# Patient Record
Sex: FEMALE | Race: WHITE | Employment: FULL TIME | URBAN - METROPOLITAN AREA
[De-identification: names, ages, dates, MRNs, and addresses within clinical notes are randomized per-mention and may not be internally consistent; named-entity substitution may affect disease eponyms.]

---

## 2017-08-28 ENCOUNTER — OFFICE VISIT (OUTPATIENT)
Dept: FAMILY MEDICINE CLINIC | Age: 52
End: 2017-08-28

## 2017-08-28 VITALS
WEIGHT: 191 LBS | HEIGHT: 65 IN | HEART RATE: 76 BPM | OXYGEN SATURATION: 100 % | DIASTOLIC BLOOD PRESSURE: 52 MMHG | RESPIRATION RATE: 16 BRPM | BODY MASS INDEX: 31.82 KG/M2 | SYSTOLIC BLOOD PRESSURE: 107 MMHG | TEMPERATURE: 97 F

## 2017-08-28 DIAGNOSIS — Z13.31 SCREENING FOR DEPRESSION: ICD-10-CM

## 2017-08-28 DIAGNOSIS — L65.9 HAIR LOSS: Primary | ICD-10-CM

## 2017-08-28 DIAGNOSIS — F31.81 BIPOLAR 2 DISORDER, MAJOR DEPRESSIVE EPISODE (HCC): ICD-10-CM

## 2017-08-28 RX ORDER — BUSPIRONE HYDROCHLORIDE 7.5 MG/1
7.5 TABLET ORAL 2 TIMES DAILY
Qty: 60 TAB | Refills: 0 | Status: SHIPPED | OUTPATIENT
Start: 2017-08-28 | End: 2017-09-12 | Stop reason: SDUPTHER

## 2017-08-28 NOTE — PROGRESS NOTES
Carmelo Williamson is an 50 y.o. female . Chief complaint: hair loss and anxiety     Pt presents as a new pt to me. She has a hx of bipolar disorder, depression and anxiety and states she currently is experiencing \"cycling. \" Pt reports hair loss, hot flashes, weight loss, loss of appetite and anxiety. States that she has not been sleeping well - chronically sleeps 2-3 hrs a night. \"My brain is not shutting off. \" Her  has reported that she is very distracted with activities at home. Reports she is still having regular menstrual cycles-finished her cycle 4 days ago. 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 She was previously following Dr. Neal Nuñez and taking Lugene Jeff and Buspar which she stopped on her own. She also has not seen Dr. Neal Nuñez since 2015. Reports latuda and buspar were both helping and she denies any adverse effects from the medications but she stopped taking them because she is just a \"bad patient. \" Pt states medication was effective and she stopped it because she is just \"not that great at taking medication. \" Of note, pt reports being hospitalized for suicidal ideation and severe depression in 2003 at [facility]. Reports to have previously tried Paxil, remeron, prozac, wellbutrin, lexapro, celexa, abilify, lamictal, klonopin, trazadone. Denies current suicidal and homicidal ideation. Pt is extremely concerned about her hair loss and that is the primary reason she is here today. Denies using any new products. Allergies - reviewed:   No Known Allergies      Medications - reviewed:   Current Outpatient Prescriptions   Medication Sig    lurasidone (LATUDA) 20 mg tab tablet Take 1 Tab by mouth daily (with breakfast) for 30 days.  busPIRone (BUSPAR) 7.5 mg tablet Take 1 Tab by mouth two (2) times a day. No current facility-administered medications for this visit.         Past Medical History - reviewed:  Past Medical History:   Diagnosis Date    Anxiety     Asthma     Bipolar 2 disorder ([address]     Depression     Pyelonephritis 6/7/14       Immunizations - reviewed: There is no immunization history on file for this patient. ROS  Constitutional: +weight change, +loss of appeite  HEENT: denies blurry vision   Cardiac: denies palpitations  Pulm: denies sob   GI: denies nausea/vomiting  Extremities: denies CAT   Endocrine: +hot flashes, +hair loss   Psych: +anxiety, + bipolar, + depression. PHQ 9 positive. Skin: denies skin changes  Neuro: denies headache    Physical Exam  Visit Vitals    /52    Pulse 76    Temp 97 °F (36.1 °C) (Oral)    Resp 16    Ht 5' 5\" (1.651 m)    Wt 191 lb (86.6 kg)    LMP 08/21/2017    SpO2 100%    BMI 31.78 kg/m2       General appearance - Alert, NAD. Appears anxious   Head: Atraumatic. Normocephalic. No lymphadenopathy  Eyes: EOMI. Sclera white. Respiratory - LCTAB. No wheeze/rale/rhonchi  Heart - Normal rate, regular rhythm. No m/r/r  Neurological/Psych - No focal deficits. Speech normal. Appears slightly anxious  Extremities - No LE edema. Distal pulses intact  Skin - normal coloration and normal turgor. No cyanosis, no rash. Assessment/Plan  Hair loss: Unclear etiology.   -Will check TSH, CBC to rule out thyroid dysfunction and iron deficiency anemia as a possible source     Bipolar Disorder, Depression, Anxiety: Poorly controlled. Pt appears to be hypomanic and depressed warranting treatment and referral to psychiatry.   -Prescribed Latuda and Buspar and pt tolerated these well previously. Return to clinic in 2 weeks for follow-up  -Referral provided for psychiatry. Urged pt to set up appt ASAP. -Pt denies suicidal/homicidal ideation. Suicide precautions provided. Follow-up Disposition:  Return in about 2 weeks (around 9/11/2017) for follow-up of bipolar and depression .     I have discussed the diagnosis with the patient and the intended plan as seen in the above orders.  she has expressed understanding.  The patient has received an after-visit summary and questions were answered concerning future plans.  I have discussed medication side effects and warnings with the patient as well. Instructed patient to contact office or on-call physician promptly should condition worsen or any new symptoms appear and provided on-call telephone numbers. IF THE PATIENT HAS ANY SUICIDAL OR HOMICIDAL IDEATION, CALL THE OFFICE, DISCUSS WITH A SUPPORT MEMBER OR GO TO THE ER IMMEDIATELY- pt said they would.     Pt discussed with Dr. Gerardo Velasco MD  Family Medicine Resident

## 2017-08-28 NOTE — PATIENT INSTRUCTIONS
Dontrell Dixon [address] (Dr. Leidy Sheth): [email or web address]  [address] [facility]. [address], [place], [address] [place] [address] Street  Phone: [phone]  Fax: [phone]  Office Hours:  Mon: 10:00 am to 4:00 pm  Tue: 8:00 am to 6:00 pm  Wed-Thur: 8:00 am to 7:00 pm    Instructed patient to contact office or on-call physician promptly should condition worsen or any new symptoms appear and provided on-call telephone numbers. IF THE PATIENT HAS ANY SUICIDAL OR HOMICIDAL IDEATION, CALL THE OFFICE, DISCUSS WITH A SUPPORT MEMBER OR GO TO THE ER IMMEDIATELY- pt said they would. Bipolar Disorder: Care Instructions  Your Care Instructions  Bipolar disorder is an illness that causes extreme mood changes, from times of very high energy (manic episodes) to times of depression. But many people with bipolar disorder show only the symptoms of depression. These moods may cause problems with your work, school, family life, friendships, and how well you function. This disease is also called manic-depression. There is no cure for bipolar disorder, but it can be helped with medicines. Counseling may also help. It is important to take your medicines exactly as prescribed, even when you feel well. You may need lifelong treatment. Follow-up care is a key part of your treatment and safety. Be sure to make and go to all appointments, and call your doctor if you are having problems. It's also a good idea to know your test results and keep a list of the medicines you take. How can you care for yourself at home? · Be safe with medicines. Take your medicines exactly as prescribed. Do not stop or change a medicine without talking to your doctor first. Baldemar Mcmullen and your doctor may need to try different combinations of medicines to find what works for you. · Take your medicines on schedule to keep your moods even. When you feel good, you may think that you do not need your medicines.  But it is important to keep taking them.  · Go to your counseling sessions. Call and talk with your counselor if you can't go to a session or if you don't think the sessions are helping. Do not just stop going. · Get at least 30 minutes of activity on most days of the week. Walking is a good choice. You also may want to do other things, such as running, swimming, or cycling. · Get enough sleep. Keep your room dark and quiet. Try to go to bed at the same time every night. · Eat a healthy diet. This includes whole grains, dairy, fruits, vegetables, and protein. Eat foods from each of these groups. · Try to lower your stress. Manage your time, build a strong support system, and lead a healthy lifestyle. To lower your stress, try physical activity, slow deep breathing, or getting a massage. · Do not use alcohol or illegal drugs. · Learn the early signs of your mood changes. You can then take steps to help yourself feel better. · Ask for help from friends and family when you need it. You may need help with daily chores when you are depressed. When you are manic, you may need support to control your high energy levels. What should you do if someone in your family has bipolar disorder? · Learn about the disease and the signs that it is getting worse. · Remind your family member that you love him or her. · Make a plan with all family members about how to take care of your loved one when his or her symptoms are bad. · Talk about your fears and concerns and those of other family members. Seek counseling if needed. · Do not focus attention only on the person who is in treatment. · Remind yourself that it will take time for changes to occur. · Do not blame yourself for the disease. · Know your legal rights and the legal rights of your family member. Support groups or counselors can help you with this information. · Take care of yourself. Keep up with your own interests, such as your career, hobbies, and friends.  Use exercise, positive self-talk, deep breathing, and other relaxing exercises to help lower your stress. · Give yourself time to grieve. You may need to deal with emotions such as anger, fear, and frustration. After you work through your feelings, you will be better able to care for yourself and your family. · If you are having a hard time with your feelings or with your relationship with your family member, talk with a counselor. When should you call for help? Call 911 anytime you think you may need emergency care. For example, call if:  · You feel like hurting yourself or someone else. · Someone who has bipolar disorder displays dangerous behavior, and you think the person might hurt himself or herself or someone else. Call your doctor now or seek immediate medical care if:  · You hear voices. · Someone you know has bipolar disorder and talks about suicide. Keep the numbers for these national suicide hotlines: 0-188-024-TALK (6-331.882.6820) and 1-358-HGSZSAW (0-429.402.7615). If a suicide threat seems real, with a specific plan and a way to carry it out, stay with the person, or ask someone you trust to stay with the person, until you can get help. · Someone you know has bipolar disorder and:  ¨ Starts to give away possessions. ¨ Is using illegal drugs or drinking alcohol heavily. ¨ Talks or writes about death, including writing suicide notes or talking about guns, knives, or pills. ¨ Talks or writes about hurting someone else. ¨ Starts to spend a lot of time alone. ¨ Acts very aggressively or suddenly appears calm. ¨ Talks about beliefs that are not based in reality (delusions). Watch closely for changes in your health, and be sure to contact your doctor if:  · You cannot go to your counseling sessions. Where can you learn more? Go to http://lilia-rohit.info/. Enter K052 in the search box to learn more about \"Bipolar Disorder: Care Instructions. \"  Current as of: July 26, 2016  Content Version: 11.3  © 4577-8445 [employer]. Care instructions adapted under license by [employer] (which disclaims liability or warranty for this information). If you have questions about a medical condition or this instruction, always ask your healthcare professional. [address] any warranty or liability for your use of this information. Depression and Chronic Disease: Care Instructions  Your Care Instructions  A chronic disease is one that you have for a long time. Some chronic diseases can be controlled, but they usually cannot be cured. Depression is common in people with chronic diseases, but it often goes unnoticed. Many people have concerns about seeking treatment for a mental health problem. You may think it's a sign of weakness, or you don't want people to know about it. It's important to overcome these reasons for not seeking treatment. Treating depression or anxiety is good for your health. Follow-up care is a key part of your treatment and safety. Be sure to make and go to all appointments, and call your doctor if you are having problems. It's also a good idea to know your test results and keep a list of the medicines you take. How can you care for yourself at home? Watch for symptoms of depression  The symptoms of depression are often subtle at first. You may think they are caused by your disease rather than depression. Or you may think it is normal to be depressed when you have a chronic disease. If you are depressed you may:  · Feel sad or hopeless. · Feel guilty or worthless. · Not enjoy the things you used to enjoy. · Feel hopeless, as though life is not worth living. · Have trouble thinking or remembering. · Have low energy, and you may not eat or sleep well. · Pull away from others. · Think often about death or killing yourself.  (Keep the numbers for these national suicide hotlines: 0-344-539-TALK [1-434.463.4419] and 6-107-LAIHHRR [1-831.562.4522]. )  Get treatment  By treating your depression, you can feel more hopeful and have more energy. If you feel better, you may take better care of yourself, so your health may improve. · Talk to your doctor if you have any changes in mood during treatment for your disease. · Ask your doctor for help. Counseling, antidepressant medicine, or a combination of the two can help most people with depression. Often a combination works best. Counseling can also help you cope with having a chronic disease. When should you call for help? Call 911 anytime you think you may need emergency care. For example, call if:  · You feel like hurting yourself or someone else. · Someone you know has depression and is about to attempt or is attempting suicide. Call your doctor now or seek immediate medical care if:  · You hear voices. · Someone you know has depression and:  ¨ Starts to give away his or her possessions. ¨ Uses illegal drugs or drinks alcohol heavily. ¨ Talks or writes about death, including writing suicide notes or talking about guns, knives, or pills. ¨ Starts to spend a lot of time alone. ¨ Acts very aggressively or suddenly appears calm. Watch closely for changes in your health, and be sure to contact your doctor if:  · You do not get better as expected. Where can you learn more? Go to http://lilia-rohit.info/. Enter T007 in the search box to learn more about \"Depression and Chronic Disease: Care Instructions. \"  Current as of: July 26, 2016  Content Version: 11.3  © 7982-3972 [employer]. Care instructions adapted under license by [employer] (which disclaims liability or warranty for this information). If you have questions about a medical condition or this instruction, always ask your healthcare professional. [address] any warranty or liability for your use of this information.

## 2017-08-28 NOTE — MR AVS SNAPSHOT
Visit Information Date & Time Provider Department Dept. Phone Encounter #  
 8/28/2017  4:10 PM Ernie Grimes MD [facility] [phone] [id number] Follow-up Instructions Return in about 2 weeks (around 9/11/2017) for follow-up of bipolar and depression . Upcoming Health Maintenance Date Due DTaP/Tdap/Td series (1 - Tdap) 3/31/1988 PAP AKA CERVICAL CYTOLOGY 7/12/2015 BREAST CANCER SCRN MAMMOGRAM 3/31/2017 FOBT Q 1 YEAR AGE 50-75 3/31/2017 INFLUENZA AGE 9 TO ADULT 8/1/2017 Allergies as of 8/28/2017  Review Complete On: 6/19/2015 By: Gilmer Perdomo RN No Known Allergies Current Immunizations  Never Reviewed No immunizations on file. Not reviewed this visit You Were Diagnosed With   
  
 Codes Comments Hair loss    -  Primary ICD-10-CM: L65.9 ICD-9-CM: 704.00 Screening for depression     ICD-10-CM: Z13.89 ICD-9-CM: V79.0 Bipolar 2 disorder, major depressive episode (HCC)     ICD-10-CM: F31.81 
ICD-9-CM: 296.89 Vitals BP Pulse Temp Resp Height(growth percentile) Weight(growth percentile) 107/52 76 97 °F (36.1 °C) (Oral) 16 5' 5\" (1.651 m) 191 lb (86.6 kg) LMP SpO2 BMI OB Status Smoking Status 08/21/2017 100% 31.78 kg/m2 Having regular periods Former Smoker BMI and BSA Data Body Mass Index Body Surface Area 31.78 kg/m 2 1.99 m 2 Preferred Pharmacy Pharmacy Name Phone [id number], [address] AT [facility] [phone] Your Updated Medication List  
  
   
This list is accurate as of: 8/28/17  4:46 PM.  Always use your most recent med list.  
  
  
  
  
 busPIRone 7.5 mg tablet Commonly known as:  BUSPAR Take 1 Tab by mouth two (2) times a day. lurasidone 20 mg Tab tablet Commonly known as:  Lars Car Take 1 Tab by mouth daily (with breakfast) for 30 days. Prescriptions Sent to Pharmacy Refills  
 lurasidone (LATUDA) 20 mg tab tablet 0 Sig: Take 1 Tab by mouth daily (with breakfast) for 30 days. Class: Normal  
 Pharmacy: [address] [place] [address] Ph #: [phone] Route: Oral  
 busPIRone (BUSPAR) 7.5 mg tablet 0 Sig: Take 1 Tab by mouth two (2) times a day. Class: Normal  
 Pharmacy: [address] [place] [address] Ph #: [phone] Route: Oral  
  
We Performed the Following CBC WITH AUTOMATED DIFF [39435 CPT(R)] METABOLIC PANEL, COMPREHENSIVE [34108 CPT(R)] [address] [facility] [email or web address] [ \[facility]\""] REFERRAL TO PSYCHIATRY [REF91 Custom] Comments:  
 Please evaluate patient for bipolar disorder and depression TSH RFX ON ABNORMAL TO FREE T4 [[id number] Custom] Follow-up Instructions Return in about 2 weeks (around 9/11/2017) for follow-up of bipolar and depression . Referral Information Referral ID Referred By Referred To  
  
 [id number] CECILIA RAMOS Not Available Visits Status Start Date End Date 1 New Request 8/28/17 8/28/18 If your referral has a status of pending review or denied, additional information will be sent to support the outcome of this decision. Patient Instructions Dontrell Dixon 149 (Dr. Sandra Hobbs): [email or web address] 
[address] [facility] [address] Phone: [phone] Fax: [phone] Office Hours: 
Mon: 10:00 am to 4:00 pm 
Tue: 8:00 am to 6:00 pm 
Wed-Thur: 8:00 am to 7:00 pm 
 
Instructed patient to contact office or on-call physician promptly should condition worsen or any new symptoms appear and provided on-call telephone numbers. IF THE PATIENT HAS ANY SUICIDAL OR HOMICIDAL IDEATION, CALL THE OFFICE, DISCUSS WITH A SUPPORT MEMBER OR GO TO THE ER IMMEDIATELY- pt said they would. Bipolar Disorder: Care Instructions Your Care Instructions Bipolar disorder is an illness that causes extreme mood changes, from times of very high energy (manic episodes) to times of depression. But many people with bipolar disorder show only the symptoms of depression. These moods may cause problems with your work, school, family life, friendships, and how well you function. This disease is also called manic-depression. There is no cure for bipolar disorder, but it can be helped with medicines. Counseling may also help. It is important to take your medicines exactly as prescribed, even when you feel well. You may need lifelong treatment. Follow-up care is a key part of your treatment and safety. Be sure to make and go to all appointments, and call your doctor if you are having problems. It's also a good idea to know your test results and keep a list of the medicines you take. How can you care for yourself at home? · Be safe with medicines. Take your medicines exactly as prescribed. Do not stop or change a medicine without talking to your doctor first. James Pratt and your doctor may need to try different combinations of medicines to find what works for you. · Take your medicines on schedule to keep your moods even. When you feel good, you may think that you do not need your medicines. But it is important to keep taking them. · Go to your counseling sessions. Call and talk with your counselor if you can't go to a session or if you don't think the sessions are helping. Do not just stop going. · Get at least 30 minutes of activity on most days of the week. Walking is a good choice. You also may want to do other things, such as running, swimming, or cycling. · Get enough sleep. Keep your room dark and quiet. Try to go to bed at the same time every night. · Eat a healthy diet. This includes whole grains, dairy, fruits, vegetables, and protein. Eat foods from each of these groups. · Try to lower your stress.  Manage your time, build a strong support system, and lead a healthy lifestyle. To lower your stress, try physical activity, slow deep breathing, or getting a massage. · Do not use alcohol or illegal drugs. · Learn the early signs of your mood changes. You can then take steps to help yourself feel better. · Ask for help from friends and family when you need it. You may need help with daily chores when you are depressed. When you are manic, you may need support to control your high energy levels. What should you do if someone in your family has bipolar disorder? · Learn about the disease and the signs that it is getting worse. · Remind your family member that you love him or her. · Make a plan with all family members about how to take care of your loved one when his or her symptoms are bad. · Talk about your fears and concerns and those of other family members. Seek counseling if needed. · Do not focus attention only on the person who is in treatment. · Remind yourself that it will take time for changes to occur. · Do not blame yourself for the disease. · Know your legal rights and the legal rights of your family member. Support groups or counselors can help you with this information. · Take care of yourself. Keep up with your own interests, such as your career, hobbies, and friends. Use exercise, positive self-talk, deep breathing, and other relaxing exercises to help lower your stress. · Give yourself time to grieve. You may need to deal with emotions such as anger, fear, and frustration. After you work through your feelings, you will be better able to care for yourself and your family. · If you are having a hard time with your feelings or with your relationship with your family member, talk with a counselor. When should you call for help? Call 911 anytime you think you may need emergency care. For example, call if: 
· You feel like hurting yourself or someone else.  
· Someone who has bipolar disorder displays dangerous behavior, and you think the person might hurt himself or herself or someone else. Call your doctor now or seek immediate medical care if: 
· You hear voices. · Someone you know has bipolar disorder and talks about suicide. Keep the numbers for these national suicide hotlines: 8-320-530-TALK (9-984.209.4011) and 4-652-BFJCNIL (0-311.902.1671). If a suicide threat seems real, with a specific plan and a way to carry it out, stay with the person, or ask someone you trust to stay with the person, until you can get help. · Someone you know has bipolar disorder and: 
¨ Starts to give away possessions. ¨ Is using illegal drugs or drinking alcohol heavily. ¨ Talks or writes about death, including writing suicide notes or talking about guns, knives, or pills. ¨ Talks or writes about hurting someone else. ¨ Starts to spend a lot of time alone. ¨ Acts very aggressively or suddenly appears calm. ¨ Talks about beliefs that are not based in reality (delusions). Watch closely for changes in your health, and be sure to contact your doctor if: 
· You cannot go to your counseling sessions. Where can you learn more? Go to http://lilia-rohit.info/. Enter K052 in the search box to learn more about \"Bipolar Disorder: Care Instructions. \" Current as of: July 26, 2016 Content Version: 11.3 © 7231-6603 Rainbow. Care instructions adapted under license by [employer] (which disclaims liability or warranty for this information). If you have questions about a medical condition or this instruction, always ask your healthcare professional. [address] any warranty or liability for your use of this information. Depression and Chronic Disease: Care Instructions Your Care Instructions A chronic disease is one that you have for a long time. Some chronic diseases can be controlled, but they usually cannot be cured.  Depression is common in people with chronic diseases, but it often goes unnoticed. Many people have concerns about seeking treatment for a mental health problem. You may think it's a sign of weakness, or you don't want people to know about it. It's important to overcome these reasons for not seeking treatment. Treating depression or anxiety is good for your health. Follow-up care is a key part of your treatment and safety. Be sure to make and go to all appointments, and call your doctor if you are having problems. It's also a good idea to know your test results and keep a list of the medicines you take. How can you care for yourself at home? Watch for symptoms of depression The symptoms of depression are often subtle at first. You may think they are caused by your disease rather than depression. Or you may think it is normal to be depressed when you have a chronic disease. If you are depressed you may: · Feel sad or hopeless. · Feel guilty or worthless. · Not enjoy the things you used to enjoy. · Feel hopeless, as though life is not worth living. · Have trouble thinking or remembering. · Have low energy, and you may not eat or sleep well. · Pull away from others. · Think often about death or killing yourself. (Keep the numbers for these national suicide hotlines: 2-511-562-TALK [1-157.971.4622] and 6-685-HTFRVBQ [1-166.238.8683]. ) Get treatment By treating your depression, you can feel more hopeful and have more energy. If you feel better, you may take better care of yourself, so your health may improve. · Talk to your doctor if you have any changes in mood during treatment for your disease. · Ask your doctor for help. Counseling, antidepressant medicine, or a combination of the two can help most people with depression. Often a combination works best. Counseling can also help you cope with having a chronic disease. When should you call for help? Call 911 anytime you think you may need emergency care. For example, call if: 
· You feel like hurting yourself or someone else. · Someone you know has depression and is about to attempt or is attempting suicide. Call your doctor now or seek immediate medical care if: 
· You hear voices. · Someone you know has depression and: 
¨ Starts to give away his or her possessions. ¨ Uses illegal drugs or drinks alcohol heavily. ¨ Talks or writes about death, including writing suicide notes or talking about guns, knives, or pills. ¨ Starts to spend a lot of time alone. ¨ Acts very aggressively or suddenly appears calm. Watch closely for changes in your health, and be sure to contact your doctor if: 
· You do not get better as expected. Where can you learn more? Go to http://lilia-rohit.info/. Enter V422 in the search box to learn more about \"Depression and Chronic Disease: Care Instructions. \" Current as of: July 26, 2016 Content Version: 11.3 © 9296-6519 Healthwise, Incorporated. Care instructions adapted under license by [employer] (which disclaims liability or warranty for this information). If you have questions about a medical condition or this instruction, always ask your healthcare professional. [address] any warranty or liability for your use of this information. Introducing [facility] & HEALTH SERVICES! Brian Marin introduces [employer] patient portal. Now you can access parts of your medical record, email your doctor's office, and request medication refills online. 1. In your internet browser, go to [email or web address] 2. Click on the First Time User? Click Here link in the Sign In box. You will see the New Member Sign Up page. 3. Enter your [employer] Access Code exactly as it appears below. You will not need to use this code after youve completed the sign-up process.  If you do not sign up before the expiration date, you must request a new code. · [employer] Access Code: [email or web address] Expires: 11/26/2017  4:46 PM 
 
4. Enter the last four digits of your Social Security Number (xxxx) and Date of Birth (mm/dd/yyyy) as indicated and click Submit. You will be taken to the next sign-up page. 5. Create a [employer] ID. This will be your [employer] login ID and cannot be changed, so think of one that is secure and easy to remember. 6. Create a [employer] password. You can change your password at any time. 7. Enter your Password Reset Question and Answer. This can be used at a later time if you forget your password. 8. Enter your e-mail address. You will receive e-mail notification when new information is available in [address] 9. Click Sign Up. You can now view and download portions of your medical record. 10. Click the Download Summary menu link to download a portable copy of your medical information. If you have questions, please visit the Frequently Asked Questions section of the [employer] website. Remember, [employer] is NOT to be used for urgent needs. For medical emergencies, dial 911. Now available from your iPhone and Android! Please provide this summary of care documentation to your next provider. Your primary care clinician is listed as Rachel Campuzano. If you have any questions after today's visit, please call [phone].

## 2017-08-29 LAB
ALBUMIN SERPL-MCNC: 4.3 G/DL (ref 3.5–5.5)
ALBUMIN/GLOB SERPL: 1.5 {RATIO} (ref 1.2–2.2)
ALP SERPL-CCNC: 73 IU/L (ref 39–117)
ALT SERPL-CCNC: 21 IU/L (ref 0–32)
AST SERPL-CCNC: 23 IU/L (ref 0–40)
BASOPHILS # BLD AUTO: 0.1 X10E3/UL (ref 0–0.2)
BASOPHILS NFR BLD AUTO: 1 %
BILIRUB SERPL-MCNC: 0.4 MG/DL (ref 0–1.2)
BUN SERPL-MCNC: 15 MG/DL (ref 6–24)
BUN/CREAT SERPL: 19 (ref 9–23)
CALCIUM SERPL-MCNC: 9.7 MG/DL (ref 8.7–10.2)
CHLORIDE SERPL-SCNC: 101 MMOL/L (ref 96–106)
CO2 SERPL-SCNC: 21 MMOL/L (ref 18–29)
CREAT SERPL-MCNC: 0.79 MG/DL (ref 0.57–1)
EOSINOPHIL # BLD AUTO: 0.3 X10E3/UL (ref 0–0.4)
EOSINOPHIL NFR BLD AUTO: 3 %
ERYTHROCYTE [DISTWIDTH] IN BLOOD BY AUTOMATED COUNT: 14.1 % (ref 12.3–15.4)
GLOBULIN SER CALC-MCNC: 2.8 G/DL (ref 1.5–4.5)
GLUCOSE SERPL-MCNC: 88 MG/DL (ref 65–99)
HCT VFR BLD AUTO: 39.5 % (ref 34–46.6)
HGB BLD-MCNC: 12.8 G/DL (ref 11.1–15.9)
IMM GRANULOCYTES # BLD: 0 X10E3/UL (ref 0–0.1)
IMM GRANULOCYTES NFR BLD: 0 %
LYMPHOCYTES # BLD AUTO: 3.2 X10E3/UL (ref 0.7–3.1)
LYMPHOCYTES NFR BLD AUTO: 30 %
MCH RBC QN AUTO: 29.9 PG (ref 26.6–33)
MCHC RBC AUTO-ENTMCNC: 32.4 G/DL (ref 31.5–35.7)
MCV RBC AUTO: 92 FL (ref 79–97)
MONOCYTES # BLD AUTO: 1 X10E3/UL (ref 0.1–0.9)
MONOCYTES NFR BLD AUTO: 9 %
NEUTROPHILS # BLD AUTO: 6.1 X10E3/UL (ref 1.4–7)
NEUTROPHILS NFR BLD AUTO: 57 %
PLATELET # BLD AUTO: 363 X10E3/UL (ref 150–379)
POTASSIUM SERPL-SCNC: 4.1 MMOL/L (ref 3.5–5.2)
PROT SERPL-MCNC: 7.1 G/DL (ref 6–8.5)
RBC # BLD AUTO: 4.28 X10E6/UL (ref 3.77–5.28)
SODIUM SERPL-SCNC: 139 MMOL/L (ref 134–144)
TSH SERPL DL<=0.005 MIU/L-ACNC: 3.07 UIU/ML (ref 0.45–4.5)
WBC # BLD AUTO: 10.6 X10E3/UL (ref 3.4–10.8)

## 2017-09-12 ENCOUNTER — OFFICE VISIT (OUTPATIENT)
Dept: FAMILY MEDICINE CLINIC | Age: 52
End: 2017-09-12

## 2017-09-12 VITALS
WEIGHT: 194 LBS | TEMPERATURE: 97.9 F | OXYGEN SATURATION: 99 % | HEART RATE: 54 BPM | SYSTOLIC BLOOD PRESSURE: 138 MMHG | BODY MASS INDEX: 32.32 KG/M2 | HEIGHT: 65 IN | DIASTOLIC BLOOD PRESSURE: 62 MMHG | RESPIRATION RATE: 16 BRPM

## 2017-09-12 DIAGNOSIS — F31.81 BIPOLAR 2 DISORDER, MAJOR DEPRESSIVE EPISODE (HCC): Primary | ICD-10-CM

## 2017-09-12 DIAGNOSIS — F41.9 ANXIETY: Chronic | ICD-10-CM

## 2017-09-12 RX ORDER — BUSPIRONE HYDROCHLORIDE 7.5 MG/1
15 TABLET ORAL 2 TIMES DAILY
Qty: 60 TAB | Refills: 0 | Status: SHIPPED | OUTPATIENT
Start: 2017-09-12 | End: 2017-12-12 | Stop reason: SDUPTHER

## 2017-09-12 NOTE — MR AVS SNAPSHOT
Visit Information Date & Time Provider Department Dept. Phone Encounter #  
 9/12/2017  4:10 PM Nicole Harkins MD [address] [place] [address] [phone] [id number] Follow-up Instructions Return in about 3 weeks (around 10/3/2017). Upcoming Health Maintenance Date Due DTaP/Tdap/Td series (1 - Tdap) 3/31/1988 PAP AKA CERVICAL CYTOLOGY 7/12/2015 BREAST CANCER SCRN MAMMOGRAM 3/31/2017 FOBT Q 1 YEAR AGE 50-75 3/31/2017 INFLUENZA AGE 9 TO ADULT 8/1/2017 Allergies as of 9/12/2017  Review Complete On: 9/12/2017 By: Hawk Andrade LPN No Known Allergies Current Immunizations  Never Reviewed No immunizations on file. Not reviewed this visit You Were Diagnosed With   
  
 Codes Comments Bipolar 2 disorder, major depressive episode ([facility]    -  Primary ICD-10-CM: F31.81 
ICD-9-CM: 296.89 Anxiety     ICD-10-CM: F41.9 ICD-9-CM: 300.00 Vitals BP Pulse Temp Resp Height(growth percentile) Weight(growth percentile)  
 138/62 (!) 54 97.9 °F (36.6 °C) (Oral) 16 5' 5\" (1.651 m) 194 lb (88 kg) LMP SpO2 BMI OB Status Smoking Status 08/21/2017 99% 32.28 kg/m2 Having regular periods Former Smoker BMI and BSA Data Body Mass Index Body Surface Area  
 32.28 kg/m 2 2.01 m 2 [facility]erred [facility] Name Phone [id number] [address] [facility] [phone] Your Updated Medication List  
  
   
This list is accurate as of: 9/12/17  4:52 PM.  Always use your most recent med list.  
  
  
  
  
 busPIRone 7.5 mg tablet Commonly known as:  BUSPAR Take 2 Tabs by mouth two (2) times a day. lurasidone 40 mg Tab tablet Commonly known as:  Euell Stew Take 1 Tab by mouth daily (with breakfast) for 30 days. Prescriptions Sent to Pharmacy  Refills  
 lurasidone (LATUDA) 40 mg tab tablet 0  
 Sig: Take 1 Tab by mouth daily (with breakfast) for 30 days. Class: Normal  
 Pharmacy: [address] [place] [address] Ph #: [phone] Route: Oral  
 busPIRone (BUSPAR) 7.5 mg tablet 0 Sig: Take 2 Tabs by mouth two (2) times a day. Class: Normal  
 Pharmacy: [address] [place] [address] Ph #: [phone] Route: Oral  
  
Follow-up Instructions Return in about 3 weeks (around 10/3/2017). Patient Instructions Bipolar Disorder: Care Instructions Your Care Instructions Bipolar disorder is an illness that causes extreme mood changes, from times of very high energy (manic episodes) to times of depression. But many people with bipolar disorder show only the symptoms of depression. These moods may cause problems with your work, school, family life, friendships, and how well you function. This disease is also called manic-depression. There is no cure for bipolar disorder, but it can be helped with medicines. Counseling may also help. It is important to take your medicines exactly as prescribed, even when you feel well. You may need lifelong treatment. Follow-up care is a key part of your treatment and safety. Be sure to make and go to all appointments, and call your doctor if you are having problems. It's also a good idea to know your test results and keep a list of the medicines you take. How can you care for yourself at home? · Be safe with medicines. Take your medicines exactly as prescribed. Do not stop or change a medicine without talking to your doctor first. Samira Xie and your doctor may need to try different combinations of medicines to find what works for you. · Take your medicines on schedule to keep your moods even. When you feel good, you may think that you do not need your medicines. But it is important to keep taking them. · Go to your counseling sessions. Call and talk with your counselor if you can't go to a session or if you don't think the sessions are helping. Do not just stop going. · Get at least 30 minutes of activity on most days of the week. Walking is a good choice. You also may want to do other things, such as running, swimming, or cycling. · Get enough sleep. Keep your room dark and quiet. Try to go to bed at the same time every night. · Eat a healthy diet. This includes whole grains, dairy, fruits, vegetables, and protein. Eat foods from each of these groups. · Try to lower your stress. Manage your time, build a strong support system, and lead a healthy lifestyle. To lower your stress, try physical activity, slow deep breathing, or getting a massage. · Do not use alcohol or illegal drugs. · Learn the early signs of your mood changes. You can then take steps to help yourself feel better. · Ask for help from friends and family when you need it. You may need help with daily chores when you are depressed. When you are manic, you may need support to control your high energy levels. What should you do if someone in your family has bipolar disorder? · Learn about the disease and the signs that it is getting worse. · Remind your family member that you love him or her. · Make a plan with all family members about how to take care of your loved one when his or her symptoms are bad. · Talk about your fears and concerns and those of other family members. Seek counseling if needed. · Do not focus attention only on the person who is in treatment. · Remind yourself that it will take time for changes to occur. · Do not blame yourself for the disease. · Know your legal rights and the legal rights of your family member. Support groups or counselors can help you with this information. · Take care of yourself. Keep up with your own interests, such as your career, hobbies, and friends.  Use exercise, positive self-talk, deep breathing, and other relaxing exercises to help lower your stress. · Give yourself time to grieve. You may need to deal with emotions such as anger, fear, and frustration. After you work through your feelings, you will be better able to care for yourself and your family. · If you are having a hard time with your feelings or with your relationship with your family member, talk with a counselor. When should you call for help? Call 911 anytime you think you may need emergency care. For example, call if: 
· You feel like hurting yourself or someone else. · Someone who has bipolar disorder displays dangerous behavior, and you think the person might hurt himself or herself or someone else. Call your doctor now or seek immediate medical care if: 
· You hear voices. · Someone you know has bipolar disorder and talks about suicide. Keep the numbers for these national suicide hotlines: 3-134-784-TALK (8-769.752.6357) and 1-169-DIOPUAP (3-917.357.5128). If a suicide threat seems real, with a specific plan and a way to carry it out, stay with the person, or ask someone you trust to stay with the person, until you can get help. · Someone you know has bipolar disorder and: 
¨ Starts to give away possessions. ¨ Is using illegal drugs or drinking alcohol heavily. ¨ Talks or writes about death, including writing suicide notes or talking about guns, knives, or pills. ¨ Talks or writes about hurting someone else. ¨ Starts to spend a lot of time alone. ¨ Acts very aggressively or suddenly appears calm. ¨ Talks about beliefs that are not based in reality (delusions). Watch closely for changes in your health, and be sure to contact your doctor if: 
· You cannot go to your counseling sessions. Where can you learn more? Go to http://lilia-rohit.info/. Enter K052 in the search box to learn more about \"Bipolar Disorder: Care Instructions. \" Current as of: July 26, 2016 Content Version: 11.3 © 5290-3858 Healthwise, Incorporated. Care instructions adapted under license by [employer] (which disclaims liability or warranty for this information). If you have questions about a medical condition or this instruction, always ask your healthcare professional. [address] any warranty or liability for your use of this information. Introducing [facility] & HEALTH SERVICES! Dear Liliya Simpson: 
Thank you for requesting a Pelikon account. Our records indicate that you already have an active Pelikon account. You can access your account anytime at [email or web address] Online Did you know that you can access your hospital and ER discharge instructions at any time in Pelikon? You can also review all of your test results from your hospital stay or ER visit. Additional Information If you have questions, please visit the Frequently Asked Questions section of the Pelikon website at [email or web address] Remember, Pelikon is NOT to be used for urgent needs. For medical emergencies, dial 911. Now available from your iPhone and Android! Please provide this summary of care documentation to your next provider. Your primary care clinician is listed as Rachel Campuzano. If you have any questions after today's visit, please call [phone].

## 2017-09-12 NOTE — PROGRESS NOTES
Chief Complaint   Patient presents with    Depression     f/u    Anxiety     1. Have you been to the ER, urgent care clinic since your last visit? Hospitalized since your last visit? No    2. Have you seen or consulted any other health care providers outside of the [address] [place] [address] since your last visit? Include any pap smears or colon screening.  No

## 2017-09-12 NOTE — PATIENT INSTRUCTIONS
Bipolar Disorder: Care Instructions  Your Care Instructions  Bipolar disorder is an illness that causes extreme mood changes, from times of very high energy (manic episodes) to times of depression. But many people with bipolar disorder show only the symptoms of depression. These moods may cause problems with your work, school, family life, friendships, and how well you function. This disease is also called manic-depression. There is no cure for bipolar disorder, but it can be helped with medicines. Counseling may also help. It is important to take your medicines exactly as prescribed, even when you feel well. You may need lifelong treatment. Follow-up care is a key part of your treatment and safety. Be sure to make and go to all appointments, and call your doctor if you are having problems. It's also a good idea to know your test results and keep a list of the medicines you take. How can you care for yourself at home? · Be safe with medicines. Take your medicines exactly as prescribed. Do not stop or change a medicine without talking to your doctor first. Riesa Body and your doctor may need to try different combinations of medicines to find what works for you. · Take your medicines on schedule to keep your moods even. When you feel good, you may think that you do not need your medicines. But it is important to keep taking them. · Go to your counseling sessions. Call and talk with your counselor if you can't go to a session or if you don't think the sessions are helping. Do not just stop going. · Get at least 30 minutes of activity on most days of the week. Walking is a good choice. You also may want to do other things, such as running, swimming, or cycling. · Get enough sleep. Keep your room dark and quiet. Try to go to bed at the same time every night. · Eat a healthy diet. This includes whole grains, dairy, fruits, vegetables, and protein. Eat foods from each of these groups. · Try to lower your stress. Manage your time, build a strong support system, and lead a healthy lifestyle. To lower your stress, try physical activity, slow deep breathing, or getting a massage. · Do not use alcohol or illegal drugs. · Learn the early signs of your mood changes. You can then take steps to help yourself feel better. · Ask for help from friends and family when you need it. You may need help with daily chores when you are depressed. When you are manic, you may need support to control your high energy levels. What should you do if someone in your family has bipolar disorder? · Learn about the disease and the signs that it is getting worse. · Remind your family member that you love him or her. · Make a plan with all family members about how to take care of your loved one when his or her symptoms are bad. · Talk about your fears and concerns and those of other family members. Seek counseling if needed. · Do not focus attention only on the person who is in treatment. · Remind yourself that it will take time for changes to occur. · Do not blame yourself for the disease. · Know your legal rights and the legal rights of your family member. Support groups or counselors can help you with this information. · Take care of yourself. Keep up with your own interests, such as your career, hobbies, and friends. Use exercise, positive self-talk, deep breathing, and other relaxing exercises to help lower your stress. · Give yourself time to grieve. You may need to deal with emotions such as anger, fear, and frustration. After you work through your feelings, you will be better able to care for yourself and your family. · If you are having a hard time with your feelings or with your relationship with your family member, talk with a counselor. When should you call for help? Call 911 anytime you think you may need emergency care. For example, call if:  · You feel like hurting yourself or someone else.   · Someone who has bipolar disorder displays dangerous behavior, and you think the person might hurt himself or herself or someone else. Call your doctor now or seek immediate medical care if:  · You hear voices. · Someone you know has bipolar disorder and talks about suicide. Keep the numbers for these national suicide hotlines: 9-073-434-TALK (4-403.713.8400) and 7-474-YZAREOA (4-178.484.6062). If a suicide threat seems real, with a specific plan and a way to carry it out, stay with the person, or ask someone you trust to stay with the person, until you can get help. · Someone you know has bipolar disorder and:  ¨ Starts to give away possessions. ¨ Is using illegal drugs or drinking alcohol heavily. ¨ Talks or writes about death, including writing suicide notes or talking about guns, knives, or pills. ¨ Talks or writes about hurting someone else. ¨ Starts to spend a lot of time alone. ¨ Acts very aggressively or suddenly appears calm. ¨ Talks about beliefs that are not based in reality (delusions). Watch closely for changes in your health, and be sure to contact your doctor if:  · You cannot go to your counseling sessions. Where can you learn more? Go to http://lilia-rohit.info/. Enter K052 in the search box to learn more about \"Bipolar Disorder: Care Instructions. \"  Current as of: July 26, 2016  Content Version: 11.3  © 1128-9854 [employer]. Care instructions adapted under license by [employer] (which disclaims liability or warranty for this information). If you have questions about a medical condition or this instruction, always ask your healthcare professional. [address] any warranty or liability for your use of this information.

## 2017-09-12 NOTE — PROGRESS NOTES
Abby Cool is an 50 y.o. female . Chief complaint: follow-up of bipolar, depression, anxiety     50year old presenting for follow-up of bipolar, depression, anxiety. She was seen on 8/28 and started on Latuda and Buspar at the lowest doses. She states that she has had some improvement of symptoms-has had fewer panic attacks and mood is overall slightly better. She is still sleeping approximately 3 hours a night and continues to feel as though her brain never shuts off. She has attempted to arrange an appt with a psychiatrist however has had some insurance coverage issues; is currently working with her insurance company to establish care with an in-network psychiatrist. She continues to be able to go to work daily without any issues and has a good support system at home. Has not had any side effects from her medication. Denies homicidal/suicidal ideation. Allergies - reviewed:   No Known Allergies      Medications - reviewed:   Current Outpatient Prescriptions   Medication Sig    lurasidone (LATUDA) 40 mg tab tablet Take 1 Tab by mouth daily (with breakfast) for 30 days.  busPIRone (BUSPAR) 7.5 mg tablet Take 2 Tabs by mouth two (2) times a day. No current facility-administered medications for this visit. Past Medical History - reviewed:  Past Medical History:   Diagnosis Date    Anxiety     Asthma     Bipolar 2 disorder ([facility] [address]     Depression     Pyelonephritis 6/7/14       Immunizations - reviewed: There is no immunization history on file for this patient.     ROS  Constitutional: Denies fatigue  HEENT: denies vision change   Cardiac: denies chest pain  Pulm: denies sob  GI: denies abdominal pain  Neuro: denies headache, dizziness  Psych: +insomnia, racing thoughts    Physical Exam  Visit Vitals    /62    Pulse (!) 54    Temp 97.9 °F (36.6 °C) (Oral)    Resp 16    Ht 5' 5\" (1.651 m)    Wt 194 lb (88 kg)    LMP 08/21/2017    SpO2 99%    BMI 32.28 kg/m2 General appearance - Alert, NAD. Head: Atraumatic. Normocephalic. No lymphadenopathy  Eyes: EOMI. Sclera white. Respiratory - LCTAB. No wheeze/rale/rhonchi  Heart - Normal rate, regular rhythm. No m/r/r  Neurological - No focal deficits. Speech normal.   Psych: Normal mood and affect     Assessment/Plan  Bipolar Disorder, Depression, Anxiety: Symptoms improving from previous visit after starting Latuda and Buspar but pt still experiencing decreased sleep and panic symptoms. Overall appears less anxious, more composed and in a better affect than last time.   -Will increase dose of Latuda from 20mg to 40mg daily and of Buspar from 7.5mg BID to 15mg BID to optimize therapy Return to clinic in 3 weeks for follow-up  -Pt to follow-up with insurance company regarding the psychiatrists that are in-network for her   -Pt denies suicidal/homicidal ideation. Suicide precautions provided. Follow-up Disposition:  Return in about 3 weeks (around 10/3/2017). I have discussed the diagnosis with the patient and the intended plan as seen in the above orders.  she has expressed understanding.  The patient has received an after-visit summary and questions were answered concerning future plans.  I have discussed medication side effects and warnings with the patient as well. Instructed patient to contact office or on-call physician promptly should condition worsen or any new symptoms appear and provided on-call telephone numbers. IF THE PATIENT HAS ANY SUICIDAL OR HOMICIDAL IDEATION, CALL THE OFFICE, DISCUSS WITH A SUPPORT MEMBER OR GO TO THE ER IMMEDIATELY- pt said they would.     Pt discussed with Dr. Sukhwinder Nunez MD  Family Medicine Resident

## 2017-12-04 ENCOUNTER — TELEPHONE (OUTPATIENT)
Dept: FAMILY MEDICINE CLINIC | Age: 52
End: 2017-12-04

## 2017-12-04 ENCOUNTER — OFFICE VISIT (OUTPATIENT)
Dept: FAMILY MEDICINE CLINIC | Age: 52
End: 2017-12-04

## 2017-12-04 VITALS
BODY MASS INDEX: 32.99 KG/M2 | RESPIRATION RATE: 16 BRPM | HEART RATE: 76 BPM | DIASTOLIC BLOOD PRESSURE: 84 MMHG | SYSTOLIC BLOOD PRESSURE: 135 MMHG | WEIGHT: 198 LBS | OXYGEN SATURATION: 100 % | HEIGHT: 65 IN | TEMPERATURE: 97.5 F

## 2017-12-04 DIAGNOSIS — R05.9 COUGH: ICD-10-CM

## 2017-12-04 DIAGNOSIS — J45.41 MODERATE PERSISTENT ASTHMA WITH ACUTE EXACERBATION: Primary | ICD-10-CM

## 2017-12-04 RX ORDER — ALBUTEROL SULFATE 0.83 MG/ML
2.5 SOLUTION RESPIRATORY (INHALATION)
Qty: 24 EACH | Refills: 2 | Status: SHIPPED | OUTPATIENT
Start: 2017-12-04 | End: 2022-03-25

## 2017-12-04 RX ORDER — ALBUTEROL SULFATE 0.83 MG/ML
2.5 SOLUTION RESPIRATORY (INHALATION) ONCE
Qty: 1 EACH | Refills: 0 | Status: SHIPPED | COMMUNITY
Start: 2017-12-04 | End: 2017-12-04

## 2017-12-04 RX ORDER — ALBUTEROL SULFATE 0.83 MG/ML
2.5 SOLUTION RESPIRATORY (INHALATION) ONCE
Qty: 24 EACH | Refills: 0 | Status: SHIPPED | OUTPATIENT
Start: 2017-12-04 | End: 2017-12-04

## 2017-12-04 RX ORDER — NEBULIZER AND COMPRESSOR
1 EACH MISCELLANEOUS
Qty: 1 EACH | Refills: 0 | Status: SHIPPED | OUTPATIENT
Start: 2017-12-04 | End: 2022-03-25

## 2017-12-04 RX ORDER — PREDNISONE 20 MG/1
60 TABLET ORAL
Qty: 15 TAB | Refills: 0 | Status: SHIPPED | OUTPATIENT
Start: 2017-12-04 | End: 2017-12-12 | Stop reason: ALTCHOICE

## 2017-12-04 RX ORDER — ALBUTEROL SULFATE 90 UG/1
2 AEROSOL, METERED RESPIRATORY (INHALATION)
COMMUNITY
End: 2022-03-25

## 2017-12-04 RX ORDER — NEBULIZER AND COMPRESSOR
1 EACH MISCELLANEOUS
Qty: 1 EACH | Refills: 0 | Status: SHIPPED | OUTPATIENT
Start: 2017-12-04 | End: 2017-12-04 | Stop reason: SDUPTHER

## 2017-12-04 RX ORDER — LURASIDONE HYDROCHLORIDE 20 MG/1
TABLET, FILM COATED ORAL
Refills: 0 | Status: ON HOLD | COMMUNITY
Start: 2017-08-28 | End: 2018-06-13

## 2017-12-04 NOTE — LETTER
NOTIFICATION RETURN TO WORK  
 
12/4/2017 11:29 AM 
 
Ms. Tolu Cali [address] [facility] [address] [id number] To Whom It May Concern: 
 
Tolu Cali is currently under the care of [facility] [employer]. She will return to work on: Thursday, December 7, 2017 If there are questions or concerns please have the patient contact our office. Sincerely, Darien Coffman MD

## 2017-12-04 NOTE — PATIENT INSTRUCTIONS
Asthma Attack: Care Instructions  Your Care Instructions    During an asthma attack, the airways swell and narrow. This makes it hard to breathe. Severe asthma attacks can be life-threatening, but you can help prevent them by keeping your asthma under control and treating symptoms before they get bad. Symptoms include being short of breath, having chest tightness, coughing, and wheezing. Noting and treating these symptoms can also help you avoid future trips to the emergency room. The doctor has checked you carefully, but problems can develop later. If you notice any problems or new symptoms, get medical treatment right away. Follow-up care is a key part of your treatment and safety. Be sure to make and go to all appointments, and call your doctor if you are having problems. It's also a good idea to know your test results and keep a list of the medicines you take. How can you care for yourself at home? · Follow your asthma action plan to prevent and treat attacks. If you don't have an asthma action plan, work with your doctor to create one. · Take your asthma medicines exactly as prescribed. Talk to your doctor right away if you have any questions about how to take them. ¨ Use your quick-relief medicine when you have symptoms of an attack. Quick-relief medicine is usually an albuterol inhaler. Some people need to use quick-relief medicine before they exercise. ¨ Take your controller medicine every day, not just when you have symptoms. Controller medicine is usually an inhaled corticosteroid. The goal is to prevent problems before they occur. Don't use your controller medicine to treat an attack that has already started. It doesn't work fast enough to help. ¨ If your doctor prescribed corticosteroid pills to use during an attack, take them exactly as prescribed. It may take hours for the pills to work, but they may make the episode shorter and help you breathe better.   ¨ Keep your quick-relief medicine with you at all times. · Talk to your doctor before using other medicines. Some medicines, such as aspirin, can cause asthma attacks in some people. · If you have a peak flow meter, use it to check how well you are breathing. This can help you predict when an asthma attack is going to occur. Then you can take medicine to prevent the asthma attack or make it less severe. · Do not smoke or allow others to smoke around you. Avoid smoky places. Smoking makes asthma worse. If you need help quitting, talk to your doctor about stop-smoking programs and medicines. These can increase your chances of quitting for good. · Learn what triggers an asthma attack for you, and avoid the triggers when you can. Common triggers include colds, smoke, air pollution, dust, pollen, mold, pets, cockroaches, stress, and cold air. · Avoid colds and the flu. Get a pneumococcal vaccine shot. If you have had one before, ask your doctor if you need a second dose. Get a flu vaccine every fall. If you must be around people with colds or the flu, wash your hands often. When should you call for help? Call 911 anytime you think you may need emergency care. For example, call if:  ? · You have severe trouble breathing. ?Call your doctor now or seek immediate medical care if:  ? · Your symptoms do not get better after you have followed your asthma action plan. ? · You have new or worse trouble breathing. ? · Your coughing and wheezing get worse. ? · You cough up dark brown or bloody mucus (sputum). ? · You have a new or higher fever. ? Watch closely for changes in your health, and be sure to contact your doctor if:  ? · You need to use quick-relief medicine on more than 2 days a week (unless it is just for exercise). ? · You cough more deeply or more often, especially if you notice more mucus or a change in the color of your mucus. ? · You are not getting better as expected. Where can you learn more?   Go to http://lilia-rohit.info/. Enter G167 in the search box to learn more about \"Asthma Attack: Care Instructions. \"  Current as of: May 12, 2017  Content Version: 11.4  © 0779-1990 [employer], [employer]. Care instructions adapted under license by [employer] (which disclaims liability or warranty for this information). If you have questions about a medical condition or this instruction, always ask your healthcare professional. [place] [address] any warranty or liability for your use of this information.

## 2017-12-04 NOTE — TELEPHONE ENCOUNTER
Pharmacy advising insurance will not cover the Located within [facility] [address] teofilo ANDRADE is required     please call pharmacy at [phone]

## 2017-12-04 NOTE — PROGRESS NOTES
Chief Complaint   Patient presents with    Asthma     c/o cough shortness of breath wheezing persistent since September 1. Have you been to the ER, urgent care clinic since your last visit? Hospitalized since your last visit? No    2. Have you seen or consulted any other health care providers outside of the [address] [place] [address] since your last visit? Include any pap smears or colon screening. No    Verbal Order given for Albuterol Sulfate Inhalation Solution 0.083% per nebulization route per physician. Verbal order repeated and confirmation provided. Treatment administered per physician's orders, physician notified at completion of treatment. No complaints or concerned voiced.      Albuterol Sulfate Inhalation Solution 0.083% (2.5mg/3mL)  Lot Number: [id number]  Expiration Date: July 31, 2018  Route: Inhalation  . [id number]

## 2017-12-04 NOTE — PROGRESS NOTES
Subjective:   Melody Sheffield  is an 50 y.o. female with known mild persistent asthma, anxiety and depression who presents for persistent cough and wheezing. HPI  She reports having persistent dry cough and dyspnea on exertion ( when she is making her bed) sine the beginning of September. Symptoms associated with wheezing. She was using her Albuterol inhaler couple times a day every day with minimal  improvement. For the last 3 days worsening of the wheezing and no improvement with Albuterol inhaler. No sputum production. No fever. No sick contacts around. Work in the environment where she is a \"second smoker user\". She has never been evaluated by the pulmonologist, never had PFTs done. No history of hospitalizations for asthma exacerbation, no hx on intubation. Family hx: Strong family hx of asthma        Allergies - reviewed:   No Known Allergies      Medications - reviewed:  Current Outpatient Prescriptions   Medication Sig    LATUDA 20 mg tab tablet TAKE 1 TABLET BY MOUTH DAILY WITH BREAKFAST    albuterol (PROAIR HFA) 90 mcg/actuation inhaler Take 2 Puffs by inhalation every four (4) hours as needed for Wheezing.  budesonide (PULMICORT) 180 mcg/actuation aepb inhaler Take 1 Puff by inhalation two (2) times a day.  predniSONE (DELTASONE) 20 mg tablet Take 3 Tabs by mouth daily (with breakfast).  Nebulizer & Compressor machine 1 Each by Does Not Apply route daily as needed.  albuterol (PROVENTIL VENTOLIN) 2.5 mg /3 mL (0.083 %) nebulizer solution 3 mL by Nebulization route every four (4) hours as needed for Wheezing.  busPIRone (BUSPAR) 7.5 mg tablet Take 2 Tabs by mouth two (2) times a day. No current facility-administered medications for this visit.           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 Denies chills. EYES: denies double vision. Has blurry vision relieved with contacts. ENT: denies sinus congestion. Denies sinus drainage  CARDIOVASCULAR: denies chest pain. Denies palpitations  RESPIRATORY: Dry cough, wheezing, dyspnea on exartion  SKIN: denies rash. Denies easy bruising          Objective:     Visit Vitals    /84 (BP 1 Location: Left arm, BP Patient Position: Sitting)    Pulse 76    Temp 97.5 °F (36.4 °C) (Oral)    Resp 16    Ht 5' 5\" (1.651 m)    Wt 198 lb (89.8 kg)    LMP 11/28/2017    SpO2 100%    BMI 32.95 kg/m2       General appearance - alert, well appearing, and in no distress, talking in full sentences  Nose - normal and patent, no erythema, discharge or polyps  Mouth - mucous membranes moist, pharynx normal without lesions  Neck - supple, no significant adenopathy  Chest - no tachypnea, retractions or cyanosis, good air movement bilaterally, bilateral wheezing throughout the lung files, no crackles, no rales  Heart - normal rate, regular rhythm, normal S1, S2, no murmurs, rubs, clicks or gallops  Skin - normal coloration and turgor, no rashes, no suspicious skin lesions noted    CXR was personally reviewed, aggree with the reading, no acute process.     Assessment:   49 yo male with known moderate persistent asthma who is here for asthma exacerbation. ICD-10-CM ICD-9-CM    1. Moderate persistent asthma with acute exacerbation J45.41 493.92 LATUDA 20 mg tab tablet      albuterol (PROAIR HFA) 90 mcg/actuation inhaler      XR CHEST PA LAT      budesonide (PULMICORT) 180 mcg/actuation aepb inhaler      predniSONE (DELTASONE) 20 mg tablet      Nebulizer & Compressor machine      albuterol (PROVENTIL VENTOLIN) 2.5 mg /3 mL (0.083 %) nebulizer solution   2. Cough R05 786.2 LATUDA 20 mg tab tablet      albuterol (PROAIR HFA) 90 mcg/actuation inhaler      XR CHEST PA LAT      budesonide (PULMICORT) 180 mcg/actuation aepb inhaler      predniSONE (DELTASONE) 20 mg tablet      Nebulizer & Compressor machine           Plan:    Mild asthma exacerbation. Will treat with short course of oral steroids. Script for nebuliezer machine and Albuterol nebulizer was given to pt. The pt was advised to use it every 4 hours when awake for the next 2 days. After the completion of oral steroid course will start maintenance steroid inhaler as she is having daily symptoms of wheezing and dyspnea and using Albuterol inhaler on the daily basis. VSS. CXR w/o acute process. Dyspnea improved with the treatment with Albuterol in the office.  -Will send to pulmonologist for PFTs when recovers from current exacerbation  -ER precautions were given, if more SOB instructed to go to ER  -She will need Influenza and pneumococcal vaccine next visit  -Appointment is scheduled on 12/12/17 for follow up visit    Follow-up Disposition:  Return in about 1 week (around 12/11/2017), or if symptoms worsen or fail to improve, for follow up visit. I have discussed the diagnosis with the patient and the intended plan as seen in the above orders. The patient has received an after-visit summary and questions were answered concerning future plans. I have discussed medication side effects and warnings with the patient as well. Informed pt to return to the office if new symptoms arise.     The pt was discussed with Dr. Demetria Eisenmenger ( the attending physician)    Shannon Duncan MD  Family Medicine Resident, PGY -2

## 2017-12-04 NOTE — MR AVS SNAPSHOT
Visit Information Date & Time Provider Department Dept. Phone Encounter #  
 12/4/2017  9:45 AM Ronda García MD [facility] [phone] [id number] Follow-up Instructions Return in about 1 week (around 12/11/2017), or if symptoms worsen or fail to improve, for follow up visit. Upcoming Health Maintenance Date Due DTaP/Tdap/Td series (1 - Tdap) 3/31/1988 PAP AKA CERVICAL CYTOLOGY 7/12/2015 BREAST CANCER SCRN MAMMOGRAM 3/31/2017 FOBT Q 1 YEAR AGE 50-75 3/31/2017 Influenza Age 5 to Adult 8/1/2017 Allergies as of 12/4/2017  Review Complete On: 12/4/2017 By: Ronda García MD  
 No Known Allergies Current Immunizations  Never Reviewed No immunizations on file. Not reviewed this visit You Were Diagnosed With   
  
 Codes Comments Moderate persistent asthma with acute exacerbation    -  Primary ICD-10-CM: J45.41 
ICD-9-CM: 493.92 Cough     ICD-10-CM: R05 ICD-9-CM: 068. 2 Vitals BP Pulse Temp Resp Height(growth percentile) Weight(growth percentile) 135/84 (BP 1 Location: Left arm, BP Patient Position: Sitting) 76 97.5 °F (36.4 °C) (Oral) 16 5' 5\" (1.651 m) 198 lb (89.8 kg) LMP SpO2 BMI OB Status Smoking Status 11/28/2017 100% 32.95 kg/m2 Having regular periods Former Smoker Vitals History BMI and BSA Data Body Mass Index Body Surface Area 32.95 kg/m 2 2.03 m 2 Preferred Pharmacy Pharm[facility] Name Phone [id number] [address] [facility] [phone] Your Updated Medication List  
  
   
This list is accurate as of: 12/4/17 11:05 AM.  Always use your most recent med list.  
  
  
  
  
 budesonide 180 mcg/actuation Aepb inhaler Commonly known as:  PULMICORT Take 1 Puff by inhalation two (2) times a day. busPIRone 7.5 mg tablet Commonly known as:  BUSPAR Take 2 Tabs by mouth two (2) times a day. LATUDA 20 mg Tab tablet Generic drug:  lurasidone TAKE 1 TABLET BY MOUTH DAILY WITH BREAKFAST Nebulizer & Compressor machine 1 Each by Does Not Apply route daily as needed. predniSONE 20 mg tablet Commonly known as:  Marcelene Im Take 3 Tabs by mouth daily (with breakfast). * PROAIR HFA 90 mcg/actuation inhaler Generic drug:  albuterol Take 2 Puffs by inhalation every four (4) hours as needed for Wheezing. * albuterol 2.5 mg /3 mL (0.083 %) nebulizer solution Commonly known as:  PROVENTIL VENTOLIN  
3 mL by Nebulization route once for 1 dose. * Notice: This list has 2 medication(s) that are the same as other medications prescribed for you. Read the directions carefully, and ask your doctor or other care provider to review them with you. Prescriptions Sent to Pharmacy Refills  
 budesonide (PULMICORT) 180 mcg/actuation aepb inhaler 3 Sig: Take 1 Puff by inhalation two (2) times a day. Class: Normal  
 Pharmacy: [address] [place] [address] Ph #: [phone] Route: Inhalation  
 predniSONE (DELTASONE) 20 mg tablet 0 Sig: Take 3 Tabs by mouth daily (with breakfast). Class: Normal  
 Pharmacy: [address] [place] [address] Ph #: [phone] Route: Oral  
 albuterol (PROVENTIL VENTOLIN) 2.5 mg /3 mL (0.083 %) nebulizer solution 0 Sig: 3 mL by Nebulization route once for 1 dose. Class: Normal  
 Pharmacy: [address] [place] [address] Ph #: [phone] Route: Nebulization Nebulizer & Compressor machine 0 Si Each by Does Not Apply route daily as needed. Class: Normal  
 Pharmacy: [address] [place] [address] Ph #: [phone] Route: Does Not Apply Follow-up Instructions Return in about 1 week (around 12/11/2017), or if symptoms worsen or fail to improve, for follow up visit. To-Do List   
 12/04/2017 Imaging:  XR CHEST PA LAT Patient Instructions Asthma Attack: Care Instructions Your Care Instructions During an asthma attack, the airways swell and narrow. This makes it hard to breathe. Severe asthma attacks can be life-threatening, but you can help prevent them by keeping your asthma under control and treating symptoms before they get bad. Symptoms include being short of breath, having chest tightness, coughing, and wheezing. Noting and treating these symptoms can also help you avoid future trips to the emergency room. The doctor has checked you carefully, but problems can develop later. If you notice any problems or new symptoms, get medical treatment right away. Follow-up care is a key part of your treatment and safety. Be sure to make and go to all appointments, and call your doctor if you are having problems. It's also a good idea to know your test results and keep a list of the medicines you take. How can you care for yourself at home? · Follow your asthma action plan to prevent and treat attacks. If you don't have an asthma action plan, work with your doctor to create one. · Take your asthma medicines exactly as prescribed. Talk to your doctor right away if you have any questions about how to take them. ¨ Use your quick-relief medicine when you have symptoms of an attack. Quick-relief medicine is usually an albuterol inhaler. Some people need to use quick-relief medicine before they exercise. ¨ Take your controller medicine every day, not just when you have symptoms. Controller medicine is usually an inhaled corticosteroid. The goal is to prevent problems before they occur. Don't use your controller medicine to treat an attack that has already started. It doesn't work fast enough to help. ¨ If your doctor prescribed corticosteroid pills to use during an attack, take them exactly as prescribed. It may take hours for the pills to work, but they may make the episode shorter and help you breathe better. ¨ Keep your quick-relief medicine with you at all times. · Talk to your doctor before using other medicines. Some medicines, such as aspirin, can cause asthma attacks in some people. · If you have a peak flow meter, use it to check how well you are breathing. This can help you predict when an asthma attack is going to occur. Then you can take medicine to prevent the asthma attack or make it less severe. · Do not smoke or allow others to smoke around you. Avoid smoky places. Smoking makes asthma worse. If you need help quitting, talk to your doctor about stop-smoking programs and medicines. These can increase your chances of quitting for good. · Learn what triggers an asthma attack for you, and avoid the triggers when you can. Common triggers include colds, smoke, air pollution, dust, pollen, mold, pets, cockroaches, stress, and cold air. · Avoid colds and the flu. Get a pneumococcal vaccine shot. If you have had one before, ask your doctor if you need a second dose. Get a flu vaccine every fall. If you must be around people with colds or the flu, wash your hands often. When should you call for help? Call 911 anytime you think you may need emergency care. For example, call if: 
? · You have severe trouble breathing. ?Call your doctor now or seek immediate medical care if: 
? · Your symptoms do not get better after you have followed your asthma action plan. ? · You have new or worse trouble breathing. ? · Your coughing and wheezing get worse. ? · You cough up dark brown or bloody mucus (sputum). ? · You have a new or higher fever. ? Watch closely for changes in your health, and be sure to contact your doctor if: 
? · You need to use quick-relief medicine on more than 2 days a week (unless it is just for exercise). ? · You cough more deeply or more often, especially if you notice more mucus or a change in the color of your mucus. ? · You are not getting better as expected. Where can you learn more? Go to http://lilia-rohit.info/. Enter Y799 in the search box to learn more about \"Asthma Attack: Care Instructions. \" Current as of: May 12, 2017 Content Version: 11.4 © 1335-5379 [employer]. Care instructions adapted under license by [employer] (which disclaims liability or warranty for this information). If you have questions about a medical condition or this instruction, always ask your healthcare professional. [address] any warranty or liability for your use of this information. Introducing [facility]! Dear Sonido Liu: 
Thank you for requesting a Caipiaobao account. Our records indicate that you already have an active Caipiaobao account. You can access your account anytime at [email or web address] Did you know that you can access your hospital and ER discharge instructions at any time in Caipiaobao? You can also review all of your test results from your hospital stay or ER visit. Additional Information If you have questions, please visit the Frequently Asked Questions section of the Caipiaobao website at [email or web address] Remember, Caipiaobao is NOT to be used for urgent needs. For medical emergencies, dial 911. Now available from your iPhone and Android! Please provide this summary of care documentation to your next provider. Your primary care clinician is listed as Rachel Campuzano. If you have any questions after today's visit, please call [phone].

## 2017-12-06 NOTE — TELEPHONE ENCOUNTER
Call [facility] pharmacy, the tech explained that the auth is needed for steroid inhaler. I called the patient at [phone]. The patient was identified by 2 identifiers. Explained that the Pulmicort is not covered by the insurance. She will call the insurance and ask what medication is covered.   11:47 AM  12/6/2017  Helen Cordova MD

## 2017-12-12 ENCOUNTER — OFFICE VISIT (OUTPATIENT)
Dept: FAMILY MEDICINE CLINIC | Age: 52
End: 2017-12-12

## 2017-12-12 VITALS
HEIGHT: 65 IN | SYSTOLIC BLOOD PRESSURE: 142 MMHG | RESPIRATION RATE: 20 BRPM | TEMPERATURE: 97.8 F | DIASTOLIC BLOOD PRESSURE: 87 MMHG | HEART RATE: 90 BPM | OXYGEN SATURATION: 97 % | WEIGHT: 196.6 LBS | BODY MASS INDEX: 32.76 KG/M2

## 2017-12-12 DIAGNOSIS — Z23 ENCOUNTER FOR IMMUNIZATION: ICD-10-CM

## 2017-12-12 DIAGNOSIS — J45.40 MODERATE PERSISTENT ASTHMA WITHOUT COMPLICATION: Primary | ICD-10-CM

## 2017-12-12 DIAGNOSIS — F41.9 ANXIETY: Chronic | ICD-10-CM

## 2017-12-12 RX ORDER — BUSPIRONE HYDROCHLORIDE 10 MG/1
20 TABLET ORAL 2 TIMES DAILY
Qty: 60 TAB | Refills: 1 | Status: SHIPPED | OUTPATIENT
Start: 2017-12-12 | End: 2018-02-05 | Stop reason: SDUPTHER

## 2017-12-12 NOTE — PROGRESS NOTES
Chief Complaint   Patient presents with    Follow-up     1. Have you been to the ER, urgent care clinic since your last visit? Hospitalized since your last visit? No    2. Have you seen or consulted any other health care providers outside of the [address] [place] [address] since your last visit? Include any pap smears or colon screening.  No

## 2017-12-12 NOTE — PATIENT INSTRUCTIONS
[address] [facility] [employer] [facility] [address], [address] [facility], [address]  Phone: [phone]  Fax: [phone]    [address]  [address], [address] [facility], [address]  Phone: [phone]  Fax: [phone]    Haresh Fan M.D. [place], [address] [facility], [address]    Skyla Yousif M.D. [facility]  [phone]    [place] [address]  [phone]  [phone]  [phone]    [address] [facility]  [address] [place] [address], [address] [place] [address], [address]  [phone]       Anxiety Disorder: Care Instructions  Your Care Instructions    Anxiety is a normal reaction to stress. Difficult situations can cause you to have symptoms such as sweaty palms and a nervous feeling. In an anxiety disorder, the symptoms are far more severe. Constant worry, muscle tension, trouble sleeping, nausea and diarrhea, and other symptoms can make normal daily activities difficult or impossible. These symptoms may occur for no reason, and they can affect your work, school, or social life. Medicines, counseling, and self-care can all help. Follow-up care is a key part of your treatment and safety. Be sure to make and go to all appointments, and call your doctor if you are having problems. It's also a good idea to know your test results and keep a list of the medicines you take. How can you care for yourself at home? · Take medicines exactly as directed. Call your doctor if you think you are having a problem with your medicine. · Go to your counseling sessions and follow-up appointments. · Recognize and accept your anxiety. Then, when you are in a situation that makes you anxious, say to yourself, \"This is not an emergency. I feel uncomfortable, but I am not in danger. I can keep going even if I feel anxious. \"  · Be kind to your body:  ¨ Relieve tension with exercise or a massage. ¨ Get enough rest.  ¨ Avoid alcohol, caffeine, nicotine, and illegal drugs. They can increase your anxiety level and cause sleep problems. ¨ Learn and do relaxation techniques. See below for more about these techniques. · Engage your mind. Get out and do something you enjoy. Go to a funny movie, or take a walk or hike. Plan your day. Having too much or too little to do can make you anxious. · Keep a record of your symptoms. Discuss your fears with a good friend or family member, or join a support group for people with similar problems. Talking to others sometimes relieves stress. · Get involved in social groups, or volunteer to help others. Being alone sometimes makes things seem worse than they are. · Get at least 30 minutes of exercise on most days of the week to relieve stress. Walking is a good choice. You also may want to do other activities, such as running, swimming, cycling, or playing tennis or team sports. Relaxation techniques  Do relaxation exercises 10 to 20 minutes a day. You can play soothing, relaxing music while you do them, if you wish. · Tell others in your house that you are going to do your relaxation exercises. Ask them not to disturb you. · Find a comfortable place, away from all distractions and noise. · Lie down on your back, or sit with your back straight. · Focus on your breathing. Make it slow and steady. · Breathe in through your nose. Breathe out through either your nose or mouth. · Breathe deeply, filling up the area between your navel and your rib cage. Breathe so that your belly goes up and down. · Do not hold your breath. · Breathe like this for 5 to 10 minutes. Notice the feeling of calmness throughout your whole body. As you continue to breathe slowly and deeply, relax by doing the following for another 5 to 10 minutes:  · Tighten and relax each muscle group in your body.  You can begin at your toes and work your way up to your head. · Imagine your muscle groups relaxing and becoming heavy. · Empty your mind of all thoughts. · Let yourself relax more and more deeply. · Become aware of the state of calmness that surrounds you. · When your relaxation time is over, you can bring yourself back to alertness by moving your fingers and toes and then your hands and feet and then stretching and moving your entire body. Sometimes people fall asleep during relaxation, but they usually wake up shortly afterward. · Always give yourself time to return to full alertness before you drive a car or do anything that might cause an accident if you are not fully alert. Never play a relaxation tape while you drive a car. When should you call for help? Call 911 anytime you think you may need emergency care. For example, call if:  ? · You feel you cannot stop from hurting yourself or someone else. ? Keep the numbers for these national suicide hotlines: 7-789-598-TALK (8-368.114.5362) and 5-174-DOQCABL (1-388.598.6160). If you or someone you know talks about suicide or feeling hopeless, get help right away. ? Watch closely for changes in your health, and be sure to contact your doctor if:  ? · You have anxiety or fear that affects your life. ? · You have symptoms of anxiety that are new or different from those you had before. Where can you learn more? Go to http://lilia-rohit.info/. Enter P754 in the search box to learn more about \"Anxiety Disorder: Care Instructions. \"  Current as of: May 12, 2017  Content Version: 11.4  © 4021-6990 [employer]. Care instructions adapted under license by [employer] (which disclaims liability or warranty for this information). If you have questions about a medical condition or this instruction, always ask your healthcare professional. [address] any warranty or liability for your use of this information.

## 2017-12-12 NOTE — PROGRESS NOTES
Subjective:   Crystal Nails is an 50 y.o. female with known moderate persistent asthma, anxiety and bipolar disorder who presents for up visit. 1.Asthma, moderate persistent with recent exacerbation. She was seen in the clinic on 12/4 for acute exacerbation, was put on oral steroid course. She completed the course and feels much better. He wheezing and SOB improved. For the last couple of months she having daily wheezing and exertional dyspnea, used Albuterol almost every day. She works in the environment where she is a \"second smoker user'. 2. Anxiety   She was taking Buspar inconsistently because she thought that \"nobody could give her refills because she does not have a psychiatrist\". Previously Buspar was working well for her, no side effects reported. Recently more worried about her performance at works and thinks '\"that other people talking about her not doing the job well\". Frequent night awakenings which affect her total number of hours of sleep, sleeps for about 4-5 hour at night as always having \" thoughts in her head\". She was seen by  ( psychiatrist) in the past ( last visit in 2015). She was trying to find the psychiatrist but her insurance recently changed so its difficult for her to find in-network psychiatrist who accepts new patients. She works as a  at [employer]. Reports good support at home. No SI/HI. Allergies - reviewed:   No Known Allergies      Medications - reviewed:  Current Outpatient Prescriptions   Medication Sig    busPIRone (BUSPAR) 10 mg tablet Take 2 Tabs by mouth two (2) times a day.  LATUDA 20 mg tab tablet TAKE 1 TABLET BY MOUTH DAILY WITH BREAKFAST    albuterol (PROAIR HFA) 90 mcg/actuation inhaler Take 2 Puffs by inhalation every four (4) hours as needed for Wheezing.  budesonide (PULMICORT) 180 mcg/actuation aepb inhaler Take 1 Puff by inhalation two (2) times a day.     Nebulizer & Compressor machine 1 Each by Does Not Apply route daily as needed.  albuterol (PROVENTIL VENTOLIN) 2.5 mg /3 mL (0.083 %) nebulizer solution 3 mL by Nebulization route every four (4) hours as needed for Wheezing. No current facility-administered medications for this visit. Past Medical History - reviewed:  Past Medical History:   Diagnosis Date    Anxiety     Asthma     Bipolar 2 disorder ([facility] [address]     Depression     Pyelonephritis 6/7/14         Past Surgical History - reviewed:  Past Surgical History:   Procedure Laterality Date    HX GYN           Family History - reviewed:  History reviewed. No pertinent family history. 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 Denies chills. CARDIOVASCULAR: denies chest pain. Denies palpitations  RESPIRATORY: denies cough, shortness of breath  PSYCH: +Anxiety. Denies depression        Objective:     Visit Vitals    /87 (BP 1 Location: Right arm, BP Patient Position: Sitting)    Pulse 90    Temp 97.8 °F (36.6 °C) (Oral)    Resp 20    Ht 5' 5\" (1.651 m)    Wt 196 lb 9.6 oz (89.2 kg)    LMP 11/28/2017    SpO2 97%    BMI 32.72 kg/m2       General appearance - alert, well appearing, and in no distress, crying spells during the conversation.   Chest - clear to auscultation, no wheezes, rales or rhonchi, symmetric air entry  Heart - normal rate, regular rhythm, normal S1, S2, no murmurs, rubs, clicks or gallops  Abdomen - soft, nontender, nondistended, no masses or organomegaly  Extremities - peripheral pulses normal, no pedal edema, no clubbing or cyanosis    Assessment:   51 yo female with knonw anxiety and asthma who is here for follow up visit    ICD-10-CM ICD-9-CM    1. Encounter for immunization Z23 V03.89 INFLUENZA VIRUS VAC QUAD,SPLIT,PRESV FREE SYRINGE IM   2. Anxiety F41.9 300.00 busPIRone (BUSPAR) 10 mg tablet   3. Moderate persistent asthma without complication K74.10 025.46 REFERRAL TO PULMONARY DISEASE           Plan:   1. Asthma. SOB improved after the treatment with steroid course. No wheezing on exam today. She needs daily steroid inhaler as symptoms currently >3 times a week and almost daily use of rescue inhaler  · Will work on [employer] for steroid inhaler as not covered by the insurance currently  · Will refer to pulmonologist for PFTs    2. Anxiety. Worsening of the symptoms due to medication non compliance. No SI/No HI. Advised the pt to take medication two times a day as prescribed to see the effect. I think that underlying bipolar disorder can contribute to non controlled symptoms as well and the pt would benefit from the psychiatrist involvement. · Will titrate up to the max dose and if no improvement of symptoms may consider switching to different class of medications ( SSRI). · She is working to find the psychiatrist as not all of the providers are covered by the insurance, new list provided    3. The pt received influenza vaccine. Follow-up Disposition:  Return in about 1 month (around 1/12/2018) for CPE with PAP. I have discussed the diagnosis with the patient and the intended plan as seen in the above orders. The patient has received an after-visit summary and questions were answered concerning future plans. I have discussed medication side effects and warnings with the patient as well. Informed pt to return to the office if new symptoms arise.       Maureen Andrade MD  Family Medicine Resident, PGY-2

## 2017-12-12 NOTE — MR AVS SNAPSHOT
Visit Information Date & Time Provider Department Dept. Phone Encounter #  
 12/12/2017  9:30 AM Deb Weber MD [address] [place] [address] [phone] [id number] Follow-up Instructions Return in about 1 month (around 1/12/2018) for CPE with PAP. Upcoming Health Maintenance Date Due Pneumococcal 19-64 Medium Risk (1 of 1 - PPSV23) 3/31/1986 DTaP/Tdap/Td series (1 - Tdap) 3/31/1988 PAP AKA CERVICAL CYTOLOGY 7/12/2015 BREAST CANCER SCRN MAMMOGRAM 3/31/2017 FOBT Q 1 YEAR AGE 50-75 3/31/2017 Influenza Age 5 to Adult 8/1/2017 Allergies as of 12/12/2017  Review Complete On: 12/12/2017 By: Axel Cheung No Known Allergies Current Immunizations  Never Reviewed Name Date Influenza Vaccine (Quad) PF  Incomplete Not reviewed this visit You Were Diagnosed With   
  
 Codes Comments Encounter for immunization    -  Primary ICD-10-CM: D57 ICD-9-CM: V03.89 Anxiety     ICD-10-CM: F41.9 ICD-9-CM: 300.00 Moderate persistent asthma without complication     CTV-35-AW: J45.40 ICD-9-CM: 493.90 Vitals BP Pulse Temp Resp Height(growth percentile) Weight(growth percentile) 142/87 (BP 1 Location: Right arm, BP Patient Position: Sitting) 90 97.8 °F (36.6 °C) (Oral) 20 5' 5\" (1.651 m) 196 lb 9.6 oz (89.2 kg) LMP SpO2 BMI OB Status Smoking Status 11/28/2017 97% 32.72 kg/m2 Having regular periods Former Smoker BMI and BSA Data Body Mass Index Body Surface Area 32.72 kg/m 2 2.02 m 2 Preferred Pharmacy Pharmacy Name Phone [id number] [place], [address] AT [address] [phone] Your Updated Medication List  
  
   
This list is accurate as of: 12/12/17  9:54 AM.  Always use your most recent med list.  
  
  
  
  
 budesonide 180 mcg/actuation Aepb inhaler Commonly known as:  PULMICORT Take 1 Puff by inhalation two (2) times a day. busPIRone 10 mg tablet Commonly known as:  BUSPAR Take 2 Tabs by mouth two (2) times a day. LATUDA 20 mg Tab tablet Generic drug:  lurasidone TAKE 1 TABLET BY MOUTH DAILY WITH BREAKFAST Nebulizer & Compressor machine 1 Each by Does Not Apply route daily as needed. * PROAIR HFA 90 mcg/actuation inhaler Generic drug:  albuterol Take 2 Puffs by inhalation every four (4) hours as needed for Wheezing. * albuterol 2.5 mg /3 mL (0.083 %) nebulizer solution Commonly known as:  PROVENTIL VENTOLIN  
3 mL by Nebulization route every four (4) hours as needed for Wheezing. * Notice: This list has 2 medication(s) that are the same as other medications prescribed for you. Read the directions carefully, and ask your doctor or other care provider to review them with you. Prescriptions Sent to Pharmacy Refills  
 busPIRone (BUSPAR) 10 mg tablet 1 Sig: Take 2 Tabs by mouth two (2) times a day. Class: Normal  
 Pharmacy: [address] [place] [address] Ph #: [phone] Route: Oral  
  
We Performed the Following INFLUENZA VIRUS VAC QUAD,SPLIT,PRESV FREE SYRINGE IM [id number] CPT(R)] REFERRAL TO PULMONARY DISEASE [RYW36 Custom] Follow-up Instructions Return in about 1 month (around 1/12/2018) for CPE with PAP. Referral Information Referral ID Referred By Referred To  
  
 [id number] [facility] [address] [place] [address] [address] Visits Status Start Date End Date 1 New Request 12/12/17 12/12/18 If your referral has a status of pending review or denied, additional information will be sent to support the outcome of this decision. Patient Instructions [address] [address], [address] Phone: [phone] Fax: [phone] [address] [facility] [address], [address] [facility] [address] Phone: [phone] Fax: [phone] Lester Robles M.D. [place], [address] Katrin Almanzar M.D. [facility] [phone] [address] [address] [phone] [address] [place] [address], [address] 
[phone] Anxiety Disorder: Care Instructions Your Care Instructions Anxiety is a normal reaction to stress. Difficult situations can cause you to have symptoms such as sweaty palms and a nervous feeling. In an anxiety disorder, the symptoms are far more severe. Constant worry, muscle tension, trouble sleeping, nausea and diarrhea, and other symptoms can make normal daily activities difficult or impossible. These symptoms may occur for no reason, and they can affect your work, school, or social life. Medicines, counseling, and self-care can all help. Follow-up care is a key part of your treatment and safety. Be sure to make and go to all appointments, and call your doctor if you are having problems. It's also a good idea to know your test results and keep a list of the medicines you take. How can you care for yourself at home? · Take medicines exactly as directed. Call your doctor if you think you are having a problem with your medicine. · Go to your counseling sessions and follow-up appointments. · Recognize and accept your anxiety. Then, when you are in a situation that makes you anxious, say to yourself, \"This is not an emergency. I feel uncomfortable, but I am not in danger. I can keep going even if I feel anxious. \" · Be kind to your body: ¨ Relieve tension with exercise or a massage.  
¨ Get enough rest. 
 ¨ Avoid alcohol, caffeine, nicotine, and illegal drugs. They can increase your anxiety level and cause sleep problems. ¨ Learn and do relaxation techniques. See below for more about these techniques. · Engage your mind. Get out and do something you enjoy. Go to a funny movie, or take a walk or hike. Plan your day. Having too much or too little to do can make you anxious. · Keep a record of your symptoms. Discuss your fears with a good friend or family member, or join a support group for people with similar problems. Talking to others sometimes relieves stress. · Get involved in social groups, or volunteer to help others. Being alone sometimes makes things seem worse than they are. · Get at least 30 minutes of exercise on most days of the week to relieve stress. Walking is a good choice. You also may want to do other activities, such as running, swimming, cycling, or playing tennis or team sports. Relaxation techniques Do relaxation exercises 10 to 20 minutes a day. You can play soothing, relaxing music while you do them, if you wish. · Tell others in your house that you are going to do your relaxation exercises. Ask them not to disturb you. · Find a comfortable place, away from all distractions and noise. · Lie down on your back, or sit with your back straight. · Focus on your breathing. Make it slow and steady. · Breathe in through your nose. Breathe out through either your nose or mouth. · Breathe deeply, filling up the area between your navel and your rib cage. Breathe so that your belly goes up and down. · Do not hold your breath. · Breathe like this for 5 to 10 minutes. Notice the feeling of calmness throughout your whole body. As you continue to breathe slowly and deeply, relax by doing the following for another 5 to 10 minutes: · Tighten and relax each muscle group in your body. You can begin at your toes and work your way up to your head. · Imagine your muscle groups relaxing and becoming heavy. · Empty your mind of all thoughts. · Let yourself relax more and more deeply. · Become aware of the state of calmness that surrounds you. · When your relaxation time is over, you can bring yourself back to alertness by moving your fingers and toes and then your hands and feet and then stretching and moving your entire body. Sometimes people fall asleep during relaxation, but they usually wake up shortly afterward. · Always give yourself time to return to full alertness before you drive a car or do anything that might cause an accident if you are not fully alert. Never play a relaxation tape while you drive a car. When should you call for help? Call 911 anytime you think you may need emergency care. For example, call if: 
? · You feel you cannot stop from hurting yourself or someone else. ? Keep the numbers for these national suicide hotlines: 0-456-355-TALK (7-457.986.8902) and 2-923-QNSAEVL (7-988-420-464.886.3409). If you or someone you know talks about suicide or feeling hopeless, get help right away. ? Watch closely for changes in your health, and be sure to contact your doctor if: 
? · You have anxiety or fear that affects your life. ? · You have symptoms of anxiety that are new or different from those you had before. Where can you learn more? Go to http://lilia-rohit.info/. Enter P754 in the search box to learn more about \"Anxiety Disorder: Care Instructions. \" Current as of: May 12, 2017 Content Version: 11.4 © 8198-2217 Healthwise, Incorporated. Care instructions adapted under license by [employer] (which disclaims liability or warranty for this information). If you have questions about a medical condition or this instruction, always ask your healthcare professional. [address] any warranty or liability for your use of this information. Introducing [facility] & HEALTH SERVICES! Dear Jori Cogan: Thank you for requesting a coresystems account. Our records indicate that you already have an active coresystems account. You can access your account anytime at https://[employer]. Orthomimetics/[employer] Did you know that you can access your hospital and ER discharge instructions at any time in coresystems? You can also review all of your test results from your hospital stay or ER visit. Additional Information If you have questions, please visit the Frequently Asked Questions section of the coresystems website at [email or web address]. Orthomimetics/[employer]/. Remember, coresystems is NOT to be used for urgent needs. For medical emergencies, dial 911. Now available from your iPhone and Android! Please provide this summary of care documentation to your next provider. Your primary care clinician is listed as Rachel Campuzano. If you have any questions after today's visit, please call [phone].

## 2018-02-05 DIAGNOSIS — F41.9 ANXIETY: Chronic | ICD-10-CM

## 2018-02-05 RX ORDER — BUSPIRONE HYDROCHLORIDE 10 MG/1
TABLET ORAL
Qty: 60 TAB | Refills: 1 | Status: SHIPPED | OUTPATIENT
Start: 2018-02-05 | End: 2018-03-03 | Stop reason: SDUPTHER

## 2018-03-03 DIAGNOSIS — F41.9 ANXIETY: Chronic | ICD-10-CM

## 2018-03-03 RX ORDER — BUSPIRONE HYDROCHLORIDE 10 MG/1
TABLET ORAL
Qty: 60 TAB | Refills: 1 | Status: SHIPPED | OUTPATIENT
Start: 2018-03-03 | End: 2018-04-02 | Stop reason: SDUPTHER

## 2018-03-23 ENCOUNTER — TELEPHONE (OUTPATIENT)
Dept: FAMILY MEDICINE CLINIC | Age: 53
End: 2018-03-23

## 2018-04-02 DIAGNOSIS — F41.9 ANXIETY: Chronic | ICD-10-CM

## 2018-04-13 RX ORDER — BUSPIRONE HYDROCHLORIDE 10 MG/1
TABLET ORAL
Qty: 60 TAB | Refills: 1 | Status: SHIPPED | OUTPATIENT
Start: 2018-04-13 | End: 2022-05-24

## 2018-05-25 ENCOUNTER — OFFICE VISIT (OUTPATIENT)
Dept: FAMILY MEDICINE CLINIC | Age: 53
End: 2018-05-25

## 2018-05-25 ENCOUNTER — HOSPITAL ENCOUNTER (OUTPATIENT)
Dept: LAB | Age: 53
Discharge: HOME | End: 2018-05-25
Payer: MEDICARE

## 2018-05-25 VITALS
HEART RATE: 65 BPM | OXYGEN SATURATION: 97 % | TEMPERATURE: 97.8 F | BODY MASS INDEX: 32.49 KG/M2 | SYSTOLIC BLOOD PRESSURE: 111 MMHG | DIASTOLIC BLOOD PRESSURE: 71 MMHG | WEIGHT: 195 LBS | HEIGHT: 65 IN | RESPIRATION RATE: 17 BRPM

## 2018-05-25 DIAGNOSIS — F10.29 ALCOHOL DEPENDENCE WITH UNSPECIFIED ALCOHOL-INDUCED DISORDER (HCC): ICD-10-CM

## 2018-05-25 DIAGNOSIS — Z12.39 SCREENING FOR BREAST CANCER: ICD-10-CM

## 2018-05-25 DIAGNOSIS — Z12.11 SCREENING FOR COLON CANCER: ICD-10-CM

## 2018-05-25 DIAGNOSIS — Z23 ENCOUNTER FOR IMMUNIZATION: ICD-10-CM

## 2018-05-25 DIAGNOSIS — L65.9 HAIR LOSS: ICD-10-CM

## 2018-05-25 DIAGNOSIS — Z01.419 WELL WOMAN EXAM WITH ROUTINE GYNECOLOGICAL EXAM: Primary | ICD-10-CM

## 2018-05-25 DIAGNOSIS — N95.1 PERIMENOPAUSAL: ICD-10-CM

## 2018-05-25 PROCEDURE — 87624 HPV HI-RISK TYP POOLED RSLT: CPT | Performed by: FAMILY MEDICINE

## 2018-05-25 PROCEDURE — 88175 CYTOPATH C/V AUTO FLUID REDO: CPT | Performed by: FAMILY MEDICINE

## 2018-05-25 RX ORDER — BUPROPION HYDROCHLORIDE 100 MG/1
TABLET, EXTENDED RELEASE ORAL
COMMUNITY
End: 2022-03-25

## 2018-05-25 RX ORDER — GABAPENTIN 300 MG/1
300 CAPSULE ORAL 3 TIMES DAILY
COMMUNITY
End: 2022-05-24

## 2018-05-25 RX ORDER — TRAZODONE HYDROCHLORIDE 150 MG/1
150 TABLET ORAL
COMMUNITY
End: 2020-02-11 | Stop reason: ALTCHOICE

## 2018-05-25 RX ORDER — MONTELUKAST SODIUM 10 MG/1
10 TABLET ORAL DAILY
COMMUNITY
End: 2022-03-25

## 2018-05-25 NOTE — MR AVS SNAPSHOT
[address] 
[phone] Patient: Cary Spear MRN: [id number] :1965 Visit Information Date & Time Provider Department Dept. Phone Encounter #  
 2018  1:00 PM Irais Hadley MD [address] [place] [address] [phone] [id number] Follow-up Instructions Return if symptoms worsen or fail to improve. Upcoming Health Maintenance Date Due Pneumococcal 19-64 Medium Risk (1 of 1 - PPSV23) 3/31/1986 DTaP/Tdap/Td series (1 - Tdap) 3/31/1988 PAP AKA CERVICAL CYTOLOGY 2015 BREAST CANCER SCRN MAMMOGRAM 3/31/2017 FOBT Q 1 YEAR AGE 50-75 3/31/2017 Influenza Age 5 to Adult 2018 Allergies as of 2018  Review Complete On: 2018 By: Irais Hadley MD  
 No Known Allergies Current Immunizations  Never Reviewed Name Date Influenza Vaccine (Quad) PF 2017 Not reviewed this visit You Were Diagnosed With   
  
 Codes Comments Well woman exam with routine gynecological exam    -  Primary ICD-10-CM: K51.300 ICD-9-CM: V72.31 Screening for breast cancer     ICD-10-CM: Z12.31 
ICD-9-CM: V76.10 Encounter for immunization     ICD-10-CM: E75 ICD-9-CM: V03.89 Screening for colon cancer     ICD-10-CM: Z12.11 ICD-9-CM: V76.51 Vitals BP Pulse Temp Resp Height(growth percentile) Weight(growth percentile) 111/71 65 97.8 °F (36.6 °C) (Oral) 17 5' 5\" (1.651 m) 195 lb (88.5 kg) SpO2 BMI OB Status Smoking Status 97% 32.45 kg/m2 Perimenopausal Former Smoker Vitals History BMI and BSA Data Body Mass Index Body Surface Area  
 32.45 kg/m 2 2.01 m 2 Preferred Pharmacy Pharmacy Name Phone [id number] [address] AT [facility] [phone] Your Updated Medication List  
  
   
 This list is accurate as of 18  1:39 PM.  Always use your most recent med list.  
  
  
  
  
 budesonide 180 mcg/actuation Aepb inhaler Commonly known as:  PULMICORT Take 1 Puff by inhalation two (2) times a day. busPIRone 10 mg tablet Commonly known as:  BUSPAR  
TAKE 2 TABLETS BY MOUTH TWO (2) TIMES A DAY. DULERA 200-5 mcg/actuation HFA inhaler Generic drug:  mometasone-formoterol Take 2 Puffs by inhalation two (2) times a day.  
  
 gabapentin 300 mg capsule Commonly known as:  NEURONTIN Take 300 mg by mouth three (3) times daily. LATUDA 20 mg Tab tablet Generic drug:  lurasidone TAKE 1 TABLET BY MOUTH DAILY WITH BREAKFAST Nebulizer & Compressor machine 1 Each by Does Not Apply route daily as needed. pneumococcal 23-valent 25 mcg/0.5 mL injection Commonly known as:  PNEUMOVAX 23  
0.5 mL by IntraMUSCular route once for 1 dose. * PROAIR HFA 90 mcg/actuation inhaler Generic drug:  albuterol Take 2 Puffs by inhalation every four (4) hours as needed for Wheezing. * albuterol 2.5 mg /3 mL (0.083 %) nebulizer solution Commonly known as:  PROVENTIL VENTOLIN  
3 mL by Nebulization route every four (4) hours as needed for Wheezing. SINGULAIR 10 mg tablet Generic drug:  montelukast  
Take 10 mg by mouth daily. traZODone 150 mg tablet Commonly known as:  Eward Sheets Take 150 mg by mouth nightly. WELLBUTRIN  mg SR tablet Generic drug:  buPROPion SR Take  by mouth. * Notice: This list has 2 medication(s) that are the same as other medications prescribed for you. Read the directions carefully, and ask your doctor or other care provider to review them with you. Prescriptions Printed Refills  
 pneumococcal 23-valent (PNEUMOVAX 23) 25 mcg/0.5 mL injection 0 Si.5 mL by IntraMUSCular route once for 1 dose. Class: Print Route: IntraMUSCular We Performed the Following CBC W/O DIFF [84728 CPT(R)] LIPID PANEL [63978 CPT(R)] METABOLIC PANEL, COMPREHENSIVE [69484 CPT(R)] PAP IG, APTIMA HPV AND RFX 16/18,45 (687138) [[id number] Custom] REFERRAL FOR COLONOSCOPY [BXT600 Custom] Comments:  
 Please evaluate patient for screening for colon cancer TSH 3RD GENERATION [92460 CPT(R)] VITAMIN B12 & FOLATE [75055 CPT(R)] Follow-up Instructions Return if symptoms worsen or fail to improve. To-Do List   
 06/07/2018 Imaging:  [facility] MAMMO BI SCREENING INCL CAD Referral Information Referral ID Referred By Referred To  
  
 [id number] [facility] [facility], [address]   
   [address] [address] [phone] [place] [address] Visits Status Start Date End Date 1 New Request 5/25/18 5/25/19 If your referral has a status of pending review or denied, additional information will be sent to support the outcome of this decision. Patient Instructions If you do not hear from a team member, please call [phone] to speak with this team directly. 
--------------------------------------------------------------------------------------------------------------------- Please call Kimball Brittle at our office ([phone]) after you have made the appointment with the specialist.  Your referral for your insurance will not be done until she has the date and time of your specialty appointment!!  
 
 
Libby Kenny MD 
[facility], [address] [address] Office: [phone] Mic Blankenship MD 
[facility] Jeff Sharif [address] [address] Phone: [phone] Fax: [phone] Well Visit, Women 48 to 72: Care Instructions Your Care Instructions Physical exams can help you stay healthy. Your doctor has checked your overall health and may have suggested ways to take good care of yourself. He or she also may have recommended tests. At home, you can help prevent illness with healthy eating, regular exercise, and other steps. Follow-up care is a key part of your treatment and safety. Be sure to make and go to all appointments, and call your doctor if you are having problems. It's also a good idea to know your test results and keep a list of the medicines you take. How can you care for yourself at home? · Reach and stay at a healthy weight. This will lower your risk for many problems, such as obesity, diabetes, heart disease, and high blood pressure. · Get at least 30 minutes of exercise on most days of the week. Walking is a good choice. You also may want to do other activities, such as running, swimming, cycling, or playing tennis or team sports. · Do not smoke. Smoking can make health problems worse. If you need help quitting, talk to your doctor about stop-smoking programs and medicines. These can increase your chances of quitting for good. · Protect your skin from too much sun. When you're outdoors from 10 a.m. to 4 p.m., stay in the shade or cover up with clothing and a hat with a wide brim. Wear sunglasses that block UV rays. Even when it's cloudy, put broad-spectrum sunscreen (SPF 30 or higher) on any exposed skin. · See a dentist one or two times a year for checkups and to have your teeth cleaned. · Wear a seat belt in the car. · Limit alcohol to 1 drink a day. Too much alcohol can cause health problems. Follow your doctor's advice about when to have certain tests. These tests can spot problems early. · Cholesterol. Your doctor will tell you how often to have this done based on your age, family history, or other things that can increase your risk for heart attack and stroke. · Blood pressure. Have your blood pressure checked during a routine doctor visit.  Your doctor will tell you how often to check your blood pressure based on your age, your blood pressure results, and other factors. · Mammogram. Ask your doctor how often you should have a mammogram, which is an X-ray of your breasts. A mammogram can spot breast cancer before it can be felt and when it is easiest to treat. · Pap test and pelvic exam. Ask your doctor how often you should have a Pap test. You may not need to have a Pap test as often as you used to. · Vision. Have your eyes checked every year or two or as often as your doctor suggests. Some experts recommend that you have yearly exams for glaucoma and other age-related eye problems starting at age 48. · Hearing. Tell your doctor if you notice any change in your hearing. You can have tests to find out how well you hear. · Diabetes. Ask your doctor whether you should have tests for diabetes. · Colon cancer. You should begin tests for colon cancer at age 48. You may have one of several tests. Your doctor will tell you how often to have tests based on your age and risk. Risks include whether you already had a precancerous polyp removed from your colon or whether your parents, sisters and brothers, or children have had colon cancer. · Thyroid disease. Talk to your doctor about whether to have your thyroid checked as part of a regular physical exam. Women have an increased chance of a thyroid problem. · Osteoporosis. You should begin tests for bone density at age 72. If you are younger than 72, ask your doctor whether you have factors that may increase your risk for this disease. You may want to have this test before age 72. · Heart attack and stroke risk. At least every 4 to 6 years, you should have your risk for heart attack and stroke assessed. Your doctor uses factors such as your age, blood pressure, cholesterol, and whether you smoke or have diabetes to show what your risk for a heart attack or stroke is over the next 10 years. When should you call for help? Watch closely for changes in your health, and be sure to contact your doctor if you have any problems or symptoms that concern you. Where can you learn more? Go to http://lilia-rohit.info/. Enter C343 in the search box to learn more about \"Well Visit, Women 50 to 72: Care Instructions. \" Current as of: May 12, 2017 Content Version: 11.4 © 8498-7783 [employer]. Care instructions adapted under license by [employer] (which disclaims liability or warranty for this information). If you have questions about a medical condition or this instruction, always ask your healthcare professional. [address] [address] any warranty or liability for your use of this information. Your physician has referred you for a procedure/imaging as discussed. A member of the [facility] Team will contact you within 24 hours to schedule. Introducing [facility] & HEALTH SERVICES! Cristi Anaya introduces [employer] patient portal. Now you can access parts of your medical record, email your doctor's office, and request medication refills online. 1. In your internet browser, go to [email or web address] 2. Click on the First Time User? Click Here link in the Sign In box. You will see the New Member Sign Up page. 3. Enter your [employer] Access Code exactly as it appears below. You will not need to use this code after youve completed the sign-up process. If you do not sign up before the expiration date, you must request a new code. · [employer] Access Code: [email or web address] Expires: 8/23/2018  1:39 PM 
 
4. Enter the last four digits of your Social Security Number (xxxx) and Date of Birth (mm/dd/yyyy) as indicated and click Submit. You will be taken to the next sign-up page. 5. Create a [employer] ID. This will be your [employer] login ID and cannot be changed, so think of one that is secure and easy to remember. 6. Create a [email or web address] password. You can change your password at any time. 7. Enter your Password Reset Question and Answer. This can be used at a later time if you forget your password. 8. Enter your e-mail address. You will receive e-mail notification when new information is available in [address] 9. Click Sign Up. You can now view and download portions of your medical record. 10. Click the Download Summary menu link to download a portable copy of your medical information. If you have questions, please visit the Frequently Asked Questions section of the [email or web address] website. Remember, [email or web address] is NOT to be used for urgent needs. For medical emergencies, dial 911. Now available from your iPhone and Android! Please provide this summary of care documentation to your next provider. Your primary care clinician is listed as Rachel Campuzano. If you have any questions after today's visit, please call [phone].

## 2018-05-25 NOTE — PROGRESS NOTES
48year old female here for complete physical    Has depression and anxiety  Works as   On FMLA due to anxiety and depression    Has hair loss  No thyroid issues    Has irregular menses    Had hx alcohol abuse -- actively trying to cut down on her alcohol intake    Needs mammogram and colonoscopy    I reviewed with the resident the medical history and the resident's findings on the physical examination. I discussed with the resident the patient's diagnosis and concur with the plan.

## 2018-05-25 NOTE — PROGRESS NOTES
Chief Complaint   Patient presents with    Complete Physical     wants to talk about hair loss     1. Have you been to the ER, urgent care clinic since your last visit? Hospitalized since your last visit? No    2. Have you seen or consulted any other health care providers outside of the [address] [place] [address] since your last visit? Include any pap smears or colon screening.  No        Pap-- needs pap    colonscopy--wants a FOBT    Mammogram-- needs referral

## 2018-05-25 NOTE — PROGRESS NOTES
HPI:  Cary Spear is a 48 y.o. female presenting for well woman exam.     Concerns today:   1. Increased hair loss for the last couple of months. No itching. No new shampoos, no new lotions. 2. Depression and anxiety. She suffers from severe anxiety and depression which is related to her job. She worked as a  at [employer] but currently on [employer] due to depression and anxiety. She reports that the job was very stressful for her. Since she has been on FMLA she feels better. No SI. She is followed by Dr. Judi Cooper at [facility], [facility]. - [facility] every 2 weeks, she is followed by the counser as well. She lives with her  and reports good relationship with him, reports he is very supportive. Chronic medical problems:  1. Mild intermittent asthma. She is followed by pulmonologist ( Dr. Denise Moody). She recently had PFTs done and was told it was normal.  She is currently on Dulera, Singular. 2. Alcohol dependence. She reports that previously she drank 1 bottle of vodka daily, but cut to 1-2 glasses of wine or beer daily. She is actively trying ti cut down by herself and is not interested in taking any medications. GYN hx:  Age at which menses began: Does not remember  Last menstrual period was April, 2018, periods are starting being irregular, every 2-4 months lasting x 4-5 days. No hot flashes, no vaginal dryness. [id number]    Sexually active?: Yes  Number of sexual partners: 1( )  Type of sexual partners: Male  Method of family planning: None    Diet: Various, but reports that she is eating \"unhealthy\", eats plenty of junk food, not a lot of salads and veggies. Exercise: Walking almost every day, does not want to go to the GYM. Allergies- reviewed:   No Known Allergies      Medications- reviewed:   Current Outpatient Prescriptions   Medication Sig    mometasone-formoterol (DULERA) 200-5 mcg/actuation HFA inhaler Take 2 Puffs by inhalation two (2) times a day.     montelukast (SINGULAIR) 10 mg tablet Take 10 mg by mouth daily.  gabapentin (NEURONTIN) 300 mg capsule Take 300 mg by mouth three (3) times daily.  traZODone (DESYREL) 150 mg tablet Take 150 mg by mouth nightly.  buPROPion SR (WELLBUTRIN SR) 100 mg SR tablet Take  by mouth.  pneumococcal 23-valent (PNEUMOVAX 23) 25 mcg/0.5 mL injection 0.5 mL by IntraMUSCular route once for 1 dose.  busPIRone (BUSPAR) 10 mg tablet TAKE 2 TABLETS BY MOUTH TWO (2) TIMES A DAY.  albuterol (PROAIR HFA) 90 mcg/actuation inhaler Take 2 Puffs by inhalation every four (4) hours as needed for Wheezing.  Nebulizer & Compressor machine 1 Each by Does Not Apply route daily as needed.  albuterol (PROVENTIL VENTOLIN) 2.5 mg /3 mL (0.083 %) nebulizer solution 3 mL by Nebulization route every four (4) hours as needed for Wheezing.  LATUDA 20 mg tab tablet TAKE 1 TABLET BY MOUTH DAILY WITH BREAKFAST    budesonide (PULMICORT) 180 mcg/actuation aepb inhaler Take 1 Puff by inhalation two (2) times a day. No current facility-administered medications for this visit. Past Medical History- reviewed:  Past Medical History:   Diagnosis Date    Anxiety     Asthma     Bipolar 2 disorder ([facility] [address]     Depression     Pyelonephritis 6/7/14         Past Surgical History- reviewed:   Past Surgical History:   Procedure Laterality Date    HX GYN           Family History - reviewed:  No family history on file.       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   Mammogram Last one in 7/2012 -WNL, due for another one  Colonoscopy Never done  DEXA scan Not indicated  HIV testing Refused today   Hepatitis C testing Refused today  Lung cancer screening N0t indicated      Review of Systems   CONSTITUTIONAL: Denies: fever, chills, weakness, fatigue, weight loss  EYES: Denies: blurry vision, decreased vision, loss of vision  ENT: Denies: nasal discharge, hearing loss  CARDIOVASCULAR: Denies: chest pain, dyspnea on exertion, orthopnea, edema  RESPIRATORY: Denies: hemoptysis, shortness of breath, wheezing  ENDOCRINE: Denies: polydipsia/polyuria, skin changes  GI: Denies: abdominal pain, black stool, blood in stool  : Denies: dysuria, frequency/urgency, genital discharge, vaginal bleeding  NEURO: Denies: dizzy/vertigo, headache, numbness/tingling  MUSCULOSKELETAL: Denies: back pain, joint pain  SKIN: Denies: rash, itching  PSYCH:+anxiety, depression  BREASTS: No masses or nipple discharge      Physical Exam  Visit Vitals    /71    Pulse 65    Temp 97.8 °F (36.6 °C) (Oral)    Resp 17    Ht 5' 5\" (1.651 m)    Wt 195 lb (88.5 kg)    SpO2 97%    BMI 32.45 kg/m2       General appearance - alert, well appearing, and in no distress  Eyes - pupils equal and reactive, extraocular eye movements intact  Ears - bilateral TM's and external ear canals normal  Nose - normal and patent, no erythema, discharge or polyps  Mouth - mucous membranes moist, pharynx normal without lesions  Neck - supple, no significant adenopathy  Chest - clear to auscultation, no wheezes, rales or rhonchi, symmetric air entry  Heart - normal rate, regular rhythm, normal S1, S2, no murmurs, rubs, clicks or gallops  Abdomen - soft, nontender, nondistended, no masses or organomegaly  Neurological - alert, oriented, normal speech, no focal findings or movement disorder noted  Musculoskeletal - no joint tenderness, deformity or swelling  Extremities - peripheral pulses normal, no pedal edema, no clubbing or cyanosis  Skin - normal coloration and turgor, no rashes, no suspicious skin lesions noted  Pelvic - Exam chaperoned by Alisson Bajwa LPN. External genitalia normal without rashes or lesions. Pink and moist vaginal mucosa. Scant white discharge. Cervix without lesions or abnormal discharge. Uterus non tender and normal size. No adnexal masses or tenderness. Breast - Exam chaperoned by Alisson Bajwa LPN. Non tender. No masses. No nipple discharge. Assessment/Plan:  49 yo female who is here for:      ICD-10-CM ICD-9-CM    1. Well woman exam with routine gynecological exam Z01.419 V72.31 PAP IG, APTIMA HPV AND RFX 16/18,45 ([id number])      REFERRAL FOR COLONOSCOPY      CBC W/O DIFF      METABOLIC PANEL, COMPREHENSIVE      LIPID PANEL      TSH 3RD GENERATION      VITAMIN B12 & FOLATE      HEMOGLOBIN A1C WITH EAG   2. Screening for breast cancer Z12.31 V76.10 [facility] MAMMO BI SCREENING INCL CAD   3. Encounter for immunization Z23 V03.89 pneumococcal 23-valent (PNEUMOVAX 23) 25 mcg/0.5 mL injection      TETANUS, DIPHTHERIA TOXOIDS AND ACELLULAR PERTUSSIS VACCINE (TDAP), IN INDIVIDS. >=7, IM   4. Screening for colon cancer Z12.11 V76.51 REFERRAL FOR COLONOSCOPY   5. Perimenopausal N95.1 627.2    6. Alcohol dependence with unspecified alcohol-induced disorder (HCC) F10.29 303.90 [place] BRIEF ALCOHOL MISUSE      291.9    7. Hair loss L65.9 704.00        · Counseled re: diet, exercise, healthy lifestyle    · Appropriate labs, vaccines, imaging studies, and referrals ordered as listed above  · Hair loss:  Checking basic labs and will screen for hypothyroid as well. Suspect it is related to the underlying stress and anxiety. Follow-up Disposition:  Return if symptoms worsen or fail to improve.       I have discussed the diagnosis with the patient and the intended plan as seen in the above orders. Patient verbalized understanding of the plan and agrees with the plan. The patient has received an after-visit summary and questions were answered concerning future plans. I have discussed medication side effects and warnings with the patient as well. Informed patient to return to the office if new symptoms arise.         Christ Kanner, MD  Family Medicine Resident, PGY-2

## 2018-05-26 LAB
ALBUMIN SERPL-MCNC: 4.3 G/DL (ref 3.5–5.5)
ALBUMIN/GLOB SERPL: 1.6 {RATIO} (ref 1.2–2.2)
ALP SERPL-CCNC: 65 IU/L (ref 39–117)
ALT SERPL-CCNC: 18 IU/L (ref 0–32)
AST SERPL-CCNC: 14 IU/L (ref 0–40)
BILIRUB SERPL-MCNC: 0.6 MG/DL (ref 0–1.2)
BUN SERPL-MCNC: 15 MG/DL (ref 6–24)
BUN/CREAT SERPL: 19 (ref 9–23)
CALCIUM SERPL-MCNC: 9.4 MG/DL (ref 8.7–10.2)
CHLORIDE SERPL-SCNC: 103 MMOL/L (ref 96–106)
CHOLEST SERPL-MCNC: 173 MG/DL (ref 100–199)
CO2 SERPL-SCNC: 22 MMOL/L (ref 18–29)
CREAT SERPL-MCNC: 0.81 MG/DL (ref 0.57–1)
ERYTHROCYTE [DISTWIDTH] IN BLOOD BY AUTOMATED COUNT: 12.9 % (ref 12.3–15.4)
EST. AVERAGE GLUCOSE BLD GHB EST-MCNC: 105 MG/DL
FOLATE SERPL-MCNC: >20 NG/ML
GFR SERPLBLD CREATININE-BSD FMLA CKD-EPI: 84 ML/MIN/1.73
GFR SERPLBLD CREATININE-BSD FMLA CKD-EPI: 97 ML/MIN/1.73
GLOBULIN SER CALC-MCNC: 2.7 G/DL (ref 1.5–4.5)
GLUCOSE SERPL-MCNC: 80 MG/DL (ref 65–99)
HBA1C MFR BLD: 5.3 % (ref 4.8–5.6)
HCT VFR BLD AUTO: 38.1 % (ref 34–46.6)
HDLC SERPL-MCNC: 58 MG/DL
HGB BLD-MCNC: 12.4 G/DL (ref 11.1–15.9)
INTERPRETATION, 910389: NORMAL
LDLC SERPL CALC-MCNC: 94 MG/DL (ref 0–99)
MCH RBC QN AUTO: 30.9 PG (ref 26.6–33)
MCHC RBC AUTO-ENTMCNC: 32.5 G/DL (ref 31.5–35.7)
MCV RBC AUTO: 95 FL (ref 79–97)
PLATELET # BLD AUTO: 297 X10E3/UL (ref 150–379)
POTASSIUM SERPL-SCNC: 4.3 MMOL/L (ref 3.5–5.2)
PROT SERPL-MCNC: 7 G/DL (ref 6–8.5)
RBC # BLD AUTO: 4.01 X10E6/UL (ref 3.77–5.28)
SODIUM SERPL-SCNC: 139 MMOL/L (ref 134–144)
TRIGL SERPL-MCNC: 105 MG/DL (ref 0–149)
TSH SERPL DL<=0.005 MIU/L-ACNC: 1.7 UIU/ML (ref 0.45–4.5)
VIT B12 SERPL-MCNC: 1266 PG/ML (ref 232–1245)
VLDLC SERPL CALC-MCNC: 21 MG/DL (ref 5–40)
WBC # BLD AUTO: 8.9 X10E3/UL (ref 3.4–10.8)

## 2018-05-28 NOTE — PROGRESS NOTES
CBC and CMP WNL. Lipid panel with , , HDL 58, LDL 94, 10 year ASCVD risk is 0.8%, no stain is indicated. TSH 1.7, HgA1C is 5.3. Will call and discuss with the patient.

## 2018-06-01 NOTE — PROGRESS NOTES
PAP is negative for IEL and negative HPV. Will need PAP in 5 years.  Called and discussed with the patient on 6/1/2018

## 2018-06-04 ENCOUNTER — TELEPHONE (OUTPATIENT)
Dept: FAMILY MEDICINE CLINIC | Age: 53
End: 2018-06-04

## 2018-06-04 NOTE — TELEPHONE ENCOUNTER
----- Message from Varun Cassidy sent at 6/4/2018  9:46 AM EDT -----  Regarding: Dr. Pauline Hinton  Pt would need a referral to Dr. Roxanna Hooker [facility]  inc for colonoscopy.  Monday June 11,2018 at 8:35 am  Pt's Callback : [phone]

## 2018-06-06 ENCOUNTER — HOSPITAL ENCOUNTER (OUTPATIENT)
Dept: MAMMOGRAPHY | Age: 53
Discharge: HOME | End: 2018-06-06
Attending: FAMILY MEDICINE
Payer: MEDICARE

## 2018-06-06 DIAGNOSIS — Z12.39 SCREENING FOR BREAST CANCER: ICD-10-CM

## 2018-06-06 PROCEDURE — 77067 SCR MAMMO BI INCL CAD: CPT

## 2018-06-06 NOTE — PROGRESS NOTES
Mammo is with BI-RADS 1: Negative. No mammographic evidence of malignancy. Will need next mammo in 1 year. Will send a letter.

## 2018-06-12 NOTE — H&P
Luz Maria Grande M.D.  [phone]            History and Physical       NAME:  Noa Adams   :   1965   MRN:   [id number]       Referring Physician:     Consult Date: 2018 2:42 PM    Chief Complaint:  Colon cancer screening    History of Present Illness:  Patient is a 48 y.o. who is seen for colon cancer screening. Denies any ongoing GI complaints. PMH:  Past Medical History:   Diagnosis Date    Anxiety     Asthma     Bipolar 2 disorder ([address]     Depression     Pyelonephritis 14       PSH:  Past Surgical History:   Procedure Laterality Date    HX GYN         Allergies:  No Known Allergies    Home Medications:  Cannot display prior to admission medications because the patient has not been admitted in this contact. Hospital Medications:  No current facility-administered medications for this encounter. Current Outpatient Prescriptions   Medication Sig    mometasone-formoterol (DULERA) 200-5 mcg/actuation HFA inhaler Take 2 Puffs by inhalation two (2) times a day.  montelukast (SINGULAIR) 10 mg tablet Take 10 mg by mouth daily.  gabapentin (NEURONTIN) 300 mg capsule Take 300 mg by mouth three (3) times daily.  traZODone (DESYREL) 150 mg tablet Take 150 mg by mouth nightly.  buPROPion SR (WELLBUTRIN SR) 100 mg SR tablet Take  by mouth.  busPIRone (BUSPAR) 10 mg tablet TAKE 2 TABLETS BY MOUTH TWO (2) TIMES A DAY.  LATUDA 20 mg tab tablet TAKE 1 TABLET BY MOUTH DAILY WITH BREAKFAST    albuterol (PROAIR HFA) 90 mcg/actuation inhaler Take 2 Puffs by inhalation every four (4) hours as needed for Wheezing.  budesonide (PULMICORT) 180 mcg/actuation aepb inhaler Take 1 Puff by inhalation two (2) times a day.  Nebulizer & Compressor machine 1 Each by Does Not Apply route daily as needed.     albuterol (PROVENTIL VENTOLIN) 2.5 mg /3 mL (0.083 %) nebulizer solution 3 mL by Nebulization route every four (4) hours as needed for Wheezing. Social History:  Social History   Substance Use Topics    Smoking status: Former Smoker     Quit date: 6/21/1992    Smokeless tobacco: Never Used      Comment: 32 Years     Alcohol use Yes       Family History:  No family history on file. Review of Systems:      Constitutional: negative fever, negative chills, negative weight loss  Eyes:   negative visual changes  ENT:   negative sore throat, tongue or lip swelling  Respiratory:  negative cough, negative dyspnea  Cards:  negative for chest pain, palpitations, lower extremity edema  GI:   See HPI  :  negative for frequency, dysuria  Integument:  negative for rash and pruritus  Heme:  negative for easy bruising and gum/nose bleeding  Musculoskel: negative for myalgias,  back pain and muscle weakness  Neuro: negative for headaches, dizziness, vertigo  Psych:  negative for feelings of anxiety, depression       Objective:   No data found. EXAM:     NEURO-a&o   HEENT-wnl   LUNGS-clear    COR-regular rate and rhythym     ABD-soft , no tenderness, no rebound, good bs     EXT-no edema     Data Review     No results for input(s): WBC, HGB, HCT, PLT, HGBEXT, HCTEXT, PLTEXT in the last 72 hours. No results for input(s): NA, K, CL, CO2, BUN, CREA, GLU, PHOS, CA in the last 72 hours. No results for input(s): SGOT, GPT, AP, TBIL, TP, ALB, GLOB, GGT, AML, LPSE in the last 72 hours. No lab exists for component: AMYP, HLPSE  No results for input(s): INR, PTP, APTT in the last 72 hours. No lab exists for component: INREXT    Patient Active Problem List   Diagnosis Code    Anxiety F41.9    [facility] 2 [facility], major [facility] F31.81    Moderate persistent asthma with acute exacerbation J45.41      Assessment:   · Colon cancer screening   Plan:   · Colonoscopy today.      Signed By: Rafael Montez MD     6/12/2018  2:42 PM

## 2018-06-13 ENCOUNTER — ANESTHESIA EVENT (OUTPATIENT)
Dept: ENDOSCOPY | Age: 53
End: 2018-06-13
Payer: MEDICARE

## 2018-06-13 ENCOUNTER — ANESTHESIA (OUTPATIENT)
Dept: ENDOSCOPY | Age: 53
End: 2018-06-13
Payer: MEDICARE

## 2018-06-13 ENCOUNTER — HOSPITAL ENCOUNTER (OUTPATIENT)
Age: 53
Setting detail: OUTPATIENT SURGERY
Discharge: HOME | End: 2018-06-13
Attending: INTERNAL MEDICINE | Admitting: INTERNAL MEDICINE
Payer: MEDICARE

## 2018-06-13 VITALS
DIASTOLIC BLOOD PRESSURE: 57 MMHG | SYSTOLIC BLOOD PRESSURE: 120 MMHG | TEMPERATURE: 97.6 F | RESPIRATION RATE: 23 BRPM | WEIGHT: 195 LBS | BODY MASS INDEX: 32.49 KG/M2 | OXYGEN SATURATION: 92 % | HEIGHT: 65 IN | HEART RATE: 73 BPM

## 2018-06-13 LAB — HCG UR QL: NEGATIVE

## 2018-06-13 PROCEDURE — 77030009426 HC FCPS BIOP ENDOSC BSC -B: Performed by: INTERNAL MEDICINE

## 2018-06-13 PROCEDURE — 74011000250 HC RX REV CODE- 250

## 2018-06-13 PROCEDURE — 77030011640 HC PAD GRND REM COVD -A: Performed by: INTERNAL MEDICINE

## 2018-06-13 PROCEDURE — 74011250636 HC RX REV CODE- 250/636

## 2018-06-13 PROCEDURE — 76040000019: Performed by: INTERNAL MEDICINE

## 2018-06-13 PROCEDURE — 81025 URINE PREGNANCY TEST: CPT

## 2018-06-13 PROCEDURE — 88305 TISSUE EXAM BY PATHOLOGIST: CPT | Performed by: INTERNAL MEDICINE

## 2018-06-13 PROCEDURE — 74011250636 HC RX REV CODE- 250/636: Performed by: INTERNAL MEDICINE

## 2018-06-13 PROCEDURE — 77030013992 HC SNR POLYP ENDOSC BSC -B: Performed by: INTERNAL MEDICINE

## 2018-06-13 PROCEDURE — 76060000031 HC ANESTHESIA FIRST 0.5 HR: Performed by: INTERNAL MEDICINE

## 2018-06-13 RX ORDER — PROPOFOL 10 MG/ML
INJECTION, EMULSION INTRAVENOUS
Status: DISCONTINUED | OUTPATIENT
Start: 2018-06-13 | End: 2018-06-13 | Stop reason: HOSPADM

## 2018-06-13 RX ORDER — EPINEPHRINE 0.1 MG/ML
1 INJECTION INTRACARDIAC; INTRAVENOUS
Status: DISCONTINUED | OUTPATIENT
Start: 2018-06-13 | End: 2018-06-13 | Stop reason: HOSPADM

## 2018-06-13 RX ORDER — PROPOFOL 10 MG/ML
INJECTION, EMULSION INTRAVENOUS AS NEEDED
Status: DISCONTINUED | OUTPATIENT
Start: 2018-06-13 | End: 2018-06-13 | Stop reason: HOSPADM

## 2018-06-13 RX ORDER — NALOXONE HYDROCHLORIDE 0.4 MG/ML
0.4 INJECTION, SOLUTION INTRAMUSCULAR; INTRAVENOUS; SUBCUTANEOUS
Status: DISCONTINUED | OUTPATIENT
Start: 2018-06-13 | End: 2018-06-13 | Stop reason: HOSPADM

## 2018-06-13 RX ORDER — LIDOCAINE HYDROCHLORIDE 20 MG/ML
INJECTION, SOLUTION EPIDURAL; INFILTRATION; INTRACAUDAL; PERINEURAL AS NEEDED
Status: DISCONTINUED | OUTPATIENT
Start: 2018-06-13 | End: 2018-06-13 | Stop reason: HOSPADM

## 2018-06-13 RX ORDER — ATROPINE SULFATE 0.1 MG/ML
0.4 INJECTION INTRAVENOUS
Status: DISCONTINUED | OUTPATIENT
Start: 2018-06-13 | End: 2018-06-13 | Stop reason: HOSPADM

## 2018-06-13 RX ORDER — MIDAZOLAM HYDROCHLORIDE 1 MG/ML
.25-5 INJECTION, SOLUTION INTRAMUSCULAR; INTRAVENOUS
Status: DISCONTINUED | OUTPATIENT
Start: 2018-06-13 | End: 2018-06-13 | Stop reason: HOSPADM

## 2018-06-13 RX ORDER — FLUMAZENIL 0.1 MG/ML
0.2 INJECTION INTRAVENOUS
Status: DISCONTINUED | OUTPATIENT
Start: 2018-06-13 | End: 2018-06-13 | Stop reason: HOSPADM

## 2018-06-13 RX ORDER — DEXTROMETHORPHAN/PSEUDOEPHED 2.5-7.5/.8
1.2 DROPS ORAL
Status: DISCONTINUED | OUTPATIENT
Start: 2018-06-13 | End: 2018-06-13 | Stop reason: HOSPADM

## 2018-06-13 RX ORDER — SODIUM CHLORIDE 9 MG/ML
INJECTION, SOLUTION INTRAVENOUS
Status: DISCONTINUED | OUTPATIENT
Start: 2018-06-13 | End: 2018-06-13 | Stop reason: HOSPADM

## 2018-06-13 RX ORDER — SODIUM CHLORIDE 9 MG/ML
50 INJECTION, SOLUTION INTRAVENOUS CONTINUOUS
Status: DISCONTINUED | OUTPATIENT
Start: 2018-06-13 | End: 2018-06-13 | Stop reason: HOSPADM

## 2018-06-13 RX ADMIN — PROPOFOL 50 MG: 10 INJECTION, EMULSION INTRAVENOUS at 07:44

## 2018-06-13 RX ADMIN — PROPOFOL 20 MG: 10 INJECTION, EMULSION INTRAVENOUS at 07:55

## 2018-06-13 RX ADMIN — PROPOFOL 50 MG: 10 INJECTION, EMULSION INTRAVENOUS at 07:51

## 2018-06-13 RX ADMIN — LIDOCAINE HYDROCHLORIDE 20 MG: 20 INJECTION, SOLUTION EPIDURAL; INFILTRATION; INTRACAUDAL; PERINEURAL at 07:43

## 2018-06-13 RX ADMIN — PROPOFOL 30 MG: 10 INJECTION, EMULSION INTRAVENOUS at 07:45

## 2018-06-13 RX ADMIN — SODIUM CHLORIDE: 9 INJECTION, SOLUTION INTRAVENOUS at 08:06

## 2018-06-13 RX ADMIN — SODIUM CHLORIDE 50 ML/HR: 9 INJECTION, SOLUTION INTRAVENOUS at 06:53

## 2018-06-13 RX ADMIN — PROPOFOL 50 MG: 10 INJECTION, EMULSION INTRAVENOUS at 07:43

## 2018-06-13 RX ADMIN — SODIUM CHLORIDE: 9 INJECTION, SOLUTION INTRAVENOUS at 07:29

## 2018-06-13 RX ADMIN — SODIUM CHLORIDE 50 ML/HR: 9 INJECTION, SOLUTION INTRAVENOUS at 07:22

## 2018-06-13 RX ADMIN — PROPOFOL 100 MCG/KG/MIN: 10 INJECTION, EMULSION INTRAVENOUS at 07:43

## 2018-06-13 NOTE — ANESTHESIA PREPROCEDURE EVALUATION
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
<<----- Click to add NO significant Past Surgical History

## 2018-06-13 NOTE — PERIOP NOTES
Care of patient assumed from the anesthesia provider. Patient tolerated procedure well. Abdomen remains soft and non tender post procedure, no complaints or indication of discomfort noted at this time. See anesthesia note. Endoscope was pre-cleaned at bedside immediately following procedure by Paras Rose.

## 2018-06-13 NOTE — PROCEDURES
Abhijit Whiting M.D.  [phone]            2018          Colonoscopy Operative Report  Sachin Cleary  :  1965  [facility] Medical Record Number:  [id number]      Indications:    Screening colonoscopy     :  Lisa Young MD    Referring Provider: Leticia Dennis MD    Sedation:  MAC anesthesia Propofol    Pre-Procedural Exam:      Airway: clear,  No airway problems anticipated  Heart: RRR, without gallops or rubs  Lungs: clear bilaterally without wheezes, crackles, or rhonchi  Abdomen: soft, nontender, nondistended, bowel sounds present  Mental Status: awake, alert and oriented to person, place and time     Procedure Details:  After informed consent was obtained with all risks and benefits of procedure explained and preoperative exam completed, the patient was taken to the endoscopy suite and placed in the left lateral decubitus position. Upon sequential sedation as per above, a digital rectal exam was performed. The Olympus videocolonoscope  was inserted in the rectum and carefully advanced to the terminal ileum. The quality of preparation was excellent. The colonoscope was slowly withdrawn with careful inspection and evaluation between folds. Retroflexion in the rectum was performed. Abdominal pressure was needed to help with passage of the scope in the transverse area    Findings:   Terminal Ileum: normal  Cecum: 1  Sessile polyp(s), the largest 6 mm in size;  Ascending Colon:     - Diverticulosis  Transverse Colon:     - Diverticulosis  Descending Colon:     - Diverticulosis  Sigmoid: 1  Sessile polyp(s), the largest 7 mm in size; Rectum: normal    Interventions:  2 complete polypectomy were performed using cold snare  and the polyps were  retrieved    Specimen Removed:  specimen #1, 6-8 mm in size, located in the cecum and the sigmoid removed by cold snare and retrieved for pathology    Complications: None. EBL:  None.     Impression:  Two polyps in the cecum and sigmoid colon. Removed using a cold snare              Diverticulosis of mild severity noted throughout the colon    Recommendations:  -Repeat colonoscopy in 5 years.   -High fiber diet.    -Resume normal medication(s). Discharge Disposition:  Home in the company of a  when able to ambulate.     Annette Stinson MD  6/13/2018  8:03 AM

## 2018-06-13 NOTE — IP AVS SNAPSHOT
Ki Albrecht 
 
 
 [address] [place] [address] 
[phone] Patient: Noa Adams MRN: [id number] :1965 About your hospitalization You were admitted on:  2018 You last received care in the:  [facility] ENDOSCOPY You were discharged on:  2018 Why you were hospitalized Your primary diagnosis was:  Not on File Follow-up Information None Discharge Orders None A check sage indicates which time of day the medication should be taken. My Medications ASK your doctor about these medications Instructions Each Dose to Equal  
 Morning Noon Evening Bedtime  
 busPIRone 10 mg tablet Commonly known as:  BUSPAR Your last dose was: Your next dose is: TAKE 2 TABLETS BY MOUTH TWO (2) TIMES A DAY. DULERA 200-5 mcg/actuation HFA inhaler Generic drug:  mometasone-formoterol Your last dose was: Your next dose is: Take 2 Puffs by inhalation two (2) times a day. 2 Puff  
    
   
   
   
  
 gabapentin 300 mg capsule Commonly known as:  NEURONTIN Your last dose was: Your next dose is: Take 300 mg by mouth three (3) times daily. 300 mg Nebulizer & Compressor machine Your last dose was: Your next dose is:    
   
   
 1 Each by Does Not Apply route daily as needed. 1 Each  
    
   
   
   
  
 * PROAIR HFA 90 mcg/actuation inhaler Generic drug:  albuterol Your last dose was: Your next dose is: Take 2 Puffs by inhalation every four (4) hours as needed for Wheezing. 2 Puff * albuterol 2.5 mg /3 mL (0.083 %) nebulizer solution Commonly known as:  PROVENTIL VENTOLIN Your last dose was: Your next dose is: 3 mL by Nebulization route every four (4) hours as needed for Wheezing. 2.5 mg  
    
   
   
   
  
 SINGULAIR 10 mg tablet Generic drug:  montelukast  
   
Your last dose was: Your next dose is: Take 10 mg by mouth daily. 10 mg  
    
   
   
   
  
 traZODone 150 mg tablet Commonly known as:  Angelito Gonzalez Your last dose was: Your next dose is: Take 150 mg by mouth nightly. 150 mg WELLBUTRIN  mg SR tablet Generic drug:  buPROPion SR Your last dose was: Your next dose is: Take  by mouth. * Notice: This list has 2 medication(s) that are the same as other medications prescribed for you. Read the directions carefully, and ask your doctor or other care provider to review them with you. Discharge Instructions [facility]. [facility] Belinda Morrell M.D. 
[phone] COLON DISCHARGE INSTRUCTIONS 
    
2018 [address] :  1965 [facility] Number:  [id number] COLONOSCOPY FINDINGS: 
Your colonoscopy showed: two polyps and diverticulosis. DISCOMFORT: 
Redness at IV site- apply warm compress to area; if redness or soreness persist- contact your physician There may be a slight amount of blood passed from the rectum Gaseous discomfort- walking, belching will help relieve any discomfort You may not operate a vehicle for 12 hours You may not engage in an occupation involving machinery or appliances for rest of today You may not drink alcoholic beverages for at least 12 hours Avoid making any critical decisions for at least 24 hour DIET: 
 High fiber diet.  however -  remember your colon is empty and a heavy meal will produce gas. Avoid these foods:  vegetables, fried / greasy foods, carbonated drinks for today ACTIVITY: 
You may resume your normal daily activities it is recommended that you spend the remainder of the day resting -  avoid any strenuous activity. CALL M.D. ANY SIGN OF: Increasing pain, nausea, vomiting Abdominal distension (swelling) New increased bleeding (oral or rectal) Fever (chills) Pain in chest area Bloody discharge from nose or mouth Shortness of breath Follow-up Instructions: 
 Call Dr. Rene Banuelos if any questions or problems. Telephone # [phone] Biopsy results will be available in  5 to 7 days Should have a repeat colonoscopy in 5 years. Introducing [facility] [place] [facility] SERVICES! Magdiel Mccall introduces Bio[employer] patient portal. Now you can access parts of your medical record, email your doctor's office, and request medication refills online. 1. In your internet browser, go to [email or web address] 2. Click on the First Time User? Click Here link in the Sign In box. You will see the New Member Sign Up page. 3. Enter your BioPoly Access Code exactly as it appears below. You will not need to use this code after youve completed the sign-up process. If you do not sign up before the expiration date, you must request a new code. · Bio[employer] Access Code: [email or web address] Expires: 8/23/2018  1:39 PM 
 
4. Enter the last four digits of your Social Security Number (xxxx) and Date of Birth (mm/dd/yyyy) as indicated and click Submit. You will be taken to the next sign-up page. 5. Create a BioPoly ID. This will be your BioPoly login ID and cannot be changed, so think of one that is secure and easy to remember. 6. Create a BioPoly password. You can change your password at any time. 7. Enter your Password Reset Question and Answer. This can be used at a later time if you forget your password. 8. Enter your e-mail address. You will receive e-mail notification when new information is available in [address] 9. Click Sign Up. You can now view and download portions of your medical record. 10. Click the Download Summary menu link to download a portable copy of your medical information. If you have questions, please visit the Frequently Asked Questions section of the ShinyB[employer]et website. Remember, EndoDex is NOT to be used for urgent needs. For medical emergencies, dial 911. Now available from your iPhone and Android! Introducing Vamsi Parekh As a [facility] patient, I wanted to make you aware of our electronic visit tool called Vamsi Parekh. [employer] allows you to connect within minutes with a medical provider 24 hours a day, seven days a week via a mobile device or tablet or logging into a secure website from your computer. You can access Vamsi Parekh from anywhere in the [place]. A virtual visit might be right for you when you have a simple condition and feel like you just dont want to get out of bed, or cant get away from work for an appointment, when your regular [facility] provider is not available (evenings, weekends or holidays), or when youre out of town and need minor care. Electronic visits cost only $49 and if the [employer] provider determines a prescription is needed to treat your condition, one can be electronically transmitted to a nearby pharmacy*. Please take a moment to enroll today if you have not already done so. The enrollment process is free and takes just a few minutes. To enroll, please download the [facility] erika to your tablet or phone, or visit [email or web address] to enroll on your computer. And, as an [address] [place] [address] patient with a Nonpareil account, the results of your visits will be scanned into your electronic medical record and your primary care provider will be able to view the scanned results. We urge you to continue to see your regular [facility] provider for your ongoing medical care.   And while your primary care provider may not be the one available when you seek a Vamsi Parekh virtual visit, the peace of mind you get from getting a real diagnosis real time can be priceless. For more information on Vamsi Parekh, view our Frequently Asked Questions (FAQs) at [email or web address]. Sincerely, 
 
Deisy Cuenca MD 
Chief Medical Officer David Wilks *:  certain medications cannot be prescribed via Vamsi Parekh Providers Seen During Your Hospitalization Provider Specialty Primary office phone Soni Blackwell MD Gastroenterology [phone] Your Primary Care Physician (PCP) Primary Care Physician Office Phone Office Fax Jett Fernández [phone] You are allergic to the following No active allergies Recent Documentation Height Weight Breastfeeding? BMI OB Status Smoking Status 1.651 m 88.5 kg No 32.45 kg/m2 Perimenopausal Former Smoker Emergency Contacts Name Discharge Info Relation Home Work Mobile [employer] DISCHARGE CAREGIVER [3] Spouse [3] [phone] Patient Belongings The following personal items are in your possession at time of discharge: 
  Dental Appliances: None  Visual Aid: Glasses Please provide this summary of care documentation to your next provider. Signatures-by signing, you are acknowledging that this After Visit Summary has been reviewed with you and you have received a copy. Patient Signature:  ____________________________________________________________ Date:  ____________________________________________________________  
  
Yecenia Salazar Provider Signature:  ____________________________________________________________ Date:  ____________________________________________________________

## 2018-06-13 NOTE — PROGRESS NOTES
Electronic signiture pad not working, discharge instructions given beside with patient and daughter, Beverley Slater in to speak to patient and daughter also

## 2018-06-13 NOTE — ANESTHESIA POSTPROCEDURE EVALUATION
Post-Anesthesia Evaluation and Assessment    Patient: Annetta Henderson MRN: [id number]  SSN: [id number]    YOB: 1965  Age: 48 y.o. Sex: female       Cardiovascular Function/Vital Signs  Visit Vitals    /57    Pulse 73    Temp 36.4 °C (97.6 °F)    Resp 23    Ht 5' 5\" (1.651 m)    Wt 88.5 kg (195 lb)    SpO2 92%    Breastfeeding No    BMI 32.45 kg/m2       Patient is status post MAC anesthesia for Procedure(s):  COLONOSCOPY  ENDOSCOPIC POLYPECTOMY. Nausea/Vomiting: None    Postoperative hydration reviewed and adequate. Pain:  Pain Scale 1: Numeric (0 - 10) (06/13/18 0831)  Pain Intensity 1: 0 (06/13/18 0831)   Managed    Neurological Status: At baseline    Mental Status and Level of Consciousness: Arousable    Pulmonary Status:   O2 Device: Room air (06/13/18 0831)   Adequate oxygenation and airway patent    Complications related to anesthesia: None    Post-anesthesia assessment completed.  No concerns    Signed By: Millicent Austin MD     June 13, 2018

## 2018-06-13 NOTE — ROUTINE PROCESS
Hermann Villa  1965  [id number]    Situation:  Verbal report received from: Arvind Pitt RN  Procedure: Procedure(s):  COLONOSCOPY  ENDOSCOPIC POLYPECTOMY    Background:    Preoperative diagnosis: FAMILY HISTORY OF COLON POLYPS  Postoperative diagnosis: colon polyps  diverticulosis    :  Dr. Conteh Standard  Assistant(s): Endoscopy Technician-1: Domingo Mathews  Endoscopy RN-1: Merlene Duarte RN    Specimens:   ID Type Source Tests Collected by Time Destination   1 : cecum/sigmoid polyp Saline Cecum  Behzad Olsen MD 6/13/2018 0800 Pathology     H. Pylori  no    Assessment:  Intra-procedure medications   Anesthesia gave intra-procedure sedation and medications, see anesthesia flow sheet yes    Intravenous fluids: NS@ KVO     Vital signs stable     Abdominal assessment: round and soft     Recommendation:  Discharge patient per MD order.   Family or Friend   Permission to share finding with family or friend yes

## 2018-06-13 NOTE — DISCHARGE INSTRUCTIONS
[facility]. Norman Soliman M.D.  [phone]          COLON DISCHARGE INSTRUCTIONS       2018    Avis Franklin  :  1965  [facility] Medical Record Number:  [id number]      COLONOSCOPY FINDINGS:  Your colonoscopy showed: two polyps and diverticulosis. DISCOMFORT:  Redness at IV site- apply warm compress to area; if redness or soreness persist- contact your physician  There may be a slight amount of blood passed from the rectum  Gaseous discomfort- walking, belching will help relieve any discomfort  You may not operate a vehicle for 12 hours  You may not engage in an occupation involving machinery or appliances for rest of today  You may not drink alcoholic beverages for at least 12 hours  Avoid making any critical decisions for at least 24 hour  DIET:   High fiber diet. - however -  remember your colon is empty and a heavy meal will produce gas. Avoid these foods:  vegetables, fried / greasy foods, carbonated drinks for today     ACTIVITY:  You may resume your normal daily activities it is recommended that you spend the remainder of the day resting -  avoid any strenuous activity. CALL M.DBrittani ANY SIGN OF:   Increasing pain, nausea, vomiting  Abdominal distension (swelling)  New increased bleeding (oral or rectal)  Fever (chills)  Pain in chest area  Bloody discharge from nose or mouth   Shortness of breath    Follow-up Instructions:   Call Dr. Itzel Frey if any questions or problems. Telephone # [phone]  Biopsy results will be available in  5 to 7 days  Should have a repeat colonoscopy in 5 years.

## 2019-05-15 ENCOUNTER — OFFICE VISIT (OUTPATIENT)
Dept: FAMILY MEDICINE CLINIC | Age: 54
End: 2019-05-15

## 2019-05-15 VITALS
SYSTOLIC BLOOD PRESSURE: 125 MMHG | WEIGHT: 200 LBS | HEART RATE: 91 BPM | HEIGHT: 65 IN | BODY MASS INDEX: 33.32 KG/M2 | OXYGEN SATURATION: 98 % | TEMPERATURE: 98.7 F | RESPIRATION RATE: 16 BRPM | DIASTOLIC BLOOD PRESSURE: 84 MMHG

## 2019-05-15 DIAGNOSIS — L03.211 CELLULITIS OF FACE: Primary | ICD-10-CM

## 2019-05-15 DIAGNOSIS — R52 BODY ACHES: ICD-10-CM

## 2019-05-15 DIAGNOSIS — J02.9 SORE THROAT: ICD-10-CM

## 2019-05-15 LAB
FLUAV+FLUBV AG NOSE QL IA.RAPID: NEGATIVE POS/NEG
FLUAV+FLUBV AG NOSE QL IA.RAPID: NEGATIVE POS/NEG
S PYO AG THROAT QL: NEGATIVE
VALID INTERNAL CONTROL?: YES
VALID INTERNAL CONTROL?: YES

## 2019-05-15 RX ORDER — DOXYCYCLINE 100 MG/1
100 CAPSULE ORAL 2 TIMES DAILY
Qty: 20 CAP | Refills: 0 | Status: SHIPPED | OUTPATIENT
Start: 2019-05-15 | End: 2019-05-25

## 2019-05-15 NOTE — PROGRESS NOTES
Chief Complaint   Patient presents with    Facial Swelling     x 3 days     Sore Throat     patient states symptoms started last night     Generalized Body Aches     patient states having body aches that last night      Blood pressure 125/84, pulse 91, temperature 98.7 °F (37.1 °C), temperature source Oral, resp. rate 16, height 5' 5\" (1.651 m), weight 200 lb (90.7 kg), SpO2 98 %. 1. Have you been to the ER, urgent care clinic since your last visit? Hospitalized since your last visit? No    2. Have you seen or consulted any other health care providers outside of the [address] [place] [address] since your last visit? Include any pap smears or colon screening.  No

## 2019-05-15 NOTE — PATIENT INSTRUCTIONS

## 2019-05-15 NOTE — PROGRESS NOTES
Leidy Valdez is a 48 y.o. female here today to address the following issues:  Chief Complaint   Patient presents with    Facial Swelling     x 3 days     Sore Throat     patient states symptoms started last night     Generalized Body Aches     patient states having body aches that last night      Patient with concerns for face swelling for the past 3 days. Also with a sore throat and some generalized body aches that started yesterday. This is painful. In the right maxillary area. No drainage. No fever. 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 Denies any issues with her vision, ear pain or sinus issues. No known sick contacts. No history of MRSA. Past Medical History:   Diagnosis Date    Anxiety     Asthma     Bipolar 2 disorder ([facility] [address]     Depression     and anxiety    Pyelonephritis 14     Past Surgical History:   Procedure Laterality Date    COLONOSCOPY N/A 2018    COLONOSCOPY performed by Raciel Larson MD at [facility] HX GYN  1995    tubal ligation    HX OTHER SURGICAL      vein ligation    HX TONSILLECTOMY  1985     Social History     Socioeconomic History    Marital status:      Spouse name: Not on file    Number of children: Not on file    Years of education: Not on file    Highest education level: Not on file   Occupational History    Occupation: Home Health Nurse   Social Needs    Financial resource strain: Not on file    Food insecurity:     Worry: Not on file     Inability: Not on file    Transportation needs:     Medical: Not on file     Non-medical: Not on file   Tobacco Use    Smoking status: Former Smoker     Last attempt to quit:      Years since quittin.3    Smokeless tobacco: Never Used    Tobacco comment: 26 Years    Substance and Sexual Activity    Alcohol use:  Yes     Alcohol/week: 12.0 oz     Types: 20 Cans of beer per week    Drug use: No    Sexual activity: Not on file   Lifestyle    Physical activity:     Days per week: Not on file     Minutes per session: Not on file    Stress: Not on file   Relationships    Social connections:     Talks on phone: Not on file     Gets together: Not on file     Attends Yazidism service: Not on file     Active member of club or organization: Not on file     Attends meetings of clubs or organizations: Not on file     Relationship status: Not on file    Intimate partner violence:     Fear of current or ex partner: Not on file     Emotionally abused: Not on file     Physically abused: Not on file     Forced sexual activity: Not on file   Other Topics Concern    Not on file   Social History Narrative    Not on file       No Known Allergies    Current Outpatient Medications   Medication Sig    doxycycline (VIBRAMYCIN) 100 mg capsule Take 1 Cap by mouth two (2) times a day for 10 days.  mometasone-formoterol (DULERA) 200-5 mcg/actuation HFA inhaler Take 2 Puffs by inhalation two (2) times a day.  montelukast (SINGULAIR) 10 mg tablet Take 10 mg by mouth daily.  gabapentin (NEURONTIN) 300 mg capsule Take 300 mg by mouth three (3) times daily.  traZODone (DESYREL) 150 mg tablet Take 150 mg by mouth nightly.  buPROPion SR (WELLBUTRIN SR) 100 mg SR tablet Take  by mouth.  busPIRone (BUSPAR) 10 mg tablet TAKE 2 TABLETS BY MOUTH TWO (2) TIMES A DAY.  albuterol (PROAIR HFA) 90 mcg/actuation inhaler Take 2 Puffs by inhalation every four (4) hours as needed for Wheezing.  Nebulizer & Compressor machine 1 Each by Does Not Apply route daily as needed.  albuterol (PROVENTIL VENTOLIN) 2.5 mg /3 mL (0.083 %) nebulizer solution 3 mL by Nebulization route every four (4) hours as needed for Wheezing. No current facility-administered medications for this visit. Review of Systems   Constitutional: Negative for fever. HENT: Negative for congestion, hearing loss, sinus pain and tinnitus. Eyes: Negative for blurred vision, double vision, photophobia, pain, discharge and redness. Musculoskeletal: Positive for myalgias. Skin: Negative for itching. A comprehensive review of systems was negative except for that written in the HPI and listed above. Visit Vitals  /84   Pulse 91   Temp 98.7 °F (37.1 °C) (Oral)   Resp 16   Ht 5' 5\" (1.651 m)   Wt 200 lb (90.7 kg)   SpO2 98%   BMI 33.28 kg/m²       Physical Exam   Constitutional: She is well-developed, well-nourished, and in no distress. No distress. HENT:   Head: Normocephalic and atraumatic. Right Ear: External ear normal.   Left Ear: External ear normal.   Nose: Nose normal.   Mouth/Throat: Oropharynx is clear and moist. No oropharyngeal exudate. 0.7cm x 0.5cm nodular lesion noted, tender to palpation, mild erythema that does not extend past the nodule   Eyes: Conjunctivae are normal. Right eye exhibits no discharge. Left eye exhibits no discharge. Cardiovascular: Normal rate, regular rhythm and normal heart sounds. Exam reveals no gallop and no friction rub. No murmur heard. Pulmonary/Chest: Effort normal and breath sounds normal. No respiratory distress. She has no wheezes. She has no rales. Abdominal: Soft. Lymphadenopathy:     She has cervical adenopathy. Neurological: She is alert. Skin: Skin is warm and dry. She is not diaphoretic. Psychiatric: Affect normal.   Nursing note and vitals reviewed. INDICATION: Possible cellulitis versus abscess      Using a [employer] e ultrasound system, a linear probe was used in the the area of erythema over the left upper thigh was evaluated. Subcutaneous edema appreciated on ultrasound. No clear hypoechoic collection of fluid or abscess noted.     IMPRESSION: Cellulitis without definable abscess    The credentialed provider supervising this exam was Dr. Sydney Pacheco MD, Annita Palacio, TARIQ        Recent Results (from the past 12 hour(s))   AMB POC RAPID STREP A    Collection Time: 05/15/19 12:35 PM   Result Value Ref Range    VALID INTERNAL CONTROL POC Yes     Group A Strep Ag Negative Negative   AMB POC JONE INFLUENZA A/B TEST    Collection Time: 05/15/19 12:35 PM   Result Value Ref Range    VALID INTERNAL CONTROL POC Yes     Influenza A Ag POC Negative Negative Pos/Neg    Influenza B Ag POC Negative Negative Pos/Neg       1. Sore throat  - AMB POC RAPID STREP A    2. Body aches  - AMB POC JONE INFLUENZA A/B TEST    3. Cellulitis of face  We will start antibiotics. Apply warm compresses as well. If any worsening, redness, concerns or fever return referral to the emergency room or follow-up here indicated. - doxycycline (VIBRAMYCIN) 100 mg capsule; Take 1 Cap by mouth two (2) times a day for 10 days. Dispense: 20 Cap; Refill: 0      Follow-up and Dispositions    · Return in about 6 months (around 11/15/2019), or if symptoms worsen or fail to improve, for Annual Wellness.            Arnaldo Ferrera MD, CAQSM, RMSK

## 2020-01-17 ENCOUNTER — HOSPITAL ENCOUNTER (OUTPATIENT)
Dept: ULTRASOUND IMAGING | Age: 55
Discharge: HOME | End: 2020-01-17
Attending: ORTHOPAEDIC SURGERY
Payer: COMMERCIAL

## 2020-01-17 DIAGNOSIS — M25.571 CHRONIC PAIN OF RIGHT ANKLE: ICD-10-CM

## 2020-01-17 DIAGNOSIS — G89.29 CHRONIC PAIN OF RIGHT ANKLE: ICD-10-CM

## 2020-01-17 PROCEDURE — 76882 US LMTD JT/FCL EVL NVASC XTR: CPT

## 2020-02-11 ENCOUNTER — OFFICE VISIT (OUTPATIENT)
Dept: FAMILY MEDICINE CLINIC | Age: 55
End: 2020-02-11

## 2020-02-11 ENCOUNTER — HOSPITAL ENCOUNTER (OUTPATIENT)
Dept: LAB | Age: 55
Discharge: HOME | End: 2020-02-11

## 2020-02-11 VITALS
BODY MASS INDEX: 33.32 KG/M2 | DIASTOLIC BLOOD PRESSURE: 81 MMHG | RESPIRATION RATE: 19 BRPM | HEIGHT: 65 IN | OXYGEN SATURATION: 100 % | HEART RATE: 66 BPM | WEIGHT: 200 LBS | TEMPERATURE: 98.7 F | SYSTOLIC BLOOD PRESSURE: 128 MMHG

## 2020-02-11 DIAGNOSIS — Z00.00 PREVENTATIVE HEALTH CARE: ICD-10-CM

## 2020-02-11 DIAGNOSIS — M76.71 PERONEAL TENDINITIS OF RIGHT LOWER EXTREMITY: Primary | ICD-10-CM

## 2020-02-11 DIAGNOSIS — Z01.818 PRE-OP EVALUATION: ICD-10-CM

## 2020-02-11 PROBLEM — J45.20 MILD INTERMITTENT ASTHMA WITHOUT COMPLICATION: Status: ACTIVE | Noted: 2017-12-04

## 2020-02-11 LAB
ALBUMIN SERPL-MCNC: 3.9 G/DL (ref 3.5–5)
ALBUMIN/GLOB SERPL: 1.1 {RATIO} (ref 1.1–2.2)
ALP SERPL-CCNC: 71 U/L (ref 45–117)
ALT SERPL-CCNC: 23 U/L (ref 12–78)
ANION GAP SERPL CALC-SCNC: 0 MMOL/L (ref 5–15)
AST SERPL-CCNC: 12 U/L (ref 15–37)
BILIRUB SERPL-MCNC: 0.5 MG/DL (ref 0.2–1)
BUN SERPL-MCNC: 16 MG/DL (ref 6–20)
BUN/CREAT SERPL: 16 (ref 12–20)
CALCIUM SERPL-MCNC: 9.7 MG/DL (ref 8.5–10.1)
CHLORIDE SERPL-SCNC: 108 MMOL/L (ref 97–108)
CHOLEST SERPL-MCNC: 199 MG/DL
CO2 SERPL-SCNC: 29 MMOL/L (ref 21–32)
CREAT SERPL-MCNC: 0.98 MG/DL (ref 0.55–1.02)
ERYTHROCYTE [DISTWIDTH] IN BLOOD BY AUTOMATED COUNT: 13.2 % (ref 11.5–14.5)
EST. AVERAGE GLUCOSE BLD GHB EST-MCNC: 120 MG/DL
GLOBULIN SER CALC-MCNC: 3.4 G/DL (ref 2–4)
GLUCOSE SERPL-MCNC: 104 MG/DL (ref 65–100)
HBA1C MFR BLD: 5.8 % (ref 4–5.6)
HCT VFR BLD AUTO: 38.4 % (ref 35–47)
HDLC SERPL-MCNC: 60 MG/DL
HDLC SERPL: 3.3 {RATIO} (ref 0–5)
HGB BLD-MCNC: 12.1 G/DL (ref 11.5–16)
LDLC SERPL CALC-MCNC: 113.4 MG/DL (ref 0–100)
LIPID PROFILE,FLP: ABNORMAL
MCH RBC QN AUTO: 30.8 PG (ref 26–34)
MCHC RBC AUTO-ENTMCNC: 31.5 G/DL (ref 30–36.5)
MCV RBC AUTO: 97.7 FL (ref 80–99)
NRBC # BLD: 0 K/UL (ref 0–0.01)
NRBC BLD-RTO: 0 PER 100 WBC
PLATELET # BLD AUTO: 319 K/UL (ref 150–400)
PMV BLD AUTO: 11.4 FL (ref 8.9–12.9)
POTASSIUM SERPL-SCNC: 4.3 MMOL/L (ref 3.5–5.1)
PROT SERPL-MCNC: 7.3 G/DL (ref 6.4–8.2)
RBC # BLD AUTO: 3.93 M/UL (ref 3.8–5.2)
SODIUM SERPL-SCNC: 137 MMOL/L (ref 136–145)
TRIGL SERPL-MCNC: 128 MG/DL (ref ?–150)
VLDLC SERPL CALC-MCNC: 25.6 MG/DL
WBC # BLD AUTO: 7.6 K/UL (ref 3.6–11)

## 2020-02-11 RX ORDER — GUANFACINE HYDROCHLORIDE 2 MG/1
TABLET ORAL
COMMUNITY
Start: 2019-12-09 | End: 2020-02-11 | Stop reason: ALTCHOICE

## 2020-02-11 RX ORDER — QUETIAPINE FUMARATE 50 MG/1
TABLET, EXTENDED RELEASE ORAL
COMMUNITY
Start: 2019-12-09 | End: 2022-03-25

## 2020-02-11 RX ORDER — DOXEPIN HYDROCHLORIDE 10 MG/1
CAPSULE ORAL
COMMUNITY
Start: 2019-12-09 | End: 2022-03-25

## 2020-02-11 NOTE — PROGRESS NOTES
[address] Medicine Residency Attending Addendum:  Patient encounter was discussed on the day of the encounter with Lilo Wen MD, performing the key elements of the service. I discussed the findings, assessment and plan with the resident and agree with the resident's findings and plan as documented in the resident's note.       Castro Chaudhari MD, CAQSM, RMSK

## 2020-02-11 NOTE — PROGRESS NOTES
Preoperative Evaluation for Zohra Brock     2/11/2020  No chief complaint on file. HPI:   Zohra Brock is a 48 y.o. female referred for evaluation by: Dr. Chad Toure for Pre- Op Evaluation. Please see encounter details and orders for consultative summary. Type of surgery and indication: Peroneal tendon reconstruction of right lower extremity    Surgery site : RLE  Anesthesia type: General  Date of procedure:  2/17/20    Review of Systems     Review of Systems   Constitutional: Negative for chills and fever. HENT: Negative for congestion and sore throat. Respiratory: Negative for cough, shortness of breath and wheezing. Cardiovascular: Negative for chest pain and palpitations. Gastrointestinal: Negative for abdominal pain, constipation, diarrhea, nausea and vomiting. Neurological: Negative for dizziness and headaches. Inherent Risk of Surgery   Surgical risk:  Intermediate  Intermediate:  Orthopedic, abdominal, thoracic, carotid endarterctomy, prostate, head and neck    Patient Cardiac Risk Assessment   Revised Cardiac Risk Index (RCRI)  High Risk Surgery: no  Hx of Heart Failure: no  Hx of ischemic heart disease: no  Hx of CVD: no  DM requiring insulin therapy: no  Preoperative serum Cr >2.0 mg/dl: no  None of the above pertain to this patient.      Rate if cardiac death, nonfatal MI, nonfatal cardiac arrest by number of risk factor  None - 0.4%    LIN/AHA 2007 Guidelines:    1) Surgery Emergency, Non-cardiac -> to surgery  2) If not, look at clinical predictors  Major Intermediate Minor   Unstable CAD (recent MI, severe angina) Mild angina Advanced Age   Decompensated CHF Prior MI Abnormal EKG   Severe Valvular Disease Compensated/Prior CHF Rhythm other than sinus   Arrhythmias (AV block, uncontrolled vent rate, sxs) Diabetes Low METS (<4)    Renal Insuficiency Hx of CVA     Uncontrolled HTN       METS     <4 METS >4 METS   Care for self Climb a flight of stairs or a hill Walk indoors around housse Walk on level ground at 4 mph   Walk 2-3 blocks on level ground (2-3 mph) Run a short distance   Light work around house (dust, dishes) Heavy work around house (scrub floors, move furniture)     Prior cardiac evaluation: none    Other Risk Factors:   Screening for ETOH use:  Done and low risk  Smoking status:  No smoking    Personal or FH of bleeding problems: no  Personal or FH of blood clots: no  Personal or FH of anesthesia problems: No    Pulmonary Risk:  Asthma: Well controlled since Pt got rid of her cat. Not on any controller inhalers. Uses albuterol inhaler less than once every 6 months. Obesity:  Body mass index is 33.28 kg/m². Known GUILLERMO: No  Albumin: Normal    Past Medical, Surgical, Social History   Allergies: No Known Allergies  Latex allergy: no  Prior reactions to anesthesia:  None      Current Outpatient Medications   Medication Sig    doxepin (SINEQUAN) 10 mg capsule     QUEtiapine SR (SEROQUEL XR) 50 mg sr tablet     QUEtiapine SR (SEROQUEL XR) 50 mg sr tablet     montelukast (SINGULAIR) 10 mg tablet Take 10 mg by mouth daily.  gabapentin (NEURONTIN) 300 mg capsule Take 300 mg by mouth three (3) times daily.  buPROPion SR (WELLBUTRIN SR) 100 mg SR tablet Take  by mouth.  busPIRone (BUSPAR) 10 mg tablet TAKE 2 TABLETS BY MOUTH TWO (2) TIMES A DAY.  albuterol (PROAIR HFA) 90 mcg/actuation inhaler Take 2 Puffs by inhalation every four (4) hours as needed for Wheezing.  Nebulizer & Compressor machine 1 Each by Does Not Apply route daily as needed.  albuterol (PROVENTIL VENTOLIN) 2.5 mg /3 mL (0.083 %) nebulizer solution 3 mL by Nebulization route every four (4) hours as needed for Wheezing. No current facility-administered medications for this visit.       Past Medical History:   Diagnosis Date    Anxiety     Asthma     Bipolar 2 disorder ([facility] [address]     Depression     and anxiety    Pyelonephritis 6/7/14     Past Surgical History:   Procedure Laterality Date    COLONOSCOPY N/A 2018    COLONOSCOPY performed by Brodie [facility]iu MD at [facility]    tubal ligation    HX OTHER SURGICAL      vein ligation    HX TONSILLECTOMY       Social History     Tobacco Use    Smoking status: Former Smoker     Last attempt to quit:      Years since quittin.1    Smokeless tobacco: Never Used    Tobacco comment: 26 Years    Substance Use Topics    Alcohol use: Yes     Alcohol/week: 20.0 standard drinks     Types: 20 Cans of beer per week    Drug use: No       Objective     Vitals:    20 1000   BP: 128/81   Pulse: 66   Resp: 19   Temp: 98.7 °F (37.1 °C)   SpO2: 100%   Weight: 200 lb (90.7 kg)   Height: 5' 5\" (1.651 m)     Constitutional: Appears well,  No Acute Distress, Vitals noted  Psychiatric:  Affect normal, Alert and Oriented to person/place/time  Eyes:  Pupils equally round and reactive, EOMI, conjunctiva clear, eyelids normal  ENT: External ears and nose normal/lips, TMs and Orophyarynx normal  Neck: general inspection and Thyroid normal.  No abnormal cervical or supraclavicular nodes  Lungs: clear to auscultation, good respiratory effort  Heart: Ausculation normal.  Regular rhythm. No cardiac murmurs. No carotid bruits or palpable thrills  Extremities: RLE in walking boot. Skin:  Warm to palpation, without rashes, bruising, or suspicious lesions     No results found for this or any previous visit (from the past 24 hour(s)). EKG Results     None        Assessment an PLan     Assessment/Plan:     ICD-10-CM ICD-9-CM    1. Peroneal tendinitis of right lower extremity M76.71 726.79    2. Pre-op evaluation Z01.818 V72.84    3.  Preventative health care Z00.00 V70.0 CBC W/O DIFF      METABOLIC PANEL, COMPREHENSIVE      LIPID PANEL      HEMOGLOBIN A1C WITH EAG     1) Preoperative Clearance:  Peroneal tendon reconstruction of right lower extremity on 20  0.4% estimated risk for MI, pulmonary edema, ventricular fibrillation, cardiac arrest or complete heart block per John Matte criteria. Patient is very low risk for adverse outcomes with non-cardiac surgery. No further testing recommended at this time    3. Preventative health care: Pt due for labs which were ordered. - f/u after surgery for yearly physical.    Follow-up and Dispositions    · Return in about 3 months (around 5/11/2020).        Pt discussed with Dr. Mariano De Leon.

## 2020-03-09 ENCOUNTER — OFFICE VISIT (OUTPATIENT)
Dept: FAMILY MEDICINE CLINIC | Age: 55
End: 2020-03-09

## 2020-03-09 VITALS
TEMPERATURE: 98 F | OXYGEN SATURATION: 98 % | HEART RATE: 76 BPM | DIASTOLIC BLOOD PRESSURE: 72 MMHG | SYSTOLIC BLOOD PRESSURE: 115 MMHG | RESPIRATION RATE: 20 BRPM

## 2020-03-09 DIAGNOSIS — I82.4Z1 ACUTE DEEP VEIN THROMBOSIS (DVT) OF DISTAL VEIN OF RIGHT LOWER EXTREMITY (HCC): Primary | ICD-10-CM

## 2020-03-09 RX ORDER — BUPROPION HYDROCHLORIDE 300 MG/1
300 TABLET ORAL
COMMUNITY
End: 2022-05-24

## 2020-03-09 NOTE — PROGRESS NOTES
Danial Oconnor is a 48 y.o. female who presents today for follow up DVT in RLE. Pt had tendon surgery 2/17/20 with Dr. Vannesa Vera and was seen by him 3/5/20 for follow up and casting. Concern for DVT and sent to [facility] and diagnosed with DVT in the ER. Started on Xarelto. Currently on 15mg twice a day since 3/6/20. Given 21 day supply. Currently with no pain in RLE. No previous hx of DVT or clotting disorder      Data reviewed or ordered today:       Other problems include:  Patient Active Problem List   Diagnosis Code    Anxiety F41.9    Bipolar 2 disorder, major depressive episode ([facility] [address] F31.81    Mild intermittent asthma without complication J50.62       Medications:  Current Outpatient Medications   Medication Sig Dispense Refill    buPROPion XL (WELLBUTRIN XL) 300 mg XL tablet Take 300 mg by mouth every morning.  rivaroxaban (XARELTO) 15 mg tab tablet Take 15 mg by mouth two (2) times a day.  rivaroxaban (XARELTO) 20 mg tab tablet Take 1 Tab by mouth daily (with breakfast). 30 Tab 2    doxepin (SINEQUAN) 10 mg capsule       QUEtiapine SR (SEROQUEL XR) 50 mg sr tablet       montelukast (SINGULAIR) 10 mg tablet Take 10 mg by mouth daily.  gabapentin (NEURONTIN) 300 mg capsule Take 300 mg by mouth three (3) times daily.  busPIRone (BUSPAR) 10 mg tablet TAKE 2 TABLETS BY MOUTH TWO (2) TIMES A DAY. 60 Tab 1    albuterol (PROAIR HFA) 90 mcg/actuation inhaler Take 2 Puffs by inhalation every four (4) hours as needed for Wheezing.  Nebulizer & Compressor machine 1 Each by Does Not Apply route daily as needed. 1 Each 0    albuterol (PROVENTIL VENTOLIN) 2.5 mg /3 mL (0.083 %) nebulizer solution 3 mL by Nebulization route every four (4) hours as needed for Wheezing. 24 Each 2    QUEtiapine SR (SEROQUEL XR) 50 mg sr tablet       buPROPion SR (WELLBUTRIN SR) 100 mg SR tablet Take  by mouth.          Allergies:  No Known Allergies    LMP:  Patient's last menstrual period was 2020. Social History     Socioeconomic History    Marital status:      Spouse name: Not on file    Number of children: Not on file    Years of education: Not on file    Highest education level: Not on file   Occupational History    Occupation: Home Health Nurse   Social Needs    Financial resource strain: Not on file    Food insecurity:     Worry: Not on file     Inability: Not on file    Transportation needs:     Medical: Not on file     Non-medical: Not on file   Tobacco Use    Smoking status: Former Smoker     Last attempt to quit:      Years since quittin.2    Smokeless tobacco: Never Used    Tobacco comment: 26 Years    Substance and Sexual Activity    Alcohol use: Yes     Alcohol/week: 20.0 standard drinks     Types: 20 Cans of beer per week    Drug use: No    Sexual activity: Not on file   Lifestyle    Physical activity:     Days per week: Not on file     Minutes per session: Not on file    Stress: Not on file   Relationships    Social connections:     Talks on phone: Not on file     Gets together: Not on file     Attends Episcopal service: Not on file     Active member of club or organization: Not on file     Attends meetings of clubs or organizations: Not on file     Relationship status: Not on file    Intimate partner violence:     Fear of current or ex partner: Not on file     Emotionally abused: Not on file     Physically abused: Not on file     Forced sexual activity: Not on file   Other Topics Concern    Not on file   Social History Narrative    Not on file       No family history on file.         ROS:  Fever: no  Headaches:  no  Chest Pain:  no  SOB:  no  Cough:  no  Other significant ROS:        Physical Exam  Visit Vitals  /72 (BP 1 Location: Left arm, BP Patient Position: Sitting)   Pulse 76   Temp 98 °F (36.7 °C) (Oral)   Resp 20   LMP 2020   SpO2 98%     Constitutional:  Appears well,  No acute distress, Vitals noted  Neck:   General inspection and Thyroid normal.  No abnormal cervical or supraclavicular nodes. Lungs:   Clear to auscultation, good respiratory effort, no wheezes, rales or rhonchi  Heart:   Normal HR, Normal S1 and S2,  Regular rhythm. No cardiac murmurs. No carotid bruits. Chest wall normal  Extremities:   RLE with hard cast in place up to knee. No discoloration of toes. Sensation intact   Skin:  Warm to palpation, without rashes, bruising, or suspicious lesions       BP Readings from Last 3 Encounters:   03/09/20 115/72   02/11/20 128/81   05/15/19 125/84       Assessment/Plan:      ICD-10-CM ICD-9-CM    1. Acute deep vein thrombosis (DVT) of distal vein of right lower extremity (HCC) I82.4Z1 453.42 rivaroxaban (XARELTO) 20 mg tab tablet     1. Acute deep vein thrombosis (DVT) of distal vein of right lower extremity [facility]): provoked with recent surgery. Will treat for 3 months. - rivaroxaban (XARELTO) 20 mg tab tablet; Take 1 Tab by mouth daily (with breakfast). Dispense: 30 Tab; Refill: 2, start after completion of 21 day treatment with 15mg BID. - follow up in 3 months here   - pt to bring medical records   - close follow up with Ortho       Orders Placed This Encounter    buPROPion XL (WELLBUTRIN XL) 300 mg XL tablet     Sig: Take 300 mg by mouth every morning.  rivaroxaban (XARELTO) 15 mg tab tablet     Sig: Take 15 mg by mouth two (2) times a day.  rivaroxaban (XARELTO) 20 mg tab tablet     Sig: Take 1 Tab by mouth daily (with breakfast).      Dispense:  30 Tab     Refill:  [address], [place]  [address] [facility]  Medicine Residency

## 2021-07-06 ENCOUNTER — TELEPHONE (OUTPATIENT)
Dept: FAMILY MEDICINE CLINIC | Age: 56
End: 2021-07-06

## 2021-07-06 NOTE — TELEPHONE ENCOUNTER
----- Message from [employer] sent at 7/6/2021 11:02 AM EDT -----  Regarding: Dr. Leandra Capellan 1/Escalated Issue      Caller's first and last name and relationship (if not the patient):  N/A      Best contact number(s):  [phone]      What are the symptoms:  Irregular heartbeat, fast beating, feels like pounding out of her chest, skipping beats, has to cough to get it to stop. This pattern is becoming more and more persistent. No other symptoms reported.       Transfer successful - yes/no (include outcome):  N      Transfer declined - yes/no (include reason):  N      Was caller advised to seek appropriate level of care - yes/no:  Y      Details to clarify the request:  N/A        Nara

## 2022-01-18 ENCOUNTER — OFFICE VISIT (OUTPATIENT)
Dept: FAMILY MEDICINE CLINIC | Age: 57
End: 2022-01-18
Payer: COMMERCIAL

## 2022-01-18 VITALS
WEIGHT: 217.6 LBS | SYSTOLIC BLOOD PRESSURE: 133 MMHG | HEIGHT: 65 IN | HEART RATE: 73 BPM | OXYGEN SATURATION: 98 % | TEMPERATURE: 98 F | DIASTOLIC BLOOD PRESSURE: 83 MMHG | BODY MASS INDEX: 36.25 KG/M2

## 2022-01-18 DIAGNOSIS — M70.22 OLECRANON BURSITIS OF LEFT ELBOW: Primary | ICD-10-CM

## 2022-01-18 PROCEDURE — 99213 OFFICE O/P EST LOW 20 MIN: CPT | Performed by: STUDENT IN AN ORGANIZED HEALTH CARE EDUCATION/TRAINING PROGRAM

## 2022-01-18 NOTE — PROGRESS NOTES
[address] [facility] [address] [place] [address]   Office [phone], Fax [phone]    Subjective:     Chief Complaint   Patient presents with    Elbow Swelling     Left elbow, painful for a month      History provided by patient     HPI:  Tor Harding is a 49 y.o. DECLINED female with medical history of Anxiety, Bipolar, and Asthma presents for   Chief Complaint   Patient presents with    Elbow Swelling     Left elbow, painful for a month        Ms. Jones presents to clinic for evaluation of Elbow Swelling. She says that she had rotator cuff surgery on 2021. She has been working hard with physical therapy since then. She saw Ortho on  and was told that she is recovering normally. Her elbow swelling started about 2-3 weeks ago. She does not have any pain in the elbow. No difficulty with movement of her elbow. No fevers. She has been using Voltaren 75mg BID but it has not been helping much. 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   Tobacco Use    Smoking status: Former Smoker     Quit date:      Years since quittin.1    Smokeless tobacco: Never Used    Tobacco comment: 26 Years    Substance and Sexual Activity    Alcohol use: Yes     Alcohol/week: 20.0 standard drinks     Types: 20 Cans of beer per week    Drug use: No    Sexual activity: Not on file   Other Topics Concern    Not on file   Social History Narrative    Not on file     Social Determinants of Health     Financial Resource Strain:     Difficulty of Paying Living Expenses: Not on file   Food Insecurity:     Worried About Running Out of Food in the Last Year: Not on file    Jesús of Food in the Last Year: Not on file   Transportation Needs:     Lack of Transportation (Medical):  Not on file    Lack of Transportation (Non-Medical): Not on file   Physical Activity:     Days of Exercise per Week: Not on file    Minutes of Exercise per Session: Not on file   Stress:     Feeling of Stress : Not on file   Social Connections:     Frequency of Communication with Friends and Family: Not on file    Frequency of Social Gatherings with Friends and Family: Not on file    Attends [employer]ish Services: Not on file    Active Member of [address] [place] [address] or Organizations: Not on file    Attends Club or Organization Meetings: Not on file    Marital Status: Not on file   Intimate Partner Violence:     Fear of Current or Ex-Partner: Not on file    Emotionally Abused: Not on file    Physically Abused: Not on file    Sexually Abused: Not on file   Housing Stability:     Unable to Pay for Housing in the Last Year: Not on file    Number of [place] in the Last Year: Not on file    Unstable Housing in the Last Year: Not on file     Review of Systems   Constitutional: Negative for chills and fever. Respiratory: Negative for cough and shortness of breath. Cardiovascular: Negative for chest pain and leg swelling. Gastrointestinal: Negative for abdominal pain, constipation, diarrhea, nausea and vomiting. Genitourinary: Negative for dysuria, frequency and urgency. Musculoskeletal:        Elbow swelling, only painful when puts pressure on it. Otherwise not overly painful. No redness. No discharge. No warmth. Skin: Negative for itching and rash. Neurological: Negative for dizziness and headaches. Objective:     Visit Vitals  /83 (BP 1 Location: Left upper arm, BP Patient Position: Sitting)   Pulse 73   Temp 98 °F (36.7 °C) (Oral)   Ht 5' 5\" (1.651 m)   Wt 217 lb 9.6 oz (98.7 kg)   SpO2 98%   BMI 36.21 kg/m²          Physical Exam  Constitutional:       Appearance: Normal appearance. HENT:      Head: Normocephalic and atraumatic.    Cardiovascular:      Rate and Rhythm: Normal rate and regular rhythm. Pulses: Normal pulses. Heart sounds: Normal heart sounds. Pulmonary:      Effort: Pulmonary effort is normal.      Breath sounds: Normal breath sounds. Abdominal:      General: Abdomen is flat. Bowel sounds are normal.      Palpations: Abdomen is soft. Musculoskeletal:         General: Swelling present. No tenderness or signs of injury. Normal range of motion. Comments: No warmth or erythema. No pain with passive or active ROM. Swelling present over olecranon in distribution of olecranon bursae. Skin:     General: Skin is warm and dry. Neurological:      General: No focal deficit present. Mental Status: She is alert and oriented to person, place, and time. Assessment and orders:       ICD-10-CM ICD-9-CM    1. Olecranon bursitis of left elbow  M70.22 726.33      1. Olecranon Bursitis of Left Elbow: no red flag symptoms to indicate joint infection, no fevers, no erythema, good range of motion passively and actively, likely due to physical therapy for shoulder  - Advised to limit physical therapy, shoulder has been progressing well since surgery  - Reassurance provided, but gave strict return precautions including development of erythema or fevers or decreased ROM     Labs, imaging and immunization ordered as above    Follow Up: as needed    Pt was discussed with Dr. Analia Lopes (attending physician). I have reviewed patient medical and social history and medications. I have reviewed pertinent labs results and other data. I have discussed the diagnosis with the patient and the intended plan as seen in the above orders. The patient has received an after-visit summary and questions were answered concerning future plans. I have discussed medication side effects and warnings with the patient as well.     Erlene Schilder, MD  Resident [facility]  02/07/22

## 2022-01-18 NOTE — PROGRESS NOTES
Chief Complaint   Patient presents with    Elbow Swelling     Left elbow, painful for a month      Visit Vitals  /83 (BP 1 Location: Left upper arm, BP Patient Position: Sitting)   Pulse 73   Temp 98 °F (36.7 °C) (Oral)   Ht 5' 5\" (1.651 m)   Wt 217 lb 9.6 oz (98.7 kg)   SpO2 98%   BMI 36.21 kg/m²

## 2022-03-14 ENCOUNTER — NURSE TRIAGE (OUTPATIENT)
Dept: OTHER | Facility: CLINIC | Age: 57
End: 2022-03-14

## 2022-03-14 NOTE — TELEPHONE ENCOUNTER
Received call from [employer] at [facility] with Red Flag Complaint. Subjective: Caller states \"abd pain  \"     Current Symptoms: onset 10 days ago. Pain in abdomen radiating around to back. Pain originates in RLQ radiating across to left and into back. Is very gassy and cramping, freq stools that are very soft, but not diarrhea. Pain is like a vise when it comes, having 4+ bm's a day. No vomiting  Pain is in lower back. Denies any urinary sx, denies any abd distention. Has not eaten any sea food or raw fish  Onset: 10 days ago; sudden    Associated Symptoms: NA    Pain Severity: 10/10; sharp and stabbing; intermittent, excruciating    Temperature: denies     What has been tried: antacids, pepto,     LMP: 4 weeks Pregnant: No    Recommended disposition: See in Office Today or Tomorrow    Care advice provided, patient verbalizes understanding; denies any other questions or concerns; instructed to call back for any new or worsening symptoms. Patient/Caller agrees with recommended disposition; writer provided warm transfer to Tennille Perez at [facility] for appointment scheduling    Attention Provider: Thank you for allowing me to participate in the care of your patient. The patient was connected to triage in response to information provided to the [facility]. Please do not respond through this encounter as the response is not directed to a shared pool.       Reason for Disposition   MILD pain (e.g., does not interfere with normal activities) and pain comes and goes (cramps) lasts > 48 hours (Exception: this same abdominal pain is a chronic symptom recurrent or ongoing AND present > 4 weeks)    Protocols used: ABDOMINAL PAIN - [facility] ARNOLD GONZALES

## 2022-03-19 PROBLEM — J45.20 MILD INTERMITTENT ASTHMA WITHOUT COMPLICATION: Status: ACTIVE | Noted: 2017-12-04

## 2022-03-19 PROBLEM — F31.81 BIPOLAR 2 DISORDER, MAJOR DEPRESSIVE EPISODE (HCC): Status: ACTIVE | Noted: 2017-08-28

## 2022-03-25 ENCOUNTER — OFFICE VISIT (OUTPATIENT)
Dept: FAMILY MEDICINE CLINIC | Age: 57
End: 2022-03-25
Payer: COMMERCIAL

## 2022-03-25 VITALS
TEMPERATURE: 98.1 F | HEIGHT: 65 IN | RESPIRATION RATE: 16 BRPM | BODY MASS INDEX: 36.15 KG/M2 | WEIGHT: 217 LBS | OXYGEN SATURATION: 95 % | HEART RATE: 77 BPM | SYSTOLIC BLOOD PRESSURE: 115 MMHG | DIASTOLIC BLOOD PRESSURE: 75 MMHG

## 2022-03-25 DIAGNOSIS — N61.0 MASTITIS: Primary | ICD-10-CM

## 2022-03-25 PROCEDURE — 99214 OFFICE O/P EST MOD 30 MIN: CPT | Performed by: STUDENT IN AN ORGANIZED HEALTH CARE EDUCATION/TRAINING PROGRAM

## 2022-03-25 RX ORDER — AMOXICILLIN AND CLAVULANATE POTASSIUM 875; 125 MG/1; MG/1
1 TABLET, FILM COATED ORAL EVERY 12 HOURS
Qty: 14 TABLET | Refills: 0 | Status: SHIPPED | OUTPATIENT
Start: 2022-03-25 | End: 2022-04-01

## 2022-03-25 RX ORDER — OXYCODONE AND ACETAMINOPHEN 5; 325 MG/1; MG/1
TABLET ORAL
COMMUNITY
Start: 2022-03-21 | End: 2022-05-24

## 2022-03-25 RX ORDER — AMOXICILLIN AND CLAVULANATE POTASSIUM 875; 125 MG/1; MG/1
1 TABLET, FILM COATED ORAL EVERY 12 HOURS
Qty: 20 TABLET | Refills: 0 | Status: SHIPPED | OUTPATIENT
Start: 2022-03-25 | End: 2022-03-25

## 2022-03-25 NOTE — PROGRESS NOTES
Curtis Monae is a 49 y.o. female who presents for right breast pain that started last night abruptly. Accompanied by fevers as high as 101.4. Patient has also noted brownish drainage on her bra. No history of mastitis. No personal or family history of breast cancer. No pain or drainage from left breast.  Taking tylenol for pain and fevers which helps some. Most recent mammogram 5/2018 Birads 1 (benign). Patient did have a sigmoidectomy a few weeks ago at [facility] for diverticulitis. Surgical sites have been healing well and not draining. Denies worsening abdominal pain other than the tenderness present since surgery. Is having regular bowel movements that are hard in consistency/      Past Medical History  Past Medical History:   Diagnosis Date    Anxiety     Asthma     Bipolar 2 disorder ([address]     Depression     and anxiety    History of partial surgical removal of colon 03/18/2022    8 inches of colon take out at [address] Pyelonephritis 6/7/14       Current Medications  Current Outpatient Medications   Medication Sig Dispense Refill    oxyCODONE-acetaminophen (PERCOCET) 5-325 mg per tablet TAKE 1 TAB BY MOUTH EVERY 4 HRS AS NEEDED FOR POST-OP PAIN      amoxicillin-clavulanate (AUGMENTIN) 875-125 mg per tablet Take 1 Tablet by mouth every twelve (12) hours for 7 days. 14 Tablet 0    buPROPion XL (WELLBUTRIN XL) 300 mg XL tablet Take 300 mg by mouth every morning.  gabapentin (NEURONTIN) 300 mg capsule Take 300 mg by mouth three (3) times daily.  busPIRone (BUSPAR) 10 mg tablet TAKE 2 TABLETS BY MOUTH TWO (2) TIMES A DAY. 60 Tab 1    rivaroxaban (XARELTO) 15 mg tab tablet Take 15 mg by mouth two (2) times a day.  rivaroxaban (XARELTO) 20 mg tab tablet Take 1 Tab by mouth daily (with breakfast).  (Patient not taking: Reported on 3/25/2022) 30 Tab 2    doxepin (SINEQUAN) 10 mg capsule  (Patient not taking: Reported on 3/25/2022)      QUEtiapine SR (SEROQUEL XR) 50 mg sr tablet  (Patient not taking: Reported on 3/25/2022)      QUEtiapine SR (SEROQUEL XR) 50 mg sr tablet       montelukast (SINGULAIR) 10 mg tablet Take 10 mg by mouth daily.  buPROPion SR (WELLBUTRIN SR) 100 mg SR tablet Take  by mouth.  albuterol (PROAIR HFA) 90 mcg/actuation inhaler Take 2 Puffs by inhalation every four (4) hours as needed for Wheezing.  Nebulizer & Compressor machine 1 Each by Does Not Apply route daily as needed. 1 Each 0    albuterol (PROVENTIL VENTOLIN) 2.5 mg /3 mL (0.083 %) nebulizer solution 3 mL by Nebulization route every four (4) hours as needed for Wheezing. 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   Tobacco Use    Smoking status: Former Smoker     Quit date:      Years since quittin.2    Smokeless tobacco: Never Used    Tobacco comment: 26 Years    Substance and Sexual Activity    Alcohol use: Yes     Alcohol/week: 20.0 standard drinks     Types: 20 Cans of beer per week    Drug use: No    Sexual activity: Not on file   Other Topics Concern    Not on file   Social History Narrative    Not on file     Social Determinants of Health     Financial Resource Strain:     Difficulty of Paying Living Expenses: Not on file   Food Insecurity:     Worried About Running Out of Food in the Last Year: Not on file    Jesús of Food in the Last Year: Not on file   Transportation Needs:     Lack of Transportation (Medical): Not on file    Lack of Transportation (Non-Medical):  Not on file   Physical Activity:     Days of Exercise per Week: Not on file    Minutes of Exercise per Session: Not on file   Stress:     Feeling of Stress : Not on file   Social Connections:  Frequency of Communication with Friends and Family: Not on file    Frequency of Social Gatherings with Friends and Family: Not on file    Attends Church Services: Not on file    Active Member of Clubs or Organizations: Not on file    Attends Club or Organization Meetings: Not on file    Marital Status: Not on file   Intimate Partner Violence:     Fear of Current or Ex-Partner: Not on file    Emotionally Abused: Not on file    Physically Abused: Not on file    Sexually Abused: Not on file   Housing Stability:     Unable to Pay for Housing in the Last Year: Not on file    Number of [place] in the Last Year: Not on file    Unstable Housing in the Last Year: Not on file       Family History  History reviewed. No pertinent family history. Objective   Vital Signs  Visit Vitals  /75 (BP 1 Location: Right upper arm, BP Patient Position: Sitting)   Pulse 77   Temp 98.1 °F (36.7 °C) (Oral)   Resp 16   Ht 5' 5\" (1.651 m)   Wt 217 lb (98.4 kg)   SpO2 95%   BMI 36.11 kg/m²       Physical Examination  Physical Exam  Cardiovascular:      Rate and Rhythm: Normal rate and regular rhythm. Pulses: Normal pulses. Heart sounds: Normal heart sounds. Pulmonary:      Effort: Pulmonary effort is normal.      Breath sounds: Normal breath sounds. Chest:   Breasts:      Right: Nipple discharge (dried brown discharge evident, not actively draining) and tenderness (subareolar ) present. No swelling, bleeding, inverted nipple, mass or skin change (no erythema present). Left: Normal.       Abdominal:      General: Abdomen is flat. Bowel sounds are normal. There is no distension. Palpations: Abdomen is soft. Tenderness: There is abdominal tenderness (mild). There is no guarding. Comments: Surgical sites healing well, no erythema or drainage. Assessment   French Salvador is a 49 y. o. who is here for right periductal mastitis.   Etiology unknown, need to rule out underlying mass obstructing ducts given most recent mammogram in 2018. Plan   Diagnoses and all orders for this visit:    1. Mastitis:   -     PAOLO MAMMO BI SCREENING INCL CAD; Future  -     amoxicillin-clavulanate (AUGMENTIN) 875-125 mg per tablet; Take 1 Tablet by mouth every twelve (12) hours for 7 days.  - Follow up in 7 days - if no improvement change mammogram to diagnostic and have completed sooner. Consider ultrasound in office to look for abscess. Patient is counseled to return to the office if symptoms do not improve as expected. Urgent consultation with the nearest Emergency Department is strongly recommended if condition worsens. Patient is counseled to follow up as recommended and to inform the office if any changes in treatment are recommended.       I discussed this patient with Dr. Tyler Faith (Attending Physician)       Signed By:  Jacey Mohan DO    Family Medicine Resident

## 2022-03-25 NOTE — PATIENT INSTRUCTIONS
central scheduling [phone]     Mastitis: Care Instructions  Your Care Instructions  Mastitis is an inflammation of the breast. It occurs most often in women who are breastfeeding, but it can affect any woman. Mastitis can be caused by poor milk flow from the breast. When milk builds up in a breast, it leaks into the nearby breast tissue. Infection can also develop when the nipples become cracked or irritated. The tissue can then become infected with bacteria. Antibiotics can usually cure mastitis. For women who are nursing, continued breastfeeding (or pumping) can help. If mastitis is not treated, a pocket of pus may form in the breast and need to be drained. Follow-up care is a key part of your treatment and safety. Be sure to make and go to all appointments, and call your doctor if you are having problems. It's also a good idea to know your test results and keep a list of the medicines you take. How can you care for yourself at home? · If your doctor prescribed antibiotics, take them as directed. Do not stop taking them just because you feel better. You need to take the full course of antibiotics. · If you are breastfeeding, continue breastfeeding or pumping breast milk. It is important to empty your breasts regularly, every 2 to 3 hours while you are awake. These tips may help:  ? Before breastfeeding, place a warm, wet washcloth over your breast for about 15 minutes. Try this at least 3 times a day. This increases milk flow in the breast. Massaging the affected breast may also increase milk flow. ? Breastfeed on both sides. Try to start with your healthy breast first. Then, after your milk is flowing, breastfeed from the affected breast until it feels soft. You should empty this breast completely. Then switch back to the healthy breast and breastfeed until your baby has finished. ? Pump or hand-express a small amount of breast milk before breastfeeding if your breasts are too full with milk.  This will make your breasts less full and may make it easier for your baby to latch on to your breast.  ? Pump or express milk from the affected breast if it hurts too much to breastfeed. · Take an over-the-counter pain medicine, such as acetaminophen (Tylenol) or ibuprofen (Advil, Motrin) to relieve pain and fever. Read and follow all instructions on the label. · Do not take two or more pain medicines at the same time unless the doctor told you to. Many pain medicines have acetaminophen, which is Tylenol. Too much acetaminophen (Tylenol) can be harmful. · Rest as much as possible. · Drink extra fluids. · If pus is draining from your infected breast, wash the nipple gently and let it air-dry before you put your bra back on. A disposable breast pad placed in the bra cup may absorb the pus. · Sometimes, a blocked nipple pore, called a milk blister (or bleb) happens. This causes milk to back up in the breast. It can cause mastitis, so it's important to treat this if it happens. A milk blister is often a white dot on your nipple that can be painful. If a milk blister is causing you pain, it may help to place a warm, wet washcloth over the blister before breastfeeding or pumping. If this doesn't clear the blockage, your doctor can open the blister using a sterile needle. When should you call for help? Call your doctor now or seek immediate medical care if:    · You have worse symptoms of a breast infection, such as:  ? Increased pain, swelling, redness, or warmth around a breast.  ? Red streaks leading from a breast.  ? Pus draining from a breast.  ? A fever. Watch closely for changes in your health, and be sure to contact your doctor if:    · You do not get better as expected. Where can you learn more? Go to http://www.gray.com/  Enter Y207 in the search box to learn more about \"Mastitis: Care Instructions. \"  Current as of: June 16, 2021               Content Version: 13.2  © 2759-8062 [employer]. Care instructions adapted under license by [employer] (which disclaims liability or warranty for this information). If you have questions about a medical condition or this instruction, always ask your healthcare professional. [address] any warranty or liability for your use of this information.

## 2022-03-25 NOTE — PROGRESS NOTES
Purvi Kahn is a 49 y.o. female    Chief Complaint   Patient presents with    Breast pain     Patient is coming in for right breast pain and discharge. She is having a throbbing and sharp pain She thinks her nipple is swollen. She state sthat it was a brown discharge and this started yesterday afternoon. Her highest temperture was this morning at 101.4. she state states taht she has been running fevers since yesterday. 1. Have you been to the ER, urgent care clinic since your last visit? Hospitalized since your last visit? No  2. Have you seen or consulted any other health care providers outside of the [address] [place] [address] since your last visit? Include any pap smears or colon screening. No      Visit Vitals  /75 (BP 1 Location: Right upper arm, BP Patient Position: Sitting)   Pulse 77   Temp 98.1 °F (36.7 °C) (Oral)   Resp 16   Ht 5' 5\" (1.651 m)   Wt 217 lb (98.4 kg)   SpO2 95%   BMI 36.11 kg/m²           Health Maintenance Due   Topic Date Due    Hepatitis C Screening  Never done    COVID-19 Vaccine (1) Never done    Pneumococcal 0-64 years (1 of 2 - PPSV23) Never done    Depression Monitoring  Never done    Shingrix Vaccine Age 50> (1 of 2) Never done    Breast Cancer Screen Mammogram  06/06/2020    Flu Vaccine (1) 09/01/2021         Medication Reconciliation completed, changes noted.   Please  Update medication list.

## 2022-04-01 ENCOUNTER — OFFICE VISIT (OUTPATIENT)
Dept: FAMILY MEDICINE CLINIC | Age: 57
End: 2022-04-01
Payer: COMMERCIAL

## 2022-04-01 VITALS
RESPIRATION RATE: 16 BRPM | DIASTOLIC BLOOD PRESSURE: 60 MMHG | BODY MASS INDEX: 36.15 KG/M2 | HEIGHT: 65 IN | HEART RATE: 70 BPM | WEIGHT: 217 LBS | SYSTOLIC BLOOD PRESSURE: 126 MMHG | OXYGEN SATURATION: 99 %

## 2022-04-01 DIAGNOSIS — N64.4 BREAST PAIN, RIGHT: ICD-10-CM

## 2022-04-01 DIAGNOSIS — N64.59 INVERTED NIPPLE: Primary | ICD-10-CM

## 2022-04-01 DIAGNOSIS — N64.52 NIPPLE DISCHARGE: ICD-10-CM

## 2022-04-01 PROCEDURE — 99213 OFFICE O/P EST LOW 20 MIN: CPT | Performed by: STUDENT IN AN ORGANIZED HEALTH CARE EDUCATION/TRAINING PROGRAM

## 2022-04-01 NOTE — PROGRESS NOTES
Chief Complaint   Patient presents with    Follow-up     Patient is here today to follow up on R breast pain. States it still hurts but now it itches and her nipple is inverted.      Visit Vitals  /60 (BP 1 Location: Right arm, BP Patient Position: Sitting, BP Cuff Size: Adult long)   Pulse 70   Resp 16   Ht 5' 5\" (1.651 m)   Wt 217 lb (98.4 kg)   SpO2 99%   BMI 36.11 kg/m²

## 2022-04-01 NOTE — PROGRESS NOTES
[address] [facility] [address] [place] [address] [facility]    Office [phone], Fax [phone]      Chief Complaint:     Chief Complaint   Patient presents with    Follow-up     Patient is here today to follow up on R breast pain. States it still hurts but now it itches and her nipple is inverted. Rose Mary Kendrick is a 56 y.o. female that presents for: follow up       Assessment/Plan:       1. Inverted nipple  Comments:  concerning finding in conjunction with breast pain, and nipple discharge. appt with breast surgery scheduled for 4/4/22   needs diagnostic mammo and US   Orders:  -     REFERRAL TO BREAST SURGERY  2. Breast pain, right  -     REFERRAL TO BREAST SURGERY  3. Nipple discharge  -     REFERRAL TO BREAST SURGERY         Follow up: Follow-up and Dispositions    · Return in about 3 months (around 7/1/2022) for chronic conditions . Subjective:   HPI:  Rose Mary Kendrick is a 56 y.o. female that presents for:    Follow up right breast pain and discharge   Seen 3/25/ with right breast pain that started abruptly. According to provider note from this visit \" Accompanied by fevers as high as 101.4. Patient has also noted brownish drainage on her bra. No history of mastitis. No personal or family history of breast cancer. No pain or drainage from left breast.  Taking tylenol for pain and fevers which helps some. Most recent mammogram 5/2018 Birads 1 (benign). Patient did have a sigmoidectomy a few weeks ago at [facility] for diverticulitis. Surgical sites have been healing well and not draining. Denies worsening abdominal pain other than the tenderness present since surgery. Is having regular bowel movements that are hard in consistency. \"     At this time Pt was Started on augmentin BID x7 days and sent for mammogram, which could not be scheduled until 4/22. Advised to return if not improving. Pt states the pain is resolved and erythema is resolved.   However the discharge is still present (yellow brown in color) yet it is smaller amount. She now has severe breat itching and now her right nipple is inverted. Left breast is normal.       Health Maintenance:  Health Maintenance Due   Topic Date Due    Hepatitis C Screening  Never done    Depression Monitoring  Never done    Shingrix Vaccine Age 50> (1 of 2) Never done    Breast Cancer Screen Mammogram  06/06/2020    COVID-19 Vaccine (3 - Booster for Pfizer series) 06/18/2021        ROS:   Review of Systems   Constitutional: Negative for chills and fever. Eyes: Negative for double vision. Respiratory: Negative for cough and shortness of breath. Cardiovascular: Negative for chest pain. Gastrointestinal: Negative for abdominal pain, diarrhea, nausea and vomiting. Genitourinary: Negative for dysuria. Right breast pain, right nipple inverted, right nipple discharge    Musculoskeletal: Negative for myalgias. Skin: Negative for rash. Neurological: Negative for dizziness, tingling, sensory change and headaches. Past medical history, social history, and medications personally reviewed. Past Medical History:   Diagnosis Date    Anxiety     Asthma     Bipolar 2 disorder ([facility] [address]     Depression     and anxiety    History of partial surgical removal of colon 03/18/2022    8 inches of colon take out at [address] Pyelonephritis 6/7/14        Allergies personally reviewed. No Known Allergies       Objective:   Vitals reviewed. Visit Vitals  /60 (BP 1 Location: Right arm, BP Patient Position: Sitting, BP Cuff Size: Adult long)   Pulse 70   Resp 16   Ht 5' 5\" (1.651 m)   Wt 217 lb (98.4 kg)   SpO2 99%   BMI 36.11 kg/m²        Physical Exam  Physical Exam  Vitals and nursing note reviewed. Exam conducted with a chaperone present. HENT:      Head: Normocephalic and atraumatic. Eyes:      Conjunctiva/sclera: Conjunctivae normal.   Cardiovascular:      Rate and Rhythm: Normal rate and regular rhythm. Heart sounds: Normal heart sounds. No murmur heard. No friction rub. No gallop. Pulmonary:      Effort: Pulmonary effort is normal. No respiratory distress. Breath sounds: Normal breath sounds. No wheezing. Chest:   Breasts: Breasts are symmetrical.      Right: Inverted nipple and nipple discharge present. No swelling, bleeding, skin change or axillary adenopathy. Left: Normal. No axillary adenopathy. Musculoskeletal:         General: Normal range of motion. Cervical back: Normal range of motion and neck supple. Lymphadenopathy:      Upper Body:      Right upper body: No axillary or pectoral adenopathy. Left upper body: No axillary or pectoral adenopathy. Skin:     General: Skin is warm and dry. Neurological:      Mental Status: She is alert. Psychiatric:         Mood and Affect: Affect normal.              Pt was discussed with Dr Esther Forman  (attending physician). I have reviewed pertinent labs results and other data. I have discussed the diagnosis with the patient and the intended plan as seen in the above orders. The patient has received an after-visit summary and questions were answered concerning future plans. I have discussed medication side effects and warnings with the patient as well.     Thi Sofia DO  Resident [facility]  04/01/22

## 2022-04-04 ENCOUNTER — OFFICE VISIT (OUTPATIENT)
Dept: SURGERY | Age: 57
End: 2022-04-04
Payer: COMMERCIAL

## 2022-04-04 VITALS
WEIGHT: 217 LBS | SYSTOLIC BLOOD PRESSURE: 123 MMHG | DIASTOLIC BLOOD PRESSURE: 56 MMHG | BODY MASS INDEX: 36.15 KG/M2 | HEIGHT: 65 IN

## 2022-04-04 DIAGNOSIS — N61.0 BREAST INFECTION: Primary | ICD-10-CM

## 2022-04-04 DIAGNOSIS — N64.59 INVERTED NIPPLE: ICD-10-CM

## 2022-04-04 PROCEDURE — 99203 OFFICE O/P NEW LOW 30 MIN: CPT | Performed by: NURSE PRACTITIONER

## 2022-04-04 NOTE — PATIENT INSTRUCTIONS

## 2022-04-04 NOTE — PROGRESS NOTES
HISTORY OF PRESENT ILLNESS  Jesus Ferreira is a 56 y.o. female. HPI New patient presents for evaluation of RIGHT breast infection. Reports redness and pain to her breast two weeks ago. Denies drainage. Was seen by PCP and diagnosed with an infection. Has completed a course of Augmentin - finished yesterday. Reports pain a 4/10 and that this has improved while on antibiotics. No redness or drainage at this time. Reports that her RIGHT nipple became inverted with the infection and remains that way. Breast history -   Referring - Dr. Koko Cortés  No history of breast biopsies      Family history -   No family history of breast or ovarian cancer. Past Surgical History:   Procedure Laterality Date    COLONOSCOPY N/A 6/13/2018    COLONOSCOPY performed by Yanni Prince [facility] at [facility]    tubal ligation    HX OTHER SURGICAL      vein ligation    HX ROTATOR CUFF REPAIR  09/17/2021    HX SHOULDER REPLACEMENT  09/2021    Shoulder reconstruction    HX TONSILLECTOMY  1985         ROS    Physical Exam  Constitutional:       Appearance: Normal appearance. Chest:   Breasts:      Right: Inverted nipple and tenderness (mildly tender) present. No mass, nipple discharge, skin change (no redness, warmth, drainage or fluctuance noted), axillary adenopathy or supraclavicular adenopathy. Musculoskeletal:      Comments: FROM - UE x 2   Lymphadenopathy:      Upper Body:      Right upper body: No supraclavicular or axillary adenopathy. Neurological:      Mental Status: She is alert.    Psychiatric:         Attention and Perception: Attention normal.         Mood and Affect: Mood normal.         Speech: Speech normal.         Behavior: Behavior normal.         Visit Vitals  BP (!) 123/56 (BP 1 Location: Right arm, BP Patient Position: Sitting, BP Cuff Size: Adult)   Ht 5' 5\" (1.651 m)   Wt 217 lb (98.4 kg)   BMI 36.11 kg/m²         ASSESSMENT and PLAN    ICD-10-CM ICD-9-CM 1. Breast infection  N61.0 611.0    2. Inverted nipple  N64.59 611.79 PAOLO 3D MAINOR W MAMMO BI DX INCL CAD      US BREAST RT LIMITED=<3 QUAD         RIGHT breast infection - resolved with no skin changes noted on exam today. Discussed pathology of a breast infection and resolution vs reinfection after treatment. RIGHT breast nipple inverted - reports that this happened with the recent breast infection. Due for mammogram - will have BDmammogram 3D and RIGHT breast US for complete evaluation for s/sx of residual infection and additional concerning findings associated with the nipple inversion. If imaging is normal, no additional follow-up is needed at this time. If imaging is abnormal, follow-up PRN. RTC here PRN. She is comfortable with this plan. All questions answered and she stated understanding. Total time spent for this patient - 30 minutes.

## 2022-05-05 ENCOUNTER — OFFICE VISIT (OUTPATIENT)
Dept: SURGERY | Age: 57
End: 2022-05-05
Payer: COMMERCIAL

## 2022-05-05 ENCOUNTER — HOSPITAL ENCOUNTER (OUTPATIENT)
Dept: MAMMOGRAPHY | Age: 57
Discharge: HOME | End: 2022-05-05
Attending: NURSE PRACTITIONER
Payer: COMMERCIAL

## 2022-05-05 ENCOUNTER — DOCUMENTATION ONLY (OUTPATIENT)
Dept: SURGERY | Age: 57
End: 2022-05-05

## 2022-05-05 VITALS — WEIGHT: 210 LBS | HEIGHT: 65 IN | BODY MASS INDEX: 34.99 KG/M2

## 2022-05-05 DIAGNOSIS — R92.8 ABNORMAL MAMMOGRAM: Primary | ICD-10-CM

## 2022-05-05 DIAGNOSIS — C77.3 BREAST CANCER METASTASIZED TO AXILLARY LYMPH NODE, RIGHT (HCC): ICD-10-CM

## 2022-05-05 DIAGNOSIS — N64.59 INVERTED NIPPLE: ICD-10-CM

## 2022-05-05 DIAGNOSIS — C50.511 MALIGNANT NEOPLASM OF LOWER-OUTER QUADRANT OF RIGHT FEMALE BREAST, UNSPECIFIED ESTROGEN RECEPTOR STATUS (HCC): ICD-10-CM

## 2022-05-05 DIAGNOSIS — C50.911 BREAST CANCER METASTASIZED TO AXILLARY LYMPH NODE, RIGHT (HCC): ICD-10-CM

## 2022-05-05 DIAGNOSIS — R92.8 ABNORMAL ULTRASOUND OF BREAST: ICD-10-CM

## 2022-05-05 PROCEDURE — 77062 BREAST TOMOSYNTHESIS BI: CPT

## 2022-05-05 PROCEDURE — 19083 BX BREAST 1ST LESION US IMAG: CPT | Performed by: SURGERY

## 2022-05-05 PROCEDURE — 38505 NEEDLE BIOPSY LYMPH NODES: CPT | Performed by: SURGERY

## 2022-05-05 PROCEDURE — 76642 ULTRASOUND BREAST LIMITED: CPT

## 2022-05-05 PROCEDURE — 99214 OFFICE O/P EST MOD 30 MIN: CPT | Performed by: SURGERY

## 2022-05-05 NOTE — PROGRESS NOTES
HISTORY OF PRESENT ILLNESS  Fernando Cardozo is a 56 y.o. female. HPI ESTABLISHED patient here for RIGHT breast mass found on diagnostic mammogram.  Pt initially presented 3/2022 with brown nipple discharge, pain, and fever and chills. Her nipple then became inverted. She was seen by Bebeto Taylor NP and scheduled for a mammogram which was done today. Mammogram shows a small RIGHT breast mass with an adjacent  (2cm away) cluster of calcs, and a RIGHT axillary lymph node with a thick cortex. 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 [facility] Results (most recent):  Results from [facility] encounter on 05/05/22    [facility] 3D MAINOR W MAMMO BI DX INCL CAD    Narrative  STUDY:  Bilateral Diagnostic Digital Mammography with tomosynthesis and right  Ultrasound    INDICATION:  Right nipple retraction. Previous episodes of nipple discharge that  have not recurred over the past month. VIEWS OBTAINED: Bilateral CC, MLO, right ML with tomosynthesis, right  magnification CC and ML    COMPARISON:  2018, 2012    BREAST COMPOSITION: There are scattered fibroglandular densities. FINDINGS:    Mammography:  Bilateral digital diagnostic mammography was performed, and is  interpreted in conjunction with a computer assisted detection (CAD) system. In  the right slightly lateral breast at 9:00 posterior depth 7 cm from the nipple  there is an irregular spiculated equal density 1 cm mass. There are linear fine  pleomorphic calcifications which extend from the mass toward the nipple over 2  cm. No evidence of suspicious masses or microcalcifications in the left breast.    Ultrasound: The patient was scanned by the technologist and the physician.  In  the right breast at 9:00 1 cm from the nipple there is an irregular hypoechoic  spiculated mass with posterior shadowing measuring 0.8 x 0.8 x 0.7 cm. No  significant internal vascularity. There is one mildly thickened right axillary  lymph node with cortical thickness of 0.5 cm. Impression  1. BI-RADS Assessment Category 5: Highly suggestive of malignancy- Appropriate  action should be taken. Right breast mass with calcifications. 2. Mildly thickened right axillary lymph node. Recommendations:  Right breast and axillary ultrasound guided biopsies. The calcifications which extend anteriorly from the mass could be excised if the  patient requires lumpectomy in the future. Otherwise stereotactic biopsy could  be considered if clinically indicated. Stereotactic biopsy would probably be  challenging to perform on the same day as the ultrasound-guided biopsy as  calcifications with likely be obscured. The patient has been notified of these results and recommendations.         Past Medical History:   Diagnosis Date    Anxiety     Asthma     Bipolar 2 disorder ([facility] [address]     Depression     and anxiety    History of partial surgical removal of colon 2022    8 inches of colon take out at [address] Pyelonephritis 14     Past Surgical History:   Procedure Laterality Date    COLONOSCOPY N/A 2018    COL[facility] performed by [facility]ine Lindsey [facility] at [facility] 86    tubal ligation    HX OTHER SURGICAL      vein ligation    HX ROTATOR CUFF REPAIR  2021    HX SHOULDER REPLACEMENT  2021    Shoulder reconstruction    HX TONSILLECTOMY        OB History    No obstetric history on file. Obstetric Comments   Menarche 17, LMP 24, # of children 3, age of 4st delivery 26, Hysterectomy/oophorectomy no/no, Breast bx no, history of breast feeding no, BCP yes, Hormone therapy no           No family history on file.   Social History     Tobacco Use    Smoking status: Former Smoker     Quit date:      Years since quittin.3    Smokeless tobacco: Never Used    Tobacco comment: 26 Years    Substance Use Topics    Alcohol use: Yes     Alcohol/week: 20.0 standard drinks     Types: 20 Cans of beer per week      Prior to Admission medications    Medication Sig Start Date End Date Taking? Authorizing Provider   oxyCODONE-acetaminophen (PERCOCET) 5-325 mg per tablet TAKE 1 TAB BY MOUTH EVERY 4 HRS AS NEEDED FOR POST-OP PAIN 3/21/22  Yes Provider, Historical   buPROPion XL (WELLBUTRIN XL) 300 mg XL tablet Take 300 mg by mouth every morning. Yes Provider, Historical   gabapentin (NEURONTIN) 300 mg capsule Take 300 mg by mouth three (3) times daily. Yes Provider, Historical   busPIRone (BUSPAR) 10 mg tablet TAKE 2 TABLETS BY MOUTH TWO (2) TIMES A DAY. 4/13/18  Yes Mackenzie Lozano MD      No Known Allergies        Review of Systems   All other systems reviewed and are negative. Physical Exam  Chest:   Breasts: Breasts are symmetrical.      Right: Inverted nipple (does not tata. no discharge) and skin change (very subtle skin retraction 8:00 1/3) present. No mass, nipple discharge, tenderness, axillary adenopathy or supraclavicular adenopathy. Left: No inverted nipple, mass, nipple discharge, skin change, tenderness, axillary adenopathy or supraclavicular adenopathy. Lymphadenopathy:      Upper Body:      Right upper body: No supraclavicular or axillary adenopathy. Left upper body: No supraclavicular or axillary adenopathy. US - Guided Core Biopsy  Indication : Right Breast mass 9 o'clock/periareolar, and RIGHT axillary lymph node with thickened cortex. Neither finding is palpable. Ultrasound Findings: In the right breast at 9:00 1 cm from the nipple there is an irregular hypoechoic spiculated mass with posterior shadowing measuring 0.8 x 0.8 x 0.7 cm. No  significant internal vascularity. There is one mildly thickened right axillary  lymph node with cortical thickness of 0.5 cm. Prep : alcohol.    Anesthesia : 1% lidocaine with epinephrine, 4cc each site. Device : The hand-held 10 gauge BARD needle was inserted through the lesion and captured tissue with real-time Ultrasound Confirmation. .   Core Sampling :  2 cores were obtained from the RIGHT breast mass and 2 cores were obtained from the RIGHT axillary lymph node. Marker: clip placed at each site   Dressing : Steristrips, gauze and tape. Instructions : Remove gauze this evening. Remove steristrips in one week. Tolerance: Pt tolerated procedure with no discomfort  Pathology : invasive ductal carcinoma, grade 1, + metastatic carcinoma  Concordance: yes  ASSESSMENT and PLAN    ICD-10-CM ICD-9-CM    1. Abnormal mammogram  R92.8 793.80 SURGICAL PATHOLOGY      SURGICAL PATHOLOGY   2. Abnormal ultrasound of breast  R92.8 793.89 SURGICAL PATHOLOGY      SURGICAL PATHOLOGY     Total time spent with patient: 45 minutes     1. RIGHT breast mass and RIGHT axillary lymph node biopsied  2. Pt has a cluster of calcs 2cm from the breast mass. Could be needle localized at surgery  3. Pt had brown discharge, and nipple retraction. MRI will be ordered after biopsy result to see if disease extends to the nipple. Going on a [place] cruise [place] 1st    5/10/2022  Called patient  invasive ductal carcinoma, grade 1, + metastatic carcinoma  biomarkers pending  Breast MRI.   Pt will schedule  Med onc appt, hopefully next week

## 2022-05-05 NOTE — PROGRESS NOTES
[facility]  OFFICE PROCEDURE PROGRESS NOTE        Chart reviewed for the following:   IEcho, have reviewed the History, Physical and updated the Allergic reactions for [address] performed immediately prior to start of procedure:   Anil Quintanilla, have performed the following reviews on [address] [place] [address] prior to the start of the procedure:            * Patient was identified by name and date of birth   * Agreement on procedure being performed was verified  * Risks and Benefits explained to the patient  * Procedure site verified and marked as necessary  * Patient was positioned for comfort  * Consent was signed and verified     Time: 12:05pm      Date of procedure: 5/5/2022    Procedure performed by:  Echo West MD    Provider assisted by: Army Jose De Jesus SAEZ    Patient assisted by: self    How tolerated by patient: tolerated the procedure well with no complications    Post Procedural Pain Scale: 0 - No Hurt    Comments: none

## 2022-05-05 NOTE — PATIENT INSTRUCTIONS
Breast Lumps: Care Instructions  Your Care Instructions  Breast lumps are common, especially in women between ages 27 and 48. Many women's breasts feel lumpy and tender before their menstrual period. Women also may have lumps when they are breastfeeding. Breast lumps may go away after menopause. All new breast lumps in women after menopause should be checked by a doctor. Although lumps may be normal for you, it is important to have your doctor check any lump or thickness that is not like the rest of your breast to make sure it is not cancer. A lump may be larger, harder, or different from the rest of your breast tissue. Follow-up care is a key part of your treatment and safety. Be sure to make and go to all appointments, and call your doctor if you are having problems. It's also a good idea to know your test results and keep a list of the medicines you take. How can you care for yourself at home? · Make an appointment to have a mammogram and other follow-up visits as recommended by your doctor. When should you call for help? Watch closely for changes in your health, and be sure to contact your doctor if:    · You do not get better as expected.     · Your breast has changed.     · You have pain in your breast.     · You have a discharge from your nipple.     · A breast lump changes or does not go away. Where can you learn more? Go to http://www.gray.com/  Enter Q928 in the search box to learn more about \"Breast Lumps: Care Instructions. \"  Current as of: November 22, 2021               Content Version: 13.2  © 2006-2022 Healthwise[employer] Incorporated. Care instructions adapted under license by [employer] (which disclaims liability or warranty for this information). If you have questions about a medical condition or this instruction, always ask your healthcare professional. [address] any warranty or liability for your use of this information.

## 2022-05-10 ENCOUNTER — TELEPHONE (OUTPATIENT)
Dept: SURGERY | Age: 57
End: 2022-05-10

## 2022-05-12 PROBLEM — C50.911 BREAST CANCER, RIGHT (HCC): Status: ACTIVE | Noted: 2022-05-12

## 2022-05-13 ENCOUNTER — DOCUMENTATION ONLY (OUTPATIENT)
Dept: SURGERY | Age: 57
End: 2022-05-13

## 2022-05-13 ENCOUNTER — TELEPHONE (OUTPATIENT)
Dept: SURGERY | Age: 57
End: 2022-05-13

## 2022-05-13 ENCOUNTER — HOSPITAL ENCOUNTER (OUTPATIENT)
Dept: MRI IMAGING | Age: 57
Discharge: HOME | End: 2022-05-13
Attending: SURGERY
Payer: COMMERCIAL

## 2022-05-13 DIAGNOSIS — C50.511 MALIGNANT NEOPLASM OF LOWER-OUTER QUADRANT OF RIGHT FEMALE BREAST, UNSPECIFIED ESTROGEN RECEPTOR STATUS (HCC): ICD-10-CM

## 2022-05-13 PROCEDURE — A9585 GADOBUTROL INJECTION: HCPCS | Performed by: SURGERY

## 2022-05-13 PROCEDURE — 77049 MRI BREAST C-+ W/CAD BI: CPT

## 2022-05-13 PROCEDURE — 74011250636 HC RX REV CODE- 250/636: Performed by: SURGERY

## 2022-05-13 RX ADMIN — GADOBUTROL 9 ML: 604.72 INJECTION INTRAVENOUS at 10:48

## 2022-05-16 ENCOUNTER — NURSE NAVIGATOR (OUTPATIENT)
Dept: CASE MANAGEMENT | Age: 57
End: 2022-05-16

## 2022-05-16 NOTE — NURSE NAVIGATOR
[facility]   Breast Navigator Encounter    Name: Ranulfo Ordaz  Age: 56 y.o.  : 1965  Diagnosis: Right breast IDC; ER+/MO+/HER2- by Glenis Obrien    Encounter type:  [x]Initial Navigator Encounter  []Patient Initiated  []Navigator Follow-up []Pre-op  []Post-op []Check-in Prior to First Treatment []Treatment Modality Change   []Other:     Narrative:   Called pt to introduce self/role of Breast Navigator and to discuss upcoming consult with Dr. Jac Navarrete. We were able to move her appointment up and she is now scheduled to see Dr. Jac Navarrete on  at 4:15pm instead of . Pt was appreciative. Date/time/location of updated appt confirmed. Pt shares she is still processing the diagnosis and has her ups and downs. Validated her feelings and supportive listening provided. She states she has a strong support system consisting of her  and adult children. 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 She is hoping after her visit with Dr. Jac Navarrete she will have a better understanding of what to expect. I assured her that she would and that it is a very informative visit. Contact info given and pt encouraged to reach out as needed. Will follow-up.      Interdisciplinary Team:  Med-Onc: Dr. Marcos Kwan MD  Surg-Onc: Dr. Marcia Lee MD  Rad-Onc:  Plastics:  :   Nurse Navigator:  Deni Chapman, RN      JAKI LittleN, RN, [facility]  Breast Cancer Nurse [address] [place] [address] Nw  W: [phone]  F: [phone]  [email or web address]   Good Help to Those in [facility]

## 2022-05-17 ENCOUNTER — DOCUMENTATION ONLY (OUTPATIENT)
Dept: SURGERY | Age: 57
End: 2022-05-17

## 2022-05-17 ENCOUNTER — TELEPHONE (OUTPATIENT)
Dept: SURGERY | Age: 57
End: 2022-05-17

## 2022-05-17 DIAGNOSIS — R92.8 ABNORMAL MRI, BREAST: Primary | ICD-10-CM

## 2022-05-17 DIAGNOSIS — Z17.0 MALIGNANT NEOPLASM OF LOWER-OUTER QUADRANT OF RIGHT BREAST OF FEMALE, ESTROGEN RECEPTOR POSITIVE (HCC): Primary | ICD-10-CM

## 2022-05-17 DIAGNOSIS — M54.9 OTHER ACUTE BACK PAIN: ICD-10-CM

## 2022-05-17 DIAGNOSIS — C50.511 MALIGNANT NEOPLASM OF LOWER-OUTER QUADRANT OF RIGHT BREAST OF FEMALE, ESTROGEN RECEPTOR POSITIVE (HCC): Primary | ICD-10-CM

## 2022-05-17 NOTE — TELEPHONE ENCOUNTER
1. Breast MRI  RIGHT breast cancer  LEFT breast UOQ nonmass enhancement. MRI guided biopsy ordered  2.  Nipple pain, and pain extends to the scapula  Bone scan ordered  3. appt with Dr Shai Rich next week, 5/24

## 2022-05-24 ENCOUNTER — DOCUMENTATION ONLY (OUTPATIENT)
Dept: ONCOLOGY | Age: 57
End: 2022-05-24

## 2022-05-24 ENCOUNTER — HOSPITAL ENCOUNTER (OUTPATIENT)
Dept: MAMMOGRAPHY | Age: 57
Discharge: HOME | End: 2022-05-24
Attending: SURGERY
Payer: COMMERCIAL

## 2022-05-24 ENCOUNTER — NURSE NAVIGATOR (OUTPATIENT)
Dept: CASE MANAGEMENT | Age: 57
End: 2022-05-24

## 2022-05-24 ENCOUNTER — OFFICE VISIT (OUTPATIENT)
Dept: ONCOLOGY | Age: 57
End: 2022-05-24
Payer: COMMERCIAL

## 2022-05-24 VITALS
WEIGHT: 221 LBS | HEART RATE: 81 BPM | DIASTOLIC BLOOD PRESSURE: 78 MMHG | RESPIRATION RATE: 18 BRPM | TEMPERATURE: 98 F | OXYGEN SATURATION: 95 % | HEIGHT: 65 IN | BODY MASS INDEX: 36.82 KG/M2 | SYSTOLIC BLOOD PRESSURE: 141 MMHG

## 2022-05-24 DIAGNOSIS — Z17.0 MALIGNANT NEOPLASM OF CENTRAL PORTION OF RIGHT BREAST IN FEMALE, ESTROGEN RECEPTOR POSITIVE (HCC): Primary | ICD-10-CM

## 2022-05-24 DIAGNOSIS — C50.111 MALIGNANT NEOPLASM OF CENTRAL PORTION OF RIGHT BREAST IN FEMALE, ESTROGEN RECEPTOR POSITIVE (HCC): Primary | ICD-10-CM

## 2022-05-24 DIAGNOSIS — R92.8 ABNORMAL MRI, BREAST: ICD-10-CM

## 2022-05-24 PROCEDURE — 99205 OFFICE O/P NEW HI 60 MIN: CPT | Performed by: INTERNAL MEDICINE

## 2022-05-24 PROCEDURE — 76642 ULTRASOUND BREAST LIMITED: CPT

## 2022-05-24 NOTE — PROGRESS NOTES
[facility] [place] at [address], [address] [facility]  W: [phone]  F: [phone]      Reason for Visit:   Shantel Hyman is a 56 y.o. female who is seen in consultation at the request of Dr. Rubi Fernandez for evaluation of therapy for breast cancer    Treatment History:   · 22 right breast bx:  IDC, gr 1, 8 mm, ER + at > 90%, SD + at 90%, HER 2 negative (IHC 2+; FISH ratio 1.8; sig/cell 3.73); right ax LN + for breast cancer    History of Present Illness: An abnormal mammogram led to the path above    FH: No breast or ovarian cancer; no pancreas or prostate cancer    LMP 2022    Past Medical History:   Diagnosis Date    Anxiety     Asthma     [facility] ([facility] [address]     Cancer ([facility] [address]     Depression     and anxiety    History of partial surgical removal of colon 2022    8 inches of colon take out at [address] Pyelonephritis 14      Past Surgical History:   Procedure Laterality Date    COLONOSCOPY N/A 2018    COLONOSCOPY performed by Kati Garcia MD at [facility] 86    tubal ligation    HX OTHER SURGICAL      vein ligation    HX ROTATOR CUFF REPAIR  2021    HX SHOULDER REPLACEMENT  2021    Shoulder reconstruction    HX TONSILLECTOMY  1985      Social History     Tobacco Use    Smoking status: Former Smoker     Quit date:      Years since quittin.4    Smokeless tobacco: Never Used    Tobacco comment: 26 Years    Substance Use Topics    Alcohol use: Yes     Alcohol/week: 20.0 standard drinks     Types: 20 Cans of beer per week      History reviewed. No pertinent family history. Current Outpatient Medications   Medication Sig    oxyCODONE-acetaminophen (PERCOCET) 5-325 mg per tablet TAKE 1 TAB BY MOUTH EVERY 4 HRS AS NEEDED FOR POST-OP PAIN (Patient not taking: Reported on 2022)    buPROPion XL (WELLBUTRIN XL) 300 mg XL tablet Take 300 mg by mouth every morning.  (Patient not taking: Reported on 5/24/2022)    gabapentin (NEURONTIN) 300 mg capsule Take 300 mg by mouth three (3) times daily. (Patient not taking: Reported on 5/24/2022)    busPIRone (BUSPAR) 10 mg tablet TAKE 2 TABLETS BY MOUTH TWO (2) TIMES A DAY. (Patient not taking: Reported on 5/24/2022)     No current facility-administered medications for this visit. Allergies   Allergen Reactions    Other Medication Other (comments)     IV PHENERGAN--Makes Hallucinate \"Really\" bad. Review of Systems: A complete review of systems was obtained, negative except as described above. Physical Exam:     Visit Vitals  BP (!) 141/78   Pulse 81   Temp 98 °F (36.7 °C) (Temporal)   Resp 18   Ht 5' 5\" (1.651 m)   Wt 221 lb (100.2 kg)   SpO2 95%   BMI 36.78 kg/m²     ECOG PS: 0    Constitutional: Appears well-developed and well-nourished in no apparent distress      Mental status: Alert and awake, Oriented to person/place/time, Able to follow commands    Eyes: EOM normal, Sclera normal, No visible ocular discharge    HENT: Normocephalic, atraumatic    Mouth/Throat: Moist mucous membranes    External Ears normal    Neck: No visualized mass    Pulmonary/Chest: Respiratory effort normal, No visualized signs of difficulty breathing or respiratory distress    Musculoskeletal: Normal gait with no signs of ataxia, Normal range of motion of neck    Neurological: No facial asymmetry (Cranial nerve 7 motor function), No gaze palsy    Skin: No significant exanthematous lesions or discoloration noted on facial skin    Psychiatric: Normal affect, No hallucinations     Breasts:  Right breast, 9:00, 4cmfn, 2.1 cm, 1.5 cm LN; Left breast:  L ax LN, 1.2 cm        Results:     Lab Results   Component Value Date/Time    WBC 7.6 02/11/2020 10:57 AM    HGB 12.1 02/11/2020 10:57 AM    HCT 38.4 02/11/2020 10:57 AM    PLATELET 386 21/37/6114 10:57 AM    MCV 97.7 02/11/2020 10:57 AM    ABS.  NEUTROPHILS 6.1 08/28/2017 04:41 PM     Lab Results   Component Value Date/Time    Sodium 137 02/11/2020 10:57 AM    Potassium 4.3 02/11/2020 10:57 AM    Chloride 108 02/11/2020 10:57 AM    CO2 29 02/11/2020 10:57 AM    Glucose 104 (H) 02/11/2020 10:57 AM    BUN 16 02/11/2020 10:57 AM    Creatinine 0.98 02/11/2020 10:57 AM    GFR est AA >60 02/11/2020 10:57 AM    GFR est non-AA 60 (L) 02/11/2020 10:57 AM    Calcium 9.7 02/11/2020 10:57 AM     Lab Results   Component Value Date/Time    Bilirubin, total 0.5 02/11/2020 10:57 AM    ALT (SGPT) 23 02/11/2020 10:57 AM    Alk. phosphatase 71 02/11/2020 10:57 AM    Protein, total 7.3 02/11/2020 10:57 AM    Albumin 3.9 02/11/2020 10:57 AM    Globulin 3.4 02/11/2020 10:57 AM     MRI breast 5/13/22  Right breast: A dominant dynamically enhancing mass with washout kinetics lies  at approximately 9:00 in the right nipple line, mid depth, measuring 1.1 cm AP,  1.5 cm cc and 1.1 cm TRV. An additional small enhancing mass lies at the nipple,  immediately posterior real or, with nipple inversion and enhancement  inseparable. This nipple areolar complex and enhancement measures 1.4 cm TRV,  1.2 cm AP and 0.9 cm CC. Between the dominant mass at 9:00 and the nipple, there  is a combined distance of 8.2 cm, where there is linear delayed enhancement in  the nipple line suspicious for ductal extension. There are no other dominant  abnormalities of morphology or enhancement within the right breast to suggest  additional disease, although the gland overall in the outer breast is enhancing. Lymph nodes in the right axilla are normal to indeterminate, but none contains a  biopsy clip artifact.     Left breast: In the axillary breast, posterior depth at 1:30, nonmass  enhancement measures 3.7 cm AP, 1.1 cm TRV and approximately 2.4 cm CC. It shows  dynamic washout enhancement and is suspicious for contralateral malignancy.  No  other dominant abnormality of morphology or enhancement appears in the left  breast. Left axillary lymph nodes are not enlarged, but are morphologically  indeterminate.      IMPRESSION  . IMPRESSION:  1. Right BI-RADS 6, known malignancy. Left BI-RADS 4, suspicious. 2. RIGHT BREAST: Known invasive ductal carcinoma at 9:00, with additional  findings between this mass and the nipple for a distance of 8.2 cm, suspicious  for ductal extension to the nipple areolar complex as described above. Indeterminate lymph nodes, no biopsy clip artifact detected. 3. LEFT BREAST: Suspicious nonmass enhancement in the axillary breast 1:30, for  which ultrasonography could be attempted for biopsy localization to exclude  contralateral malignancy. However, this lesion may be sonographically subtle,  and MRI guided biopsy may be necessary to ensure concordance. Indeterminate left  axillary lymph nodes. 4. A summary portfolio has been created for reference and is available in PACS. 5/24/22 US breast L  IMPRESSION  No discrete mass is identified. BI-RADS Category 4 - Suspicious abnormality. .  RECOMMENDATION:  MRI guided biopsy which will be technically challenging due to the far posterior  position of the MRI finding. Records reviewed and summarized above. Pathology report(s) reviewed above. Radiology report(s) reviewed above. Assessment/plan:   1. Right breast IDC, gr 1, ER +, CA +, HER 2 negative, LN + by core:  cT2 cN1a cM0, stage IIB, prognostic stage IA  Pre/perimenopausal    We explained to the patient that the goal of systemic adjuvant therapy is to improve the chances for cure and decrease the risk of relapse. We explained why a patient can have microscopic cancer spread now even though physical examination, laboratory studies and imaging studies are negative for cancer. We explained that the same treatments used now as adjuvant or preventive treatments rarely if ever are curative in women who develop metastases.      Premenopausal, so neoadjuvant endocrine therapy is not ideal as tamoxifen should not be used in this setting and it would take 4 week for OS. Discussed that based on [place]-Rx, chemotherapy would be warranted (premenopausal with LN + disease). Discussed that the update to MINDACT validates mammaprint in this setting (> 48years old, LN + disease). We discussed the potential value of Mammaprint testing. The [place] 70-gene prognostic profile (Mammaprint®), one of the first gene expression array-based prognosticators, classifies tumors as low-risk or high-risk for breast cancer recurrence. The clinical validity of the 70-gene prognostic profile has been demonstrated in multiple studies. Results from an international randomized trial, the Microarray in Node-Negative Disease May Avoid Chemotherapy (MINDACT) trial suggest that this genetic profile may identify subsets of patients who have a low likelihood of distant recurrence despite high-risk clinical features. In this trial, [id number] women, approximately [de-identified] percent of whom had lymph node-negative disease, underwent risk assessment by clinical criteria (using Adjuvant! Online) and by the 70-genetic profile. Patients with discordant clinical and genomic predictions were randomly assigned to receive or not receive adjuvant chemotherapy. Among patients in the intention-to-treat population who had a high clinical risk of recurrence but a low risk by [place] genetic profile, the five-year distant metastases-free survival rate was 95.9 percent with chemotherapy and 94.4 percent without chemotherapy (HR for distant metastasis or death 0.78, 95% CI 0.50-1.21). However, it should be noted that the MINDACT study was not powered to exclude a benefit of chemotherapy.  In analysis of discordant groups in the intention-to-treat population, adjuvant chemotherapy was associated with improvement in the rate of distant metastasis-free survival of 2.8 and 2 percent, respectively, for the high clinical/low genomic and low clinical/high genomic risk groups, although these differences were also not statistically significant. Based on MINDACT, [employer] has suggested Vin[employer] Gouge is one of several assays that might be used to determine prognosis for those with high clinical risk, hormone receptor-positive, HER2-negative breast cancer and no or limited (one to three) involved lymph nodes to inform decisions regarding withholding chemotherapy. Will order mammaprint. Discussed that the likely plan would be to proceed with surgery when she returns from her vacation. Discussed that chemotherapy may follow surgery if the mammaprint returns high risk OR if her tumor is > 5 cm. Discussed that XRT and endocrine therapy would be part of her adjuvant therapy plan. 2. Emotional well being/recurrent depression:  She has excellent support and is coping well with her disease    3. L breast lesion:  MRI bx on 6/1/22. If this is + for breast cancer, would then recommend genetic testing, discussed    4. Back pain/right shoulder pain: bone scan scheduled for 6/1/22; will order CT c/a/p to complete staging    Will be on cruise 6/2-6/17/22    > 60 min were spent in this encounter with time spent in face to face discussion, record review, examination, and documentation    I appreciate the opportunity to participate in Ms. [address] [place]'s care. Signed By: Arely Malin MD      No orders of the defined types were placed in this encounter.

## 2022-05-25 NOTE — NURSE NAVIGATOR
[address]  Breast Navigator Encounter    Name: Yany Scott  Age: 56 y.o.  : 1965  Diagnosis: Right breast IDC; ER+/AR+/HER2- by [facility]    Encounter type:  [x]Initial Navigator Encounter  []Patient Initiated  []Navigator Follow-up []Pre-op  []Post-op []Check-in Prior to First Treatment []Treatment Modality Change   []Other:     Narrative:   Met with pt and her , Quan Dillard, while in clinic for her med/onc consult. Re-introduced self/role of Breast Navigator. Pt shares she went for ultrasound biopsy today, however it wasn't able to be completed. MRI biopsy is recommended and scheduled for next Wednesday, . Pt states bone scan was ordered d/t shoulder/back pain. This is scheduled for  as well. Answered all questions, as able. Contact info given and pt encouraged to reach out as needed. Will follow-up.      Interdisciplinary Team:  Med-Onc: Dr. Ephraim Ross MD  Surg-Onc: Dr. Laura Schaeffer MD  Rad-Onc:  Plastics:  : Usama Price LCSW  Nurse Navigator:  Alfonso Santillan, JAKI RicciN, RN, [facility]  Breast Cancer Nurse [address] [place] [address]  W: [phone]  F: [phone]  [email or web address]   Good Help to Those in [facility]

## 2022-05-25 NOTE — PROGRESS NOTES
Oncology Social Work  Psychosocial Assessment    Reason for Assessment:      [] Social Work Referral [x] Initial Assessment [x] New Diagnosis [] Other    [facility] [address] 2017   Confirm Advance Directive None   Patient Would Like to Complete Advance Directive No   Does the patient have other document types (No Data)       Sources of Information:    [x]Patient  [x]Family  [x]Staff  [x]Medical Record    Mental Status:    [x]Alert  []Lethargic  []Unresponsive   [] Unable to assess   Oriented to:  [x]Person  [x]Place  [x]Time  [x]Situation      Relationship Status:  []Single  []  [x]Significant Other/Life Partner  []  []  []    Living Circumstances:  []Lives Alone  [x]Family/Significant Other in Household  []Roommates  []Children in the Home  []Paid Caregivers  []Assisted Living Facility/Group Home  []Skilled [address]  []Homeless  []Incarcerated  []Environmental/Care Concerns  []Other:    Employment Status:  [x]Employed Full-time []Employed Part-time []Homemaker  [] Retired [] Short-Term Disability [] Long-Term Disability  [] Unemployed   [] [place]   [] SSDI  [] Self-Employment    Barriers to Learning:    []Language  []Developmental  []Cognitive  []Altered Mental Status  []Visual/Hearing Impairment  []Unable to Read/Write  []Motivational  [] Challenges Understanding Medical Jargon [x]No Barriers Identified      Financial/Legal Concerns:    []Uninsured  []Limited Income/Resources  []Non-Citizen  []Food Insecurity [x]No Concerns Identified   []Other:    Tenriism/Spiritual/Existential:  Does patient have any spiritual or Methodist beliefs? [] Yes [] No  Is the patient involved in a spiritual, serenity or Methodist community?  [] Yes [] No  Patient expressing spiritual/existential angst? [] Yes [x] No  Notes:    Support System:    Identified Support Person/Group: Suhas ()  []Strong  [x]Fair  []Limited    Coping with Illness:   []  Coping Well  [] Challenges Coping with Serious Illness [x] Situational Depression [x] Situational Anxiety [] Anticipatory Grief  [] Recent Loss [] Caregiver [place]            Narrative: Patient here for consultation with Dr. Dash Glynn for the management of breast cancer. Met with patient and her  Nerissa Sheldon) to introduce social work role and supports. Patient lives with her  in their private residence. They have three adult children, two local. 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 Her job requires frequent travel throughout the [place]. She has health insurance. Patient's primary support is her , children, stepmother in-law and father in-law. Patient endorsed feelings of concern and worry related to recent breast cancer diagnosis. She has a history of depression and anxiety. She has an established relationship with a psychiatrist (Dr. Kartik Damon) and therapist. Recently, her symptoms of depression and anxiety have decreased enough that she has been able to manage without medication and therapy. She also changed jobs which has reduced her stress. Although fear of the unknown related to her cancer diagnosis has been difficult, she feels in-tune with herself to know when additional supports are needed. Reviewed barriers to care and none identified at this time. Provided her with new patient resources folder. Patient will consider meditation; will hold off on referral at this time. Plan: Encouraged patient to contact me for ongoing psychosocial needs.      Referral/Handouts:  Complementary therapies referral  Support Groups referral    Thank you,  Emmanuelle Love LCSW

## 2022-06-01 ENCOUNTER — HOSPITAL ENCOUNTER (OUTPATIENT)
Dept: CT IMAGING | Age: 57
Discharge: HOME | End: 2022-06-01
Attending: INTERNAL MEDICINE
Payer: COMMERCIAL

## 2022-06-01 ENCOUNTER — TELEPHONE (OUTPATIENT)
Dept: ONCOLOGY | Age: 57
End: 2022-06-01

## 2022-06-01 ENCOUNTER — HOSPITAL ENCOUNTER (OUTPATIENT)
Dept: MRI IMAGING | Age: 57
Discharge: HOME | End: 2022-06-01
Attending: SURGERY
Payer: COMMERCIAL

## 2022-06-01 ENCOUNTER — HOSPITAL ENCOUNTER (OUTPATIENT)
Dept: MAMMOGRAPHY | Age: 57
Discharge: HOME | End: 2022-06-01
Attending: SURGERY
Payer: COMMERCIAL

## 2022-06-01 ENCOUNTER — HOSPITAL ENCOUNTER (OUTPATIENT)
Dept: NUCLEAR MEDICINE | Age: 57
Discharge: HOME | End: 2022-06-01
Attending: SURGERY
Payer: COMMERCIAL

## 2022-06-01 DIAGNOSIS — Z17.0 MALIGNANT NEOPLASM OF LOWER-OUTER QUADRANT OF RIGHT BREAST OF FEMALE, ESTROGEN RECEPTOR POSITIVE (HCC): ICD-10-CM

## 2022-06-01 DIAGNOSIS — M54.9 OTHER ACUTE BACK PAIN: ICD-10-CM

## 2022-06-01 DIAGNOSIS — R92.8 ABNORMAL MRI, BREAST: ICD-10-CM

## 2022-06-01 DIAGNOSIS — C50.111 MALIGNANT NEOPLASM OF CENTRAL PORTION OF RIGHT BREAST IN FEMALE, ESTROGEN RECEPTOR POSITIVE (HCC): ICD-10-CM

## 2022-06-01 DIAGNOSIS — Z17.0 MALIGNANT NEOPLASM OF CENTRAL PORTION OF RIGHT BREAST IN FEMALE, ESTROGEN RECEPTOR POSITIVE (HCC): ICD-10-CM

## 2022-06-01 DIAGNOSIS — C50.511 MALIGNANT NEOPLASM OF LOWER-OUTER QUADRANT OF RIGHT BREAST OF FEMALE, ESTROGEN RECEPTOR POSITIVE (HCC): ICD-10-CM

## 2022-06-01 PROCEDURE — 88305 TISSUE EXAM BY PATHOLOGIST: CPT

## 2022-06-01 PROCEDURE — 74011250636 HC RX REV CODE- 250/636

## 2022-06-01 PROCEDURE — 88360 TUMOR IMMUNOHISTOCHEM/MANUAL: CPT

## 2022-06-01 PROCEDURE — 74011000258 HC RX REV CODE- 258: Performed by: SURGERY

## 2022-06-01 PROCEDURE — A9576 INJ PROHANCE MULTIPACK: HCPCS

## 2022-06-01 PROCEDURE — 74011000250 HC RX REV CODE- 250: Performed by: SURGERY

## 2022-06-01 PROCEDURE — 78306 BONE IMAGING WHOLE BODY: CPT

## 2022-06-01 PROCEDURE — 74011000636 HC RX REV CODE- 636: Performed by: RADIOLOGY

## 2022-06-01 PROCEDURE — 19085 BX BREAST 1ST LESION MR IMAG: CPT

## 2022-06-01 PROCEDURE — 77065 DX MAMMO INCL CAD UNI: CPT

## 2022-06-01 PROCEDURE — 74177 CT ABD & PELVIS W/CONTRAST: CPT

## 2022-06-01 RX ORDER — LIDOCAINE HYDROCHLORIDE AND EPINEPHRINE 10; 10 MG/ML; UG/ML
INJECTION, SOLUTION INFILTRATION; PERINEURAL
Status: DISPENSED
Start: 2022-06-01 | End: 2022-06-01

## 2022-06-01 RX ORDER — LIDOCAINE HYDROCHLORIDE 10 MG/ML
INJECTION, SOLUTION EPIDURAL; INFILTRATION; INTRACAUDAL; PERINEURAL
Status: DISPENSED
Start: 2022-06-01 | End: 2022-06-01

## 2022-06-01 RX ORDER — LIDOCAINE HYDROCHLORIDE AND EPINEPHRINE 10; 10 MG/ML; UG/ML
24 INJECTION, SOLUTION INFILTRATION; PERINEURAL ONCE
Status: COMPLETED | OUTPATIENT
Start: 2022-06-01 | End: 2022-06-01

## 2022-06-01 RX ORDER — SODIUM BICARBONATE 84 MG/ML
10 INJECTION, SOLUTION INTRAVENOUS ONCE
Status: COMPLETED | OUTPATIENT
Start: 2022-06-01 | End: 2022-06-01

## 2022-06-01 RX ORDER — LIDOCAINE HYDROCHLORIDE 10 MG/ML
16 INJECTION INFILTRATION; PERINEURAL ONCE
Status: COMPLETED | OUTPATIENT
Start: 2022-06-01 | End: 2022-06-01

## 2022-06-01 RX ORDER — SODIUM BICARBONATE 84 MG/ML
INJECTION, SOLUTION INTRAVENOUS
Status: DISPENSED
Start: 2022-06-01 | End: 2022-06-01

## 2022-06-01 RX ORDER — SODIUM CHLORIDE 0.9 % (FLUSH) 0.9 %
10 SYRINGE (ML) INJECTION
Status: COMPLETED | OUTPATIENT
Start: 2022-06-01 | End: 2022-06-01

## 2022-06-01 RX ADMIN — SODIUM CHLORIDE 100 ML: 9 INJECTION, SOLUTION INTRAVENOUS at 08:01

## 2022-06-01 RX ADMIN — LIDOCAINE HYDROCHLORIDE,EPINEPHRINE BITARTRATE 240 MG: 10; .01 INJECTION, SOLUTION INFILTRATION; PERINEURAL at 08:00

## 2022-06-01 RX ADMIN — SODIUM CHLORIDE, PRESERVATIVE FREE 10 ML: 5 INJECTION INTRAVENOUS at 08:01

## 2022-06-01 RX ADMIN — IOHEXOL 50 ML: 240 INJECTION, SOLUTION INTRATHECAL; INTRAVASCULAR; INTRAVENOUS; ORAL at 10:24

## 2022-06-01 RX ADMIN — SODIUM BICARBONATE 10 MEQ: 84 INJECTION, SOLUTION INTRAVENOUS at 08:00

## 2022-06-01 RX ADMIN — LIDOCAINE HYDROCHLORIDE 16 ML: 10 INJECTION, SOLUTION EPIDURAL; INFILTRATION; INTRACAUDAL; PERINEURAL at 08:00

## 2022-06-01 RX ADMIN — GADOTERIDOL 20 ML: 279.3 INJECTION, SOLUTION INTRAVENOUS at 08:00

## 2022-06-01 NOTE — TELEPHONE ENCOUNTER
Called patient to inform her that her CT scan came back all clear, no cancer anywhere else. Patient understood and was pleased to hear.  Patient had no further questions or concerns at this time

## 2022-06-02 ENCOUNTER — TELEPHONE (OUTPATIENT)
Dept: SURGERY | Age: 57
End: 2022-06-02

## 2022-06-02 NOTE — TELEPHONE ENCOUNTER
Called patient  1. Bone scan and CT scan both negative. 2. MRI guided biopsy yesterday. RIGHT DCIS. Pt is going to have bilateral mastectomies  3. appt with Dr Vel Lyons 6/21/2022  4.  She is on a [place] cruise

## 2022-06-14 ENCOUNTER — DOCUMENTATION ONLY (OUTPATIENT)
Dept: SURGERY | Age: 57
End: 2022-06-14

## 2022-06-14 NOTE — PROGRESS NOTES
I called Dr Victor's office, left a message for Dee Dee Bethea to call me back to look at dates for this patient .

## 2022-06-17 ENCOUNTER — DOCUMENTATION ONLY (OUTPATIENT)
Dept: SURGERY | Age: 57
End: 2022-06-17

## 2022-06-17 NOTE — PROGRESS NOTES
I called the patient to let her know we are holding July 27th of June for this patient combo surgery . Josy/ Natali Brannon  She appreciated the phone call. Osiel Rangel with plastics will call after patient has seen the doctor.

## 2022-06-20 ENCOUNTER — DOCUMENTATION ONLY (OUTPATIENT)
Dept: SURGERY | Age: 57
End: 2022-06-20

## 2022-06-20 NOTE — PROGRESS NOTES
Verbally informed patient of surgery   [facility]   Will call when complete. Date: 06/27/2022 @ 7:30 AM   Arrive:6:00 AM  report to 1st floor outpatient registration day of surgery.     PAT:Nurse will call with pre admission testing information   Arrive: nurse gives info:  report to 2nd floor volunteer desk, take a right off the elevator      Gave following instructions:  NPO after Midnight the night before  Shower in AM, no lotion, deodorant, powder,perfume or makeup  Will need  morning of the surgery    Post op per Dr Indra Huang

## 2022-06-21 DIAGNOSIS — C50.911 MALIGNANT NEOPLASM OF RIGHT FEMALE BREAST, UNSPECIFIED ESTROGEN RECEPTOR STATUS, UNSPECIFIED SITE OF BREAST (HCC): Primary | ICD-10-CM

## 2022-06-22 ENCOUNTER — HOSPITAL ENCOUNTER (OUTPATIENT)
Dept: PREADMISSION TESTING | Age: 57
Discharge: HOME | End: 2022-06-22
Payer: COMMERCIAL

## 2022-06-22 VITALS
OXYGEN SATURATION: 97 % | RESPIRATION RATE: 16 BRPM | DIASTOLIC BLOOD PRESSURE: 69 MMHG | HEIGHT: 65 IN | WEIGHT: 225.09 LBS | BODY MASS INDEX: 37.5 KG/M2 | SYSTOLIC BLOOD PRESSURE: 135 MMHG | TEMPERATURE: 98.6 F | HEART RATE: 68 BPM

## 2022-06-22 LAB
ABO + RH BLD: NORMAL
ALBUMIN SERPL-MCNC: 3.5 G/DL (ref 3.5–5)
ALBUMIN/GLOB SERPL: 0.9 {RATIO} (ref 1.1–2.2)
ALP SERPL-CCNC: 83 U/L (ref 45–117)
ALT SERPL-CCNC: 36 U/L (ref 12–78)
ANION GAP SERPL CALC-SCNC: 5 MMOL/L (ref 5–15)
APTT PPP: 24.7 SEC (ref 22.1–31)
AST SERPL-CCNC: 26 U/L (ref 15–37)
BASOPHILS # BLD: 0.1 K/UL (ref 0–0.1)
BASOPHILS NFR BLD: 1 % (ref 0–1)
BILIRUB SERPL-MCNC: 0.4 MG/DL (ref 0.2–1)
BLOOD GROUP ANTIBODIES SERPL: NORMAL
BUN SERPL-MCNC: 14 MG/DL (ref 6–20)
BUN/CREAT SERPL: 20 (ref 12–20)
CALCIUM SERPL-MCNC: 9.5 MG/DL (ref 8.5–10.1)
CHLORIDE SERPL-SCNC: 106 MMOL/L (ref 97–108)
CO2 SERPL-SCNC: 28 MMOL/L (ref 21–32)
CREAT SERPL-MCNC: 0.7 MG/DL (ref 0.55–1.02)
DIFFERENTIAL METHOD BLD: ABNORMAL
EOSINOPHIL # BLD: 0.1 K/UL (ref 0–0.4)
EOSINOPHIL NFR BLD: 2 % (ref 0–7)
ERYTHROCYTE [DISTWIDTH] IN BLOOD BY AUTOMATED COUNT: 14.4 % (ref 11.5–14.5)
GLOBULIN SER CALC-MCNC: 3.9 G/DL (ref 2–4)
GLUCOSE SERPL-MCNC: 98 MG/DL (ref 65–100)
HCT VFR BLD AUTO: 39.2 % (ref 35–47)
HGB BLD-MCNC: 12.3 G/DL (ref 11.5–16)
IMM GRANULOCYTES # BLD AUTO: 0 K/UL (ref 0–0.04)
IMM GRANULOCYTES NFR BLD AUTO: 1 % (ref 0–0.5)
INR PPP: 1 (ref 0.9–1.1)
LYMPHOCYTES # BLD: 2.8 K/UL (ref 0.8–3.5)
LYMPHOCYTES NFR BLD: 42 % (ref 12–49)
MCH RBC QN AUTO: 30.6 PG (ref 26–34)
MCHC RBC AUTO-ENTMCNC: 31.4 G/DL (ref 30–36.5)
MCV RBC AUTO: 97.5 FL (ref 80–99)
MONOCYTES # BLD: 0.6 K/UL (ref 0–1)
MONOCYTES NFR BLD: 9 % (ref 5–13)
NEUTS SEG # BLD: 3.1 K/UL (ref 1.8–8)
NEUTS SEG NFR BLD: 45 % (ref 32–75)
NRBC # BLD: 0 K/UL (ref 0–0.01)
NRBC BLD-RTO: 0 PER 100 WBC
PLATELET # BLD AUTO: 309 K/UL (ref 150–400)
PMV BLD AUTO: 10.2 FL (ref 8.9–12.9)
POTASSIUM SERPL-SCNC: 4.4 MMOL/L (ref 3.5–5.1)
PROT SERPL-MCNC: 7.4 G/DL (ref 6.4–8.2)
PROTHROMBIN TIME: 10.3 SEC (ref 9–11.1)
RBC # BLD AUTO: 4.02 M/UL (ref 3.8–5.2)
SODIUM SERPL-SCNC: 139 MMOL/L (ref 136–145)
SPECIMEN EXP DATE BLD: NORMAL
THERAPEUTIC RANGE,PTTT: NORMAL SECS (ref 58–77)
WBC # BLD AUTO: 6.7 K/UL (ref 3.6–11)

## 2022-06-22 PROCEDURE — 85730 THROMBOPLASTIN TIME PARTIAL: CPT

## 2022-06-22 PROCEDURE — 93005 ELECTROCARDIOGRAM TRACING: CPT

## 2022-06-22 PROCEDURE — 80053 COMPREHEN METABOLIC PANEL: CPT

## 2022-06-22 PROCEDURE — 36415 COLL VENOUS BLD VENIPUNCTURE: CPT

## 2022-06-22 PROCEDURE — 86850 RBC ANTIBODY SCREEN: CPT

## 2022-06-22 PROCEDURE — 85025 COMPLETE CBC W/AUTO DIFF WBC: CPT

## 2022-06-22 PROCEDURE — 85610 PROTHROMBIN TIME: CPT

## 2022-06-22 RX ORDER — ACETAMINOPHEN 500 MG
1000 TABLET ORAL
COMMUNITY

## 2022-06-22 RX ORDER — CHOLECALCIFEROL (VITAMIN D3) 125 MCG
10 CAPSULE ORAL
COMMUNITY
End: 2022-09-12

## 2022-06-22 NOTE — PERIOP NOTES
[address], [address] [facility]   MAIN OR                                  [phone]   MAIN PRE OP                          [phone]                                                                                AMBULATORY PRE OP          [phone]  PRE-ADMISSION TESTING    [phone]   Surgery Date:  Monday 6/27/22       Is surgery arrival time given by surgeon? NO  If NO, [facility] staff will call you between 3 and 7pm the day before your surgery with your arrival time. (If your surgery is on a Monday, we will call you the Friday before.)    Call [phone] after 7pm Monday-Friday if you did not receive this call. INSTRUCTIONS BEFORE YOUR SURGERY   When You  Arrive Arrive at the 2nd [address] [facility] on the day of your surgery  Have your insurance card, photo ID, and any copayment (if needed)   Food   and   Drink NO food or drink after midnight the night before surgery    This means NO water, gum, mints, coffee, juice, etc.  No alcohol (beer, wine, liquor) 24 hours before and after surgery   Medications to   TAKE   Morning of Surgery MEDICATIONS TO TAKE THE MORNING OF SURGERY WITH A SIP OF WATER:    n/a   Medications  To  STOP      7 days before surgery  Non-Steroidal anti-inflammatory Drugs (NSAID's): for example, Ibuprofen (Advil, Motrin), Naproxen (Aleve)   Aspirin, if taking for pain    Herbal supplements, vitamins, and fish oil   Other:  (Pain medications not listed above, including Tylenol may be taken)   Blood  Thinners  n/a   Bathing Clothing  Jewelry  Valuables      If you shower the morning of surgery, please do not apply anything to your skin (lotions, powders, deodorant, or makeup, especially mascara)   Follow [employer]lor[employer] body wash instructions provided to you during PAT appointment. Begin 3 days prior to surgery.    Do not shave or trim anywhere 24 hours before surgery   Wear your hair loose or down; no pony-tails, buns, or metal hair clips   Wear loose, comfortable, clean clothes   Wear glasses instead of contacts  [address], valuables, and jewelry, including body piercings, at home   If you were given an [employer], bring it on day of surgery. Going Home - or Spending the Night  SAME-DAY SURGERY: You must have a responsible adult drive you home and stay with you 24 hours after surgery   ADMITS: If your doctor is keeping you in the hospital after surgery, leave personal belongings/luggage in your car until you have a hospital room number. Hospital discharge time is 12 noon  Drivers must be here before 12 noon unless you are told differently   Special Instructions   Free  parking available M-F 7-5     Follow all instructions so your surgery wont be cancelled. Please, be on time. If a situation occurs and you are delayed the day of surgery, call  [phone]. If your physical condition changes (like a fever, cold, flu, etc.) call your surgeon. Home medication(s) reviewed and verified via    VERBAL   during PAT appointment. The patient was contacted by     IN-PERSON  The patient verbalizes understanding of all instructions and      DOES NOT   need reinforcement.

## 2022-06-23 LAB
ATRIAL RATE: 61 BPM
CALCULATED P AXIS, ECG09: 36 DEGREES
CALCULATED R AXIS, ECG10: 17 DEGREES
CALCULATED T AXIS, ECG11: 38 DEGREES
DIAGNOSIS, 93000: NORMAL
P-R INTERVAL, ECG05: 134 MS
Q-T INTERVAL, ECG07: 404 MS
QRS DURATION, ECG06: 96 MS
QTC CALCULATION (BEZET), ECG08: 406 MS
VENTRICULAR RATE, ECG03: 61 BPM

## 2022-06-24 ENCOUNTER — ANESTHESIA EVENT (OUTPATIENT)
Dept: SURGERY | Age: 57
End: 2022-06-24
Payer: COMMERCIAL

## 2022-06-24 RX ORDER — OXYCODONE HYDROCHLORIDE 5 MG/1
10 TABLET ORAL
Status: CANCELLED | OUTPATIENT
Start: 2022-06-24 | End: 2022-06-25

## 2022-06-24 RX ORDER — OXYCODONE HCL 10 MG/1
10 TABLET, FILM COATED, EXTENDED RELEASE ORAL ONCE
Status: CANCELLED | OUTPATIENT
Start: 2022-06-24 | End: 2022-06-24

## 2022-06-24 RX ORDER — KETOROLAC TROMETHAMINE 30 MG/ML
15 INJECTION, SOLUTION INTRAMUSCULAR; INTRAVENOUS
Status: CANCELLED | OUTPATIENT
Start: 2022-06-24 | End: 2022-06-25

## 2022-06-24 RX ORDER — CELECOXIB 100 MG/1
100 CAPSULE ORAL ONCE
Status: CANCELLED | OUTPATIENT
Start: 2022-06-24 | End: 2022-06-24

## 2022-06-24 RX ORDER — ACETAMINOPHEN 325 MG/1
975 TABLET ORAL ONCE
Status: CANCELLED | OUTPATIENT
Start: 2022-06-24 | End: 2022-06-24

## 2022-06-27 ENCOUNTER — APPOINTMENT (OUTPATIENT)
Dept: GENERAL RADIOLOGY | Age: 57
End: 2022-06-27
Attending: STUDENT IN AN ORGANIZED HEALTH CARE EDUCATION/TRAINING PROGRAM
Payer: COMMERCIAL

## 2022-06-27 ENCOUNTER — HOSPITAL ENCOUNTER (OUTPATIENT)
Age: 57
Setting detail: OBSERVATION
Discharge: HOME | End: 2022-06-28
Attending: SURGERY | Admitting: STUDENT IN AN ORGANIZED HEALTH CARE EDUCATION/TRAINING PROGRAM
Payer: COMMERCIAL

## 2022-06-27 ENCOUNTER — ANESTHESIA (OUTPATIENT)
Dept: SURGERY | Age: 57
End: 2022-06-27
Payer: COMMERCIAL

## 2022-06-27 ENCOUNTER — APPOINTMENT (OUTPATIENT)
Dept: GENERAL RADIOLOGY | Age: 57
End: 2022-06-27
Attending: SURGERY
Payer: COMMERCIAL

## 2022-06-27 DIAGNOSIS — C50.111 MALIGNANT NEOPLASM OF CENTRAL PORTION OF RIGHT BREAST IN FEMALE, ESTROGEN RECEPTOR POSITIVE (HCC): ICD-10-CM

## 2022-06-27 DIAGNOSIS — Z17.0 MALIGNANT NEOPLASM OF CENTRAL PORTION OF RIGHT BREAST IN FEMALE, ESTROGEN RECEPTOR POSITIVE (HCC): ICD-10-CM

## 2022-06-27 DIAGNOSIS — C50.911 MALIGNANT NEOPLASM OF RIGHT FEMALE BREAST, UNSPECIFIED ESTROGEN RECEPTOR STATUS, UNSPECIFIED SITE OF BREAST (HCC): ICD-10-CM

## 2022-06-27 PROBLEM — C50.919 BREAST CANCER (HCC): Status: ACTIVE | Noted: 2022-06-27

## 2022-06-27 PROCEDURE — 88307 TISSUE EXAM BY PATHOLOGIST: CPT

## 2022-06-27 PROCEDURE — 88360 TUMOR IMMUNOHISTOCHEM/MANUAL: CPT

## 2022-06-27 PROCEDURE — 77030020061 HC IV BLD WRMR ADMIN SET 3M -B: Performed by: ANESTHESIOLOGY

## 2022-06-27 PROCEDURE — 77030018673: Performed by: SURGERY

## 2022-06-27 PROCEDURE — 77030040361 HC SLV COMPR DVT MDII -B

## 2022-06-27 PROCEDURE — P9045 ALBUMIN (HUMAN), 5%, 250 ML: HCPCS | Performed by: NURSE ANESTHETIST, CERTIFIED REGISTERED

## 2022-06-27 PROCEDURE — 77030019940 HC BLNKT HYPOTHRM STRY -B: Performed by: SURGERY

## 2022-06-27 PROCEDURE — 76210000037 HC AMBSU PH I REC 2 TO 2.5 HR: Performed by: SURGERY

## 2022-06-27 PROCEDURE — 77030031139 HC SUT VCRL2 J&J -A: Performed by: SURGERY

## 2022-06-27 PROCEDURE — 74011000250 HC RX REV CODE- 250: Performed by: NURSE ANESTHETIST, CERTIFIED REGISTERED

## 2022-06-27 PROCEDURE — 74011000258 HC RX REV CODE- 258: Performed by: SURGERY

## 2022-06-27 PROCEDURE — 77030005513 HC CATH URETH FOL11 MDII -B: Performed by: SURGERY

## 2022-06-27 PROCEDURE — 2709999900 HC NON-CHARGEABLE SUPPLY: Performed by: SURGERY

## 2022-06-27 PROCEDURE — 77030002986 HC SUT PROL J&J -A: Performed by: SURGERY

## 2022-06-27 PROCEDURE — 38525 BIOPSY/REMOVAL LYMPH NODES: CPT | Performed by: SURGERY

## 2022-06-27 PROCEDURE — 77030040506 HC DRN WND MDII -A: Performed by: SURGERY

## 2022-06-27 PROCEDURE — 77030008684 HC TU ET CUF COVD -B: Performed by: ANESTHESIOLOGY

## 2022-06-27 PROCEDURE — 77030011264 HC ELECTRD BLD EXT COVD -A: Performed by: SURGERY

## 2022-06-27 PROCEDURE — 77030011283 HC ELECTRD NDL COVD -A: Performed by: SURGERY

## 2022-06-27 PROCEDURE — 38900 IO MAP OF SENT LYMPH NODE: CPT | Performed by: SURGERY

## 2022-06-27 PROCEDURE — 74011000250 HC RX REV CODE- 250: Performed by: SURGERY

## 2022-06-27 PROCEDURE — 77030022517 HC TRNSFR ST F/ IMPL MENT -B: Performed by: SURGERY

## 2022-06-27 PROCEDURE — 74011250636 HC RX REV CODE- 250/636: Performed by: ANESTHESIOLOGY

## 2022-06-27 PROCEDURE — 76000 FLUOROSCOPY <1 HR PHYS/QHP: CPT

## 2022-06-27 PROCEDURE — 74011000250 HC RX REV CODE- 250: Performed by: STUDENT IN AN ORGANIZED HEALTH CARE EDUCATION/TRAINING PROGRAM

## 2022-06-27 PROCEDURE — G0378 HOSPITAL OBSERVATION PER HR: HCPCS

## 2022-06-27 PROCEDURE — 74011000258 HC RX REV CODE- 258: Performed by: NURSE ANESTHETIST, CERTIFIED REGISTERED

## 2022-06-27 PROCEDURE — 74011000272 HC RX REV CODE- 272: Performed by: SURGERY

## 2022-06-27 PROCEDURE — C1789 PROSTHESIS, BREAST, IMP: HCPCS | Performed by: SURGERY

## 2022-06-27 PROCEDURE — 77030002996 HC SUT SLK J&J -A: Performed by: SURGERY

## 2022-06-27 PROCEDURE — 77030002916 HC SUT ETHLN J&J -A: Performed by: SURGERY

## 2022-06-27 PROCEDURE — 74011250636 HC RX REV CODE- 250/636: Performed by: SURGERY

## 2022-06-27 PROCEDURE — C9290 INJ, BUPIVACAINE LIPOSOME: HCPCS | Performed by: SURGERY

## 2022-06-27 PROCEDURE — 19303 MAST SIMPLE COMPLETE: CPT | Performed by: SURGERY

## 2022-06-27 PROCEDURE — 74011250637 HC RX REV CODE- 250/637: Performed by: STUDENT IN AN ORGANIZED HEALTH CARE EDUCATION/TRAINING PROGRAM

## 2022-06-27 PROCEDURE — 76060000070 HC AMB SURG ANES 5 TO 5.5 HR: Performed by: SURGERY

## 2022-06-27 PROCEDURE — 77030002966 HC SUT PDS J&J -A: Performed by: SURGERY

## 2022-06-27 PROCEDURE — 77030026438 HC STYL ET INTUB CARD -A: Performed by: ANESTHESIOLOGY

## 2022-06-27 PROCEDURE — 77030040504 HC DRN WND MDII -B: Performed by: SURGERY

## 2022-06-27 PROCEDURE — 74011250636 HC RX REV CODE- 250/636: Performed by: NURSE ANESTHETIST, CERTIFIED REGISTERED

## 2022-06-27 PROCEDURE — 74011250636 HC RX REV CODE- 250/636: Performed by: STUDENT IN AN ORGANIZED HEALTH CARE EDUCATION/TRAINING PROGRAM

## 2022-06-27 PROCEDURE — 77030040922 HC BLNKT HYPOTHRM STRY -A

## 2022-06-27 PROCEDURE — 77030002933 HC SUT MCRYL J&J -A: Performed by: SURGERY

## 2022-06-27 PROCEDURE — 76030000010 HC AMB SURG OR TIME 5 TO 5.5: Performed by: SURGERY

## 2022-06-27 DEVICE — GRAFT DERMAL PLIABLE PRE THN LG 24X21 CMX0.7-1.4 MM FLEXHD: Type: IMPLANTABLE DEVICE | Site: BREAST | Status: FUNCTIONAL

## 2022-06-27 DEVICE — TISSUE EXPANDER, SMOOTH SURFACE, INTEGRATED PORT, FULL HEIGHT, 600-720 CC
Type: IMPLANTABLE DEVICE | Site: BREAST | Status: NON-FUNCTIONAL
Brand: DERMASPAN TISSUE EXPANDER
Removed: 2022-07-14

## 2022-06-27 RX ORDER — HYDROMORPHONE HYDROCHLORIDE 1 MG/ML
.25-1 INJECTION, SOLUTION INTRAMUSCULAR; INTRAVENOUS; SUBCUTANEOUS
Status: DISCONTINUED | OUTPATIENT
Start: 2022-06-27 | End: 2022-06-27 | Stop reason: HOSPADM

## 2022-06-27 RX ORDER — ACETAMINOPHEN 500 MG
1000 TABLET ORAL EVERY 6 HOURS
Status: DISCONTINUED | OUTPATIENT
Start: 2022-06-27 | End: 2022-06-28 | Stop reason: HOSPADM

## 2022-06-27 RX ORDER — FENTANYL CITRATE 50 UG/ML
25 INJECTION, SOLUTION INTRAMUSCULAR; INTRAVENOUS
Status: DISCONTINUED | OUTPATIENT
Start: 2022-06-27 | End: 2022-06-27 | Stop reason: HOSPADM

## 2022-06-27 RX ORDER — SODIUM CHLORIDE 0.9 % (FLUSH) 0.9 %
5-40 SYRINGE (ML) INJECTION EVERY 8 HOURS
Status: DISCONTINUED | OUTPATIENT
Start: 2022-06-27 | End: 2022-06-27 | Stop reason: HOSPADM

## 2022-06-27 RX ORDER — DIPHENHYDRAMINE HYDROCHLORIDE 50 MG/ML
12.5 INJECTION, SOLUTION INTRAMUSCULAR; INTRAVENOUS
Status: DISCONTINUED | OUTPATIENT
Start: 2022-06-27 | End: 2022-06-28 | Stop reason: HOSPADM

## 2022-06-27 RX ORDER — ONDANSETRON 2 MG/ML
4 INJECTION INTRAMUSCULAR; INTRAVENOUS AS NEEDED
Status: DISCONTINUED | OUTPATIENT
Start: 2022-06-27 | End: 2022-06-27 | Stop reason: HOSPADM

## 2022-06-27 RX ORDER — SUCCINYLCHOLINE CHLORIDE 20 MG/ML
INJECTION INTRAMUSCULAR; INTRAVENOUS AS NEEDED
Status: DISCONTINUED | OUTPATIENT
Start: 2022-06-27 | End: 2022-06-27 | Stop reason: HOSPADM

## 2022-06-27 RX ORDER — PROPOFOL 10 MG/ML
INJECTION, EMULSION INTRAVENOUS
Status: DISCONTINUED | OUTPATIENT
Start: 2022-06-27 | End: 2022-06-27 | Stop reason: HOSPADM

## 2022-06-27 RX ORDER — SODIUM CHLORIDE, SODIUM LACTATE, POTASSIUM CHLORIDE, CALCIUM CHLORIDE 600; 310; 30; 20 MG/100ML; MG/100ML; MG/100ML; MG/100ML
125 INJECTION, SOLUTION INTRAVENOUS CONTINUOUS
Status: DISCONTINUED | OUTPATIENT
Start: 2022-06-27 | End: 2022-06-28 | Stop reason: HOSPADM

## 2022-06-27 RX ORDER — ALBUTEROL SULFATE 0.83 MG/ML
2.5 SOLUTION RESPIRATORY (INHALATION) AS NEEDED
Status: DISCONTINUED | OUTPATIENT
Start: 2022-06-27 | End: 2022-06-27 | Stop reason: HOSPADM

## 2022-06-27 RX ORDER — ONDANSETRON 2 MG/ML
INJECTION INTRAMUSCULAR; INTRAVENOUS AS NEEDED
Status: DISCONTINUED | OUTPATIENT
Start: 2022-06-27 | End: 2022-06-27 | Stop reason: HOSPADM

## 2022-06-27 RX ORDER — LIDOCAINE HYDROCHLORIDE 20 MG/ML
INJECTION, SOLUTION EPIDURAL; INFILTRATION; INTRACAUDAL; PERINEURAL AS NEEDED
Status: DISCONTINUED | OUTPATIENT
Start: 2022-06-27 | End: 2022-06-27 | Stop reason: HOSPADM

## 2022-06-27 RX ORDER — PROPOFOL 10 MG/ML
INJECTION, EMULSION INTRAVENOUS AS NEEDED
Status: DISCONTINUED | OUTPATIENT
Start: 2022-06-27 | End: 2022-06-27 | Stop reason: HOSPADM

## 2022-06-27 RX ORDER — ALBUMIN HUMAN 50 G/1000ML
SOLUTION INTRAVENOUS AS NEEDED
Status: DISCONTINUED | OUTPATIENT
Start: 2022-06-27 | End: 2022-06-27 | Stop reason: HOSPADM

## 2022-06-27 RX ORDER — OXYCODONE HYDROCHLORIDE 5 MG/1
5 TABLET ORAL
Status: DISCONTINUED | OUTPATIENT
Start: 2022-06-27 | End: 2022-06-28 | Stop reason: HOSPADM

## 2022-06-27 RX ORDER — SODIUM CHLORIDE 0.9 % (FLUSH) 0.9 %
5-40 SYRINGE (ML) INJECTION AS NEEDED
Status: DISCONTINUED | OUTPATIENT
Start: 2022-06-27 | End: 2022-06-27 | Stop reason: HOSPADM

## 2022-06-27 RX ORDER — ROCURONIUM BROMIDE 10 MG/ML
INJECTION, SOLUTION INTRAVENOUS AS NEEDED
Status: DISCONTINUED | OUTPATIENT
Start: 2022-06-27 | End: 2022-06-27 | Stop reason: HOSPADM

## 2022-06-27 RX ORDER — NALOXONE HYDROCHLORIDE 0.4 MG/ML
0.4 INJECTION, SOLUTION INTRAMUSCULAR; INTRAVENOUS; SUBCUTANEOUS AS NEEDED
Status: DISCONTINUED | OUTPATIENT
Start: 2022-06-27 | End: 2022-06-28 | Stop reason: HOSPADM

## 2022-06-27 RX ORDER — DIPHENHYDRAMINE HYDROCHLORIDE 50 MG/ML
12.5 INJECTION, SOLUTION INTRAMUSCULAR; INTRAVENOUS ONCE
Status: COMPLETED | OUTPATIENT
Start: 2022-06-27 | End: 2022-06-27

## 2022-06-27 RX ORDER — HYDROMORPHONE HYDROCHLORIDE 1 MG/ML
0.5 INJECTION, SOLUTION INTRAMUSCULAR; INTRAVENOUS; SUBCUTANEOUS
Status: DISCONTINUED | OUTPATIENT
Start: 2022-06-27 | End: 2022-06-28 | Stop reason: HOSPADM

## 2022-06-27 RX ORDER — EPHEDRINE SULFATE/0.9% NACL/PF 50 MG/5 ML
SYRINGE (ML) INTRAVENOUS AS NEEDED
Status: DISCONTINUED | OUTPATIENT
Start: 2022-06-27 | End: 2022-06-27 | Stop reason: HOSPADM

## 2022-06-27 RX ORDER — DIPHENHYDRAMINE HYDROCHLORIDE 50 MG/ML
12.5 INJECTION, SOLUTION INTRAMUSCULAR; INTRAVENOUS AS NEEDED
Status: DISCONTINUED | OUTPATIENT
Start: 2022-06-27 | End: 2022-06-27 | Stop reason: HOSPADM

## 2022-06-27 RX ORDER — SODIUM CHLORIDE, SODIUM LACTATE, POTASSIUM CHLORIDE, CALCIUM CHLORIDE 600; 310; 30; 20 MG/100ML; MG/100ML; MG/100ML; MG/100ML
125 INJECTION, SOLUTION INTRAVENOUS CONTINUOUS
Status: DISCONTINUED | OUTPATIENT
Start: 2022-06-27 | End: 2022-06-27 | Stop reason: HOSPADM

## 2022-06-27 RX ORDER — ENOXAPARIN SODIUM 100 MG/ML
40 INJECTION SUBCUTANEOUS EVERY 24 HOURS
Status: DISCONTINUED | OUTPATIENT
Start: 2022-06-28 | End: 2022-06-28 | Stop reason: HOSPADM

## 2022-06-27 RX ORDER — GLYCOPYRROLATE 0.2 MG/ML
INJECTION INTRAMUSCULAR; INTRAVENOUS AS NEEDED
Status: DISCONTINUED | OUTPATIENT
Start: 2022-06-27 | End: 2022-06-27 | Stop reason: HOSPADM

## 2022-06-27 RX ORDER — SODIUM CHLORIDE 0.9 % (FLUSH) 0.9 %
5-40 SYRINGE (ML) INJECTION AS NEEDED
Status: DISCONTINUED | OUTPATIENT
Start: 2022-06-27 | End: 2022-06-28 | Stop reason: HOSPADM

## 2022-06-27 RX ORDER — FENTANYL CITRATE 50 UG/ML
25 INJECTION, SOLUTION INTRAMUSCULAR; INTRAVENOUS
Status: COMPLETED | OUTPATIENT
Start: 2022-06-27 | End: 2022-06-27

## 2022-06-27 RX ORDER — ENOXAPARIN SODIUM 100 MG/ML
40 INJECTION SUBCUTANEOUS EVERY 24 HOURS
Status: DISCONTINUED | OUTPATIENT
Start: 2022-06-27 | End: 2022-06-27 | Stop reason: SDUPTHER

## 2022-06-27 RX ORDER — DEXAMETHASONE SODIUM PHOSPHATE 4 MG/ML
INJECTION, SOLUTION INTRA-ARTICULAR; INTRALESIONAL; INTRAMUSCULAR; INTRAVENOUS; SOFT TISSUE AS NEEDED
Status: DISCONTINUED | OUTPATIENT
Start: 2022-06-27 | End: 2022-06-27 | Stop reason: HOSPADM

## 2022-06-27 RX ORDER — MIDAZOLAM HYDROCHLORIDE 1 MG/ML
INJECTION, SOLUTION INTRAMUSCULAR; INTRAVENOUS AS NEEDED
Status: DISCONTINUED | OUTPATIENT
Start: 2022-06-27 | End: 2022-06-27 | Stop reason: HOSPADM

## 2022-06-27 RX ORDER — ONDANSETRON 2 MG/ML
4 INJECTION INTRAMUSCULAR; INTRAVENOUS
Status: DISCONTINUED | OUTPATIENT
Start: 2022-06-27 | End: 2022-06-28 | Stop reason: HOSPADM

## 2022-06-27 RX ORDER — LIDOCAINE HYDROCHLORIDE 10 MG/ML
0.1 INJECTION, SOLUTION EPIDURAL; INFILTRATION; INTRACAUDAL; PERINEURAL AS NEEDED
Status: DISCONTINUED | OUTPATIENT
Start: 2022-06-27 | End: 2022-06-27 | Stop reason: HOSPADM

## 2022-06-27 RX ORDER — HYDROMORPHONE HYDROCHLORIDE 2 MG/ML
INJECTION, SOLUTION INTRAMUSCULAR; INTRAVENOUS; SUBCUTANEOUS AS NEEDED
Status: DISCONTINUED | OUTPATIENT
Start: 2022-06-27 | End: 2022-06-27 | Stop reason: HOSPADM

## 2022-06-27 RX ORDER — FENTANYL CITRATE 50 UG/ML
INJECTION, SOLUTION INTRAMUSCULAR; INTRAVENOUS AS NEEDED
Status: DISCONTINUED | OUTPATIENT
Start: 2022-06-27 | End: 2022-06-27 | Stop reason: HOSPADM

## 2022-06-27 RX ORDER — NALOXONE HYDROCHLORIDE 0.4 MG/ML
0.04 INJECTION, SOLUTION INTRAMUSCULAR; INTRAVENOUS; SUBCUTANEOUS
Status: DISCONTINUED | OUTPATIENT
Start: 2022-06-27 | End: 2022-06-27 | Stop reason: HOSPADM

## 2022-06-27 RX ORDER — OXYCODONE HYDROCHLORIDE 5 MG/1
10 TABLET ORAL
Status: DISCONTINUED | OUTPATIENT
Start: 2022-06-27 | End: 2022-06-28 | Stop reason: HOSPADM

## 2022-06-27 RX ORDER — FLUMAZENIL 0.1 MG/ML
0.2 INJECTION INTRAVENOUS
Status: DISCONTINUED | OUTPATIENT
Start: 2022-06-27 | End: 2022-06-27 | Stop reason: HOSPADM

## 2022-06-27 RX ORDER — NEOSTIGMINE METHYLSULFATE 1 MG/ML
INJECTION, SOLUTION INTRAVENOUS AS NEEDED
Status: DISCONTINUED | OUTPATIENT
Start: 2022-06-27 | End: 2022-06-27 | Stop reason: HOSPADM

## 2022-06-27 RX ORDER — SODIUM CHLORIDE 0.9 % (FLUSH) 0.9 %
5-40 SYRINGE (ML) INJECTION EVERY 8 HOURS
Status: DISCONTINUED | OUTPATIENT
Start: 2022-06-27 | End: 2022-06-28 | Stop reason: HOSPADM

## 2022-06-27 RX ADMIN — HYDROMORPHONE HYDROCHLORIDE 0.5 MG: 1 INJECTION, SOLUTION INTRAMUSCULAR; INTRAVENOUS; SUBCUTANEOUS at 15:08

## 2022-06-27 RX ADMIN — FENTANYL CITRATE 25 MCG: 50 INJECTION, SOLUTION INTRAMUSCULAR; INTRAVENOUS at 13:26

## 2022-06-27 RX ADMIN — ROCURONIUM BROMIDE 20 MG: 10 INJECTION INTRAVENOUS at 10:18

## 2022-06-27 RX ADMIN — FENTANYL CITRATE 100 MCG: 50 INJECTION, SOLUTION INTRAMUSCULAR; INTRAVENOUS at 09:21

## 2022-06-27 RX ADMIN — ROCURONIUM BROMIDE 10 MG: 10 INJECTION INTRAVENOUS at 07:50

## 2022-06-27 RX ADMIN — FENTANYL CITRATE 25 MCG: 50 INJECTION, SOLUTION INTRAMUSCULAR; INTRAVENOUS at 13:21

## 2022-06-27 RX ADMIN — SUCCINYLCHOLINE CHLORIDE 160 MG: 20 INJECTION, SOLUTION INTRAMUSCULAR; INTRAVENOUS; PARENTERAL at 07:50

## 2022-06-27 RX ADMIN — PROPOFOL 200 MG: 10 INJECTION, EMULSION INTRAVENOUS at 07:50

## 2022-06-27 RX ADMIN — FENTANYL CITRATE 25 MCG: 50 INJECTION, SOLUTION INTRAMUSCULAR; INTRAVENOUS at 13:16

## 2022-06-27 RX ADMIN — OXYCODONE 10 MG: 5 TABLET ORAL at 22:11

## 2022-06-27 RX ADMIN — FENTANYL CITRATE 100 MCG: 50 INJECTION, SOLUTION INTRAMUSCULAR; INTRAVENOUS at 07:47

## 2022-06-27 RX ADMIN — Medication 3 MG: at 12:27

## 2022-06-27 RX ADMIN — ALBUMIN (HUMAN) 12.5 G: 12.5 SOLUTION INTRAVENOUS at 10:52

## 2022-06-27 RX ADMIN — FENTANYL CITRATE 100 MCG: 50 INJECTION, SOLUTION INTRAMUSCULAR; INTRAVENOUS at 08:29

## 2022-06-27 RX ADMIN — DEXAMETHASONE SODIUM PHOSPHATE 8 MG: 4 INJECTION, SOLUTION INTRAMUSCULAR; INTRAVENOUS at 08:16

## 2022-06-27 RX ADMIN — LIDOCAINE HYDROCHLORIDE 60 MG: 20 INJECTION, SOLUTION INTRAVENOUS at 07:50

## 2022-06-27 RX ADMIN — MIDAZOLAM HYDROCHLORIDE 4 MG: 1 INJECTION, SOLUTION INTRAMUSCULAR; INTRAVENOUS at 07:38

## 2022-06-27 RX ADMIN — DIPHENHYDRAMINE HYDROCHLORIDE 12.5 MG: 50 INJECTION, SOLUTION INTRAMUSCULAR; INTRAVENOUS at 16:01

## 2022-06-27 RX ADMIN — CEFAZOLIN SODIUM 2 G: 1 POWDER, FOR SOLUTION INTRAMUSCULAR; INTRAVENOUS at 12:12

## 2022-06-27 RX ADMIN — HYDROMORPHONE HYDROCHLORIDE 1 MG: 1 INJECTION, SOLUTION INTRAMUSCULAR; INTRAVENOUS; SUBCUTANEOUS at 13:31

## 2022-06-27 RX ADMIN — GLYCOPYRROLATE 0.4 MG: 0.2 INJECTION INTRAMUSCULAR; INTRAVENOUS at 12:27

## 2022-06-27 RX ADMIN — SODIUM CHLORIDE 1 G: 9 INJECTION, SOLUTION INTRAVENOUS at 09:52

## 2022-06-27 RX ADMIN — DIPHENHYDRAMINE HYDROCHLORIDE 12.5 MG: 50 INJECTION, SOLUTION INTRAMUSCULAR; INTRAVENOUS at 07:20

## 2022-06-27 RX ADMIN — CEFAZOLIN 2 G: 1 INJECTION, POWDER, FOR SOLUTION INTRAMUSCULAR; INTRAVENOUS at 20:14

## 2022-06-27 RX ADMIN — HYDROMORPHONE HYDROCHLORIDE 1 MG: 2 INJECTION, SOLUTION INTRAMUSCULAR; INTRAVENOUS; SUBCUTANEOUS at 10:23

## 2022-06-27 RX ADMIN — ONDANSETRON HYDROCHLORIDE 4 MG: 2 SOLUTION INTRAMUSCULAR; INTRAVENOUS at 12:12

## 2022-06-27 RX ADMIN — Medication 10 MG: at 11:11

## 2022-06-27 RX ADMIN — ROCURONIUM BROMIDE 40 MG: 10 INJECTION INTRAVENOUS at 08:02

## 2022-06-27 RX ADMIN — SODIUM CHLORIDE, POTASSIUM CHLORIDE, SODIUM LACTATE AND CALCIUM CHLORIDE 125 ML/HR: 600; 310; 30; 20 INJECTION, SOLUTION INTRAVENOUS at 22:11

## 2022-06-27 RX ADMIN — FENTANYL CITRATE 25 MCG: 50 INJECTION, SOLUTION INTRAMUSCULAR; INTRAVENOUS at 13:06

## 2022-06-27 RX ADMIN — HYDROMORPHONE HYDROCHLORIDE 0.5 MG: 1 INJECTION, SOLUTION INTRAMUSCULAR; INTRAVENOUS; SUBCUTANEOUS at 20:10

## 2022-06-27 RX ADMIN — OXYCODONE 10 MG: 5 TABLET ORAL at 18:00

## 2022-06-27 RX ADMIN — CEFAZOLIN SODIUM 2 G: 1 POWDER, FOR SOLUTION INTRAMUSCULAR; INTRAVENOUS at 08:03

## 2022-06-27 RX ADMIN — ENOXAPARIN SODIUM 40 MG: 100 INJECTION SUBCUTANEOUS at 07:35

## 2022-06-27 RX ADMIN — PROPOFOL 50 MCG/KG/MIN: 10 INJECTION, EMULSION INTRAVENOUS at 08:00

## 2022-06-27 RX ADMIN — SODIUM CHLORIDE, POTASSIUM CHLORIDE, SODIUM LACTATE AND CALCIUM CHLORIDE 125 ML/HR: 600; 310; 30; 20 INJECTION, SOLUTION INTRAVENOUS at 06:22

## 2022-06-27 RX ADMIN — SODIUM CHLORIDE, PRESERVATIVE FREE 10 ML: 5 INJECTION INTRAVENOUS at 22:12

## 2022-06-27 RX ADMIN — ACETAMINOPHEN 1000 MG: 500 TABLET ORAL at 20:24

## 2022-06-27 RX ADMIN — OXYCODONE 10 MG: 5 TABLET ORAL at 13:54

## 2022-06-27 RX ADMIN — SODIUM CHLORIDE, POTASSIUM CHLORIDE, SODIUM LACTATE AND CALCIUM CHLORIDE 125 ML/HR: 600; 310; 30; 20 INJECTION, SOLUTION INTRAVENOUS at 19:06

## 2022-06-27 RX ADMIN — FENTANYL CITRATE 100 MCG: 50 INJECTION, SOLUTION INTRAMUSCULAR; INTRAVENOUS at 08:38

## 2022-06-27 RX ADMIN — FENTANYL CITRATE 100 MCG: 50 INJECTION, SOLUTION INTRAMUSCULAR; INTRAVENOUS at 08:07

## 2022-06-27 NOTE — ANESTHESIA POSTPROCEDURE EVALUATION
Procedure(s):  BILATERAL BREAST MASTECTOMIES AND RIGHT AXILLARY LYMPH NODE DISSECTION/BILATERAL BREAST RECONSTRUCTION WITH POSSIBLE TISSUE EXPANDER/POSSIBLE DIRECT TO IMPLANT/ ACELLULAR DERMAL MATRIX  BREAST RECONSTRUCTION. general    Anesthesia Post Evaluation      Multimodal analgesia: multimodal analgesia used between 6 hours prior to anesthesia start to PACU discharge  Patient location during evaluation: bedside  Patient participation: complete - patient participated  Level of consciousness: awake  Pain management: adequate  Airway patency: patent  Anesthetic complications: no  Cardiovascular status: acceptable  Respiratory status: acceptable  Hydration status: acceptable        INITIAL Post-op Vital signs:   Vitals Value Taken Time   /74 06/27/22 1431   Temp     Pulse 73 06/27/22 1436   Resp 15 06/27/22 1436   SpO2 99 % 06/27/22 1436   Vitals shown include unvalidated device data.

## 2022-06-27 NOTE — ANESTHESIA PREPROCEDURE EVALUATION
Relevant Problems   RESPIRATORY SYSTEM   (+) Mild intermittent asthma without complication      NEUROLOGY   (+) Bipolar 2 disorder, major depressive episode (HCC)      PERSONAL HX & FAMILY HX OF CANCER   (+) Breast cancer, right (HCC)       Anesthetic History   No history of anesthetic complications            Review of Systems / Medical History  Patient summary reviewed and pertinent labs reviewed    Pulmonary            Asthma : well controlled       Neuro/Psych         Psychiatric history     Cardiovascular  Within defined limits                Exercise tolerance: >4 METS     GI/Hepatic/Renal     GERD: well controlled           Endo/Other        Morbid obesity and cancer (right and left breast cancer)     Other Findings   Comments: H/o DVT x3 in 2020           Physical Exam    Airway  Mallampati: III  TM Distance: 4 - 6 cm  Neck ROM: normal range of motion   Mouth opening: Normal     Cardiovascular    Rhythm: regular  Rate: normal         Dental    Dentition: Upper dentition intact and Lower dentition intact     Pulmonary  Breath sounds clear to auscultation               Abdominal         Other Findings            Anesthetic Plan    ASA: 2  Anesthesia type: general          Induction: Intravenous  Anesthetic plan and risks discussed with: Patient

## 2022-06-27 NOTE — H&P
History and Physical    HISTORY OF PRESENT ILLNESS  Pino Woody is a 56 y.o. female. HPI ESTABLISHED patient here for RIGHT breast mass found on diagnostic mammogram.  Pt initially presented 3/2022 with brown nipple discharge, pain, and fever and chills. Her nipple then became inverted. She was seen by Ursula Barthel, NP and scheduled for a mammogram which was done today. Mammogram shows a small RIGHT breast mass with an adjacent  (2cm away) cluster of calcs, and a RIGHT axillary lymph node with a thick cortex. 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       [facility] Results (most recent):  Results from Hospital Encounter encounter on 05/05/22     [facility] 3D MAINOR W MAMMO BI DX INCL CAD     Narrative  STUDY:  Bilateral Diagnostic Digital Mammography with tomosynthesis and right  Ultrasound     INDICATION:  Right nipple retraction. Previous episodes of nipple discharge that  have not recurred over the past month.     VIEWS OBTAINED: Bilateral CC, MLO, right ML with tomosynthesis, right  magnification CC and ML     COMPARISON:  2018, 2012     BREAST COMPOSITION: There are scattered fibroglandular densities.        FINDINGS:     Mammography:  Bilateral digital diagnostic mammography was performed, and is  interpreted in conjunction with a computer assisted detection (CAD) system. In  the right slightly lateral breast at 9:00 posterior depth 7 cm from the nipple  there is an irregular spiculated equal density 1 cm mass. There are linear fine  pleomorphic calcifications which extend from the mass toward the nipple over 2  cm. No evidence of suspicious masses or microcalcifications in the left breast.     Ultrasound: The patient was scanned by the technologist and the physician.  In  the right breast at 9:00 1 cm from the nipple there is an irregular hypoechoic  spiculated mass with posterior shadowing measuring 0.8 x 0.8 x 0.7 cm. No  significant internal vascularity. There is one mildly thickened right axillary  lymph node with cortical thickness of 0.5 cm.     Impression  1. BI-RADS Assessment Category 5: Highly suggestive of malignancy- Appropriate  action should be taken. Right breast mass with calcifications. 2. Mildly thickened right axillary lymph node.     Recommendations:  Right breast and axillary ultrasound guided biopsies. The calcifications which extend anteriorly from the mass could be excised if the  patient requires lumpectomy in the future. Otherwise stereotactic biopsy could  be considered if clinically indicated. Stereotactic biopsy would probably be  challenging to perform on the same day as the ultrasound-guided biopsy as  calcifications with likely be obscured.     The patient has been notified of these results and recommendations.                Past Medical History:   Diagnosis Date    Anxiety      Asthma      Bipolar 2 disorder ([facility] [address]      Depression       and anxiety    History of partial surgical removal of colon 03/18/2022     8 inches of colon take out at [address] Pyelonephritis 6/7/14            Past Surgical History:   Procedure Laterality Date    COLONOSCOPY N/A 6/13/2018     COLONOSCOPY performed by Boyd Evans MD at [facility] HX GYN   1995     tubal ligation    HX OTHER SURGICAL         vein ligation    HX ROTATOR CUFF REPAIR   09/17/2021    HX SHOULDER REPLACEMENT   09/2021     Shoulder reconstruction    HX TONSILLECTOMY   1985      OB History    No obstetric history on file.       Obstetric Comments   Menarche 17, LMP 2/1/22, # of children 3, age of 4st delivery 26, Hysterectomy/oophorectomy no/no, Breast bx no, history of breast feeding no, BCP yes, Hormone therapy no             No family history on file.   Social History            Tobacco Use    Smoking status: Former Smoker       Quit date: 1991       Years since quittin.3    Smokeless tobacco: Never Used    Tobacco comment: 26 Years    Substance Use Topics    Alcohol use: Yes       Alcohol/week: 20.0 standard drinks       Types: 20 Cans of beer per week              Prior to Admission medications    Medication Sig Start Date End Date Taking? Authorizing Provider   oxyCODONE-acetaminophen (PERCOCET) 5-325 mg per tablet TAKE 1 TAB BY MOUTH EVERY 4 HRS AS NEEDED FOR POST-OP PAIN 3/21/22   Yes Provider, Historical   buPROPion XL (WELLBUTRIN XL) 300 mg XL tablet Take 300 mg by mouth every morning.     Yes Provider, Historical   gabapentin (NEURONTIN) 300 mg capsule Take 300 mg by mouth three (3) times daily.     Yes Provider, Historical   busPIRone (BUSPAR) 10 mg tablet TAKE 2 TABLETS BY MOUTH TWO (2) TIMES A DAY. 18   Yes Amalia Adams MD      No Known Allergies           Review of Systems   All other systems reviewed and are negative.        Physical Exam  Chest:   Breasts: Breasts are symmetrical.      Right: Inverted nipple (does not tata. no discharge) and skin change (very subtle skin retraction 8:00 1/3) present. No mass, nipple discharge, tenderness, axillary adenopathy or supraclavicular adenopathy. Left: No inverted nipple, mass, nipple discharge, skin change, tenderness, axillary adenopathy or supraclavicular adenopathy.          Lymphadenopathy:      Upper Body:      Right upper body: No supraclavicular or axillary adenopathy. Left upper body: No supraclavicular or axillary adenopathy.       US - Guided Core Biopsy  Indication : Right Breast mass 9 o'clock/periareolar, and RIGHT axillary lymph node with thickened cortex. Neither finding is palpable. Ultrasound Findings: In the right breast at 9:00 1 cm from the nipple there is an irregular hypoechoic spiculated mass with posterior shadowing measuring 0.8 x 0.8 x 0.7 cm. No  significant internal vascularity.  There is one mildly thickened right axillary  lymph node with cortical thickness of 0.5 cm. Prep : alcohol. Anesthesia : 1% lidocaine with epinephrine, 4cc each site. Device : The hand-held 10 gauge BARD needle was inserted through the lesion and captured tissue with real-time Ultrasound Confirmation. .   Core Sampling :  2 cores were obtained from the RIGHT breast mass and 2 cores were obtained from the RIGHT axillary lymph node. Marker: clip placed at each site   Dressing : Steristrips, gauze and tape. Instructions : Remove gauze this evening. Remove steristrips in one week. Tolerance: Pt tolerated procedure with no discomfort  Pathology : invasive ductal carcinoma, grade 1, + metastatic carcinoma  Concordance: yes  ASSESSMENT and PLAN      ICD-10-CM ICD-9-CM     1. Abnormal mammogram  R92.8 793.80 SURGICAL PATHOLOGY         SURGICAL PATHOLOGY   2. Abnormal ultrasound of breast  R92.8 793.89 SURGICAL PATHOLOGY         SURGICAL PATHOLOGY      5/2022 RIGHT LOQ invasive ductal carcinoma, grade 1, + axillary node, ER >90%. 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 Breast MRI  . IMPRESSION:  1. Right BI-RADS 6, known malignancy. Left BI-RADS 4, suspicious. 2. RIGHT BREAST: Known invasive ductal carcinoma at 9:00, with additional  findings between this mass and the nipple for a distance of 8.2 cm, suspicious  for ductal extension to the nipple areolar complex as described above. Indeterminate lymph nodes, no biopsy clip artifact detected. 3. LEFT BREAST: Suspicious nonmass enhancement in the axillary breast 1:30, for  which ultrasonography could be attempted for biopsy localization to exclude  contralateral malignancy. However, this lesion may be sonographically subtle,  and MRI guided biopsy may be necessary to ensure concordance. Indeterminate left  axillary lymph nodes.   LEFT breast MRI guided biopsy: papilloma with DCIS      Plan:   Bilateral mastectomy, RIGHT ALND  Reconstruction with  Rei Seals

## 2022-06-27 NOTE — OP NOTES
[address]  [address] [address] [place] [address] Way [address]    Name: Nathaniel Roca  : 1965    Bilateral Mastectomy followed by Reconstruction   Operative Report    Date of Surgery: 2022  Preoperative Diagnosis: RIGHT Breast Cancer and LEFT DCIS  Postoperative Diagnosis: same  Surgeon: Dr Herbert Dupree   Anesthesia: General  Procedure:  Procedure(s):  BILATERAL BREAST MASTECTOMIES AND RIGHT AXILLARY LYMPH NODE BIOPSY  Indication: RIGHT breast invasive ductal carcinoma with + axillary lymph node metastasis, and LEFT breast DCIS    Procedure Note:  The patient was brought to the operating room and placed on the table in the supine position. She was induced with general anesthesia. The chest was prepped and draped in the usual sterile fashion. 7cc of 0.5% methylene blue was injected in the RIGHT subareolar breast and UOQ. The right breast was massaged for 5 minutes. The LEFT mastectomy was done first.  This was performed through a triangular periareolar incision, each limb measuring 6 cm in length. The skin flaps were elevated medially to the sternum, inferiorly to the inframammary fold, superiorly to the infraclavicular region and laterally to the mid axillary line. The breast tissue was removed from the Pectoralis Major muscle using electrocautery. This was done in a medial to lateral fashion. The breast tissue was removed, marked with sutures for orientation purposes and x-rayed to confirm the UOQ biopsy clip. The biopsy clip was marked with a short/long suture to assist the pathologist.  The RIGHT breast mastectomy was then performed in a similar fashion with attention to the tumor in the subareolar space.  The breast was removed, marked with sutures and sent to pathology  The right deep axillary cavity was accessed through the clavipectoral fascia and 4 blue lymph nodes were removed, x-rayed to confirm the + node with clip was included in the specimen, and sent to Pathology. Hemostasis was controlled throughout the procedure using electrocautery. Dr Landen Mccormack then placed bilateral tissue expanders. Sponge, needle and instrument counts were reported to be correct.     Findings:  Biopsy clip confirmed in LEFT breast and RIGHT axillary node  Estimated Blood Loss:  100 cc        Specimens:   ID Type Source Tests Collected by Time Destination   1 : LEFT BREAST  Preservative Breast  Radhames Mayfield MD 6/27/2022 8411 Pathology   2 : RIGHT BREAST MASTECTOMY   Preservative Breast  Radhames Mayfield MD 6/27/2022 1539 Pathology   3 : RIGHT AXILLARY SENTINEL LYMPH NODES Preservative Odell Uriarte MD 6/27/2022 6811 Pathology      Complications: none  Implants: none  Assistant: Joann Hdez SA  Signed:  Neela Mcnamara MD

## 2022-06-27 NOTE — PERIOP NOTES
Bedside and Verbal shift change report given to JORDAN Ortiz (oncoming nurse) by Desi Cummings RN (offgoing nurse). Report included the following information SBAR, Kardex, OR Summary, Intake/Output and Cardiac Rhythm SR.       1930: Bedside and Verbal shift change report given to Pamela Martinez RN  (oncoming nurse) by Kaylynn Fall RN (offgoing nurse).  Report included the following information SBAR, Kardex, Recent Results and Cardiac Rhythm SR.

## 2022-06-27 NOTE — H&P
Plastic Surgery PRE-OP Admission History and Physical    Patient: Soren Nicholson MRN: [id number]  SSN: [id number]    YOB: 1965  Age: 56 y.o. Sex: female            Subjective:   Patient is a 56 y.o.  female who presents with right breast cancer. The patient was evaluated and determined the most appropriate plan of care is to proceed with surgical intervention. Patient denies chest pain, tightness, pain radiating down left arm, palpitations, dizziness, lightheadedness, fevers, chills. Patient denies shortness of breath, wheezing, diarrhea, constipation, abdominal pain. Patient denies urinary problems, dysuria, hesitancy, urgency. Denies skin breakdown, rashes, insect bites or open area. Pt. Is a  Not a smoker. Patient Active Problem List    Diagnosis Date Noted    Breast cancer, right ([facility] 05/12/2022    Mild intermittent asthma without complication 07/65/9000    Bipolar 2 disorder, major depressive episode ([facility] 08/28/2017    Anxiety 01/07/2014     Past Medical History:   Diagnosis Date    Allergy-induced asthma     Anxiety and depression     Arthritis     hands    Bipolar 2 disorder ([address]     Breast cancer ([facility] [address] 2022    Right and left breast    COVID-19 2022    Diverticulosis     Pyelonephritis 6/7/14    Thromboembolus ([address] 2020    post surgery-1 in R calf and 2 in R upper thigh      Past Surgical History:   Procedure Laterality Date    COLONOSCOPY N/A 6/13/2018    COLONOSCOPY performed by Nayely North MD at [address] [facility] HX ORTHOPAEDIC Right 2020    tendon and ligament repair to right foot    HX OTHER SURGICAL  1988    vein ligation    HX PARTIAL COLECTOMY  2022    partial sigmoid colectomy    HX ROTATOR CUFF REPAIR Left 09/17/2021    HX SHOULDER REPLACEMENT  09/2021    HX TONSILLECTOMY  1985    HX TUBAL LIGATION  1994      Prior to Admission medications    Medication Sig Start Date End Date Taking?  Authorizing Provider   acetaminophen (Tylenol Extra Strength) 500 mg tablet Take 1,000 mg by mouth daily as needed for Pain. Provider, Historical   melatonin 5 mg tablet Take 10 mg by mouth nightly as needed. Provider, Historical     Current Facility-Administered Medications   Medication Dose Route Frequency    lidocaine (PF) (XYLOCAINE) 10 mg/mL (1 %) injection 0.1 mL  0.1 mL SubCUTAneous PRN    lactated Ringers infusion  125 mL/hr IntraVENous CONTINUOUS    sodium chloride (NS) flush 5-40 mL  5-40 mL IntraVENous Q8H    sodium chloride (NS) flush 5-40 mL  5-40 mL IntraVENous PRN    naloxone (NARCAN) injection 0.04 mg  0.04 mg IntraVENous Multiple    flumazeniL (ROMAZICON) 0.1 mg/mL injection 0.2 mg  0.2 mg IntraVENous Multiple    ceFAZolin (ANCEF) 2 g in sterile water (preservative free) 20 mL IV syringe  2 g IntraVENous ONCE    enoxaparin (LOVENOX) injection 40 mg  40 mg SubCUTAneous Q24H      Allergies   Allergen Reactions    Phenergan [Promethazine] Other (comments)     IV only- Hallucinate      Social History     Tobacco Use    Smoking status: Former Smoker     Quit date:      Years since quittin.5    Smokeless tobacco: Never Used    Tobacco comment: 26 Years    Substance Use Topics    Alcohol use: Yes     Alcohol/week: 3.0 standard drinks     Types: 1 Glasses of wine, 1 Cans of beer, 1 Shots of liquor per week      History reviewed. No pertinent family history. Review of Systems  A comprehensive review of systems was negative except for that written in the HPI.         Objective:     Patient Vitals for the past 8 hrs:   BP Temp Pulse Resp SpO2 Height Weight   22 0621 (!) 121/93 98.1 °F (36.7 °C) 76 20 97 % 5' 5\" (1.651 m) 100.6 kg (221 lb 12.5 oz)     Temp (24hrs), Av.1 °F (36.7 °C), Min:98.1 °F (36.7 °C), Max:98.1 °F (36.7 °C)      Gen: Well-developed,  in no acute distress   HEENT:  hearing intact to voice, moist mucous membranes   Neck: Supple, without masses,   Resp: No accessory muscle use,  breathing even/ nonlabored. Card: Regular rate   Abd: Soft, non-tender, non-distended,   Breast: bilateral breasts are soft and nontender to palpation. Right nipple is inverted. Skin: No skin breakdown noted. Skin warm, pink, dry. No rashes. Neuro:  follows commands appropriately   Psych: Good insight, oriented to person, place and time, alert    Labs: No results found for this or any previous visit (from the past 24 hour(s)). Assessment:     Patient Active Problem List    Diagnosis Date Noted    Breast cancer[facility] right ([facility] 05/12/2022    Mild intermittent asthma without complication 39/39/3419    Bipolar 2 disorder, major depressive episode ([facility] 08/28/2017    Anxiety 01/07/2014    Will proceed with bilateral breast reconstruction. Of note patient has had a provoked DVT after lower extremity surgery. She additionally has an aunt who passed after a provoked PE after knee surgery. Dr. Fran Lowe with heme-onc reviewed her medical history and recommended we administered Lovenox on the day of surgery as well as in the postoperative period until patient is ambulatory. I have ordered lovenox for this morning     Plan:   Proceed with surgical intervention without contraindications. I met with pt. this morning and discussed increased ambulation to improve pulmonary status. We also discussed preop and postop expectations to include pain management. All questions were answered.         Jerica Silva MD

## 2022-06-27 NOTE — INTERVAL H&P NOTE
Update History & Physical    The Patient's History and Physical of June 27,   The surgical plan  was reviewed with the patient and I examined the patient. There was no change. The surgical site was confirmed by the patient and me. Plan:  The risk, benefits, expected outcome, and alternative to the recommended procedure have been discussed with the patient. Patient understands and wants to proceed with the procedure.     Electronically signed by Hollis Weathers MD on 6/27/2022 at 7:30 AM

## 2022-06-28 VITALS
RESPIRATION RATE: 14 BRPM | HEART RATE: 63 BPM | DIASTOLIC BLOOD PRESSURE: 83 MMHG | TEMPERATURE: 97.5 F | WEIGHT: 221.78 LBS | HEIGHT: 65 IN | SYSTOLIC BLOOD PRESSURE: 142 MMHG | BODY MASS INDEX: 36.95 KG/M2 | OXYGEN SATURATION: 98 %

## 2022-06-28 PROCEDURE — G0378 HOSPITAL OBSERVATION PER HR: HCPCS

## 2022-06-28 PROCEDURE — 74011250636 HC RX REV CODE- 250/636: Performed by: STUDENT IN AN ORGANIZED HEALTH CARE EDUCATION/TRAINING PROGRAM

## 2022-06-28 PROCEDURE — 74011000250 HC RX REV CODE- 250: Performed by: STUDENT IN AN ORGANIZED HEALTH CARE EDUCATION/TRAINING PROGRAM

## 2022-06-28 PROCEDURE — 74011250637 HC RX REV CODE- 250/637: Performed by: STUDENT IN AN ORGANIZED HEALTH CARE EDUCATION/TRAINING PROGRAM

## 2022-06-28 RX ORDER — DIAZEPAM 5 MG/1
5 TABLET ORAL
Status: DISCONTINUED | OUTPATIENT
Start: 2022-06-28 | End: 2022-06-28 | Stop reason: HOSPADM

## 2022-06-28 RX ADMIN — OXYCODONE 10 MG: 5 TABLET ORAL at 03:29

## 2022-06-28 RX ADMIN — CEFAZOLIN 2 G: 1 INJECTION, POWDER, FOR SOLUTION INTRAMUSCULAR; INTRAVENOUS at 03:01

## 2022-06-28 RX ADMIN — ENOXAPARIN SODIUM 40 MG: 100 INJECTION SUBCUTANEOUS at 08:15

## 2022-06-28 RX ADMIN — OXYCODONE 10 MG: 5 TABLET ORAL at 11:05

## 2022-06-28 RX ADMIN — HYDROMORPHONE HYDROCHLORIDE 0.5 MG: 1 INJECTION, SOLUTION INTRAMUSCULAR; INTRAVENOUS; SUBCUTANEOUS at 00:40

## 2022-06-28 RX ADMIN — OXYCODONE 10 MG: 5 TABLET ORAL at 07:08

## 2022-06-28 RX ADMIN — ACETAMINOPHEN 1000 MG: 500 TABLET ORAL at 08:15

## 2022-06-28 RX ADMIN — ACETAMINOPHEN 1000 MG: 500 TABLET ORAL at 03:01

## 2022-06-28 RX ADMIN — HYDROMORPHONE HYDROCHLORIDE 0.5 MG: 1 INJECTION, SOLUTION INTRAMUSCULAR; INTRAVENOUS; SUBCUTANEOUS at 08:20

## 2022-06-28 RX ADMIN — DIAZEPAM 5 MG: 5 TABLET ORAL at 08:15

## 2022-06-28 RX ADMIN — HYDROMORPHONE HYDROCHLORIDE 0.5 MG: 1 INJECTION, SOLUTION INTRAMUSCULAR; INTRAVENOUS; SUBCUTANEOUS at 08:39

## 2022-06-28 NOTE — OP NOTES
Operative Note    Patient: Mirela Brian  YOB: 1965  MRN: [id number]    Date of Procedure: 6/27/2022       PREOPERATIVE DIAGNOSES:  1.  Carcinoma of the right breast  2.  Surgical absence, bilateral breasts.        POSTOPERATIVE DIAGNOSES:  1.  Carcinoma of the right breast  2.  Surgical absence, bilateral breasts. Procedure(s):  1.  Reconstruction of right and left breasts with insertion of tissue expander. 3.  Reconstruction of right and left breasts with acellular dermal matrix, 504 cm2. 4.  Reconstruction of right and left breasts with inferiorly based fasciocutaneous flap, 20 cm x 8 cm. Surgeon(s):  MD Delvis Ortega MD    Surgical Assistant: Surg Asst-1: Joaquin SHELDON    Anesthesia: General     Estimated Blood Loss (mL):  less than 50     Complications: None    Specimens:   ID Type Source Tests Collected by Time Destination   1 : LEFT BREAST Preservative Breast  Courtney Mccormack MD 6/27/2022 1900 Pathology   2 : RIGHT BREAST MASTECTOMY Preservative Breast  Courtney Mccormack MD 6/27/2022 5955 Pathology   3 : RIGHT AXILLARY SENTINEL LYMPH NODES Preservative Alva Wang MD 6/27/2022 3674 Pathology        Implants:   Implant Name Type Inv.  Item Serial No.  Lot No. LRB No. Used Action   GRAFT DERMAL PLIABLE PRE THN LG 24X21 CMX0.7-1.4 MM FLEXHD - [id number]  GRAFT DERMAL PLIABLE PRE THN LG 24X21 CMX0.7-1.4 MM FLEXHD [id number] MUSCULOSKELETAL TRANSPLANT [facility]_ NA Left 504 Implanted   GRAFT DERMAL PLIABLE PRE THN LG 24X21 CMX0.7-1.4 MM FLEXHD - [id number]  GRAFT DERMAL PLIABLE PRE THN LG 24X21 CMX0.7-1.4 MM FLEXHD [id number] MUSCULOSKELETAL TRANSPLANT [facility]_ NA Right 504 Implanted   EXPANDER TISS BRST 6.4-7.9 CM 15X13.8 -720 CC [id number] - [id number]  EXPANDER TISS BRST 6.4-7.9 CM 15X13.8 -720 CC [id number] [employer]_WD NA Left 1 Implanted   EXPANDER TISS BRST 6.4-7.9 CM 15X13.8 CM 600-720 CC [id number] - [id number]  EXPANDER TISS BRST 6.4-7.9 CM 15X13.8 -720 CC [id number] [employer] NA Right 1 Implanted       Drains:   Pedro-Johnston Drain 06/27/22 Left; Lower Breast (Active)   Site Assessment Clean, dry, & intact 06/28/22 0430   Dressing Status Clean, dry, & intact 06/28/22 0430   Status Patent; Charged;Draining 06/28/22 0430   Drainage Color Serosanguinous 06/28/22 0430   Output (ml) 25 ml 06/28/22 0430       Pedro-Johnston Drain 06/27/22 Right; Lower Breast (Active)   Site Assessment Clean, dry, & intact 06/28/22 0430   Dressing Status Clean, dry, & intact 06/28/22 0430   Status Patent; Charged;Draining 06/28/22 0430   Drainage Color Serosanguinous 06/28/22 0430   Output (ml) 30 ml 06/28/22 0430             PROCEDURES PERFORMED:  1.  Reconstruction of right and left breasts with insertion of tissue expander. 3.  Reconstruction of right and left breasts with acellular dermal matrix, 132 cm2. 4.  Reconstruction of right and left breasts with inferiorly based fasciocutaneous flap, 20 cm x 8 cm.     SURGEON:  Nell Victor MD    ANESTHESIA:  General endotracheal.     COMPLICATIONS:  n/a     SPECIMENS REMOVED:  None.     IMPLANTS:  See note.     ESTIMATED BLOOD LOSS:  45 mL     DRAINS:  A 15-Welsh Josafat x2.     COUNTS:  Correct x2.     DISPOSITION:  Stable to PACU.     BRIEF HISTORY:  The patient is a 56 year-old female patient who presents with a known right breast cancer, see initial consult note for more details.  Patient now presents for bilateral total mastectomy with immediate tissue expander reconstruction.  A Wise pattern skin excision with tissue expander versus direct implant placement and acellular dermal matrix and flap reconstruction is offered.  The overall procedure, incisional approach, expected outcomes and recovery were discussed.  The risks, benefits and alternatives to the procedure including but not limited to bleeding, infection, damage to surrounding tissue structures, the need for revisional surgery, seroma, hematoma, skin flap loss, fat necrosis, non-incorporation of acellular dermal matrix, asymmetry, PE, DVT and fat embolism were discussed.  She understood these risks and agreed to proceed.     PROCEDURE:  Preoperative markings were made with the patient in the standing position.  Informed consent was obtained.  The patient was taken to the operating room, placed supine on the operating table.  Sequential compression boots were placed.  General endotracheal anesthesia was obtained.  A lower body Yanely Hugger and Roche catheter were placed.  The chest was prepped and draped in the usual sterile fashion.  Bilateral total skin-sparing mastectomies with right axillary sentinel node biopsy were performed by Dr. Amanda Nguyen and will be dictated under a separate note.      The mastectomy flaps were inspected and noted to be bleeding at the edges with intact capillary refill.      The previously marked Schwarz pattern was then incised vertically and horizontally.  The inframammary incision was then made.  A  20cm x 8 cm fasciocutaneous flap based inferiorly was then de-epithelialized.  The entire wound bed was inspected.  Hemostasis was secured. The original breast borders were re-created using a 2-0 vicryl interrupted suture to reinforce the lateral breast border and IMF. Exparel 20 mL was expanded to 100 mL using 80 mL of sterile injectable normal saline.  A 50 mL of this mixture was injected into the pectoralis major muscle along the chest wall on the right side. The entire wound bed was then irrigated with 500 mL of half-strength Betadine solution and 2 liters of triple antibiotic solution. Given the healthy appearance of the flaps the decision to proceed with prepectoral breast reconstruction was made. On the back table  Flex HD 24 x 21 cm  were presoaked, serial number [id number] was used.  A  Engagiontra full height smooth tissue expander (600cc-720cc) SN [id number] was then selected and prefilled to 300 cc. The Flex HD was then anteriorly wrapped over the tissue expander and secured posteriorly using a 2-0 PDS running suture.  The graft was then sutured along the inframammary fold, lateral mammary fold into the anterior border of serratus anterior muscle with a running 2-0 PDS suture. This was then placed within the prepectoral pocket. This was secured medially, inferiorly, superiorly, and laterally using a 2-0 PDS suture.    The inferiorly based de-epithelialized flap was then sutured to the anterior border of the Flex HD with interrupted 3-0 Monocryl suture.    A 15-Korean Josafat drain was brought through the lateral inferior chest wall and secured to the skin with 3-0 nylon and placed within the wound bed.  The superior fasciocutaneous breast flap was transposed inferiorly to its new anatomic position and tailor tacked closed with a 0 Prolene key stitch and surgical staples.  The wounds were then definitively closed with a running subcutaneous and deep dermal 2-0 Monocryl and a running subcuticular 3-0 Monocryl on all areas.  The exact same procedure was repeated on the contralateral left side with acellular dermal matrix lot number [id number] and tissue expander lot number [id number], inflated to 300mL.          Symmetry and volume were satisfactory.  The wounds were then dressed with steri strips, 4 x 4s, Medipore tape.  A surgical bra was placed. Marikay Aurora was then discontinued.  The patient extubated in the operating room having tolerated the procedure well.  The needle, instrument, and laparotomy pad counts at the end of the case were correct.     Ernie Kim MD

## 2022-06-28 NOTE — PERIOP NOTES
Left forearm IV infiltrated. 0.5 mg IV dilaudid given though LFA. Verified with pharmacy the RN could give another dose of 0.5 mg dilaudid in R. Wrist IV.

## 2022-06-28 NOTE — DISCHARGE INSTRUCTIONS
DISCHARGE SUMMARY from your Nurse      PATIENT INSTRUCTIONS    After general anesthesia or intravenous sedation, for 24 hours or while taking prescription Narcotics:  · Limit your activities  · Do not drive and operate hazardous machinery  · Do not make important personal or business decisions  · Do  not drink alcoholic beverages  · If you have not urinated within 8 hours after discharge, please contact your surgeon on call. Report the following to your surgeon:  · Excessive pain, swelling, redness or odor of or around the surgical area  · Temperature over 100.5  · Nausea and vomiting lasting longer than 4 hours or if unable to take medications  · Any signs of decreased circulation or nerve impairment to extremity: change in color, persistent  numbness, tingling, coldness or increase pain  · Any questions      GOOD HELP TO FIGHT AN INFECTION  Here are a few tip to help reduce the chance of getting an infection after surgery:   Wash Your Hands   Good handwashing is the most important thing you and your caregiver can do.  Wash before and after caring for any wounds. Dry your hand with a clean towel.  Wash with soap and water for at least 20 seconds. A TIP: sing the \"Happy Birthday\" song through one time while washing to help with the timing.  Use a hand  in between washings.  Shower   When your surgeon says it is OK to take a shower, use a new bar of antibacterial soap (if that is what you use, and keep that bar of soap ONLY for your use), or antibacterial body wash.  Use a clean wash cloth or sponge when you bathe.  Dry off with a clean towel  after every bath - be careful around any wounds, skin staples, sutures or surgical glue over/on wounds.  Do not enter swimming pools, hot tubs, lakes, rivers and/or ocean until wounds are healed and your doctor/surgeon says it is OK.  Use Clean Sheets   Sleep on freshly laundered sheets after your surgery.    Keep the surgery site covered with a clean, dry bandage (if instructed to do so). If the bandage becomes soiled, reapply a new, dry, clean bandage.  Do not allow pets to sleep with you while your wound is healing.  Lifestyle Modification and Controlling Your Blood Sugar   Smoking slows wound healing. Stop smoking and limit exposure to second-hand smoke.  High blood sugar slows wound healing. Eat a well-balanced diet to provide proper nutrition while healing   Monitor your blood sugar (if you are a diabetic) and take your medications as you are suppose to so you can control you blood sugar after surgery. COUGH AND DEEP BREATHE    Breathing deeply and coughing are very important exercises to do after surgery. Deep breathing and coughing open the little air tubes and air sacks in your lungs. You take deep breaths every day. You may not even notice - it is just something you do when you sigh or yawn. It is a natural exercise you do to keep these air passages open. After surgery, take deep breaths and cough, on purpose. DIRECTIONS:  · Take 10 to 15 slow deep breaths every hour while awake. · Breathe in deeply, and hold it for 2 seconds. · Exhale slowly through puckered lips, like blowing up a balloon. · After every 4th or 5th deep breath, hug your pillow to your chest or belly and give a hard, deep cough. Yes, it will probably hurt. But doing this exercise is a very important part of healing after surgery. Take your pain medicine to help you do this exercise without too much pain. Coughing and deep breathing help prevent bronchitis and pneumonia after surgery. If you had chest or belly surgery, use a pillow as a \"hug stacy\" and hold it tightly to your chest or belly when you cough. ANKLE PUMPS    Ankle pumps increase the circulation of oxygenated blood to your lower extremities and decrease your risk for circulation problems such as blood clots.  They also stretch the muscles, tendons and ligaments in your foot and ankle, and prevent joint contracture in the ankle and foot, especially after surgeries on the legs. It is important to do ankle pump exercises regularly after surgery because immobility increases your risk for developing a blood clot. Your doctor may also have you take an Aspirin for the next few days as well. If your doctor did not ask you to take an Aspirin, consult with him before starting Aspirin therapy on your own. The exercise is quite simple. · Slowly point your foot forward, feeling the muscles on the top of your lower leg stretch, and hold this position for 5 seconds. · Next, pull your foot back toward you as far as possible, stretching the calf muscles, and hold that position for 5 seconds. · Repeat with the other foot. · Perform 10 repetitions every hour while awake for both ankles if possible (down and then up with the foot once is one repetition). You should feel gentle stretching of the muscles in your lower leg when doing this exercise. If you feel pain, or your range of motion is limited, don't push too hard. Only go the limit your joint and muscles will let you go. If you have increasing pain, progressively worsening leg warmth or swelling, STOP the exercise and call your doctor. The following personal items collected during your admission are returned to you:   Dental Appliance: Dental Appliances: None  Vision: Visual Aid: Contacts,Glasses  Hearing Aid:    Jewelry: Jewelry: None  Clothing: Clothing: Ariane Ing  Other Valuables: Other Valuables: Daniel Ledesma, NP      Activities  Immediatly after  surgery you will rest quietly for the first 48 hours.   You will be able to walk around the house and perform light daily activities; however, during this time, it is not at all unusual for you to feel some pain, soreness and pressure in the chest area. This will gradually subside and Dr. Anahi Wallis will give you pain medication to relieve it. Be sure to lie on your back whenever you rest or sleep. Keep your head elevated for 72 hours after surgery as this will help with swelling. No heavy exercise or lifting for three weeks following surgery. This will allow the implants to remain in the proper position without movement. For the first three days following surgery you should try to restrict your arm movements. Move your arms slowly and avoid sudden jerky movements of the chest and breast area. Keep your arms as close to your body as possible. This allows the implants to remain in place. Dr. Anahi Wallis encourages walking immediately after surgery. This activity will greatly minimize the risk of blood clots in your leg veins. Bathing: You will then be allowed to shower two days after surgery. You will have steristrips on all your incisions. We will take off the steri strips in the office, do not remove them on your own. Make sure to towel dry very well after showering under your breasts so there is no residual water. Do NOT submerge yourself in a bath, swimming pool, or whirlpool for 4 weeks. ONLY SHOWERS     Under garments: The surgery bra that you have been put in should be worn constantly during the day and at night. You will be changed out of that surgery bra to a more comfortable bra in the office at your first post operative appointment. You will wear the sports bra for a total of 4 weeks after surgery. You may also wear a soft sports bra with light support instead of the surgery bra if preferred. The maximum swelling occurs at about three days and then begins to dramatically improve. Mild bruising typically resolves within 14 days. Medications:      Oxycodone  this is a your pain medication. Take one to two tablets as needed every 3-4 hours. No NSAIDS (advil, ibuprofen 5 days after surgery.  This will increase your bleeding risk. Tylenol: (over the counter) 1000mg every 8 hours as needed for mild pain. You can not exceed 4000 mg a day of tylenol. Zofran: this is a medication for nausea. Take this as needed for nausea every 6-8 hours. Make sure you drink plenty of fluids. Nausea usually resolves after the first 24 hours. Augmentin or Levaquin: this is your antibiotic. Take this medication completley until empty. Valium: this is for muscle relaxation. Your implant was placed beneath the pectoral muscle. This muscle can sometimes feel tight. Valium can help relax this muscle. Stool softeners: while taking narcotics, take a stool softener (over the counter) twice daily. Incease fluids, fiber. It is very important to keep your bowels regular while taking narcotics. Work: You should plan to be off work for 1-2 weeks , although this can vary from person to person. Do not expose your breasts to the sun for eight weeks after surgery. Follow up: Please present to Dr. Victor's office at your scheduled appointment made prior to surgery. If you do not have an appt, please call the office ASAP to make your first post op visit. We like to see you within one to two days after surgery. Please notify Dr. Emmanuelle Costello if:  Jacqui Desai If your one breast becomes significantly larger than the other   If you develop significant bruising across the chest    If you experience a significant increase in pain in the breast after the pain has improved   If you develop a temperature above 101.5° F   If you develop redness (like a sunburn) around your incisions or around your breasts    If your skin appears black or darkened      If you need help or have any questions feel free to call Dr Emmanuelle Costello with your concerns. Dr. Emmanuelle Costello is on call 24 hours per day, seven days per week and has an answering service to forward calls.       In case of an emergency, Dr. Ovalles  cellphone number is [phone]    The quality of your cosmetic enhancement may be compromised if you fail to return for any scheduled post-op visits, or follow the pre- and post-operative instructions. Please dont hesitate to report any unusual or concerning changes. Our number is [phone].

## 2022-06-28 NOTE — PERIOP NOTES
Bedside shift change report given to [address] [place] [address]  (oncoming nurse) by Andrew Patel  (offgoing nurse). Report included the following information SBAR, Kardex, Intake/Output, MAR, Recent Results and Cardiac Rhythm NSR.

## 2022-06-29 ENCOUNTER — PATIENT OUTREACH (OUTPATIENT)
Dept: CASE MANAGEMENT | Age: 57
End: 2022-06-29

## 2022-06-29 PROBLEM — C50.919 BREAST CANCER (HCC): Status: RESOLVED | Noted: 2022-06-27 | Resolved: 2022-06-29

## 2022-06-29 RX ORDER — AMOXICILLIN AND CLAVULANATE POTASSIUM 875; 125 MG/1; MG/1
1 TABLET, FILM COATED ORAL EVERY 12 HOURS
COMMUNITY
End: 2022-07-11 | Stop reason: SDUPTHER

## 2022-06-29 RX ORDER — DIAZEPAM 5 MG/1
5 TABLET ORAL
COMMUNITY
End: 2022-09-12

## 2022-06-29 RX ORDER — ONDANSETRON 4 MG/1
4 TABLET, ORALLY DISINTEGRATING ORAL
COMMUNITY
End: 2022-09-12

## 2022-06-29 RX ORDER — ENOXAPARIN SODIUM 100 MG/ML
40 INJECTION SUBCUTANEOUS DAILY
COMMUNITY
End: 2022-07-11

## 2022-06-29 RX ORDER — OXYCODONE HYDROCHLORIDE 5 MG/1
5 TABLET ORAL
COMMUNITY
End: 2022-09-12

## 2022-06-29 RX ORDER — DOCUSATE SODIUM 100 MG/1
100 CAPSULE, LIQUID FILLED ORAL 2 TIMES DAILY
COMMUNITY
End: 2022-09-12

## 2022-06-29 NOTE — PROGRESS NOTES
Care Transitions Initial Call    Call within 2 business days of discharge: Yes     Patient: Josue Kruger Patient : 1965 MRN: [id number]    Last Discharge [address]       Complaint Diagnosis Description Type Department Provider    22  Malignant neoplasm of right female breast, unspecified estrogen receptor status, unspecified site of breast ([facility] [address] . .. Admission (Discharged) Reg Loaiza MD          Was this an external facility discharge? Yes, [address] Discharge Facility    Challenges to be reviewed by the provider   Additional needs identified to be addressed with provider: jose  [facility] - Bilateral breast mastectomy with reconstruction expanders         Method of communication with provider : chart routing    Discussed COVID-19 related testing which was not done at this time. Advance Care Planning:   Does patient have an Advance Directive: not on file, declined education at this time. Inpatient Readmission Risk score: No data recorded  Was this a readmission? no   Patient stated reason for the admission: scheduled surgery for bilateral mastectomy    Patients top risk factors for readmission: medical condition-h/o anxiety,depression,asthma,bipolar,colon surgery. Interventions to address risk factors: Scheduled appointment with PCP-declined JOSEPHINE with pcp at this time, Scheduled appointment with Specialist-  and Obtained and reviewed discharge summary and/or continuity of care documents    Care Transition Nurse (CTN) contacted the patient by telephone to perform post hospital discharge assessment. Verified name and  with patient as identifiers. Provided introduction to self, and explanation of the CTN role. Reports she is ok, pain about #6. Took Oxycodone IR 5mg one tab at 6am along with Tylenol ES 1000mg. (about 4 hours prior). Has had some nausea, so took one dose of the Zofran.  and mother-in-law home with her today.  A little dizziness noted. Advised to drink fluids, get up slowly and wait before starting to walk. Plans to wait until children there to help her with her shower. CTN reviewed discharge instructions, medical action plan and red flags with patient who verbalized understanding. Were discharge instructions available to patient? yes. Reviewed appropriate site of care based on symptoms and resources available to patient including: PCP, Specialist, Urgent Care Clinics, When to call 911 and [address]. Patient given an opportunity to ask questions and does not have any further questions or concerns at this time. The patient agrees to contact the PCP office for questions related to their healthcare. Medication reconciliation was performed with patient, who verbalizes understanding of administration of home medications. Advised obtaining a 90-day supply of all daily and as-needed medications. Referral to Pharm D needed: no     Home Health/Outpatient orders at discharge: [address]: na  Date of initial visit: na    Durable Medical Equipment ordered at discharge: None  [address]: na  Durable Medical Equipment received: na    Was patient discharged with a pulse oximeter? no    Discussed follow-up appointments. If no appointment was previously scheduled, appointment scheduling offered: yes. Is follow up appointment scheduled within 7 days of discharge? yes. [facility] follow up appointment(s):   Future Appointments   Date Time Provider Joaquin Burk   7/11/2022  1:30 PM Den[facility]     Non-[facility] follow up appointment(s): , 7/5/22    Plan for follow-up call in 7-10 days based on severity of symptoms and risk factors.   Plan for next call: symptom management-assess current symptoms, self management-following discharge instructions, follow up appointment-saw  for f/u  and medication management-taking meds as ordered  CTN provided contact information for future needs.    Goals Addressed                 This Visit's Progress     Prevent complications post hospitalization. 6/29/22 [facility] 6/27-7/28 Bilateral breast mastectomy   Reviewed discharge instructions with patient using teachback.  Reviewed meds with patient, education provided on new meds, using teachback.  Reviewed red flags: fever,nausea,vomiting,diarrhea,sob,dizziness,chest pain, signs of DVT: redness/swelling/tender lower leg.  JOSEPHINE with pcp declined at this time.  F/u  7/5/22.  Given info on [employer] as resource.  Given CTN contact info if questions/concerns.  Reviewed fall precautions with patient- stay hydrated, get up slowly and wait short while before starting to walk, wear good support shoes, remove scatter rugs and excess furniture, have family member with her when walking until dizziness subsides.    CTN to check back in about a week,sooner [email or web address]

## 2022-07-11 ENCOUNTER — OFFICE VISIT (OUTPATIENT)
Dept: ONCOLOGY | Age: 57
End: 2022-07-11
Payer: COMMERCIAL

## 2022-07-11 ENCOUNTER — HOSPITAL ENCOUNTER (INPATIENT)
Age: 57
LOS: 4 days | Discharge: HOME | DRG: 909 | End: 2022-07-15
Attending: STUDENT IN AN ORGANIZED HEALTH CARE EDUCATION/TRAINING PROGRAM | Admitting: STUDENT IN AN ORGANIZED HEALTH CARE EDUCATION/TRAINING PROGRAM
Payer: COMMERCIAL

## 2022-07-11 ENCOUNTER — APPOINTMENT (OUTPATIENT)
Dept: ULTRASOUND IMAGING | Age: 57
DRG: 909 | End: 2022-07-11
Attending: STUDENT IN AN ORGANIZED HEALTH CARE EDUCATION/TRAINING PROGRAM
Payer: COMMERCIAL

## 2022-07-11 VITALS
HEIGHT: 65 IN | SYSTOLIC BLOOD PRESSURE: 111 MMHG | DIASTOLIC BLOOD PRESSURE: 66 MMHG | TEMPERATURE: 98.2 F | RESPIRATION RATE: 18 BRPM | WEIGHT: 223.2 LBS | BODY MASS INDEX: 37.19 KG/M2 | OXYGEN SATURATION: 95 % | HEART RATE: 70 BPM

## 2022-07-11 DIAGNOSIS — N61.0 CELLULITIS OF RIGHT BREAST: Primary | ICD-10-CM

## 2022-07-11 DIAGNOSIS — C50.111 MALIGNANT NEOPLASM OF CENTRAL PORTION OF RIGHT BREAST IN FEMALE, ESTROGEN RECEPTOR POSITIVE (HCC): Primary | ICD-10-CM

## 2022-07-11 DIAGNOSIS — Z17.0 MALIGNANT NEOPLASM OF CENTRAL PORTION OF RIGHT BREAST IN FEMALE, ESTROGEN RECEPTOR POSITIVE (HCC): Primary | ICD-10-CM

## 2022-07-11 LAB
ALBUMIN SERPL-MCNC: 3.2 G/DL (ref 3.5–5)
ALBUMIN/GLOB SERPL: 0.8 {RATIO} (ref 1.1–2.2)
ALP SERPL-CCNC: 81 U/L (ref 45–117)
ALT SERPL-CCNC: 19 U/L (ref 12–78)
ANION GAP SERPL CALC-SCNC: 12 MMOL/L (ref 5–15)
AST SERPL-CCNC: 14 U/L (ref 15–37)
BASOPHILS # BLD: 0.1 K/UL (ref 0–0.1)
BASOPHILS NFR BLD: 1 % (ref 0–1)
BILIRUB SERPL-MCNC: 0.4 MG/DL (ref 0.2–1)
BUN SERPL-MCNC: 14 MG/DL (ref 6–20)
BUN/CREAT SERPL: 16 (ref 12–20)
CALCIUM SERPL-MCNC: 9.2 MG/DL (ref 8.5–10.1)
CHLORIDE SERPL-SCNC: 105 MMOL/L (ref 97–108)
CO2 SERPL-SCNC: 23 MMOL/L (ref 21–32)
CREAT SERPL-MCNC: 0.89 MG/DL (ref 0.55–1.02)
DIFFERENTIAL METHOD BLD: ABNORMAL
EOSINOPHIL # BLD: 0.7 K/UL (ref 0–0.4)
EOSINOPHIL NFR BLD: 5 % (ref 0–7)
ERYTHROCYTE [DISTWIDTH] IN BLOOD BY AUTOMATED COUNT: 13.8 % (ref 11.5–14.5)
GLOBULIN SER CALC-MCNC: 4.2 G/DL (ref 2–4)
GLUCOSE SERPL-MCNC: 91 MG/DL (ref 65–100)
HCT VFR BLD AUTO: 33.4 % (ref 35–47)
HGB BLD-MCNC: 10.8 G/DL (ref 11.5–16)
IMM GRANULOCYTES # BLD AUTO: 0.1 K/UL (ref 0–0.04)
IMM GRANULOCYTES NFR BLD AUTO: 1 % (ref 0–0.5)
LACTATE BLD-SCNC: 0.66 MMOL/L (ref 0.4–2)
LACTATE BLD-SCNC: 0.72 MMOL/L (ref 0.4–2)
LYMPHOCYTES # BLD: 3.1 K/UL (ref 0.8–3.5)
LYMPHOCYTES NFR BLD: 21 % (ref 12–49)
MCH RBC QN AUTO: 30.8 PG (ref 26–34)
MCHC RBC AUTO-ENTMCNC: 32.3 G/DL (ref 30–36.5)
MCV RBC AUTO: 95.2 FL (ref 80–99)
MONOCYTES # BLD: 0.9 K/UL (ref 0–1)
MONOCYTES NFR BLD: 6 % (ref 5–13)
NEUTS SEG # BLD: 10.2 K/UL (ref 1.8–8)
NEUTS SEG NFR BLD: 66 % (ref 32–75)
NRBC # BLD: 0 K/UL (ref 0–0.01)
NRBC BLD-RTO: 0 PER 100 WBC
PLATELET # BLD AUTO: 356 K/UL (ref 150–400)
PMV BLD AUTO: 10.4 FL (ref 8.9–12.9)
POTASSIUM SERPL-SCNC: 3.9 MMOL/L (ref 3.5–5.1)
PROT SERPL-MCNC: 7.4 G/DL (ref 6.4–8.2)
RBC # BLD AUTO: 3.51 M/UL (ref 3.8–5.2)
SODIUM SERPL-SCNC: 140 MMOL/L (ref 136–145)
WBC # BLD AUTO: 15 K/UL (ref 3.6–11)

## 2022-07-11 PROCEDURE — 65270000029 HC RM PRIVATE

## 2022-07-11 PROCEDURE — 74011250636 HC RX REV CODE- 250/636: Performed by: STUDENT IN AN ORGANIZED HEALTH CARE EDUCATION/TRAINING PROGRAM

## 2022-07-11 PROCEDURE — 74011250637 HC RX REV CODE- 250/637: Performed by: STUDENT IN AN ORGANIZED HEALTH CARE EDUCATION/TRAINING PROGRAM

## 2022-07-11 PROCEDURE — 74011000258 HC RX REV CODE- 258: Performed by: STUDENT IN AN ORGANIZED HEALTH CARE EDUCATION/TRAINING PROGRAM

## 2022-07-11 PROCEDURE — 85025 COMPLETE CBC W/AUTO DIFF WBC: CPT

## 2022-07-11 PROCEDURE — 36415 COLL VENOUS BLD VENIPUNCTURE: CPT

## 2022-07-11 PROCEDURE — 76642 ULTRASOUND BREAST LIMITED: CPT

## 2022-07-11 PROCEDURE — 74011000250 HC RX REV CODE- 250: Performed by: STUDENT IN AN ORGANIZED HEALTH CARE EDUCATION/TRAINING PROGRAM

## 2022-07-11 PROCEDURE — 99285 EMERGENCY DEPT VISIT HI MDM: CPT

## 2022-07-11 PROCEDURE — 99214 OFFICE O/P EST MOD 30 MIN: CPT | Performed by: INTERNAL MEDICINE

## 2022-07-11 PROCEDURE — 87040 BLOOD CULTURE FOR BACTERIA: CPT

## 2022-07-11 PROCEDURE — 83605 ASSAY OF LACTIC ACID: CPT

## 2022-07-11 PROCEDURE — 80053 COMPREHEN METABOLIC PANEL: CPT

## 2022-07-11 RX ORDER — ONDANSETRON 2 MG/ML
4 INJECTION INTRAMUSCULAR; INTRAVENOUS
Status: DISCONTINUED | OUTPATIENT
Start: 2022-07-11 | End: 2022-07-15 | Stop reason: HOSPADM

## 2022-07-11 RX ORDER — SODIUM CHLORIDE 0.9 % (FLUSH) 0.9 %
5-40 SYRINGE (ML) INJECTION AS NEEDED
Status: DISCONTINUED | OUTPATIENT
Start: 2022-07-11 | End: 2022-07-15 | Stop reason: HOSPADM

## 2022-07-11 RX ORDER — NALOXONE HYDROCHLORIDE 0.4 MG/ML
0.4 INJECTION, SOLUTION INTRAMUSCULAR; INTRAVENOUS; SUBCUTANEOUS AS NEEDED
Status: DISCONTINUED | OUTPATIENT
Start: 2022-07-11 | End: 2022-07-15 | Stop reason: HOSPADM

## 2022-07-11 RX ORDER — DIAZEPAM 5 MG/1
5 TABLET ORAL
Status: DISCONTINUED | OUTPATIENT
Start: 2022-07-11 | End: 2022-07-15 | Stop reason: HOSPADM

## 2022-07-11 RX ORDER — LANOLIN ALCOHOL/MO/W.PET/CERES
10 CREAM (GRAM) TOPICAL
Status: DISCONTINUED | OUTPATIENT
Start: 2022-07-11 | End: 2022-07-15 | Stop reason: HOSPADM

## 2022-07-11 RX ORDER — OXYCODONE HYDROCHLORIDE 5 MG/1
10 TABLET ORAL ONCE
Status: COMPLETED | OUTPATIENT
Start: 2022-07-11 | End: 2022-07-11

## 2022-07-11 RX ORDER — OXYCODONE HYDROCHLORIDE 5 MG/1
10 TABLET ORAL
Status: DISCONTINUED | OUTPATIENT
Start: 2022-07-11 | End: 2022-07-15 | Stop reason: HOSPADM

## 2022-07-11 RX ORDER — MORPHINE SULFATE 4 MG/ML
4 INJECTION INTRAVENOUS
Status: COMPLETED | OUTPATIENT
Start: 2022-07-11 | End: 2022-07-11

## 2022-07-11 RX ORDER — DIPHENHYDRAMINE HYDROCHLORIDE 50 MG/ML
12.5 INJECTION, SOLUTION INTRAMUSCULAR; INTRAVENOUS
Status: ACTIVE | OUTPATIENT
Start: 2022-07-11 | End: 2022-07-12

## 2022-07-11 RX ORDER — OXYCODONE HYDROCHLORIDE 5 MG/1
5 TABLET ORAL
Status: DISCONTINUED | OUTPATIENT
Start: 2022-07-11 | End: 2022-07-15 | Stop reason: HOSPADM

## 2022-07-11 RX ORDER — ACETAMINOPHEN 325 MG/1
650 TABLET ORAL
Status: DISCONTINUED | OUTPATIENT
Start: 2022-07-11 | End: 2022-07-15 | Stop reason: HOSPADM

## 2022-07-11 RX ORDER — SODIUM CHLORIDE 0.9 % (FLUSH) 0.9 %
5-40 SYRINGE (ML) INJECTION EVERY 8 HOURS
Status: DISCONTINUED | OUTPATIENT
Start: 2022-07-11 | End: 2022-07-15 | Stop reason: HOSPADM

## 2022-07-11 RX ORDER — VANCOMYCIN/0.9 % SOD CHLORIDE 1.5G/250ML
1500 PLASTIC BAG, INJECTION (ML) INTRAVENOUS EVERY 12 HOURS
Status: DISCONTINUED | OUTPATIENT
Start: 2022-07-11 | End: 2022-07-11 | Stop reason: DRUGHIGH

## 2022-07-11 RX ORDER — HYDROMORPHONE HYDROCHLORIDE 1 MG/ML
0.5 INJECTION, SOLUTION INTRAMUSCULAR; INTRAVENOUS; SUBCUTANEOUS
Status: DISCONTINUED | OUTPATIENT
Start: 2022-07-11 | End: 2022-07-15 | Stop reason: HOSPADM

## 2022-07-11 RX ORDER — SODIUM CHLORIDE, SODIUM LACTATE, POTASSIUM CHLORIDE, CALCIUM CHLORIDE 600; 310; 30; 20 MG/100ML; MG/100ML; MG/100ML; MG/100ML
125 INJECTION, SOLUTION INTRAVENOUS CONTINUOUS
Status: DISCONTINUED | OUTPATIENT
Start: 2022-07-11 | End: 2022-07-15 | Stop reason: HOSPADM

## 2022-07-11 RX ORDER — AMOXICILLIN AND CLAVULANATE POTASSIUM 875; 125 MG/1; MG/1
1 TABLET, FILM COATED ORAL EVERY 12 HOURS
Qty: 14 TABLET | Refills: 0 | Status: SHIPPED | OUTPATIENT
Start: 2022-07-11 | End: 2022-07-15

## 2022-07-11 RX ADMIN — ACETAMINOPHEN 650 MG: 325 TABLET ORAL at 23:30

## 2022-07-11 RX ADMIN — CEFEPIME HYDROCHLORIDE 2 G: 2 INJECTION, POWDER, FOR SOLUTION INTRAVENOUS at 19:56

## 2022-07-11 RX ADMIN — HYDROMORPHONE HYDROCHLORIDE 0.5 MG: 1 INJECTION, SOLUTION INTRAMUSCULAR; INTRAVENOUS; SUBCUTANEOUS at 23:30

## 2022-07-11 RX ADMIN — SODIUM CHLORIDE, PRESERVATIVE FREE 10 ML: 5 INJECTION INTRAVENOUS at 22:03

## 2022-07-11 RX ADMIN — VANCOMYCIN HYDROCHLORIDE 2500 MG: 500 INJECTION, POWDER, LYOPHILIZED, FOR SOLUTION INTRAVENOUS at 20:32

## 2022-07-11 RX ADMIN — OXYCODONE 10 MG: 5 TABLET ORAL at 15:55

## 2022-07-11 RX ADMIN — MORPHINE SULFATE 4 MG: 4 INJECTION INTRAVENOUS at 19:57

## 2022-07-11 RX ADMIN — SODIUM CHLORIDE, SODIUM LACTATE, POTASSIUM CHLORIDE, AND CALCIUM CHLORIDE 125 ML/HR: 600; 310; 30; 20 INJECTION, SOLUTION INTRAVENOUS at 22:03

## 2022-07-11 NOTE — PROGRESS NOTES
Kim Rickey is a 56 y.o. female Follow up for the evaluation of breast cancer. 1. Have you been to the ER, urgent care clinic since your last visit? Hospitalized since your last visit? Yes.     2. Have you seen or consulted any other health care providers outside of the [address] [place] [address] since your last visit? Include any pap smears or colon screening.  No

## 2022-07-11 NOTE — ED TRIAGE NOTES
Pt reports right double mastectomy with start of reconstruction 2 weeks ago. States she began with a fever yesterday and redness to right breast starting today. Referred here by plastic surgeon.

## 2022-07-11 NOTE — H&P
Plastic Surgery PRE-OP Admission History and Physical    Patient: Jemal Alejandro MRN: [id number]  SSN: [id number]    YOB: 1965  Age: 56 y.o. Sex: female            Subjective:   Patient is a 56 y.o.  female who presents with right breast erythema. Patient originally underwent bilateral mastectomies with bilateral breast reconstruction on 6/27/22. Patient's reconstruction was prepectoral with tissue expander and FLEX HD placement. Patient noted yesterday a temperature to 100.4 and today while meeting with her medical oncologist noted her right breast had redness. Patient was sent to the ER for workup and was noted to have a leukocytosis and fluid collection on the right breast ultrasound. She otherwise denies any constitutional symptoms but does state her right breast feels full and is painful.      Patient Active Problem List    Diagnosis Date Noted    Cellulitis of right breast 07/11/2022    Breast cancer, right ([address] 05/12/2022    Mild intermittent asthma without complication 80/11/9296    Bipolar 2 disorder, major depressive episode ([address] 08/28/2017    Anxiety 01/07/2014     Past Medical History:   Diagnosis Date    Allergy-induced asthma     Anxiety and depression     Arthritis     hands    Bipolar 2 disorder ([address]     Breast cancer ([address] 2022    Right and left breast    COVID-19 2022    Diverticulosis     Pyelonephritis 6/7/14    Thromboembolus ([address] 2020    post surgery-1 in R calf and 2 in R upper thigh      Past Surgical History:   Procedure Laterality Date    COLONOSCOPY N/A 6/13/2018    COLONOSCOPY performed by Sharan Angeles MD at [address] Bilateral 6/27/2022    BREAST RECONSTRUCTION performed by Juju Carroll MD at [address] HX MASTECTOMY Bilateral 6/27/2022    BILATERAL BREAST MASTECTOMIES AND RIGHT AXILLARY LYMPH NODE DISSECTION/BILATERAL BREAST RECONSTRUCTION WITH POSSIBLE TISSUE EXPANDER/POSSIBLE DIRECT TO IMPLANT/ ACELLULAR DERMAL MATRIX performed by Vinnie Walker MD at [facility] HX ORTHOPAEDIC Right 2020    tendon and ligament repair to right foot    HX OTHER SURGICAL  1988    vein ligation    HX PARTIAL COLECTOMY  2022    partial sigmoid colectomy    HX ROTATOR CUFF REPAIR Left 09/17/2021    HX SHOULDER REPLACEMENT  09/2021    HX TONSILLECTOMY  1985    HX TUBAL LIGATION  1994      Prior to Admission medications    Medication Sig Start Date End Date Taking? Authorizing Provider   amoxicillin-clavulanate (Augmentin) 875-125 mg per tablet Take 1 Tablet by mouth every twelve (12) hours. Take q 12 hours x 7 days 7/11/22   Rigoberto Gordon MD   oxyCODONE IR (ROXICODONE) 5 mg immediate release tablet Take 5 mg by mouth four (4) times daily as needed for Pain. Provider, Historical   ondansetron (ZOFRAN ODT) 4 mg disintegrating tablet Take 4 mg by mouth every eight (8) hours as needed for Nausea or Vomiting. Provider, Historical   diazePAM (Valium) 5 mg tablet Take 5 mg by mouth every eight (8) hours as needed for Muscle Spasm(s). Provider, Historical   docusate sodium (Stool Softener) 100 mg capsule Take 100 mg by mouth two (2) times a day. Bid while on pain medication    Provider, Historical   acetaminophen (Tylenol Extra Strength) 500 mg tablet Take 1,000 mg by mouth daily as needed for Pain. Provider, Historical   melatonin 5 mg tablet Take 10 mg by mouth nightly as needed. Provider, Historical     Current Facility-Administered Medications   Medication Dose Route Frequency    vancomycin (VANCOCIN) 2500 mg in  mL infusion  2,500 mg IntraVENous ONCE    cefepime (MAXIPIME) 2 g in 0.9% sodium chloride (MBP/ADV) 100 mL MBP  2 g IntraVENous ONCE     Current Outpatient Medications   Medication Sig    amoxicillin-clavulanate (Augmentin) 875-125 mg per tablet Take 1 Tablet by mouth every twelve (12) hours.  Take q 12 hours x 7 days    oxyCODONE IR (ROXICODONE) 5 mg immediate release tablet Take 5 mg by mouth four (4) times daily as needed for Pain.  ondansetron (ZOFRAN ODT) 4 mg disintegrating tablet Take 4 mg by mouth every eight (8) hours as needed for Nausea or Vomiting.  diazePAM (Valium) 5 mg tablet Take 5 mg by mouth every eight (8) hours as needed for Muscle Spasm(s).  docusate sodium (Stool Softener) 100 mg capsule Take 100 mg by mouth two (2) times a day. Bid while on pain medication    acetaminophen (Tylenol Extra Strength) 500 mg tablet Take 1,000 mg by mouth daily as needed for Pain.  melatonin 5 mg tablet Take 10 mg by mouth nightly as needed. Allergies   Allergen Reactions    Phenergan [Promethazine] Other (comments)     IV only- Hallucinate    Other Medication Other (comments)     IV PHENERGAN--Makes Hallucinate \"Really\" bad. Chlorahexine WIPES & SOAP--Broke out in hives and was extremely itchy. Social History     Tobacco Use    Smoking status: Former Smoker     Quit date:      Years since quittin.5    Smokeless tobacco: Never Used    Tobacco comment: 26 Years    Substance Use Topics    Alcohol use: Yes     Alcohol/week: 3.0 standard drinks     Types: 1 Glasses of wine, 1 Cans of beer, 1 Shots of liquor per week      No family history on file. Review of Systems  A comprehensive review of systems was negative except for that written in the HPI. Objective:     Patient Vitals for the past 8 hrs:   BP Temp Pulse Resp SpO2 Height Weight   22 1539 131/71 97.7 °F (36.5 °C) 75 16 100 % 5' 5\" (1.651 m) 100.7 kg (222 lb)     Temp (24hrs), Av °F (36.7 °C), Min:97.7 °F (36.5 °C), Max:98.2 °F (36.8 °C)        Gen: Well-developed,  in no acute distress   HEENT:  hearing intact to voice, moist mucous membranes   Neck: Supple, without masses,   Resp: No accessory muscle use,  breathing even/ nonlabored.    Card: Regular rate   Abd: Soft, non-tender, non-distended,   Breast: bilateral breasts are soft, there is notable roxanne erythema to the right breast in the upper pole. Incisions are c/d/i. Left breast is soft and nontender. Skin: No skin breakdown noted. Skin warm, pink, dry. No rashes. Neuro:  follows commands appropriately   Psych: Good insight, oriented to person, place and time, alert      Labs:   Recent Results (from the past 24 hour(s))   POC LACTIC ACID    Collection Time: 07/11/22  3:44 PM   Result Value Ref Range    Lactic Acid (POC) 0.66 0.40 - 2.00 mmol/L   CBC WITH AUTOMATED DIFF    Collection Time: 07/11/22  3:46 PM   Result Value Ref Range    WBC 15.0 (H) 3.6 - 11.0 K/uL    RBC 3.51 (L) 3.80 - 5.20 M/uL    HGB 10.8 (L) 11.5 - 16.0 g/dL    HCT 33.4 (L) 35.0 - 47.0 %    MCV 95.2 80.0 - 99.0 FL    MCH 30.8 26.0 - 34.0 PG    MCHC 32.3 30.0 - 36.5 g/dL    RDW 13.8 11.5 - 14.5 %    PLATELET 249 494 - 494 K/uL    MPV 10.4 8.9 - 12.9 FL    NRBC 0.0 0  WBC    ABSOLUTE NRBC 0.00 0.00 - 0.01 K/uL    NEUTROPHILS 66 32 - 75 %    LYMPHOCYTES 21 12 - 49 %    MONOCYTES 6 5 - 13 %    EOSINOPHILS 5 0 - 7 %    BASOPHILS 1 0 - 1 %    IMMATURE GRANULOCYTES 1 (H) 0.0 - 0.5 %    ABS. NEUTROPHILS 10.2 (H) 1.8 - 8.0 K/UL    ABS. LYMPHOCYTES 3.1 0.8 - 3.5 K/UL    ABS. MONOCYTES 0.9 0.0 - 1.0 K/UL    ABS. EOSINOPHILS 0.7 (H) 0.0 - 0.4 K/UL    ABS. BASOPHILS 0.1 0.0 - 0.1 K/UL    ABS. IMM. GRANS. 0.1 (H) 0.00 - 0.04 K/UL    DF AUTOMATED     METABOLIC PANEL, COMPREHENSIVE    Collection Time: 07/11/22  3:46 PM   Result Value Ref Range    Sodium 140 136 - 145 mmol/L    Potassium 3.9 3.5 - 5.1 mmol/L    Chloride 105 97 - 108 mmol/L    CO2 23 21 - 32 mmol/L    Anion gap 12 5 - 15 mmol/L    Glucose 91 65 - 100 mg/dL    BUN 14 6 - 20 MG/DL    Creatinine 0.89 0.55 - 1.02 MG/DL    BUN/Creatinine ratio 16 12 - 20      GFR est AA >60 >60 ml/min/1.73m2    GFR est non-AA >60 >60 ml/min/1.73m2    Calcium 9.2 8.5 - 10.1 MG/DL    Bilirubin, total 0.4 0.2 - 1.0 MG/DL    ALT (SGPT) 19 12 - 78 U/L    AST (SGOT) 14 (L) 15 - 37 U/L    Alk.  phosphatase 81 45 - 117 U/L    Protein, total 7.4 6.4 - 8.2 g/dL    Albumin 3.2 (L) 3.5 - 5.0 g/dL    Globulin 4.2 (H) 2.0 - 4.0 g/dL    A-G Ratio 0.8 (L) 1.1 - 2.2     POC LACTIC ACID    Collection Time: 07/11/22  4:34 PM   Result Value Ref Range    Lactic Acid (POC) 0.72 0.40 - 2.00 mmol/L       Assessment:     Patient Active Problem List    Diagnosis Date Noted    Cellulitis of right breast 07/11/2022    Breast cancer, right ([facility] [address] 05/12/2022    Mild intermittent asthma without complication 96/03/4252    Bipolar 2 disorder, major depressive episode ([facility] 08/28/2017    Anxiety 01/07/2014     This is a 56year old patient s/p bilateral mastectomies with prepectoral tissue expander breast reconstruction on 6/27/22 who presents to the ER with right breast redness. ER workup is significant for a leukocytosis of 15 and a small fluid collection on the right breast.     Plan:    Will plan to admit her for IV antibiotics and US guided aspiration and culture of the fluid in the right breast.     -start IV vanc/cefepime  -trend daily CBC  -US guided aspiration of the fluid collection on the right breast with cultures in the AM   -NPO in case worse by AM and requires return to the Kristina Phillip MD

## 2022-07-11 NOTE — PROGRESS NOTES
[facility] [place] at [address], [address] [facility] 100[address] [facility]  W: [phone]  F: [phone]      Reason for Visit:   Derrick Sands is a 56 y.o. female who is seen in follow up for breast cancer    Breast Surgeon: Dr. Danny Macias surgery:  Dr. Emmanuelle Costello    Treatment History:   · 5/5/22 right breast bx:  IDC, gr 1, 8 mm, ER + at > 90%, NH + at 90%, HER 2 negative (IHC 2+; FISH ratio 1.8; sig/cell 3.73); right ax LN + for breast cancer  · Mammaprint - Low risk luminal A  · 6/1/22 left breast core bx:  DCIS, gr 2, involving intraductal papilloma, ER + at 95%  · 6/27/22 left breast mastectomy: DCIS 20 mm, gr 2, pTis pNx cM0; right breast mastectomy:  IDC 17 mm, gr 1, ER + 95%, NH + 95%, HER-2 negative at IHC  1+. 1/5 LN positive with 5.5 mm carcinoma, ER + 95%, NH +95%, HER-2 negative at [facility] 1+; DCIS present, not extensive, gr 2,  pT1c pN1a cM0    History of Present Illness: An abnormal mammogram led to the path above    Interval Hx: Patient presents today for follow up.  Reports gr 2 loss of appetite, gr 1 constipation, gr 2 fatigue, gr 1 fever, gr 1 chills, gr 2 nausea, gr 1 insomnia, gr 1 anxiety/depression, gr 2 pain rated 7/10 to her chest.    FH:  No breast or ovarian cancer; no pancreas or prostate cancer    LMP 7/11/22    Past Medical History:   Diagnosis Date    Allergy-induced asthma     Anxiety and depression     Arthritis     hands    Bipolar 2 disorder ([address]     Breast cancer ([address] 2022    Right and left breast    COVID-19 2022    Diverticulosis     Pyelonephritis 6/7/14    Thromboembolus ([address] 2020    post surgery-1 in R calf and 2 in R upper thigh      Past Surgical History:   Procedure Laterality Date    COLONOSCOPY N/A 6/13/2018    COLONOSCOPY performed by Asif Bowers MD at [address] [facility] HX BREAST RECONSTRUCTION Bilateral 6/27/2022    BREAST RECONSTRUCTION performed by Diego Silvan, MD at [place] [address] Bilateral 2022    BILATERAL BREAST MASTECTOMIES AND RIGHT AXILLARY LYMPH NODE DISSECTION/BILATERAL BREAST RECONSTRUCTION WITH POSSIBLE TISSUE EXPANDER/POSSIBLE DIRECT TO IMPLANT/ ACELLULAR DERMAL MATRIX performed by Hansel Hobson MD at [facility] HX ORTHOPAEDIC Right     tendon and ligament repair to right foot    HX OTHER SURGICAL  1988    vein ligation    HX PARTIAL COLECTOMY      partial sigmoid colectomy    HX ROTATOR CUFF REPAIR Left 2021    HX SHOULDER REPLACEMENT  2021    HX TONSILLECTOMY  1985    HX TUBAL LIGATION        Social History     Tobacco Use    Smoking status: Former Smoker     Quit date:      Years since quittin.5    Smokeless tobacco: Never Used    Tobacco comment: 26 Years    Substance Use Topics    Alcohol use: Yes     Alcohol/week: 3.0 standard drinks     Types: 1 Glasses of wine, 1 Cans of beer, 1 Shots of liquor per week      History reviewed. No pertinent family history. Current Outpatient Medications   Medication Sig    oxyCODONE IR (ROXICODONE) 5 mg immediate release tablet Take 5 mg by mouth four (4) times daily as needed for Pain.  ondansetron (ZOFRAN ODT) 4 mg disintegrating tablet Take 4 mg by mouth every eight (8) hours as needed for Nausea or Vomiting.  diazePAM (Valium) 5 mg tablet Take 5 mg by mouth every eight (8) hours as needed for Muscle Spasm(s).  docusate sodium (Stool Softener) 100 mg capsule Take 100 mg by mouth two (2) times a day. Bid while on pain medication    acetaminophen (Tylenol Extra Strength) 500 mg tablet Take 1,000 mg by mouth daily as needed for Pain.  melatonin 5 mg tablet Take 10 mg by mouth nightly as needed.  amoxicillin-clavulanate (Augmentin) 875-125 mg per tablet Take 1 Tablet by mouth every twelve (12) hours. Take q 12 hours x 7 days (Patient not taking: Reported on 2022)    enoxaparin (Lovenox) 40 mg/0.4 mL 40 mg by SubCUTAneous route daily.  (Patient not taking: Reported on 7/11/2022)     No current facility-administered medications for this visit. Allergies   Allergen Reactions    Phenergan [Promethazine] Other (comments)     IV only- Hallucinate    Other Medication Other (comments)     IV PHENERGAN--Makes Hallucinate \"Really\" bad. Chlorahexine WIPES & SOAP--Broke out in hives and was extremely itchy. Review of Systems: A complete review of systems was obtained, negative except as described above. Physical Exam:     Visit Vitals  /66   Pulse 70   Temp 98.2 °F (36.8 °C) (Oral)   Resp 18   Ht 5' 5\" (1.651 m)   Wt 223 lb 3.2 oz (101.2 kg)   SpO2 95%   BMI 37.14 kg/m²     ECOG PS: 0    Constitutional: Appears well-developed and well-nourished in no apparent distress      Mental status: Alert and awake, Oriented to person/place/time, Able to follow commands    Eyes: EOM normal, Sclera normal, No visible ocular discharge    HENT: Normocephalic, atraumatic    Mouth/Throat: Moist mucous membranes    External Ears normal    Neck: No visualized mass    Pulmonary/Chest: Respiratory effort normal, No visualized signs of difficulty breathing or respiratory distress    Musculoskeletal: Normal gait with no signs of ataxia, Normal range of motion of neck    Neurological: No facial asymmetry (Cranial nerve 7 motor function), No gaze palsy    Skin: right breast with erythema    Psychiatric: Normal affect, No hallucinations         Results:     Lab Results   Component Value Date/Time    WBC 6.7 06/22/2022 09:03 AM    HGB 12.3 06/22/2022 09:03 AM    HCT 39.2 06/22/2022 09:03 AM    PLATELET 854 13/11/6033 09:03 AM    MCV 97.5 06/22/2022 09:03 AM    ABS.  NEUTROPHILS 3.1 06/22/2022 09:03 AM     Lab Results   Component Value Date/Time    Sodium 139 06/22/2022 09:03 AM    Potassium 4.4 06/22/2022 09:03 AM    Chloride 106 06/22/2022 09:03 AM    CO2 28 06/22/2022 09:03 AM    Glucose 98 06/22/2022 09:03 AM    BUN 14 06/22/2022 09:03 AM    Creatinine 0.70 06/22/2022 09:03 AM    GFR est AA >60 06/22/2022 09:03 AM    GFR est non-AA >60 06/22/2022 09:03 AM    Calcium 9.5 06/22/2022 09:03 AM     Lab Results   Component Value Date/Time    Bilirubin, total 0.4 06/22/2022 09:03 AM    ALT (SGPT) 36 06/22/2022 09:03 AM    Alk. phosphatase 83 06/22/2022 09:03 AM    Protein, total 7.4 06/22/2022 09:03 AM    Albumin 3.5 06/22/2022 09:03 AM    Globulin 3.9 06/22/2022 09:03 AM     MRI breast 5/13/22  Right breast: A dominant dynamically enhancing mass with washout kinetics lies  at approximately 9:00 in the right nipple line, mid depth, measuring 1.1 cm AP,  1.5 cm cc and 1.1 cm TRV. An additional small enhancing mass lies at the nipple,  immediately posterior real or, with nipple inversion and enhancement  inseparable. This nipple areolar complex and enhancement measures 1.4 cm TRV,  1.2 cm AP and 0.9 cm CC. Between the dominant mass at 9:00 and the nipple, there  is a combined distance of 8.2 cm, where there is linear delayed enhancement in  the nipple line suspicious for ductal extension. There are no other dominant  abnormalities of morphology or enhancement within the right breast to suggest  additional disease, although the gland overall in the outer breast is enhancing. Lymph nodes in the right axilla are normal to indeterminate, but none contains a  biopsy clip artifact.     Left breast: In the axillary breast, posterior depth at 1:30, nonmass  enhancement measures 3.7 cm AP, 1.1 cm TRV and approximately 2.4 cm CC. It shows  dynamic washout enhancement and is suspicious for contralateral malignancy. No  other dominant abnormality of morphology or enhancement appears in the left  breast. Left axillary lymph nodes are not enlarged, but are morphologically  indeterminate.      IMPRESSION  . IMPRESSION:  1. Right BI-RADS 6, known malignancy. Left BI-RADS 4, suspicious.   2. RIGHT BREAST: Known invasive ductal carcinoma at 9:00, with additional  findings between this mass and the nipple for a distance of 8.2 cm, suspicious  for ductal extension to the nipple areolar complex as described above. Indeterminate lymph nodes, no biopsy clip artifact detected. 3. LEFT BREAST: Suspicious nonmass enhancement in the axillary breast 1:30, for  which ultrasonography could be attempted for biopsy localization to exclude  contralateral malignancy. However, this lesion may be sonographically subtle,  and MRI guided biopsy may be necessary to ensure concordance. Indeterminate left  axillary lymph nodes. 4. A summary portfolio has been created for reference and is available in PACS. 5/24/22 US breast L  IMPRESSION  No discrete mass is identified. BI-RADS Category 4 - Suspicious abnormality. .  RECOMMENDATION:  MRI guided biopsy which will be technically challenging due to the far posterior  position of the MRI finding. Records reviewed and summarized above. Pathology report(s) reviewed above. Radiology report(s) reviewed above. Assessment/plan:   1. Right breast IDC, gr 1, ER +, TN +, HER 2 negative, LN + by core:  cT2 cN1a cM0, stage IIB, prognostic stage IA  Pre/perimenopausal    · 6/1/22 left breast core bx:  DCIS, gr 2, involving intraductal papilloma, ER + at 95%  · 6/27/22 left breast mastectomy: DCIS 20 mm, gr 2, pTis pNx cM0; right breast mastectomy:  IDC 17 mm, gr 1, ER + 95%, TN + 95%, HER-2 negative at IHC  1+. 1/5 LN positive with 5.5 mm carcinoma, ER + 95%, TN +95%, HER-2 negative at [facility] 1+; DCIS present, not extensive, gr 2,  pT1c pN1a cM0, stage IIA, prognostic stage IA      We explained to the patient that the goal of systemic adjuvant therapy is to improve the chances for cure and decrease the risk of relapse. We explained why a patient can have microscopic cancer spread now even though physical examination, laboratory studies and imaging studies are negative for cancer.  We explained that the same treatments used now as adjuvant or preventive treatments rarely if ever are curative in women who develop metastases. Premenopausal, so neoadjuvant endocrine therapy is not ideal as tamoxifen should not be used in this setting and it would take 4 week for OS. Discussed that based on [place]-[facility], chemotherapy would be warranted (premenopausal with LN + disease). Discussed that the update to MINDACT validates mammaprint in this setting (> 48years old, LN + disease). We discussed the potential value of Mammaprint testing. The Stanley 70-gene prognostic profile (Mammaprint®), one of the first gene expression array-based prognosticators, classifies tumors as low-risk or high-risk for breast cancer recurrence. The clinical validity of the 70-gene prognostic profile has been demonstrated in multiple studies. Results from an international randomized trial, the Microarray in Node-Negative Disease May Avoid Chemotherapy (MINDACT) trial suggest that this genetic profile may identify subsets of patients who have a low likelihood of distant recurrence despite high-risk clinical features. In this trial, 4725 women, approximately [de-identified] percent of whom had lymph node-negative disease, underwent risk assessment by clinical criteria (using Adjuvant! Online) and by the 70-genetic profile. Patients with discordant clinical and genomic predictions were randomly assigned to receive or not receive adjuvant chemotherapy. Among patients in the intention-to-treat population who had a high clinical risk of recurrence but a low risk by [place] genetic profile, the five-year distant metastases-free survival rate was 95.9 percent with chemotherapy and 94.4 percent without chemotherapy (HR for distant metastasis or death 0.78, 95% CI 0.50-1.21). However, it should be noted that the MINDACT study was not powered to exclude a benefit of chemotherapy.  In analysis of discordant groups in the intention-to-treat population, adjuvant chemotherapy was associated with improvement in the rate of distant metastasis-free survival of 2.8 and 2 percent, respectively, for the high clinical/low genomic and low clinical/high genomic risk groups, although these differences were also not statistically significant. Based on MINDACT, ASCO has suggested Tequila Dress is one of several assays that might be used to determine prognosis for those with high clinical risk, hormone receptor-positive, HER2-negative breast cancer and no or limited (one to three) involved lymph nodes to inform decisions regarding withholding chemotherapy. Low risk mammaprint. With age > 48, and with the updated MINDACT results, I am ok with no chemotherapy    She will proceed now with XRT to the right breast. Will refer to rad onc    Will avoid tamoxifen due to history of VTE    The risks and benefits of aromatase inhibitors (anastrozole, letrozole, and exemestane) were discussed in detail and the patient was informed of the following: Risks include the development of painful muscles and joints (arthralgia/myalgia) and bone loss. Muscle and joint pain can be severe but rarely result in any tissue damage; symptoms usually resolve in several weeks when the medication is stopped. Bone loss is common and a bone density test is recommended as a baseline and then yearly to every several years depending on initial results. The risk of fractures is increased by a few percent in patients taking these drugs, but careful monitoring of bone density and using bone protecting agents when indicated can minimize these risks. Unlike tamoxifen there is no increased risk of blood clots or endometrial cancer. AIs can cause or worsen vaginal dryness but women using these drugs should not use vaginal estrogen preparations for these symptoms. AIs can also cause or increase hot flashes. Any other symptoms should be reported.     We discussed that for high risk women with breast cancer (having either received chemotherapy or < 28years old), ovarian suppression(such as monthly lupron 3.75 mg IM) + AI was superior in terms of overall survival than tamoxifen alone. The added risks of worse QOL due to depression and worse menopausal symptoms were discussed with the addition of ovarian suppression. Following XRT, will start her on lupron, followed by anastrozole. She may be a good candidate for BSO in the future. Will check ki67 on right breast mastectomy specimen. 2. Emotional well being/recurrent depression:  She has excellent support and is coping well with her disease    3. L DCIS:  Stage 0. See treatment for #1. S/p mastectomy, no XRT required    4. Back pain/right shoulder pain: negative bone scan 6/01/22    5. New redness to right breast: pt also describes fever to 100.4; sees Dr. Roel Croft tomorrow; will rx augmentin 875/125 mg bid x 7 days      I appreciate the opportunity to participate in Ms. [address] [facility] [address] [place]'s care. Signed By: Yahaira Roldan MD      No orders of the defined types were placed in this encounter.

## 2022-07-11 NOTE — ED PROVIDER NOTES
The patient is a 53-year-old female history of breast cancer who recently had a bilateral mastectomy with spacer placement on June 27 here today with redness and swelling of her right breast.  She reports severe pain of bilateral breast but the right is worse. She developed redness and fullness of the right breast today. She spoke to her surgeon today who recommended she come in and get seen. She has had low-grade fevers    I received call from Huber on her plastic surgery team--requesting labs and US of the breast. She can be reached [phone]           Past Medical History:   Diagnosis Date    Allergy-induced asthma     Anxiety and depression     Arthritis     hands    Bipolar 2 disorder ([address]     Breast cancer ([address] 2022    Right and left breast    COVID-19 2022    Diverticulosis     Pyelonephritis 6/7/14    Thromboembolus ([address] 2020    post surgery-1 in R calf and 2 in R upper thigh       Past Surgical History:   Procedure Laterality Date    COLONOSCOPY N/A 6/13/2018    COLONOSCOPY performed by Ulises Orourke MD at [address] Bilateral 6/27/2022    BREAST RECONSTRUCTION performed by Fadumo Stern MD at [facility] HX MASTECTOMY Bilateral 6/27/2022    BILATERAL BREAST MASTECTOMIES AND RIGHT AXILLARY LYMPH NODE DISSECTION/BILATERAL BREAST RECONSTRUCTION WITH POSSIBLE TISSUE EXPANDER/POSSIBLE DIRECT TO IMPLANT/ ACELLULAR DERMAL MATRIX performed by Beth Cho MD at [facility] 57 HX ORTHOPAEDIC Right 2020    tendon and ligament repair to right foot    HX OTHER SURGICAL  1988    vein ligation    HX PARTIAL COLECTOMY  2022    partial sigmoid colectomy    HX ROTATOR CUFF REPAIR Left 09/17/2021    HX SHOULDER REPLACEMENT  09/2021    HX TONSILLECTOMY  1985    HX TUBAL LIGATION  1994         No family history on file.     Social History     Socioeconomic History    Marital status:      Spouse name: Not on file    Number of children: Not on file    Years of education: Not on file    Highest education level: Not on file   Occupational History    Occupation: Home Health Nurse   Tobacco Use    Smoking status: Former Smoker     Quit date:      Years since quittin.5    Smokeless tobacco: Never Used    Tobacco comment: 26 Years    Substance and Sexual Activity    Alcohol use: Yes     Alcohol/week: 3.0 standard drinks     Types: 1 Glasses of wine, 1 Cans of beer, 1 Shots of liquor per week    Drug use: No    Sexual activity: Not on file   Other Topics Concern    Not on file   Social History Narrative    Not on file     Social Determinants of Health     Financial Resource Strain:     Difficulty of Paying Living Expenses: Not on file   Food Insecurity:     Worried About Running Out of Food in the Last Year: Not on file    Jesús of Food in the Last Year: Not on file   Transportation Needs: No Transportation Needs    Lack of Transportation (Medical): No    Lack of Transportation (Non-Medical):  No   Physical Activity:     Days of Exercise per Week: Not on file    Minutes of Exercise per Session: Not on file   Stress:     Feeling of Stress : Not on file   Social Connections:     Frequency of Communication with Friends and Family: Not on file    Frequency of Social Gatherings with Friends and Family: Not on file    Attends Yarsanism Services: Not on file    Active Member of [address] [place] [id number] or Organizations: Not on file    Attends Club or Organization Meetings: Not on file    Marital Status: Not on file   Intimate Partner Violence:     Fear of Current or Ex-Partner: Not on file    Emotionally Abused: Not on file    Physically Abused: Not on file    Sexually Abused: Not on file   Housing Stability:     Unable to Pay for Housing in the Last Year: Not on file    Number of [place] in the Last Year: Not on file    Unstable Housing in the Last Year: Not on file         ALLERGIES: Phenergan [promethazine] and Other medication    Review of Systems   Constitutional: Negative for chills and fever. HENT: Negative for congestion and rhinorrhea. Eyes: Negative for redness and visual disturbance. Respiratory: Negative for cough and shortness of breath. Cardiovascular: Negative for chest pain and leg swelling. Gastrointestinal: Negative for abdominal pain, diarrhea, nausea and vomiting. Genitourinary: Negative for dysuria, flank pain, frequency, hematuria and urgency. Musculoskeletal: Negative for arthralgias, back pain, myalgias and neck pain. Skin: Positive for wound. Negative for rash. Allergic/Immunologic: Negative for immunocompromised state. Neurological: Negative for dizziness and headaches. There were no vitals filed for this visit. Physical Exam  Vitals and nursing note reviewed. Exam conducted with a chaperone present (STAN Rogers). Constitutional:       General: She is not in acute distress. Appearance: She is well-developed. She is not diaphoretic. HENT:      Head: Normocephalic. Mouth/Throat:      Pharynx: No oropharyngeal exudate. Eyes:      General:         Right eye: No discharge. Left eye: No discharge. Pupils: Pupils are equal, round, and reactive to light. Cardiovascular:      Rate and Rhythm: Normal rate and regular rhythm. Heart sounds: Normal heart sounds. No murmur heard. No friction rub. No gallop. Pulmonary:      Effort: Pulmonary effort is normal. No respiratory distress. Breath sounds: Normal breath sounds. No stridor. No wheezing or rales. Chest:      Comments: Erythema to the right breast which is marked with a surgical pen, markedly tender bilateral breast, LISETTE drains in place bilaterally  Abdominal:      General: Bowel sounds are normal. There is no distension. Palpations: Abdomen is soft. Tenderness: There is no abdominal tenderness. There is no guarding or rebound. Musculoskeletal:         General: No deformity. Normal range of motion. Cervical back: Normal range of motion and neck supple. Skin:     General: Skin is warm and dry. Capillary Refill: Capillary refill takes less than 2 seconds. Findings: No rash. Neurological:      Mental Status: She is alert and oriented to person, place, and time. Psychiatric:         Behavior: Behavior normal.              MDM       Procedures      I discussed with her surgical team who will admit, requesting antibiotics: Vancomycin and cefepime      The patient is a 19-year-old female presenting today with right-sided breast cellulitis and questionable abscess seen on ultrasound. To be admitted by her breast surgery team.  Leukocytosis but no tachycardia or fever here. Will be admitted on broad-spectrum antibiotics. Cultures sent.       Boyd Hardy DO

## 2022-07-12 ENCOUNTER — APPOINTMENT (OUTPATIENT)
Dept: ULTRASOUND IMAGING | Age: 57
DRG: 909 | End: 2022-07-12
Attending: STUDENT IN AN ORGANIZED HEALTH CARE EDUCATION/TRAINING PROGRAM
Payer: COMMERCIAL

## 2022-07-12 LAB
BASOPHILS # BLD: 0.1 K/UL (ref 0–0.1)
BASOPHILS NFR BLD: 0 % (ref 0–1)
COMMENT, HOLDF: NORMAL
DIFFERENTIAL METHOD BLD: ABNORMAL
EOSINOPHIL # BLD: 0.6 K/UL (ref 0–0.4)
EOSINOPHIL NFR BLD: 5 % (ref 0–7)
ERYTHROCYTE [DISTWIDTH] IN BLOOD BY AUTOMATED COUNT: 13.7 % (ref 11.5–14.5)
HCT VFR BLD AUTO: 31.6 % (ref 35–47)
HGB BLD-MCNC: 10 G/DL (ref 11.5–16)
IMM GRANULOCYTES # BLD AUTO: 0.1 K/UL (ref 0–0.04)
IMM GRANULOCYTES NFR BLD AUTO: 1 % (ref 0–0.5)
LYMPHOCYTES # BLD: 2.9 K/UL (ref 0.8–3.5)
LYMPHOCYTES NFR BLD: 25 % (ref 12–49)
MCH RBC QN AUTO: 31 PG (ref 26–34)
MCHC RBC AUTO-ENTMCNC: 31.6 G/DL (ref 30–36.5)
MCV RBC AUTO: 97.8 FL (ref 80–99)
MONOCYTES # BLD: 0.8 K/UL (ref 0–1)
MONOCYTES NFR BLD: 7 % (ref 5–13)
NEUTS SEG # BLD: 7.3 K/UL (ref 1.8–8)
NEUTS SEG NFR BLD: 62 % (ref 32–75)
NRBC # BLD: 0 K/UL (ref 0–0.01)
NRBC BLD-RTO: 0 PER 100 WBC
PLATELET # BLD AUTO: 347 K/UL (ref 150–400)
PMV BLD AUTO: 10.6 FL (ref 8.9–12.9)
RBC # BLD AUTO: 3.23 M/UL (ref 3.8–5.2)
SAMPLES BEING HELD,HOLD: NORMAL
WBC # BLD AUTO: 11.7 K/UL (ref 3.6–11)

## 2022-07-12 PROCEDURE — 87205 SMEAR GRAM STAIN: CPT

## 2022-07-12 PROCEDURE — 65270000029 HC RM PRIVATE

## 2022-07-12 PROCEDURE — 74011250637 HC RX REV CODE- 250/637: Performed by: STUDENT IN AN ORGANIZED HEALTH CARE EDUCATION/TRAINING PROGRAM

## 2022-07-12 PROCEDURE — 99152 MOD SED SAME PHYS/QHP 5/>YRS: CPT

## 2022-07-12 PROCEDURE — 77030040361 HC SLV COMPR DVT MDII -B

## 2022-07-12 PROCEDURE — 76942 ECHO GUIDE FOR BIOPSY: CPT

## 2022-07-12 PROCEDURE — 74011250636 HC RX REV CODE- 250/636: Performed by: STUDENT IN AN ORGANIZED HEALTH CARE EDUCATION/TRAINING PROGRAM

## 2022-07-12 PROCEDURE — 77030014115

## 2022-07-12 PROCEDURE — 74011000250 HC RX REV CODE- 250: Performed by: STUDENT IN AN ORGANIZED HEALTH CARE EDUCATION/TRAINING PROGRAM

## 2022-07-12 PROCEDURE — 77030003666 HC NDL SPINAL BD -A

## 2022-07-12 PROCEDURE — 36415 COLL VENOUS BLD VENIPUNCTURE: CPT

## 2022-07-12 PROCEDURE — 85025 COMPLETE CBC W/AUTO DIFF WBC: CPT

## 2022-07-12 PROCEDURE — 74011000258 HC RX REV CODE- 258: Performed by: STUDENT IN AN ORGANIZED HEALTH CARE EDUCATION/TRAINING PROGRAM

## 2022-07-12 PROCEDURE — 0H9T3ZZ DRAINAGE OF RIGHT BREAST, PERCUTANEOUS APPROACH: ICD-10-PCS | Performed by: STUDENT IN AN ORGANIZED HEALTH CARE EDUCATION/TRAINING PROGRAM

## 2022-07-12 RX ORDER — LIDOCAINE HYDROCHLORIDE 10 MG/ML
10 INJECTION, SOLUTION EPIDURAL; INFILTRATION; INTRACAUDAL; PERINEURAL
Status: COMPLETED | OUTPATIENT
Start: 2022-07-12 | End: 2022-07-12

## 2022-07-12 RX ORDER — FENTANYL CITRATE 50 UG/ML
25-100 INJECTION, SOLUTION INTRAMUSCULAR; INTRAVENOUS
Status: DISCONTINUED | OUTPATIENT
Start: 2022-07-12 | End: 2022-07-12

## 2022-07-12 RX ADMIN — CEFEPIME HYDROCHLORIDE 2 G: 2 INJECTION, POWDER, FOR SOLUTION INTRAVENOUS at 13:53

## 2022-07-12 RX ADMIN — FENTANYL CITRATE 25 MCG: 50 INJECTION INTRAMUSCULAR; INTRAVENOUS at 09:32

## 2022-07-12 RX ADMIN — CEFEPIME HYDROCHLORIDE 2 G: 2 INJECTION, POWDER, FOR SOLUTION INTRAVENOUS at 22:23

## 2022-07-12 RX ADMIN — OXYCODONE 5 MG: 5 TABLET ORAL at 23:54

## 2022-07-12 RX ADMIN — OXYCODONE 10 MG: 5 TABLET ORAL at 13:51

## 2022-07-12 RX ADMIN — SODIUM CHLORIDE, PRESERVATIVE FREE 10 ML: 5 INJECTION INTRAVENOUS at 04:50

## 2022-07-12 RX ADMIN — HYDROMORPHONE HYDROCHLORIDE 0.5 MG: 1 INJECTION, SOLUTION INTRAMUSCULAR; INTRAVENOUS; SUBCUTANEOUS at 17:00

## 2022-07-12 RX ADMIN — OXYCODONE 10 MG: 5 TABLET ORAL at 07:34

## 2022-07-12 RX ADMIN — VANCOMYCIN HYDROCHLORIDE 1000 MG: 1 INJECTION, POWDER, LYOPHILIZED, FOR SOLUTION INTRAVENOUS at 22:22

## 2022-07-12 RX ADMIN — HYDROMORPHONE HYDROCHLORIDE 0.5 MG: 1 INJECTION, SOLUTION INTRAMUSCULAR; INTRAVENOUS; SUBCUTANEOUS at 10:20

## 2022-07-12 RX ADMIN — SODIUM CHLORIDE, PRESERVATIVE FREE 10 ML: 5 INJECTION INTRAVENOUS at 17:00

## 2022-07-12 RX ADMIN — OXYCODONE 10 MG: 5 TABLET ORAL at 19:33

## 2022-07-12 RX ADMIN — LIDOCAINE HYDROCHLORIDE 5 ML: 10 INJECTION, SOLUTION EPIDURAL; INFILTRATION; INTRACAUDAL; PERINEURAL at 09:56

## 2022-07-12 RX ADMIN — SODIUM CHLORIDE, SODIUM LACTATE, POTASSIUM CHLORIDE, AND CALCIUM CHLORIDE 125 ML/HR: 600; 310; 30; 20 INJECTION, SOLUTION INTRAVENOUS at 06:32

## 2022-07-12 RX ADMIN — SODIUM CHLORIDE, SODIUM LACTATE, POTASSIUM CHLORIDE, AND CALCIUM CHLORIDE 125 ML/HR: 600; 310; 30; 20 INJECTION, SOLUTION INTRAVENOUS at 17:39

## 2022-07-12 RX ADMIN — SODIUM CHLORIDE, PRESERVATIVE FREE 10 ML: 5 INJECTION INTRAVENOUS at 22:34

## 2022-07-12 RX ADMIN — CEFEPIME HYDROCHLORIDE 2 G: 2 INJECTION, POWDER, FOR SOLUTION INTRAVENOUS at 06:29

## 2022-07-12 RX ADMIN — OXYCODONE 5 MG: 5 TABLET ORAL at 01:10

## 2022-07-12 RX ADMIN — FENTANYL CITRATE 25 MCG: 50 INJECTION INTRAMUSCULAR; INTRAVENOUS at 09:37

## 2022-07-12 RX ADMIN — HYDROMORPHONE HYDROCHLORIDE 0.5 MG: 1 INJECTION, SOLUTION INTRAMUSCULAR; INTRAVENOUS; SUBCUTANEOUS at 04:46

## 2022-07-12 RX ADMIN — VANCOMYCIN HYDROCHLORIDE 1000 MG: 1 INJECTION, POWDER, LYOPHILIZED, FOR SOLUTION INTRAVENOUS at 12:34

## 2022-07-12 RX ADMIN — HYDROMORPHONE HYDROCHLORIDE 0.5 MG: 1 INJECTION, SOLUTION INTRAMUSCULAR; INTRAVENOUS; SUBCUTANEOUS at 22:18

## 2022-07-12 NOTE — PROGRESS NOTES
[facility] Progress Note: Antimicrobial Stewardship    Consult for antibiotic dosing of Vancomycin by Dr. Estephania Hammonds  Indication: SSTI, post op infection  Day of Therapy: 1    Plan:  Vancomycin therapy:  1. Start with loading dose of vancomycin 2500 mg IV (25 mg/kg, max 2.5 gm)  2. Follow with maintenance dose of vancomycin 1000 mg IV every 12 hours   3. Dose calculated to approximate a   a. Target AUC/NOEL of 400-600  b. Trough of 15 mcg/mL. 4.  Plan: Will order level in next 24 hours. Pharmacy to follow daily and will make changes to dose and/or frequency based on clinical status. Date Dose & Interval Measured (mcg/mL) Extrapolated (mcg/mL)   ? ? ? ?   ? ? ? ?   ? ? ? ? Other Antimicrobial  (not dosed by pharmacist)   Cefepime   Cultures     pending   Serum Creatinine     Lab Results   Component Value Date/Time    Creatinine 0.89 07/11/2022 03:46 PM       Creatinine Clearance Estimated Creatinine Clearance: 84 mL/min (based on SCr of 0.89 mg/dL).      WBC   Lab Results   Component Value Date/Time    WBC 15.0 (H) 07/11/2022 03:46 PM         Pharmacist: [address] [place] [address]

## 2022-07-12 NOTE — PROCEDURES
Interventional Radiology  Procedure Note        7/12/2022 10:59 AM    Patient: Jemal Alejandro     Informed consent obtained    Diagnosis: Post operative fluid collection in right breast    Procedure(s): Ultrasound guided aspiration of small fluid collection in the right breast    Specimens removed:  2cc thin bloody fluid    Complications: None    Primary Physician: Fred Roche MD    Recommendations: N/A    Discharge Disposition: return to floor    Full dictated report to follow    Fred Roche MD  Interventional Radiology  [facility]  10:59 AM, 7/12/2022

## 2022-07-12 NOTE — PROGRESS NOTES
Reason for Admission:  Post operative fluid collection right breast.                      RUR Score: 8%                    Plan for utilizing home health:      No need anticipated    PCP: First and Last name:  Shelly Merlos MD     Name of Practice:    Are you a current patient: Yes/No: yes   Approximate date of last visit:    Can you participate in a virtual visit with your PCP: yes                    Current Advanced Directive/Advance Care Plan: No Order      Healthcare Decision Maker:    Angelia Geiger                              Transition of Care Plan:   1- CM to follow for any needs post discharge  2- Medical management continues. For washout on Thursday  3- No needs anticipated,  to transport home  4- home with outpatient services    Care Management Interventions  PCP Verified by CM:  Yes Christine Hernandez MD)  Transition of Care Consult (CM Consult):  (new patient assessment. )  Support Systems: Spouse/Significant Other  Confirm Follow Up Transport: Family  Discharge Location  Patient Expects to be Discharged to[de-identified] Home with outpatient services

## 2022-07-12 NOTE — PROGRESS NOTES
[facility] Progress Note: Antimicrobial Stewardship  Consult for antibiotic dosing of Vancomycin by Dr. Jose Alfredo Henderson  Cefepime dose change per renal protocol  Indication: SSTI/Recent bilateral mastectomy  Day of Therapy: 1    Ht Readings from Last 1 Encounters:   07/11/22 165.1 cm (65\")        Wt Readings from Last 1 Encounters:   07/11/22 100.7 kg (222 lb)      Vancomycin therapy:  Loading dose: Vancomycin 2500 mg IV given  Current maintenance dose: vancomycin 1000 mg IV q12hr   Dose calculated to approximate a           a. Target AUC/NOEL of 400-600          b. Trough of N/A   Last level: will order level for am  Plan: continue same  Dose administration notes:   Doses given appropriately as scheduled    Non-Kinetic Antimicrobial Dosing Regimen:   Current Regimen:  Cefepime 2 gm IV q12hr  Recommendation: Changed Cefepime to 2gm IV q8hr  Dose administration notes:   Doses given appropriately as scheduled    Other Antimicrobial   (not dosed by pharmacist) none   Cultures 7/11: Blood: NG x13hrs pending   Serum Creatinine Lab Results   Component Value Date/Time    Creatinine 0.89 07/11/2022 03:46 PM         Creatinine Clearance Estimated Creatinine Clearance: 84 mL/min (based on SCr of 0.89 mg/dL). Temp Temp: 98.5 °F (36.9 °C)       WBC Lab Results   Component Value Date/Time    WBC 11.7 (H) 07/12/2022 03:26 AM        Procalcitonin No results found for: PCT     For Antifungals, Metronidazole and Nafcillin: Lab Results   Component Value Date/Time    ALT (SGPT) 19 07/11/2022 03:46 PM    AST (SGOT) 14 (L) 07/11/2022 03:46 PM    Alk.  phosphatase 81 07/11/2022 03:46 PM    Bilirubin, total 0.4 07/11/2022 03:46 PM        Pharmacist Mathew Richardson

## 2022-07-12 NOTE — PROGRESS NOTES
Spiritual Care Assessment/Progress Note  [facility]      NAME: Navneet Berman      MRN: [id number]  AGE: 56 y.o. SEX: female  Cheondoism Affiliation: Scientology   Language: English     7/12/2022     Total Time (in minutes): 15     Spiritual Assessment begun in [facility] 5M1 MED SURG 1 through conversation with:         []Patient        [] Family    [] Friend(s)        Reason for Consult: Initial visit     Spiritual beliefs: (Please include comment if needed)     [] Identifies with a serenity tradition:         [] Supported by a serenity community:            [] Claims no spiritual orientation:           [] Seeking spiritual identity:                [] Adheres to an individual form of spirituality:           [x] Not able to assess:                           Identified resources for coping:      [] Prayer                               [] Music                  [] Guided Imagery     [] Family/friends                 [] Pet visits     [] Devotional reading                         [x] Unknown     [x] Other:                                               Interventions offered during this visit: (See comments for more details)                Plan of Care:     [] Support spiritual and/or cultural needs    [] Support AMD and/or advance care planning process      [] Support grieving process   [] Coordinate Rites and/or Rituals    [] Coordination with community clergy   [] No spiritual needs identified at this time   [] Detailed Plan of Care below (See Comments)  [] Make referral to Music Therapy  [] Make referral to Pet Therapy     [] Make referral to Addiction services  [] Make referral to [facility]  [] Make referral to Spiritual Care Partner  [] No future visits requested        [] Contact Spiritual Care for further referrals     Comments: Visit for spiritual assessment in Room 504. Patient appeared to be sleeping. Provided ministry of presence and silent prayer. Chart reviewed.   Please contact Spiritual Care for any further referrals. Visited by: Cherie Cristina.  Soto Moyer, [address] [place] [address] Road paging Service [phone])

## 2022-07-12 NOTE — PROGRESS NOTES
Plastic Surgery Progress note:      S: Patient is s/p bilateral mastectomies with prepectoral tissue expander based breast reconstruction. Patient is admited for right breast erythema. Patient feels like she has the flu this morning and notes pain over her right breast.     O: VSS. Low grade fever to 38 over night. Breast: bilateral breasts are soft, there is notable faint erythema to the right breast in the upper and central pole as well as an area of erythema and tenderness along the right lateral incision. Erythema has receded from admission. The triple point has some superficial slough to it. The left breast appears benign, there is a superficial wound healing delay along the triple point with some associated erythema. Drains with straw colored fluid. A/P: S/P bilateral mastectomies with prepectoral tissue expander based breast reconstruction. Patient is admited for right breast erythema. Leukocytosis has improved from 15 to 11. Patient did have a low grade fever over night and subjectively feels unwell today. Erythema has improved with IV antibiotics. Will have this right breast fluid aspirated and sent for culture today. Will plan for OR on Thursday for washout. Continue antibiotics.

## 2022-07-12 NOTE — PROGRESS NOTES
Problem: Impaired Skin Integrity/Pressure Injury Treatment  Goal: *Improvement of Existing Pressure Injury  Outcome: Progressing Towards Goal     Problem: Pain  Goal: *Control of Pain  Outcome: Progressing Towards Goal     Problem: Falls - Risk of  Goal: *Absence of Falls  Description: Document Brayan Fall Risk and appropriate interventions in the flowsheet.   Outcome: Progressing Towards Goal  Note: Fall Risk Interventions:            Medication Interventions: Evaluate medications/consider consulting pharmacy,Teach patient to arise slowly                   Problem: Fluid Volume - Risk of, Imbalanced  Goal: *Balanced intake and output  Outcome: Progressing Towards Goal

## 2022-07-12 NOTE — PROGRESS NOTES
MD notified of completed procedure. Patient requesting drink. Message left with office for return call.

## 2022-07-12 NOTE — PROGRESS NOTES
0875- Pt brought to [address] from room 504 via stretcher. Pt is A&Ox 3 with C/O pain as a \"10\" on 0-10 scale. She stated that her pain is in her chest and moving to her upper arms. RT arm limb alert. LFT AC #20 flushed with saline and positive blood return. Pt placed on monitor. 8647- Dr. Sheila Reynolds at bedside taking images with the ultrasound. LFT breast with no fluid for aspiration. RT breast with a small amount of fluid to aspirate. 4701- Dr. Sheila Reynolds obtained informed consent for fine needle aspiration of fluid. He gave pt opportunity to ask questions and provided clarification. 5618- Time out performed and fentanyl given IV.   2443- Dr. Sheila Reynolds obtained fluid sample and it was sent to the lab. Band aid was placed over RT lower breast site. Total amount of Fentanyl given was 50 mcg. Pt tolerated well. 0940- TRANSFER - OUT REPORT:    Verbal report given to Shelly Eduardo (name) on [facility]  being transferred to [facility](unit) for routine progression of care       Report consisted of patients Situation, Background, Assessment and   Recommendations(SBAR). Information from the following report(s) Procedure Summary was reviewed with the receiving nurse. Lines:   Peripheral IV 07/11/22 Left Antecubital (Active)   Site Assessment Clean, dry, & intact 07/11/22 2122   Phlebitis Assessment 0 07/11/22 2122   Infiltration Assessment 0 07/11/22 2122   Dressing Status Clean, dry, & intact 07/11/22 2122   Dressing Type Transparent 07/11/22 2122   Hub Color/Line Status Pink 07/11/22 2122   Alcohol Cap Used Yes 07/11/22 2122        Opportunity for questions and clarification was provided.       Patient transported with:   [employer]

## 2022-07-12 NOTE — H&P
Plastic Surgery PRE-OP Admission History and Physical    Patient: Natasha Cunningham MRN: [id number]  SSN: [id number]    YOB: 1965  Age: 56 y.o. Sex: female            Subjective:   Patient is a 56 y.o.  female who presents with right breast erythema. Patient originally underwent bilateral mastectomies with bilateral breast reconstruction on 6/27/22. Patient's reconstruction was prepectoral with tissue expander and FLEX HD placement. Patient noted on Sunday (7/10/22) she started having low grade fever to 100.4. Today while meeting with her medical oncologist noted her right breast had redness. Patient was sent to the ER for workup and was noted to have a leukocytosis and fluid collection on the right breast ultrasound. She otherwise denies any constitutional symptoms but does state her right breast feels full and is painful.      Patient Active Problem List    Diagnosis Date Noted    Cellulitis of right breast 07/11/2022    Breast cancer, right ([address] 05/12/2022    Mild intermittent asthma without complication 82/84/9040    Bipolar 2 disorder, major depressive episode ([address] 08/28/2017    Anxiety 01/07/2014     Past Medical History:   Diagnosis Date    Allergy-induced asthma     Anxiety and depression     Arthritis     hands    Bipolar 2 disorder ([address]     Breast cancer ([address] 2022    Right and left breast    COVID-19 2022    Diverticulosis     Pyelonephritis 6/7/14    Thromboembolus ([address] 2020    post surgery-1 in R calf and 2 in R upper thigh      Past Surgical History:   Procedure Laterality Date    COLONOSCOPY N/A 6/13/2018    COLONOSCOPY performed by Eric Pratt MD at [address] [facility] HX BREAST RECONSTRUCTION Bilateral 6/27/2022    BREAST RECONSTRUCTION performed by Author Yumiko MD at [address] [place] [address] HX MASTECTOMY Bilateral 6/27/2022    BILATERAL BREAST MASTECTOMIES AND RIGHT AXILLARY LYMPH NODE DISSECTION/BILATERAL BREAST RECONSTRUCTION WITH POSSIBLE TISSUE EXPANDER/POSSIBLE DIRECT TO IMPLANT/ ACELLULAR DERMAL MATRIX performed by Carson Whitley MD at [address] HX ORTHOPAEDIC Right 2020    tendon and ligament repair to right foot    HX OTHER SURGICAL  1988    vein ligation    HX PARTIAL COLECTOMY  2022    partial sigmoid colectomy    HX ROTATOR CUFF REPAIR Left 09/17/2021    HX SHOULDER REPLACEMENT  09/2021    HX TONSILLECTOMY  1985    HX TUBAL LIGATION  1994      Prior to Admission medications    Medication Sig Start Date End Date Taking? Authorizing Provider   amoxicillin-clavulanate (Augmentin) 875-125 mg per tablet Take 1 Tablet by mouth every twelve (12) hours. Take q 12 hours x 7 days 7/11/22   Jose Aleman MD   oxyCODONE IR (ROXICODONE) 5 mg immediate release tablet Take 5 mg by mouth four (4) times daily as needed for Pain. Provider, Historical   ondansetron (ZOFRAN ODT) 4 mg disintegrating tablet Take 4 mg by mouth every eight (8) hours as needed for Nausea or Vomiting. Provider, Historical   diazePAM (Valium) 5 mg tablet Take 5 mg by mouth every eight (8) hours as needed for Muscle Spasm(s). Provider, Historical   docusate sodium (Stool Softener) 100 mg capsule Take 100 mg by mouth two (2) times a day. Bid while on pain medication    Provider, Historical   acetaminophen (Tylenol Extra Strength) 500 mg tablet Take 1,000 mg by mouth daily as needed for Pain. Provider, Historical   melatonin 5 mg tablet Take 10 mg by mouth nightly as needed. Provider, Historical     Current Facility-Administered Medications   Medication Dose Route Frequency    vancomycin (VANCOCIN) 2500 mg in  mL infusion  2,500 mg IntraVENous ONCE     Current Outpatient Medications   Medication Sig    amoxicillin-clavulanate (Augmentin) 875-125 mg per tablet Take 1 Tablet by mouth every twelve (12) hours. Take q 12 hours x 7 days    oxyCODONE IR (ROXICODONE) 5 mg immediate release tablet Take 5 mg by mouth four (4) times daily as needed for Pain.  ondansetron (ZOFRAN ODT) 4 mg disintegrating tablet Take 4 mg by mouth every eight (8) hours as needed for Nausea or Vomiting.  diazePAM (Valium) 5 mg tablet Take 5 mg by mouth every eight (8) hours as needed for Muscle Spasm(s).  docusate sodium (Stool Softener) 100 mg capsule Take 100 mg by mouth two (2) times a day. Bid while on pain medication    acetaminophen (Tylenol Extra Strength) 500 mg tablet Take 1,000 mg by mouth daily as needed for Pain.  melatonin 5 mg tablet Take 10 mg by mouth nightly as needed. Allergies   Allergen Reactions    Phenergan [Promethazine] Other (comments)     IV only- Hallucinate    Other Medication Other (comments)     IV PHENERGAN--Makes Hallucinate \"Really\" bad. Chlorahexine WIPES & SOAP--Broke out in hives and was extremely itchy. Social History     Tobacco Use    Smoking status: Former Smoker     Quit date:      Years since quittin.5    Smokeless tobacco: Never Used    Tobacco comment: 26 Years    Substance Use Topics    Alcohol use: Yes     Alcohol/week: 3.0 standard drinks     Types: 1 Glasses of wine, 1 Cans of beer, 1 Shots of liquor per week      No family history on file. Review of Systems  A comprehensive review of systems was negative except for that written in the HPI. Objective:     Patient Vitals for the past 8 hrs:   BP Temp Pulse Resp SpO2 Height Weight   22 1539 131/71 97.7 °F (36.5 °C) 75 16 100 % 5' 5\" (1.651 m) 100.7 kg (222 lb)     Temp (24hrs), Av °F (36.7 °C), Min:97.7 °F (36.5 °C), Max:98.2 °F (36.8 °C)        Gen: Well-developed,  in no acute distress   HEENT:  hearing intact to voice, moist mucous membranes   Neck: Supple, without masses,   Resp: No accessory muscle use,  breathing even/ nonlabored.    Card: Regular rate   Abd: Soft, non-tender, non-distended,   Breast: bilateral breasts are soft, there is notable  Faint erythema to the right breast in the upper and central pole as well as an area of erythema and tenderness along the right lateral incision. The triple point has some superficial slough to it. The left breast appears benign, there is a superficial wound healing delay along the triple point with some associated erythema. Drains with straw colored fluid. Skin: No skin breakdown noted. Skin warm, pink, dry. No rashes. Neuro:  follows commands appropriately   Psych: Good insight, oriented to person, place and time, alert          Labs:   Recent Results (from the past 24 hour(s))   POC LACTIC ACID    Collection Time: 07/11/22  3:44 PM   Result Value Ref Range    Lactic Acid (POC) 0.66 0.40 - 2.00 mmol/L   CBC WITH AUTOMATED DIFF    Collection Time: 07/11/22  3:46 PM   Result Value Ref Range    WBC 15.0 (H) 3.6 - 11.0 K/uL    RBC 3.51 (L) 3.80 - 5.20 M/uL    HGB 10.8 (L) 11.5 - 16.0 g/dL    HCT 33.4 (L) 35.0 - 47.0 %    MCV 95.2 80.0 - 99.0 FL    MCH 30.8 26.0 - 34.0 PG    MCHC 32.3 30.0 - 36.5 g/dL    RDW 13.8 11.5 - 14.5 %    PLATELET 555 379 - 080 K/uL    MPV 10.4 8.9 - 12.9 FL    NRBC 0.0 0  WBC    ABSOLUTE NRBC 0.00 0.00 - 0.01 K/uL    NEUTROPHILS 66 32 - 75 %    LYMPHOCYTES 21 12 - 49 %    MONOCYTES 6 5 - 13 %    EOSINOPHILS 5 0 - 7 %    BASOPHILS 1 0 - 1 %    IMMATURE GRANULOCYTES 1 (H) 0.0 - 0.5 %    ABS. NEUTROPHILS 10.2 (H) 1.8 - 8.0 K/UL    ABS. LYMPHOCYTES 3.1 0.8 - 3.5 K/UL    ABS. MONOCYTES 0.9 0.0 - 1.0 K/UL    ABS. EOSINOPHILS 0.7 (H) 0.0 - 0.4 K/UL    ABS. BASOPHILS 0.1 0.0 - 0.1 K/UL    ABS. IMM.  GRANS. 0.1 (H) 0.00 - 0.04 K/UL    DF AUTOMATED     METABOLIC PANEL, COMPREHENSIVE    Collection Time: 07/11/22  3:46 PM   Result Value Ref Range    Sodium 140 136 - 145 mmol/L    Potassium 3.9 3.5 - 5.1 mmol/L    Chloride 105 97 - 108 mmol/L    CO2 23 21 - 32 mmol/L    Anion gap 12 5 - 15 mmol/L    Glucose 91 65 - 100 mg/dL    BUN 14 6 - 20 MG/DL    Creatinine 0.89 0.55 - 1.02 MG/DL    BUN/Creatinine ratio 16 12 - 20      GFR est AA >60 >60 ml/min/1.73m2    GFR est non-AA >60 >60 ml/min/1.73m2    Calcium 9.2 8.5 - 10.1 MG/DL    Bilirubin, total 0.4 0.2 - 1.0 MG/DL    ALT (SGPT) 19 12 - 78 U/L    AST (SGOT) 14 (L) 15 - 37 U/L    Alk. phosphatase 81 45 - 117 U/L    Protein, total 7.4 6.4 - 8.2 g/dL    Albumin 3.2 (L) 3.5 - 5.0 g/dL    Globulin 4.2 (H) 2.0 - 4.0 g/dL    A-G Ratio 0.8 (L) 1.1 - 2.2     POC LACTIC ACID    Collection Time: 07/11/22  4:34 PM   Result Value Ref Range    Lactic Acid (POC) 0.72 0.40 - 2.00 mmol/L       Assessment:     Patient Active Problem List    Diagnosis Date Noted    Cellulitis of right breast 07/11/2022    Breast cancer, right ([facility] 05/12/2022    Mild intermittent asthma without complication 05/53/1032    Bipolar 2 disorder, major depressive episode ([facility] 08/28/2017    Anxiety 01/07/2014     This is a 56year old patient s/p bilateral mastectomies with prepectoral tissue expander breast reconstruction on 6/27/22 who presents to the ER with right breast redness. ER workup is significant for a leukocytosis of 15 and a small fluid collection on the right breast.     Plan:    Will plan to admit her for IV antibiotics and US guided aspiration and culture of the fluid in the right breast. Patient will likely return to OR for washout and device replacement versus explant pending clinical course.      -start IV vanc/cefepime  -trend daily CBC  -US guided aspiration of the fluid collection on the right breast with cultures in the AM   -NPO in case worse by AM and requires return to the Tracey Evans MD

## 2022-07-13 ENCOUNTER — ANESTHESIA EVENT (OUTPATIENT)
Dept: SURGERY | Age: 57
DRG: 909 | End: 2022-07-13
Payer: COMMERCIAL

## 2022-07-13 LAB
ANION GAP SERPL CALC-SCNC: 7 MMOL/L (ref 5–15)
BASOPHILS # BLD: 0 K/UL (ref 0–0.1)
BASOPHILS NFR BLD: 1 % (ref 0–1)
BUN SERPL-MCNC: 6 MG/DL (ref 6–20)
BUN/CREAT SERPL: 10 (ref 12–20)
CALCIUM SERPL-MCNC: 9.1 MG/DL (ref 8.5–10.1)
CHLORIDE SERPL-SCNC: 107 MMOL/L (ref 97–108)
CO2 SERPL-SCNC: 27 MMOL/L (ref 21–32)
COMMENT, HOLDF: NORMAL
CREAT SERPL-MCNC: 0.58 MG/DL (ref 0.55–1.02)
DIFFERENTIAL METHOD BLD: ABNORMAL
EOSINOPHIL # BLD: 0.6 K/UL (ref 0–0.4)
EOSINOPHIL NFR BLD: 7 % (ref 0–7)
ERYTHROCYTE [DISTWIDTH] IN BLOOD BY AUTOMATED COUNT: 13.6 % (ref 11.5–14.5)
GLUCOSE SERPL-MCNC: 94 MG/DL (ref 65–100)
HCT VFR BLD AUTO: 29.9 % (ref 35–47)
HGB BLD-MCNC: 9.3 G/DL (ref 11.5–16)
IMM GRANULOCYTES # BLD AUTO: 0.1 K/UL (ref 0–0.04)
IMM GRANULOCYTES NFR BLD AUTO: 1 % (ref 0–0.5)
LYMPHOCYTES # BLD: 2.6 K/UL (ref 0.8–3.5)
LYMPHOCYTES NFR BLD: 31 % (ref 12–49)
MCH RBC QN AUTO: 30.4 PG (ref 26–34)
MCHC RBC AUTO-ENTMCNC: 31.1 G/DL (ref 30–36.5)
MCV RBC AUTO: 97.7 FL (ref 80–99)
MONOCYTES # BLD: 0.7 K/UL (ref 0–1)
MONOCYTES NFR BLD: 9 % (ref 5–13)
NEUTS SEG # BLD: 4.2 K/UL (ref 1.8–8)
NEUTS SEG NFR BLD: 51 % (ref 32–75)
NRBC # BLD: 0 K/UL (ref 0–0.01)
NRBC BLD-RTO: 0 PER 100 WBC
PLATELET # BLD AUTO: 339 K/UL (ref 150–400)
PMV BLD AUTO: 10.5 FL (ref 8.9–12.9)
POTASSIUM SERPL-SCNC: 3.9 MMOL/L (ref 3.5–5.1)
RBC # BLD AUTO: 3.06 M/UL (ref 3.8–5.2)
SAMPLES BEING HELD,HOLD: NORMAL
SODIUM SERPL-SCNC: 141 MMOL/L (ref 136–145)
VANCOMYCIN SERPL-MCNC: 18.5 UG/ML
WBC # BLD AUTO: 8.1 K/UL (ref 3.6–11)

## 2022-07-13 PROCEDURE — 74011250637 HC RX REV CODE- 250/637: Performed by: STUDENT IN AN ORGANIZED HEALTH CARE EDUCATION/TRAINING PROGRAM

## 2022-07-13 PROCEDURE — 80202 ASSAY OF VANCOMYCIN: CPT

## 2022-07-13 PROCEDURE — 74011000250 HC RX REV CODE- 250: Performed by: STUDENT IN AN ORGANIZED HEALTH CARE EDUCATION/TRAINING PROGRAM

## 2022-07-13 PROCEDURE — 74011000258 HC RX REV CODE- 258: Performed by: STUDENT IN AN ORGANIZED HEALTH CARE EDUCATION/TRAINING PROGRAM

## 2022-07-13 PROCEDURE — 85025 COMPLETE CBC W/AUTO DIFF WBC: CPT

## 2022-07-13 PROCEDURE — 80048 BASIC METABOLIC PNL TOTAL CA: CPT

## 2022-07-13 PROCEDURE — 36415 COLL VENOUS BLD VENIPUNCTURE: CPT

## 2022-07-13 PROCEDURE — 65270000029 HC RM PRIVATE

## 2022-07-13 PROCEDURE — 74011250636 HC RX REV CODE- 250/636: Performed by: STUDENT IN AN ORGANIZED HEALTH CARE EDUCATION/TRAINING PROGRAM

## 2022-07-13 RX ORDER — BACITRACIN ZINC 500 UNIT/G
OINTMENT (GRAM) TOPICAL 2 TIMES DAILY
Status: DISCONTINUED | OUTPATIENT
Start: 2022-07-13 | End: 2022-07-13

## 2022-07-13 RX ORDER — BACITRACIN ZINC 500 UNIT/G
OINTMENT (GRAM) TOPICAL 2 TIMES DAILY
Status: DISCONTINUED | OUTPATIENT
Start: 2022-07-13 | End: 2022-07-15 | Stop reason: HOSPADM

## 2022-07-13 RX ADMIN — CEFEPIME HYDROCHLORIDE 2 G: 2 INJECTION, POWDER, FOR SOLUTION INTRAVENOUS at 22:40

## 2022-07-13 RX ADMIN — OXYCODONE 5 MG: 5 TABLET ORAL at 15:11

## 2022-07-13 RX ADMIN — HYDROMORPHONE HYDROCHLORIDE 0.5 MG: 1 INJECTION, SOLUTION INTRAMUSCULAR; INTRAVENOUS; SUBCUTANEOUS at 08:11

## 2022-07-13 RX ADMIN — SODIUM CHLORIDE, SODIUM LACTATE, POTASSIUM CHLORIDE, AND CALCIUM CHLORIDE 125 ML/HR: 600; 310; 30; 20 INJECTION, SOLUTION INTRAVENOUS at 21:47

## 2022-07-13 RX ADMIN — OXYCODONE 5 MG: 5 TABLET ORAL at 10:41

## 2022-07-13 RX ADMIN — HYDROMORPHONE HYDROCHLORIDE 0.5 MG: 1 INJECTION, SOLUTION INTRAMUSCULAR; INTRAVENOUS; SUBCUTANEOUS at 22:33

## 2022-07-13 RX ADMIN — HYDROMORPHONE HYDROCHLORIDE 0.5 MG: 1 INJECTION, SOLUTION INTRAMUSCULAR; INTRAVENOUS; SUBCUTANEOUS at 13:04

## 2022-07-13 RX ADMIN — CEFEPIME HYDROCHLORIDE 2 G: 2 INJECTION, POWDER, FOR SOLUTION INTRAVENOUS at 14:59

## 2022-07-13 RX ADMIN — HYDROMORPHONE HYDROCHLORIDE 0.5 MG: 1 INJECTION, SOLUTION INTRAMUSCULAR; INTRAVENOUS; SUBCUTANEOUS at 18:04

## 2022-07-13 RX ADMIN — ACETAMINOPHEN 650 MG: 325 TABLET ORAL at 01:05

## 2022-07-13 RX ADMIN — CEFEPIME HYDROCHLORIDE 2 G: 2 INJECTION, POWDER, FOR SOLUTION INTRAVENOUS at 05:15

## 2022-07-13 RX ADMIN — BACITRACIN ZINC: 500 OINTMENT TOPICAL at 14:59

## 2022-07-13 RX ADMIN — SODIUM CHLORIDE, PRESERVATIVE FREE 10 ML: 5 INJECTION INTRAVENOUS at 13:04

## 2022-07-13 RX ADMIN — SODIUM CHLORIDE, PRESERVATIVE FREE 10 ML: 5 INJECTION INTRAVENOUS at 05:15

## 2022-07-13 RX ADMIN — VANCOMYCIN HYDROCHLORIDE 1000 MG: 1 INJECTION, POWDER, LYOPHILIZED, FOR SOLUTION INTRAVENOUS at 10:31

## 2022-07-13 RX ADMIN — SODIUM CHLORIDE, PRESERVATIVE FREE 10 ML: 5 INJECTION INTRAVENOUS at 22:40

## 2022-07-13 RX ADMIN — OXYCODONE 5 MG: 5 TABLET ORAL at 19:54

## 2022-07-13 RX ADMIN — VANCOMYCIN HYDROCHLORIDE 1000 MG: 1 INJECTION, POWDER, LYOPHILIZED, FOR SOLUTION INTRAVENOUS at 22:33

## 2022-07-13 RX ADMIN — HYDROMORPHONE HYDROCHLORIDE 0.5 MG: 1 INJECTION, SOLUTION INTRAMUSCULAR; INTRAVENOUS; SUBCUTANEOUS at 02:28

## 2022-07-13 NOTE — ROUTINE PROCESS
Bedside shift change report given to Kiersten RN (oncoming nurse) by Iesha Aleman RN  (offgoing nurse). Report included the following information SBAR, Kardex, MAR and Recent Results.

## 2022-07-13 NOTE — PROGRESS NOTES
[facility] Dosing Services: Antimicrobial Stewardship Daily Doc    Consult for antibiotic dosing of vancomycin by Dr. Terrell Albert  Indication: Right breast erythema/leukocytosis s/p bilateral mastectomies  Day of Therapy: 3    Ht Readings from Last 1 Encounters:   07/11/22 165.1 cm (65\")        Wt Readings from Last 1 Encounters:   07/11/22 100.7 kg (222 lb)        Vancomycin therapy:  Current maintenance dose: vancomycin 1000 mg IV every 12 hours     Dose calculated to approximate a           a. Target AUC/NOEL of 400-600          b. Trough of N/A mcg/mL     Assessment/Plan:  There is no new SCr since before vancomycin was started - BMP x 1 is ordered and BMP ordered for tomorrow AM, leukocytosis resolved, no procalcitonin results to assess, afebrile in past 24 hours, culture results are in process, no urine output documentation  The vancomycin random level this morning calculates AUC of 442 using [facility], which is within target range. Continue current regimen for now  Follow SCr - BMP ordered by MD  Plan for OR in AM for wash out and exchange device vs explant  Dose administration notes:   Doses not given as scheduled - times adjusted    Date Dose & Interval Measured (mcg/mL) Extrapolated (mcg/mL)   ? 7/13 at 0220 ?1000 mg IV q12h ?18.5 ? AUC = 442   ? ? ? ?   ? ? ? ? Other Antimicrobial   (not dosed by pharmacist) Cefepime 2 g IV every 8 hours for 7 days   Cultures 7/11 - Blood, paired - Pending  7/12 - Breast body fluid - Pending   Serum Creatinine Lab Results   Component Value Date/Time    Creatinine 0.89 07/11/2022 03:46 PM         Creatinine Clearance Estimated Creatinine Clearance: 84 mL/min (based on SCr of 0.89 mg/dL).      Temp Temp: 98.2 °F (36.8 °C)       WBC Lab Results   Component Value Date/Time    WBC 8.1 07/13/2022 02:20 AM        Procalcitonin No results found for: PCT     For Antifungals, Metronidazole and Nafcillin: Lab Results   Component Value Date/Time    ALT (SGPT) 19 07/11/2022 03:46 PM AST (SGOT) 14 (L) 07/11/2022 03:46 PM    Alk.  phosphatase 81 07/11/2022 03:46 PM    Bilirubin, total 0.4 07/11/2022 03:46 PM        Pharmacist Reginald Vail, PHARMD

## 2022-07-13 NOTE — PROGRESS NOTES
Problem: Impaired Skin Integrity/Pressure Injury Treatment  Goal: *Improvement of Existing Pressure Injury  Outcome: Progressing Towards Goal     Problem: Pain  Goal: *Control of Pain  Outcome: Progressing Towards Goal     Problem: Falls - Risk of  Goal: *Absence of Falls  Description: Document Brayan Fall Risk and appropriate interventions in the flowsheet.   Outcome: Progressing Towards Goal  Note: Fall Risk Interventions:            Medication Interventions: Assess postural VS orthostatic hypotension,Bed/chair exit alarm,Evaluate medications/consider consulting pharmacy,Patient to call before getting OOB,Teach patient to arise slowly,Utilize gait belt for transfers/ambulation                   Problem: Fluid Volume - Risk of, Imbalanced  Goal: *Balanced intake and output  Outcome: Progressing Towards Goal

## 2022-07-13 NOTE — PROGRESS NOTES
Ultrasound IV by Zurdo Schaeffer RN :  Procedure Note    Patient meets criteria for US IV insertion. Ultrasound IV education provided to patient. Opportunities for questions given. IV ultrasound technique used for PIV placement:  20 gauge 6 cm Arrow Endurance Extended Dwell(may stay in place for 29 days)  R lower arm location. 1 X Attempt(s). Flushed with ease; vigorous blood return. Procedure tolerated well. Primary RN aware of IV placement and added to LDA.                                 Zurdo Schaeffer RN

## 2022-07-13 NOTE — PROGRESS NOTES
7/13/2022  Case Management Progress Note    12:05 PM  Patient is 56year old female admitted 7/11 with cellulitis of right breast  Patient's RUR is 8% green/low risk for readmission  Covid test: none this admission  Chart reviewed--patient discussed at IDR rounds  Per chart patient is for the OR tomorrow for a washout. Planning for likely discharge home on Friday, and patient does not have any noted needs at this time. Will continue to follow and assist with discharge planning as needed. Transition of Care Plan   1. Continue medical management/treatment  2. Home with family when medically ready to do so   3. Family will transport at discharge   4. Follow up outpatient as indicated  5.  CM will continue to follow    COSME Villalta

## 2022-07-13 NOTE — PROGRESS NOTES
Plastic Surgery Progress note:      S: Patient is s/p bilateral mastectomies with prepectoral tissue expander based breast reconstruction. Patient is admited for right breast erythema. In the interim she underwent an aspiration of right breast fluid collection. 3cc's were removed and sent for culture. Culture results are pending but gram stain shows no organisms. Patient feels better today but still complains of pain at the right breast and a lack of appetite. O: VSS. Afebrile x 24 hours. Breast: bilateral breasts are soft, there is no longer any erythema to the right breast in the upper and central pole. There is an improved area of erythema and tenderness along the right lateral incision. The triple point has some superficial slough to it. The left breast appears benign, there is a superficial wound healing delay along the triple point with some associated erythema. Drains with straw colored fluid, slightly cloudier on the right c/w fat necrosis. A/P: S/P bilateral mastectomies with prepectoral tissue expander based breast reconstruction. Patient is admited for right breast erythema. Leukocytosis has resolved. Patient is now afebrile x 24 hours. . Erythema has largely resolved with IV antibiotics.      -Continue IV antibiotics   -f/u cultures from right breast aspiration   -will plan for OR in the AM to wash out the right breast and exchange device versus explant   -NPO at midnight.

## 2022-07-13 NOTE — PROGRESS NOTES
Hospital Follow Up with PCP  Fallon Emerson MD for 7/21/2022 at 11:00am. Patients normal provider did not have any available appointments.

## 2022-07-14 ENCOUNTER — ANESTHESIA (OUTPATIENT)
Dept: SURGERY | Age: 57
DRG: 909 | End: 2022-07-14
Payer: COMMERCIAL

## 2022-07-14 LAB
ANION GAP SERPL CALC-SCNC: 9 MMOL/L (ref 5–15)
BASOPHILS # BLD: 0.1 K/UL (ref 0–0.1)
BASOPHILS NFR BLD: 1 % (ref 0–1)
BUN SERPL-MCNC: 7 MG/DL (ref 6–20)
BUN/CREAT SERPL: 10 (ref 12–20)
CALCIUM SERPL-MCNC: 9 MG/DL (ref 8.5–10.1)
CHLORIDE SERPL-SCNC: 107 MMOL/L (ref 97–108)
CO2 SERPL-SCNC: 24 MMOL/L (ref 21–32)
CREAT SERPL-MCNC: 0.67 MG/DL (ref 0.55–1.02)
DIFFERENTIAL METHOD BLD: ABNORMAL
EOSINOPHIL # BLD: 0.6 K/UL (ref 0–0.4)
EOSINOPHIL NFR BLD: 8 % (ref 0–7)
ERYTHROCYTE [DISTWIDTH] IN BLOOD BY AUTOMATED COUNT: 13.4 % (ref 11.5–14.5)
GLUCOSE SERPL-MCNC: 117 MG/DL (ref 65–100)
HCT VFR BLD AUTO: 32.4 % (ref 35–47)
HGB BLD-MCNC: 10.2 G/DL (ref 11.5–16)
IMM GRANULOCYTES # BLD AUTO: 0 K/UL (ref 0–0.04)
IMM GRANULOCYTES NFR BLD AUTO: 1 % (ref 0–0.5)
LYMPHOCYTES # BLD: 2.8 K/UL (ref 0.8–3.5)
LYMPHOCYTES NFR BLD: 41 % (ref 12–49)
MCH RBC QN AUTO: 29.7 PG (ref 26–34)
MCHC RBC AUTO-ENTMCNC: 31.5 G/DL (ref 30–36.5)
MCV RBC AUTO: 94.5 FL (ref 80–99)
MONOCYTES # BLD: 0.6 K/UL (ref 0–1)
MONOCYTES NFR BLD: 9 % (ref 5–13)
NEUTS SEG # BLD: 2.8 K/UL (ref 1.8–8)
NEUTS SEG NFR BLD: 41 % (ref 32–75)
NRBC # BLD: 0 K/UL (ref 0–0.01)
NRBC BLD-RTO: 0 PER 100 WBC
PLATELET # BLD AUTO: 225 K/UL (ref 150–400)
POTASSIUM SERPL-SCNC: 4.2 MMOL/L (ref 3.5–5.1)
RBC # BLD AUTO: 3.43 M/UL (ref 3.8–5.2)
SODIUM SERPL-SCNC: 140 MMOL/L (ref 136–145)
WBC # BLD AUTO: 6.8 K/UL (ref 3.6–11)

## 2022-07-14 PROCEDURE — 74011250637 HC RX REV CODE- 250/637: Performed by: STUDENT IN AN ORGANIZED HEALTH CARE EDUCATION/TRAINING PROGRAM

## 2022-07-14 PROCEDURE — 76210000016 HC OR PH I REC 1 TO 1.5 HR: Performed by: STUDENT IN AN ORGANIZED HEALTH CARE EDUCATION/TRAINING PROGRAM

## 2022-07-14 PROCEDURE — 36415 COLL VENOUS BLD VENIPUNCTURE: CPT

## 2022-07-14 PROCEDURE — 87102 FUNGUS ISOLATION CULTURE: CPT

## 2022-07-14 PROCEDURE — 74011000258 HC RX REV CODE- 258: Performed by: STUDENT IN AN ORGANIZED HEALTH CARE EDUCATION/TRAINING PROGRAM

## 2022-07-14 PROCEDURE — 77030026438 HC STYL ET INTUB CARD -A: Performed by: NURSE ANESTHETIST, CERTIFIED REGISTERED

## 2022-07-14 PROCEDURE — 74011000250 HC RX REV CODE- 250: Performed by: NURSE ANESTHETIST, CERTIFIED REGISTERED

## 2022-07-14 PROCEDURE — C9290 INJ, BUPIVACAINE LIPOSOME: HCPCS | Performed by: STUDENT IN AN ORGANIZED HEALTH CARE EDUCATION/TRAINING PROGRAM

## 2022-07-14 PROCEDURE — 77030040922 HC BLNKT HYPOTHRM STRY -A

## 2022-07-14 PROCEDURE — 85025 COMPLETE CBC W/AUTO DIFF WBC: CPT

## 2022-07-14 PROCEDURE — 74011250636 HC RX REV CODE- 250/636: Performed by: STUDENT IN AN ORGANIZED HEALTH CARE EDUCATION/TRAINING PROGRAM

## 2022-07-14 PROCEDURE — 77030040506 HC DRN WND MDII -A: Performed by: STUDENT IN AN ORGANIZED HEALTH CARE EDUCATION/TRAINING PROGRAM

## 2022-07-14 PROCEDURE — 74011250636 HC RX REV CODE- 250/636: Performed by: NURSE ANESTHETIST, CERTIFIED REGISTERED

## 2022-07-14 PROCEDURE — 87205 SMEAR GRAM STAIN: CPT

## 2022-07-14 PROCEDURE — 0HPT0JZ REMOVAL OF SYNTHETIC SUBSTITUTE FROM RIGHT BREAST, OPEN APPROACH: ICD-10-PCS | Performed by: STUDENT IN AN ORGANIZED HEALTH CARE EDUCATION/TRAINING PROGRAM

## 2022-07-14 PROCEDURE — 87075 CULTR BACTERIA EXCEPT BLOOD: CPT

## 2022-07-14 PROCEDURE — 77030011264 HC ELECTRD BLD EXT COVD -A: Performed by: STUDENT IN AN ORGANIZED HEALTH CARE EDUCATION/TRAINING PROGRAM

## 2022-07-14 PROCEDURE — 77030002916 HC SUT ETHLN J&J -A: Performed by: STUDENT IN AN ORGANIZED HEALTH CARE EDUCATION/TRAINING PROGRAM

## 2022-07-14 PROCEDURE — 77030013079 HC BLNKT BAIR HGGR 3M -A: Performed by: ANESTHESIOLOGY

## 2022-07-14 PROCEDURE — 76010000131 HC OR TIME 2 TO 2.5 HR: Performed by: STUDENT IN AN ORGANIZED HEALTH CARE EDUCATION/TRAINING PROGRAM

## 2022-07-14 PROCEDURE — 65270000029 HC RM PRIVATE

## 2022-07-14 PROCEDURE — 2709999900 HC NON-CHARGEABLE SUPPLY: Performed by: STUDENT IN AN ORGANIZED HEALTH CARE EDUCATION/TRAINING PROGRAM

## 2022-07-14 PROCEDURE — 87176 TISSUE HOMOGENIZATION CULTR: CPT

## 2022-07-14 PROCEDURE — 77030008463 HC STPLR SKN PROX J&J -B: Performed by: STUDENT IN AN ORGANIZED HEALTH CARE EDUCATION/TRAINING PROGRAM

## 2022-07-14 PROCEDURE — 80048 BASIC METABOLIC PNL TOTAL CA: CPT

## 2022-07-14 PROCEDURE — 87186 SC STD MICRODIL/AGAR DIL: CPT

## 2022-07-14 PROCEDURE — 77030002933 HC SUT MCRYL J&J -A: Performed by: STUDENT IN AN ORGANIZED HEALTH CARE EDUCATION/TRAINING PROGRAM

## 2022-07-14 PROCEDURE — 77030019908 HC STETH ESOPH SIMS -A: Performed by: NURSE ANESTHETIST, CERTIFIED REGISTERED

## 2022-07-14 PROCEDURE — 77030008684 HC TU ET CUF COVD -B: Performed by: NURSE ANESTHETIST, CERTIFIED REGISTERED

## 2022-07-14 PROCEDURE — 77030040361 HC SLV COMPR DVT MDII -B

## 2022-07-14 PROCEDURE — 87077 CULTURE AEROBIC IDENTIFY: CPT

## 2022-07-14 PROCEDURE — 77030040504 HC DRN WND MDII -B: Performed by: STUDENT IN AN ORGANIZED HEALTH CARE EDUCATION/TRAINING PROGRAM

## 2022-07-14 PROCEDURE — 74011000250 HC RX REV CODE- 250: Performed by: STUDENT IN AN ORGANIZED HEALTH CARE EDUCATION/TRAINING PROGRAM

## 2022-07-14 PROCEDURE — 77030002966 HC SUT PDS J&J -A: Performed by: STUDENT IN AN ORGANIZED HEALTH CARE EDUCATION/TRAINING PROGRAM

## 2022-07-14 PROCEDURE — 74011250636 HC RX REV CODE- 250/636: Performed by: ANESTHESIOLOGY

## 2022-07-14 PROCEDURE — 87116 MYCOBACTERIA CULTURE: CPT

## 2022-07-14 PROCEDURE — 77030011825 HC SUPP SURG PSTOP S2SG -B: Performed by: STUDENT IN AN ORGANIZED HEALTH CARE EDUCATION/TRAINING PROGRAM

## 2022-07-14 PROCEDURE — 88300 SURGICAL PATH GROSS: CPT

## 2022-07-14 PROCEDURE — 77030038692 HC WND DEB SYS IRMX -B: Performed by: STUDENT IN AN ORGANIZED HEALTH CARE EDUCATION/TRAINING PROGRAM

## 2022-07-14 PROCEDURE — 77030019940 HC BLNKT HYPOTHRM STRY -B: Performed by: STUDENT IN AN ORGANIZED HEALTH CARE EDUCATION/TRAINING PROGRAM

## 2022-07-14 PROCEDURE — 76060000035 HC ANESTHESIA 2 TO 2.5 HR: Performed by: STUDENT IN AN ORGANIZED HEALTH CARE EDUCATION/TRAINING PROGRAM

## 2022-07-14 RX ORDER — POVIDONE-IODINE 10 %
SOLUTION, NON-ORAL TOPICAL AS NEEDED
Status: DISCONTINUED | OUTPATIENT
Start: 2022-07-14 | End: 2022-07-14 | Stop reason: HOSPADM

## 2022-07-14 RX ORDER — SODIUM CHLORIDE, SODIUM LACTATE, POTASSIUM CHLORIDE, CALCIUM CHLORIDE 600; 310; 30; 20 MG/100ML; MG/100ML; MG/100ML; MG/100ML
100 INJECTION, SOLUTION INTRAVENOUS CONTINUOUS
Status: DISPENSED | OUTPATIENT
Start: 2022-07-14 | End: 2022-07-15

## 2022-07-14 RX ORDER — HYDROMORPHONE HYDROCHLORIDE 1 MG/ML
.25-1 INJECTION, SOLUTION INTRAMUSCULAR; INTRAVENOUS; SUBCUTANEOUS
Status: DISCONTINUED | OUTPATIENT
Start: 2022-07-14 | End: 2022-07-14 | Stop reason: HOSPADM

## 2022-07-14 RX ORDER — HYDROMORPHONE HYDROCHLORIDE 2 MG/ML
INJECTION, SOLUTION INTRAMUSCULAR; INTRAVENOUS; SUBCUTANEOUS AS NEEDED
Status: DISCONTINUED | OUTPATIENT
Start: 2022-07-14 | End: 2022-07-14 | Stop reason: HOSPADM

## 2022-07-14 RX ORDER — SODIUM CHLORIDE, SODIUM LACTATE, POTASSIUM CHLORIDE, CALCIUM CHLORIDE 600; 310; 30; 20 MG/100ML; MG/100ML; MG/100ML; MG/100ML
100 INJECTION, SOLUTION INTRAVENOUS CONTINUOUS
Status: DISCONTINUED | OUTPATIENT
Start: 2022-07-14 | End: 2022-07-14 | Stop reason: HOSPADM

## 2022-07-14 RX ORDER — DEXAMETHASONE SODIUM PHOSPHATE 4 MG/ML
INJECTION, SOLUTION INTRA-ARTICULAR; INTRALESIONAL; INTRAMUSCULAR; INTRAVENOUS; SOFT TISSUE AS NEEDED
Status: DISCONTINUED | OUTPATIENT
Start: 2022-07-14 | End: 2022-07-14 | Stop reason: HOSPADM

## 2022-07-14 RX ORDER — FENTANYL CITRATE 50 UG/ML
INJECTION, SOLUTION INTRAMUSCULAR; INTRAVENOUS AS NEEDED
Status: DISCONTINUED | OUTPATIENT
Start: 2022-07-14 | End: 2022-07-14 | Stop reason: HOSPADM

## 2022-07-14 RX ORDER — ROCURONIUM BROMIDE 10 MG/ML
INJECTION, SOLUTION INTRAVENOUS AS NEEDED
Status: DISCONTINUED | OUTPATIENT
Start: 2022-07-14 | End: 2022-07-14 | Stop reason: HOSPADM

## 2022-07-14 RX ORDER — LIDOCAINE HYDROCHLORIDE 20 MG/ML
INJECTION, SOLUTION EPIDURAL; INFILTRATION; INTRACAUDAL; PERINEURAL AS NEEDED
Status: DISCONTINUED | OUTPATIENT
Start: 2022-07-14 | End: 2022-07-14 | Stop reason: HOSPADM

## 2022-07-14 RX ORDER — LIDOCAINE HYDROCHLORIDE 10 MG/ML
0.1 INJECTION, SOLUTION EPIDURAL; INFILTRATION; INTRACAUDAL; PERINEURAL AS NEEDED
Status: DISCONTINUED | OUTPATIENT
Start: 2022-07-14 | End: 2022-07-14 | Stop reason: HOSPADM

## 2022-07-14 RX ORDER — MIDAZOLAM HYDROCHLORIDE 1 MG/ML
INJECTION, SOLUTION INTRAMUSCULAR; INTRAVENOUS AS NEEDED
Status: DISCONTINUED | OUTPATIENT
Start: 2022-07-14 | End: 2022-07-14 | Stop reason: HOSPADM

## 2022-07-14 RX ORDER — PROPOFOL 10 MG/ML
INJECTION, EMULSION INTRAVENOUS
Status: DISCONTINUED | OUTPATIENT
Start: 2022-07-14 | End: 2022-07-14 | Stop reason: HOSPADM

## 2022-07-14 RX ORDER — PROPOFOL 10 MG/ML
INJECTION, EMULSION INTRAVENOUS AS NEEDED
Status: DISCONTINUED | OUTPATIENT
Start: 2022-07-14 | End: 2022-07-14 | Stop reason: HOSPADM

## 2022-07-14 RX ORDER — ONDANSETRON 2 MG/ML
INJECTION INTRAMUSCULAR; INTRAVENOUS AS NEEDED
Status: DISCONTINUED | OUTPATIENT
Start: 2022-07-14 | End: 2022-07-14 | Stop reason: HOSPADM

## 2022-07-14 RX ADMIN — HYDROMORPHONE HYDROCHLORIDE 0.5 MG: 1 INJECTION, SOLUTION INTRAMUSCULAR; INTRAVENOUS; SUBCUTANEOUS at 06:49

## 2022-07-14 RX ADMIN — MIDAZOLAM HYDROCHLORIDE 2 MG: 1 INJECTION, SOLUTION INTRAMUSCULAR; INTRAVENOUS at 12:58

## 2022-07-14 RX ADMIN — SODIUM CHLORIDE, SODIUM LACTATE, POTASSIUM CHLORIDE, AND CALCIUM CHLORIDE 125 ML/HR: 600; 310; 30; 20 INJECTION, SOLUTION INTRAVENOUS at 11:11

## 2022-07-14 RX ADMIN — VANCOMYCIN HYDROCHLORIDE 1000 MG: 1 INJECTION, POWDER, LYOPHILIZED, FOR SOLUTION INTRAVENOUS at 11:12

## 2022-07-14 RX ADMIN — ROCURONIUM BROMIDE 10 MG: 10 INJECTION INTRAVENOUS at 13:37

## 2022-07-14 RX ADMIN — DEXAMETHASONE SODIUM PHOSPHATE 8 MG: 4 INJECTION, SOLUTION INTRAMUSCULAR; INTRAVENOUS at 13:30

## 2022-07-14 RX ADMIN — CEFEPIME HYDROCHLORIDE 2 G: 2 INJECTION, POWDER, FOR SOLUTION INTRAVENOUS at 13:45

## 2022-07-14 RX ADMIN — VANCOMYCIN HYDROCHLORIDE 1000 MG: 1 INJECTION, POWDER, LYOPHILIZED, FOR SOLUTION INTRAVENOUS at 21:40

## 2022-07-14 RX ADMIN — LIDOCAINE HYDROCHLORIDE 80 MG: 20 INJECTION, SOLUTION EPIDURAL; INFILTRATION; INTRACAUDAL; PERINEURAL at 13:06

## 2022-07-14 RX ADMIN — OXYCODONE 5 MG: 5 TABLET ORAL at 04:48

## 2022-07-14 RX ADMIN — PROPOFOL 20 MG: 10 INJECTION, EMULSION INTRAVENOUS at 13:11

## 2022-07-14 RX ADMIN — PROPOFOL 100 MG: 10 INJECTION, EMULSION INTRAVENOUS at 13:15

## 2022-07-14 RX ADMIN — SODIUM CHLORIDE, SODIUM LACTATE, POTASSIUM CHLORIDE, AND CALCIUM CHLORIDE: 600; 310; 30; 20 INJECTION, SOLUTION INTRAVENOUS at 14:12

## 2022-07-14 RX ADMIN — SODIUM CHLORIDE, SODIUM LACTATE, POTASSIUM CHLORIDE, AND CALCIUM CHLORIDE 125 ML/HR: 600; 310; 30; 20 INJECTION, SOLUTION INTRAVENOUS at 17:16

## 2022-07-14 RX ADMIN — FENTANYL CITRATE 50 MCG: 50 INJECTION, SOLUTION INTRAMUSCULAR; INTRAVENOUS at 13:02

## 2022-07-14 RX ADMIN — OXYCODONE 5 MG: 5 TABLET ORAL at 00:36

## 2022-07-14 RX ADMIN — PROPOFOL 180 MG: 10 INJECTION, EMULSION INTRAVENOUS at 13:08

## 2022-07-14 RX ADMIN — SODIUM CHLORIDE, POTASSIUM CHLORIDE, SODIUM LACTATE AND CALCIUM CHLORIDE 100 ML/HR: 600; 310; 30; 20 INJECTION, SOLUTION INTRAVENOUS at 17:17

## 2022-07-14 RX ADMIN — HYDROMORPHONE HYDROCHLORIDE 0.5 MG: 1 INJECTION, SOLUTION INTRAMUSCULAR; INTRAVENOUS; SUBCUTANEOUS at 16:00

## 2022-07-14 RX ADMIN — HYDROMORPHONE HYDROCHLORIDE 0.5 MG: 1 INJECTION, SOLUTION INTRAMUSCULAR; INTRAVENOUS; SUBCUTANEOUS at 11:10

## 2022-07-14 RX ADMIN — ROCURONIUM BROMIDE 40 MG: 10 INJECTION INTRAVENOUS at 13:09

## 2022-07-14 RX ADMIN — ROCURONIUM BROMIDE 10 MG: 10 INJECTION INTRAVENOUS at 14:00

## 2022-07-14 RX ADMIN — HYDROMORPHONE HYDROCHLORIDE 0.5 MG: 1 INJECTION, SOLUTION INTRAMUSCULAR; INTRAVENOUS; SUBCUTANEOUS at 17:18

## 2022-07-14 RX ADMIN — PROPOFOL 75 MCG/KG/MIN: 10 INJECTION, EMULSION INTRAVENOUS at 13:22

## 2022-07-14 RX ADMIN — SODIUM CHLORIDE, PRESERVATIVE FREE 10 ML: 5 INJECTION INTRAVENOUS at 06:23

## 2022-07-14 RX ADMIN — CEFEPIME HYDROCHLORIDE 2 G: 2 INJECTION, POWDER, FOR SOLUTION INTRAVENOUS at 23:06

## 2022-07-14 RX ADMIN — CEFEPIME HYDROCHLORIDE 2 G: 2 INJECTION, POWDER, FOR SOLUTION INTRAVENOUS at 06:22

## 2022-07-14 RX ADMIN — ONDANSETRON HYDROCHLORIDE 4 MG: 2 SOLUTION INTRAMUSCULAR; INTRAVENOUS at 14:31

## 2022-07-14 RX ADMIN — HYDROMORPHONE HYDROCHLORIDE 0.5 MG: 1 INJECTION, SOLUTION INTRAMUSCULAR; INTRAVENOUS; SUBCUTANEOUS at 22:01

## 2022-07-14 RX ADMIN — SODIUM CHLORIDE, PRESERVATIVE FREE 10 ML: 5 INJECTION INTRAVENOUS at 21:43

## 2022-07-14 RX ADMIN — SODIUM CHLORIDE, PRESERVATIVE FREE 10 ML: 5 INJECTION INTRAVENOUS at 17:15

## 2022-07-14 RX ADMIN — FENTANYL CITRATE 50 MCG: 50 INJECTION, SOLUTION INTRAMUSCULAR; INTRAVENOUS at 13:06

## 2022-07-14 RX ADMIN — HYDROMORPHONE HYDROCHLORIDE 0.5 MG: 1 INJECTION, SOLUTION INTRAMUSCULAR; INTRAVENOUS; SUBCUTANEOUS at 02:40

## 2022-07-14 RX ADMIN — HYDROMORPHONE HYDROCHLORIDE 1 MG: 2 INJECTION, SOLUTION INTRAMUSCULAR; INTRAVENOUS; SUBCUTANEOUS at 15:05

## 2022-07-14 RX ADMIN — FENTANYL CITRATE 50 MCG: 50 INJECTION, SOLUTION INTRAMUSCULAR; INTRAVENOUS at 13:51

## 2022-07-14 RX ADMIN — FENTANYL CITRATE 50 MCG: 50 INJECTION, SOLUTION INTRAMUSCULAR; INTRAVENOUS at 14:44

## 2022-07-14 RX ADMIN — FENTANYL CITRATE 50 MCG: 50 INJECTION, SOLUTION INTRAMUSCULAR; INTRAVENOUS at 12:58

## 2022-07-14 NOTE — PROGRESS NOTES
Plastic Surgery Progress note:      S: Patient is s/p bilateral mastectomies with prepectoral tissue expander based breast reconstruction. Patient is admited for right breast erythema. In the interim she underwent an aspiration of right breast fluid collection. 3cc's were removed and sent for culture. Culture show no growth to date and negative gram stain. WBC count = 6. Patient feels better today but still complains of pain at the right breast.     O: VSS. Afebrile x 48 hours. Breast: bilateral breasts are soft, there is no erythema to the right breast.Breast remains tender to light touch. The triple point has some superficial slough to it. The left breast appears benign, there is a superficial wound healing delay along the triple point with some associated erythema. Drains with straw colored fluid, slightly cloudier on the right . A/P: S/P bilateral mastectomies with prepectoral tissue expander based breast reconstruction. Patient is admited for right breast erythema. Leukocytosis has resolved. Patient is now afebrile x 48  hours. . Erythema has resolved with IV antibiotics. NGTD on aspiration fluid       I discussed that we will return to the OR today for right breast washout, debridement and removal of the right tissue expander. I did  her that if the pocket appears grossly purulent we will not plan to replace the device. Of note the patient has a small area of delayed wound healing on the left breast triple point that we will excise and close in the OR.  Patient reflects understanding of the surgical plan and all questions were answered.     -Continue IV antibiotics

## 2022-07-14 NOTE — ANESTHESIA PREPROCEDURE EVALUATION
Relevant Problems   No relevant active problems       Anesthetic History   No history of anesthetic complications            Review of Systems / Medical History  Patient summary reviewed and pertinent labs reviewed    Pulmonary            Asthma : well controlled       Neuro/Psych         Psychiatric history  Pertinent negatives: No seizures, TIA and CVA   Cardiovascular                Pertinent negatives: No past MI, angina and CHF  Exercise tolerance: >4 METS     GI/Hepatic/Renal  Within defined limits           Pertinent negatives: No renal disease   Endo/Other        Obesity, arthritis and cancer  Pertinent negatives: No diabetes   Other Findings   Comments: Hx of provoked DVTs in 2020, not chronically anticoagulated           Physical Exam    Airway  Mallampati: II  TM Distance: 4 - 6 cm  Neck ROM: normal range of motion   Mouth opening: Normal     Cardiovascular      Rate: normal         Dental  No notable dental hx       Pulmonary  Breath sounds clear to auscultation               Abdominal  GI exam deferred       Other Findings            Anesthetic Plan    ASA: 3  Anesthesia type: general          Induction: Intravenous  Anesthetic plan and risks discussed with: Patient

## 2022-07-14 NOTE — INTERVAL H&P NOTE
Update History & Physical    The Patient's History and Physical of July 14,   The surgical plan was reviewed with the patient and I examined the patient. There was no change. The surgical site was confirmed by the patient and me. Plan:  The risk, benefits, expected outcome, and alternative to the recommended procedure have been discussed with the patient. Patient understands and wants to proceed with the procedure.     Electronically signed by Marcelino Cole MD on 7/14/2022 at 12:38 PM

## 2022-07-14 NOTE — OP NOTES
Operative Note    Patient: Kim Lang  YOB: 1965  MRN: [id number]    Date of Procedure: 7/14/2022     Pre-Op Diagnosis:   1. Right breast cancer   2. Absence of bilateral breasts  3. S/p bilateral breast reconstruction with tissue expanders  4. Right breast cellulitis   5. Left breast triple point delayed wound healing    Post-Op Diagnosis:    1. Right breast cancer   2. Abscence of bilateral breasts  3. S/p bilateral breast reconstruction with tissue expanders  4. Right breast infected tissue expander  5.  Left breast triple point delayed wound healing    Procedure(s):  RIGHT BREAST IRRIGATION AND DEBRIDEMENT REMOVAL OF RIGHT TISSUE EXPANDER AND FLEX HD  Closure of 1x2cm left breast triple point wound     Surgeon(s):  Gisella Oropeza MD    Surgical Assistant: Surg Asst-1: Judy SHELDON    Anesthesia: General     Estimated Blood Loss (mL):  less than 50     Complications: None    Specimens:   ID Type Source Tests Collected by Time Destination   1 : RIGHT breast tissue expander Preservative Breast  Gisella Oropeza MD 7/14/2022 1414 Pathology   1 : RIGHT breast drain site #1 Wound Incision CULTURE, AEROBIC AND ANAEROBIC, CULTURE, Reyna Steele MD 7/14/2022 1330 Microbiology   2 : RIGHT breast drain site # 2 Body Fluid Wound CULTURE, AEROBIC AND ANAEROBIC, CULTURE, FUNGUS, CULTURE & SMEAR, AFB Gisella Oropeza MD 7/14/2022 1332 Microbiology   3 : RIGHT breast pocket Wound Breast CULTURE, FUNGUS, CULTURE, AEROBIC AND ANAEROBIC Gisella Oropeza MD 7/14/2022 1354 Microbiology   4 : martin expander fluid RIGHT breast  Body Fluid Breast CULTURE, AEROBIC AND ANAEROBIC, CULTURE, Reyna Steele MD 7/14/2022 1400 Microbiology   5 : martin expander fluid RIGHT breast #2 Body Fluid Breast CULTURE, AEROBIC AND ANAEROBIC, CULTURE & SMEAR, AFB, CULTURE, Reyna Steele MD 7/14/2022 1402 Microbiology   6 : RIGHT breast flex HD tissue  Tissue Breast CULTURE, TISSUE W GRAM STAIN, CULTURE & SMEAR, AFB Reyna Adams MD 7/14/2022 1418 Microbiology        Implants:   Implant Name Type Inv. Item Serial No.  Lot No. LRB No. Used Action   EXPANDER TISS BRST 6.4-7.9 CM [id number] -720 CC [id number]  EXPANDER TISS BRST 6.4-7.9 CM 15X13.8 -720 CC [id number] [employer] NA Right 1 Explanted       Drains:   Alonna Cheek #1 07/14/22 Outer;Right Breast (Active)       [REMOVED] Pedro-Johnston Drain 06/27/22 Left Breast (Removed)   Site Assessment Pink;Clean, dry, & intact 07/13/22 0812   Dressing Status Other (comment) 07/13/22 0812   Status Patent;Draining; Charged 07/13/22 0812   Drainage Color Yellow 07/13/22 1808   Output (ml) 10 ml 07/14/22 0624       [REMOVED] Pedro-Johnston Drain 06/27/22 Right Breast (Removed)   Site Assessment Pink;Clean, dry, & intact 07/13/22 0812   Dressing Status Other (comment) 07/13/22 0812   Status Patent; Charged;Draining 07/13/22 0812   Drainage Color Yellow 07/13/22 1808   Output (ml) 10 ml 07/14/22 0624       Findings: The right breast tissue expander was noted to be bathed in purulent cloudy fluid. Of note on removal there was a puncture to the right tissue expander. BRIEF HISTORY:  The patient is a 56 year-old female patient who presented with a known right breast cancer, see initial consult note for more details. Patient is now s/p bilateral total mastectomy with immediate tissue expander reconstruction. She developed cellulitis of the right breast and low grade fevers. She clinically improved with hospital admission and IV antibiotics. We will plan to return to the OR for irrigation and debridement of the right breast. I discussed in detail that if the pocket was noted to be overtly purulent we would plan to remove the tissue expander and not replace the device.  If pocket appeared clean we would thoroughly irrigate and plan to replace the expander and FLEX HD.      The overall procedure, incisional approach, expected outcomes and recovery were discussed.  The risks, benefits and alternatives to the procedure including but not limited to bleeding, infection, damage to surrounding tissue structures, the need for revisional surgery, seroma, hematoma, skin flap loss, fat necrosis, non-incorporation of acellular dermal matrix, asymmetry, PE, DVT and fat embolism were discussed. Lico Pearson understood these risks and agreed to proceed.     PROCEDURE:  Preoperative markings were made with the patient in the standing position.  Informed consent was obtained.  The patient was taken to the operating room, placed supine on the operating table.  Sequential compression boots were placed.  General endotracheal anesthesia was obtained.  A lower body Yanely Hugger was placed. Prior to prepping, the bilateral drains were removed.  Of note when removing the right breast drain approximately 20 cc of cloudy fluid was noted to leak from the drain hole, this fluid was sent for culture. The chest was prepped and draped in the usual sterile fashion. The left breast was noted to have a small superficial area of delayed wound healing. This was superficially debrided to find healthy granulation tissues. A 4-0 monocryl was used to close this area.     The right breast IMF incision was opened and the superior flap was raised off the autoderm flap inferiorly. The previous flex HD was visualized and an incision was made in the medial aspect of the FLEX HD. We immediately encountered purulent cloudy fluid bathing the expander. Of note the previously placed FLEX HD had completely incorporated to the surrounding tissues. Given the gross purulence of the tissue expander pocket the decision was made to abort replacement of the device. The previously place FLEX HD was removed. Specimens of the fluid and pocket were sent for culture. The pocket was copiously irrigated with 3 L of triple antibiotic solution, irrisept, betadine, and saline.  Hemostasis was achieved. A 15-Danish Nikolai Nakai drain was brought through the lateral inferior chest wall and secured to the skin with 3-0 nylon and placed within the wound bed.  The wounds were then definitively closed with a running subcutaneous and deep dermal 2-0 Monocryl and a running subcuticular 3-0 Monocryl on all areas.   The wounds were then dressed with steri strips, 4 x 4s, Medipore tape.  A surgical bra was placed. Zenovia Kaur was then discontinued.  The patient extubated in the operating room having tolerated the procedure well.  The needle, instrument, and laparotomy pad counts at the end of the case were correct.     Anam Melchor MD      Electronically Signed by Anam Melchor MD on 7/14/2022 at 3:02 PM

## 2022-07-14 NOTE — PROGRESS NOTES
Bedside shift change report given to Anitha Thayer rn (oncoming nurse) by Rudy Santos (offgoing nurse). Report included the following information SBAR, Kardex, ED Summary, Procedure Summary, Intake/Output and MAR.

## 2022-07-14 NOTE — ANESTHESIA POSTPROCEDURE EVALUATION
Procedure(s):  RIGHT BREAST IRRIGATION AND DEBRIDEMENT REMOVAL OF RIGHT TISSUE EXPANDER AND FLEX HD, CLOSURE OF LEFT BREAST TRIPLE POINT WOUND.    general    Anesthesia Post Evaluation      Multimodal analgesia: multimodal analgesia used between 6 hours prior to anesthesia start to PACU discharge  Patient location during evaluation: PACU  Patient participation: complete - patient participated  Level of consciousness: awake  Pain management: adequate  Airway patency: patent  Anesthetic complications: no  Cardiovascular status: acceptable and stable  Respiratory status: acceptable and unassisted  Hydration status: acceptable  Comments: The patient was seen and evaluated in the post-operative period. The time of my evaluation may not match the time of this note. The patient denied uncontrolled pain or nausea, and there were no significant complications evident. Kristine Pan MD      Post anesthesia nausea and vomiting:  none  Final Post Anesthesia Temperature Assessment:  Normothermia (36.0-37.5 degrees C)      INITIAL Post-op Vital signs:   Vitals Value Taken Time   /55 07/14/22 1530   Temp 36.3 °C (97.4 °F) 07/14/22 1517   Pulse 87 07/14/22 1533   Resp 16 07/14/22 1533   SpO2 94 % 07/14/22 1533   Vitals shown include unvalidated device data.

## 2022-07-14 NOTE — PROGRESS NOTES
Physician Progress Note      Keon Mena  CSN #:                  [id number]  :                       1965  ADMIT DATE:       2022 3:38 PM  [address] [place] DATE:  RESPONDING  PROVIDER #:        Daphnie Atkins MD          QUERY TEXTRudolfo Side AFternoon    This patinet admitted for Right breast cellulitis. The patient is s/p Celso. mastectomies with tissue expander breast reconstruction on .    ? If possible,  can you please clarify and please document in progress notes and discharge summary:    The medical record reflects the following:  Risk Factors: Known Breast cancer and is s/p mastectomies and breast reconstruction 2022  Clinical Indicators: Presented to ER with right breast redness, WBC 15.0---found to have small fluid collection on the right breast. Pt is s/p breast reconstruction on 2022  Treatment: To IR for aspiration of the breast fluid, IV abx, Vanco, and IV cefepime, and NPO, possible OR for Washout and device replacement    Thank you  JAKI Correan,RN, CPQ, [facility], [facility]  [phone]  Options provided:  -- Right breast Cellulitis is a?postoperative complication  -- Right breast Cellulitis  is not a postoperative complication, but is due to (please document the causing factor)  -- Right breast cellulitis  is not a postoperative complication, but is due to other incidental risk factor, Please specify other incidental risk factor. -- Other - I will add my own diagnosis  -- Disagree - Not applicable / Not valid  -- Disagree - Clinically unable to determine / Unknown  -- Refer to Clinical Documentation Reviewer    PROVIDER RESPONSE TEXT:    Patient has Right breast Cellulitis as a?postoperative complication.     Query created by: Reather Cranker on 2022 2:23 PM      Electronically signed by:  Daphnie Atkins MD 2022 3:01 PM

## 2022-07-14 NOTE — PROGRESS NOTES
Problem: Impaired Skin Integrity/Pressure Injury Treatment  Goal: *Improvement of Existing Pressure Injury  Outcome: Progressing Towards Goal     Problem: Pain  Goal: *Control of Pain  Outcome: Progressing Towards Goal     Problem: Falls - Risk of  Goal: *Absence of Falls  Description: Document Brayan Fall Risk and appropriate interventions in the flowsheet.   Outcome: Progressing Towards Goal  Note: Fall Risk Interventions:            Medication Interventions: Bed/chair exit alarm,Evaluate medications/consider consulting pharmacy,Teach patient to arise slowly,Patient to call before getting OOB                   Problem: Fluid Volume - Risk of, Imbalanced  Goal: *Balanced intake and output  Outcome: Progressing Towards Goal

## 2022-07-14 NOTE — PROGRESS NOTES
Bedside and Verbal shift change report given to Amanda Torres RN (oncoming nurse) by Shannan Draper RN (offgoing nurse). Report included the following information SBAR, Kardex, OR Summary, Procedure Summary, Intake/Output, MAR and Accordion.

## 2022-07-14 NOTE — PERIOP NOTES
TRANSFER - OUT REPORT:    Verbal report given to [place] on Tolu Kelsi Stevenson 794 being transferred to 5th floor; room 504 for routine post - op       Report consisted of patients Situation, Background, Assessment and   Recommendations(SBAR). Information from the following report(s) SBAR was reviewed with the receiving nurse. Opportunity for questions and clarification was provided.

## 2022-07-15 VITALS
HEIGHT: 65 IN | SYSTOLIC BLOOD PRESSURE: 120 MMHG | RESPIRATION RATE: 18 BRPM | HEART RATE: 68 BPM | TEMPERATURE: 98.1 F | BODY MASS INDEX: 36.99 KG/M2 | WEIGHT: 222 LBS | OXYGEN SATURATION: 98 % | DIASTOLIC BLOOD PRESSURE: 66 MMHG

## 2022-07-15 LAB
BACTERIA SPEC CULT: NORMAL
BASOPHILS # BLD: 0 K/UL (ref 0–0.1)
BASOPHILS NFR BLD: 0 % (ref 0–1)
DIFFERENTIAL METHOD BLD: ABNORMAL
EOSINOPHIL # BLD: 0 K/UL (ref 0–0.4)
EOSINOPHIL NFR BLD: 0 % (ref 0–7)
ERYTHROCYTE [DISTWIDTH] IN BLOOD BY AUTOMATED COUNT: 13.1 % (ref 11.5–14.5)
GRAM STN SPEC: NORMAL
GRAM STN SPEC: NORMAL
HCT VFR BLD AUTO: 29.7 % (ref 35–47)
HGB BLD-MCNC: 9.4 G/DL (ref 11.5–16)
IMM GRANULOCYTES # BLD AUTO: 0 K/UL (ref 0–0.04)
IMM GRANULOCYTES NFR BLD AUTO: 1 % (ref 0–0.5)
LYMPHOCYTES # BLD: 1.2 K/UL (ref 0.8–3.5)
LYMPHOCYTES NFR BLD: 24 % (ref 12–49)
MCH RBC QN AUTO: 30.1 PG (ref 26–34)
MCHC RBC AUTO-ENTMCNC: 31.6 G/DL (ref 30–36.5)
MCV RBC AUTO: 95.2 FL (ref 80–99)
MONOCYTES # BLD: 0.3 K/UL (ref 0–1)
MONOCYTES NFR BLD: 7 % (ref 5–13)
NEUTS SEG # BLD: 3.4 K/UL (ref 1.8–8)
NEUTS SEG NFR BLD: 68 % (ref 32–75)
NRBC # BLD: 0 K/UL (ref 0–0.01)
NRBC BLD-RTO: 0 PER 100 WBC
PLATELET # BLD AUTO: 312 K/UL (ref 150–400)
PMV BLD AUTO: 10.4 FL (ref 8.9–12.9)
RBC # BLD AUTO: 3.12 M/UL (ref 3.8–5.2)
SERVICE CMNT-IMP: NORMAL
WBC # BLD AUTO: 5 K/UL (ref 3.6–11)

## 2022-07-15 PROCEDURE — 74011250636 HC RX REV CODE- 250/636: Performed by: STUDENT IN AN ORGANIZED HEALTH CARE EDUCATION/TRAINING PROGRAM

## 2022-07-15 PROCEDURE — 74011250637 HC RX REV CODE- 250/637: Performed by: STUDENT IN AN ORGANIZED HEALTH CARE EDUCATION/TRAINING PROGRAM

## 2022-07-15 PROCEDURE — 85025 COMPLETE CBC W/AUTO DIFF WBC: CPT

## 2022-07-15 PROCEDURE — 36415 COLL VENOUS BLD VENIPUNCTURE: CPT

## 2022-07-15 PROCEDURE — 74011000250 HC RX REV CODE- 250: Performed by: STUDENT IN AN ORGANIZED HEALTH CARE EDUCATION/TRAINING PROGRAM

## 2022-07-15 PROCEDURE — 74011000258 HC RX REV CODE- 258: Performed by: STUDENT IN AN ORGANIZED HEALTH CARE EDUCATION/TRAINING PROGRAM

## 2022-07-15 RX ORDER — OXYCODONE HYDROCHLORIDE 5 MG/1
5 TABLET ORAL
Qty: 30 TABLET | Refills: 0 | Status: SHIPPED | OUTPATIENT
Start: 2022-07-15 | End: 2022-07-22

## 2022-07-15 RX ORDER — FLUCONAZOLE 150 MG/1
TABLET ORAL
Qty: 3 TABLET | Refills: 0 | Status: SHIPPED | OUTPATIENT
Start: 2022-07-15 | End: 2022-09-12

## 2022-07-15 RX ORDER — LEVOFLOXACIN 250 MG/1
750 TABLET ORAL DAILY
Qty: 42 TABLET | Refills: 0 | Status: SHIPPED | OUTPATIENT
Start: 2022-07-15 | End: 2022-07-29

## 2022-07-15 RX ORDER — SULFAMETHOXAZOLE AND TRIMETHOPRIM 800; 160 MG/1; MG/1
1 TABLET ORAL 2 TIMES DAILY
Qty: 14 TABLET | Refills: 0 | Status: SHIPPED | OUTPATIENT
Start: 2022-07-15 | End: 2022-07-15

## 2022-07-15 RX ORDER — METRONIDAZOLE 500 MG/1
500 TABLET ORAL 3 TIMES DAILY
Qty: 42 TABLET | Refills: 0 | Status: SHIPPED | OUTPATIENT
Start: 2022-07-15 | End: 2022-07-29

## 2022-07-15 RX ADMIN — SODIUM CHLORIDE, PRESERVATIVE FREE 10 ML: 5 INJECTION INTRAVENOUS at 13:18

## 2022-07-15 RX ADMIN — BACITRACIN ZINC: 500 OINTMENT TOPICAL at 09:48

## 2022-07-15 RX ADMIN — HYDROMORPHONE HYDROCHLORIDE 0.5 MG: 1 INJECTION, SOLUTION INTRAMUSCULAR; INTRAVENOUS; SUBCUTANEOUS at 02:54

## 2022-07-15 RX ADMIN — OXYCODONE 10 MG: 5 TABLET ORAL at 08:16

## 2022-07-15 RX ADMIN — OXYCODONE 10 MG: 5 TABLET ORAL at 13:17

## 2022-07-15 RX ADMIN — VANCOMYCIN HYDROCHLORIDE 1000 MG: 1 INJECTION, POWDER, LYOPHILIZED, FOR SOLUTION INTRAVENOUS at 09:47

## 2022-07-15 RX ADMIN — CEFEPIME HYDROCHLORIDE 2 G: 2 INJECTION, POWDER, FOR SOLUTION INTRAVENOUS at 13:18

## 2022-07-15 RX ADMIN — CEFEPIME HYDROCHLORIDE 2 G: 2 INJECTION, POWDER, FOR SOLUTION INTRAVENOUS at 05:45

## 2022-07-15 RX ADMIN — ACETAMINOPHEN 650 MG: 325 TABLET ORAL at 11:22

## 2022-07-15 RX ADMIN — OXYCODONE 10 MG: 5 TABLET ORAL at 05:50

## 2022-07-15 NOTE — PROGRESS NOTES
Called Pharmacy back regarding patient's culture and abx don/t match. Per pharmacist Eliana Tapia), patient has pseudomonas in her tissue culture and that she would different abx. Pharmacist to speak with Dr. Luis Vences MD's contact # was given.

## 2022-07-15 NOTE — PROGRESS NOTES
Pharmacist Discharge Medication Reconciliation    Discharge Provider:  Dr. Beata Colbert       Discharge Medications:      My Medications        START taking these medications        Instructions Each Dose to Equal Morning Noon Evening Bedtime   fluconazole 150 mg tablet  Commonly known as: DIFLUCAN    Your last dose was: Your next dose is: Take if symptoms of yeast                  levoFLOXacin 250 mg tablet  Commonly known as: LEVAQUIN    Your last dose was: Your next dose is: Take 3 Tablets by mouth daily for 14 days. 750 mg                 metroNIDAZOLE 500 mg tablet  Commonly known as: FLAGYL    Your last dose was: Your next dose is: Take 1 Tablet by mouth three (3) times daily for 14 days. 500 mg                        CHANGE how you take these medications        Instructions Each Dose to Equal Morning Noon Evening Bedtime   * oxyCODONE IR 5 mg immediate release tablet  Commonly known as: ROXICODONE  What changed: You were already taking a medication with the same name, and this prescription was added. Make sure you understand how and when to take each. Your last dose was: Your next dose is: Take 1 Tablet by mouth every four (4) hours as needed for Pain for up to 7 days. Max Daily Amount: 30 mg.   5 mg                 * oxyCODONE IR 5 mg immediate release tablet  Commonly known as: Whit Balsam  What changed: Another medication with the same name was added. Make sure you understand how and when to take each. Your last dose was: Your next dose is: Take 5 mg by mouth four (4) times daily as needed for Pain. 5 mg                       * This list has 2 medication(s) that are the same as other medications prescribed for you. Read the directions carefully, and ask your doctor or other care provider to review them with you.                 CONTINUE taking these medications        Instructions Each Dose to Equal Morning Noon Evening Bedtime   melatonin 5 mg tablet    Your last dose was: Your next dose is: Take 10 mg by mouth nightly as needed. 10 mg                 ondansetron 4 mg disintegrating tablet  Commonly known as: ZOFRAN ODT    Your last dose was: Your next dose is: Take 4 mg by mouth every eight (8) hours as needed for Nausea or Vomiting. 4 mg                 Stool Softener 100 mg capsule  Generic drug: docusate sodium    Your last dose was: Your next dose is: Take 100 mg by mouth two (2) times a day. Bid while on pain medication   100 mg                 Tylenol Extra Strength 500 mg tablet  Generic drug: acetaminophen    Your last dose was: Your next dose is: Take 1,000 mg by mouth daily as needed for Pain.   1,000 mg                 Valium 5 mg tablet  Generic drug: diazePAM    Your last dose was: Your next dose is: Take 5 mg by mouth every eight (8) hours as needed for Muscle Spasm(s). 5 mg                        STOP taking these medications      amoxicillin-clavulanate 875-125 mg per tablet  Commonly known as: Augmentin               ASK your doctor about these medications        Instructions Each Dose to Equal Morning Noon Evening Bedtime   Lovenox 40 mg/0.4 mL  Generic drug: enoxaparin    Your last dose was: Your next dose is:         40 mg by SubCUTAneous route daily.    40 mg                           Where to Get Your Medications        These medications were sent to [facility]/pharmacy #[id number] [place] - [address] [address] [place] [address] [address]Br[address], Jessic[place]armela [place] [address]      Hours: 24-hours Phone: [phone]   fluconazole 150 mg tablet  levoFLOXacin 250 mg tablet  metroNIDAZOLE 500 mg tablet  oxyCODONE IR 5 mg immediate release tablet        The patient's chart, MAR, and AVS were reviewed by   ALEXX Hopkins Celso Emilio Rosalba 23: [phone]

## 2022-07-15 NOTE — PROGRESS NOTES
7/15/2022  Case Management Progress Note    11:37 AM  Patient is 56year old female admitted 7/11 with cellulitis of right breast  Patient's RUR is 7% green/low risk for readmission  Covid test: none this admission  Chart reviewed--patient discussed at [facility] rounds  Patient had washout in the OR yesterday, noted discharge order for today. Patient does not have any noted needs for home and plan remains for her to discharge with family support. OK for discharge from CM standpoint. Transition of Care Plan   1. Discharge order already placed  2. Home with family assistance today   3. Family will transport at discharge   4. Follow up outpatient as indicated  5.  CM will continue to follow    COSME Romero

## 2022-07-15 NOTE — DISCHARGE SUMMARY
Discharge Summary    Date: 7/15/2022  Patient Name: Pino Woody YOB: 1965 Age: 56 y.o. Admit Date: 7/11/2022  Discharge Date: 6/24/2020  Discharge Condition: Good    Discharge Diagnosis  Active Problems: Cellulitis of right breastResolved Problems: * No resolved hospital problems. [facility] Stay  Narrative of Hospital Course:  Patient is a 56 y.o.  female who presented initially with right breast erythema. Patient originally underwent bilateral mastectomies with bilateral breast reconstruction on 6/27/22. Patient's reconstruction was prepectoral with tissue expander and FLEX HD placement. Patient noted on Sunday (7/10/22) she started having low grade fever to 100.4. She was admitted and noted to have a leukocytosis of 15 as well as erythema over the right breast and pain. Ultrasound showed a 3 cm fluid collection in the lower lateral aspect of the breast.     Patient was started on IV vanc/cefepime and clinically improved with resolution of leukocytosis and erythema. On HD1 she was taken for aspiration of fluid from the right breast that did not result in positive cultures or gram stain. On 7/14 patient was taken to the OR. Intraoperatively she was noted to have gross purulences adjacent to the expander. The right breast expander was removed and not replaced given the contaminated appearance of the pocket. Cultures and gram stain from the OR show only WBCs with no organisms. On POD 1 patient states she is feeling better with improved pain. We will plan to discharge her today on PO antibiotics with plans for close follow up. Consultants:  None    Surgeries/procedures Performed: On 7/14 patient was taken to the OR. Intraoperatively she was noted to have gross purulences adjacent to the expander. The right breast expander was removed and not replaced given the contaminated appearance of the pocket.      Treatments:    Analgesia, Antibiotics and IV Hydration    Acetaminophen and Dilaudid, Vancomycin and Other    Discharge Plan:  Home    Hospital/Incidental Findings Requiring Follow Up:    Patient Instructions:    Diet: Regular Diet    Activity:No Lifting, Driving or Strenuous Excercise  For number of days (if applicable): Other Instructions:    Provider Follow-Up:   No orders of the defined types were placed in this encounter. Significant Diagnostic Studies:    Recent Labs:  Admission on 07/11/2022No results displayed because visit has over 200 results. ------------    Radiology last 7 days:    Pending Labs   Order Current Status  AFB CULTURE + SMEAR W/RFLX ID FROM CULTURE In process  AFB CULTURE + SMEAR W/RFLX ID FROM CULTURE In process  CULTURE, ANAEROBIC In process  CULTURE, ANAEROBIC In process  CULTURE, FUNGUS In process  CULTURE, FUNGUS In process  CULTURE, FUNGUS In process  CULTURE, FUNGUS In process  CULTURE, ANAEROBIC Preliminary result  CULTURE, BLOOD, PAIRED Preliminary result  CULTURE, BODY FLUID W GRAM STAIN Preliminary result  CULTURE, BODY FLUID W GRAM STAIN Preliminary result  CULTURE, BODY FLUID W GRAM STAIN Preliminary result  CULTURE, BODY FLUID W GRAM STAIN Preliminary result  CULTURE, FUNGUS Preliminary result  CULTURE, TISSUE W GRAM STAIN Preliminary result  CULTURE, WOUND W GRAM STAIN Preliminary result  CULTURE, WOUND W GRAM STAIN Preliminary result      Discharge Medications    Current Discharge Medication ListSTART taking these medicationstrimethoprim-sulfamethoxazole (BACTRIM DS, SEPTRA DS) 160-800 mg per tabletTake 1 Tablet by mouth two (2) times a day for 7 days. Qty: 14 Tablet Refills: 0Start date: 7/15/2022, End date: 7/22/2022!! oxyCODONE IR (ROXICODONE) 5 mg immediate release tabletTake 1 Tablet by mouth every four (4) hours as needed for Pain for up to 7 days.  Max Daily Amount: 30 mg.Qty: 30 Tablet Refills: 0Start date: 7/15/2022, End date: 7/22/2022Associated Diagnoses:Cellulitis of right breast!! - Potential duplicate medications found. Please discuss with provider. CONTINUE these medications which have NOT CHANGED!! oxyCODONE IR (ROXICODONE) 5 mg immediate release tabletTake 5 mg by mouth four (4) times daily as needed for Pain. ondansetron (ZOFRAN ODT) 4 mg disintegrating tabletTake 4 mg by mouth every eight (8) hours as needed for Nausea or Vomiting.diazePAM (Valium) 5 mg tabletTake 5 mg by mouth every eight (8) hours as needed for Muscle Spasm(s). docusate sodium (Stool Softener) 100 mg capsuleTake 100 mg by mouth two (2) times a day. Bid while on pain medicationacetaminophen (Tylenol Extra Strength) 500 mg tabletTake 1,000 mg by mouth daily as needed for Pain.melatonin 5 mg tabletTake 10 mg by mouth nightly as needed. !! - Potential duplicate medications found. Please discuss with provider. STOP taking these medicationsamoxicillin-clavulanate (Augmentin) 875-125 mg per tabletComments:Reason for Stopping:    Time Spent on Discharge:3E] minutes were spent in patient examination, evaluation, counseling as well as medication reconciliation, prescriptions for required medications, discharge plan, and follow up.     Electronically signed by Charly Shanks MD on 7/15/22 at 7:48 AM

## 2022-07-15 NOTE — PROGRESS NOTES
Bedside and Verbal shift change report given to Sandy Frey (oncoming nurse) by Kat Pathak RN (offgoing nurse). Report included the following information SBAR, Kardex, Intake/Output and Recent Results.

## 2022-07-15 NOTE — PROGRESS NOTES
Bedside and Verbal shift change report given to Abdi Celeste RN (oncoming nurse) by Guadalupe Mishra RN (offgoing nurse). Report included the following information SBAR, Kardex, Procedure Summary, MAR and Recent Results.

## 2022-07-16 LAB
BACTERIA SPEC CULT: ABNORMAL
BACTERIA SPEC CULT: NORMAL
GRAM STN SPEC: ABNORMAL
SERVICE CMNT-IMP: ABNORMAL
SERVICE CMNT-IMP: NORMAL

## 2022-07-17 LAB
BACTERIA SPEC CULT: ABNORMAL
GRAM STN SPEC: ABNORMAL
SERVICE CMNT-IMP: ABNORMAL

## 2022-07-17 NOTE — PROGRESS NOTES
Physician Progress Note      Dennis Mata  CSN #:                  [id number]  :                       1965  ADMIT DATE:       2022 3:38 PM  [address] [place] DATE:        7/15/2022 5:49 PM  RESPONDING  PROVIDER #:        Blake Trotter MD          QUERY TEXTSylvester Shade Afternoon    This patient admitted for right breast cellulitis and is s/p Celso. mastectomies and breast reconstruction and  implants    If possible, can you please clarify if in your medical opinion and  please document in progress notes and discharge summary the relationship, if any, between Right breast cellulitis and breast implants. The medical record reflects the following:  Risk Factors: Breast cancer. Clinical Indicators: PT is s/p Celso .mastectomies and breast reconstruction, presented with right breast erythema. now with right breast cellulitis WBC 15.0---US showed a 3M fluid collection in the lower lateral aspect of the breast.  Treatment: To OR noted to have gross purulence adjacent to the expander. Expander was removed and not replaced given the contamination of the pocket. Thank you  Philly [place], [address] [place] [address], [address] [place] [address]  Options provided:  -- Right breast cellulitis was  due to implantation of Breasts implants  -- Right breast cellulitis was  unrelated to Breast implants  -- Other - I will add my own diagnosis  -- Disagree - Not applicable / Not valid  -- Disagree - Clinically unable to determine / Unknown  -- Refer to Clinical Documentation Reviewer    PROVIDER RESPONSE TEXT:    Provider is clinically unable to determine a response to this query. The right breast cellulitis was secondary to right breast tissue expander infection.     Query created by: Juan Daniel Brandt on 7/15/2022 9:48 AM      Electronically signed by:  Blake Trotter MD 2022 9:22 AM

## 2022-07-18 ENCOUNTER — PATIENT OUTREACH (OUTPATIENT)
Dept: CASE MANAGEMENT | Age: 57
End: 2022-07-18

## 2022-07-18 NOTE — PROGRESS NOTES
Care Transitions Initial Call    Call within 2 business days of discharge: Yes     Patient: Riccardo Clark Patient : 1965 MRN: [id number]    Last Discharge [address]       Complaint Diagnosis Description Type Department Provider    22 Post-Op Problem; Fever Cellulitis of right breast ED to Hosp-Admission (Discharged) (ADMIT) [id number] Malick Rahman MD; Noah Gilman. Was this an external facility discharge? Yes, [facility] -7/15 Discharge Facility    Challenges to be reviewed by the provider   Additional needs identified to be addressed with provider: yes  [facility] -7/15 Cellulitis right breast  -removed right breast expander. Insurance would not dispense Levaquin 250mg 3 tabs q d for 14 days. Only provided her with 14 days total. Patient has LM at 's office for them to do a prior authorization for the medication. Method of communication with provider : chart routing    Discussed [phone] related testing which was not done at this time. Advance Care Planning:   Does patient have an Advance Directive: not on file, declined education at this time. Inpatient Readmission Risk score: Unplanned Readmit Risk Score: 7.4 ( )    Was this a readmission? yes   Patient stated reason for the admission: redness/fever concerning right breast    Patients top risk factors for readmission: medical condition-breast cancer- s/p bilateral mastectomy  with bilateral breast reconstruction    Interventions to address risk factors: Scheduled appointment with PCP-Dr.Melisa Esteves Dry  at 11am, Scheduled appointment with Specialist-,oncologist, reminded to call and schedule f/u; f/u with surgeon-  and Obtained and reviewed discharge summary and/or continuity of care documents. Scheduled to see plastic surgeon, Tomi Gonzales, on . Care Transition Nurse (CTN) contacted the patient by telephone to perform post hospital discharge assessment.  Verified name and  with patient as identifiers. Provided introduction to self, and explanation of the CTN role. Reports she is doing fine, no fever. Sees surgeon,, on 7/20 and pcp on 7/21. Pain about #5, had taken most recent dose of Oxycodone IR 5mg at 8am (about 5 hours prior). Insurance not wanting to cover the Levaquin- only would provide #14 tablets. New sig is Levaquin 250mg, 3 tabs qd , x 14 days. Said she had notified 's office so they can do a prior authorization for her. CTN reviewed discharge instructions, medical action plan and red flags with patient who verbalized understanding. Were discharge instructions available to patient? yes. Reviewed appropriate site of care based on symptoms and resources available to patient including: PCP, Specialist, Urgent Care Clinics, When to call 911 and [address]. Patient given an opportunity to ask questions and does not have any further questions or concerns at this time. The patient agrees to contact the PCP office for questions related to their healthcare. Medication reconciliation was performed with patient, who verbalizes understanding of administration of home medications. Advised obtaining a 90-day supply of all daily and as-needed medications. Referral to Pharm D needed: no     Home Health/Outpatient orders at discharge: [address]: na  Date of initial visit: na    Durable Medical Equipment ordered at discharge: None  [address]: na  Durable Medical Equipment received: na    Was patient discharged with a pulse oximeter? no    Discussed follow-up appointments. If no appointment was previously scheduled, appointment scheduling offered: yes. Is follow up appointment scheduled within 7 days of discharge? yes. [address] follow up appointment(s):   Future Appointments   Date Time Provider Joaquin Burk   7/21/2022 11:00 AM Haim Graham MD [facility] BS AMB   8/4/2022  9:00 AM RAD ONC THERAPY [facility]   9/12/2022 8:45 AM Usha Walters MD [facility]     [facility](s): Ledy Hubbard, 7/20    Plan for follow-up call in 7-10 days based on severity of symptoms and risk factors. Plan for next call: symptom management-assess current symptoms, self management-following discharge instructions, follow up appointment-saw pcp for JOSEPHINE visit and medication management-taking meds as ordered. CTN provided contact information for future needs. Goals Addressed                 This Visit's Progress     Prevent complications post hospitalization. 6/29/22 [facility] 6/27-7/28 Bilateral breast mastectomy   Reviewed discharge instructions with patient using teachback.  Reviewed meds with patient, education provided on new meds, using teachback.  Reviewed red flags: fever,nausea,vomiting,diarrhea,sob,dizziness,chest pain, signs of DVT: redness/swelling/tender lower leg.  JOSEPHINE with pcp declined at this time.  F/u  7/5/22.  Given info on [facility] as resource.  Given CTN contact info if questions/concerns.  Reviewed fall precautions with patient- stay hydrated, get up slowly and wait short while before starting to walk, wear good support shoes, remove scatter rugs and excess furniture, have family member with her when walking until dizziness subsides.  CTN to check back in about a week,sooner prn.mbt    7/18/22 [facility] 7/11-7/15 Cellulitis of right breast  Reviewed discharge instructions with patient. Reviewed meds- insurance did not cover entire 14 days of Levaquin 250mg, sig 3 tabs per day. Would only dispense 14 tabs. Patient has contacted plastic surgeon's office for prior auth. Reviewed red flags: fever, increased redness/swelling/warmth, sob,chest discomfort,nausea,vomiting,diarrhea. JOSEPHINE with pcp, 7/21, Kaley Watts. Ledy Hubbard, 7/20. Reminded can call [facility] if needs acute visit (for non emergency concerns). CTN to check back in about a week. mbt

## 2022-07-26 ENCOUNTER — PATIENT OUTREACH (OUTPATIENT)
Dept: CASE MANAGEMENT | Age: 57
End: 2022-07-26

## 2022-07-26 NOTE — PROGRESS NOTES
Called and spoke to patient. Goals        Prevent complications post hospitalization. 6/29/22 [facility] 6/27-7/28 Bilateral breast mastectomy  Reviewed discharge instructions with patient using teachback. Reviewed meds with patient, education provided on new meds, using teachback. Reviewed red flags: fever,nausea,vomiting,diarrhea,sob,dizziness,chest pain, signs of DVT: redness/swelling/tender lower leg. JOSEPHINE with pcp declined at this time. F/u  7/5/22. Given info on [facility] as resource. Given CTN contact info if questions/concerns. Reviewed fall precautions with patient- stay hydrated, get up slowly and wait short while before starting to walk, wear good support shoes, remove scatter rugs and excess furniture, have family member with her when walking until dizziness subsides. CTN to check back in about a week,sooner prn.mbt    7/18/22 [facility] 7/11-7/15 Cellulitis of right breast  Reviewed discharge instructions with patient. Reviewed meds- insurance did not cover entire 14 days of Levaquin 250mg, sig 3 tabs per day. Would only dispense 14 tabs. Patient has contacted plastic surgeon's office for prior auth. Reviewed red flags: fever, increased redness/swelling/warmth, sob,chest discomfort,nausea,vomiting,diarrhea. JOSEPHINE with pcp, 7/21, Marti Fulton. Earline Dimas, 7/20. Reminded can call [facility] if needs acute visit (for non emergency concerns). CTN to check back in about a week. mbt  7/26/22  Reports she is doing good. Only a little discomfort. If is too active, may need to take pain med 1-2 x day. But definitely improving. Sees Rad Onc on 8/4 and  again on 8/8- to f/u on wound on left breast that is not healing as well. Says  is keeping a close eye on it. No red flags noted otherwise. Advised to continue current POC, CTN to check back in about a week. mbt

## 2022-08-03 ENCOUNTER — PATIENT OUTREACH (OUTPATIENT)
Dept: CASE MANAGEMENT | Age: 57
End: 2022-08-03

## 2022-08-03 NOTE — PROGRESS NOTES
Called and spoke to patient. Goals Addressed                   This Visit's Progress     Prevent complications post hospitalization. 6/29/22 [facility] 6/27-7/28 Bilateral breast mastectomy  Reviewed discharge instructions with patient using teachback. Reviewed meds with patient, education provided on new meds, using teachback. Reviewed red flags: fever,nausea,vomiting,diarrhea,sob,dizziness,chest pain, signs of DVT: redness/swelling/tender lower leg. JOSEPHINE with pcp declined at this time. F/u  7/5/22. Given info on [facility] as resource. Given CTN contact info if questions/concerns. Reviewed fall precautions with patient- stay hydrated, get up slowly and wait short while before starting to walk, wear good support shoes, remove scatter rugs and excess furniture, have family member with her when walking until dizziness subsides. CTN to check back in about a week,sooner prn.mbt    7/18/22 [facility] 7/11-7/15 Cellulitis of right breast  Reviewed discharge instructions with patient. Reviewed meds- insurance did not cover entire 14 days of Levaquin 250mg, sig 3 tabs per day. Would only dispense 14 tabs. Patient has contacted plastic surgeon's office for prior auth. Reviewed red flags: fever, increased redness/swelling/warmth, sob,chest discomfort,nausea,vomiting,diarrhea. JOSEPHINE with pcp, 7/21, Carlo Essex. Marnie Constantino, 7/20. Reminded can call [facility] if needs acute visit (for non emergency concerns). CTN to check back in about a week. mbt  7/26/22  Reports she is doing good. Only a little discomfort. If is too active, may need to take pain med 1-2 x day. But definitely improving. Sees Rad Onc on 8/4 and  again on 8/8- to f/u on wound on left breast that is not healing as well. Says  is keeping a close eye on it. No red flags noted otherwise. Advised to continue current POC, CTN to check back in about a week. mbt  8/3/22  Says healing is going slowlly.   One spot on both sides is not healing well. Trouble sleeping at night due to discomfort. Continues to need pain med prn. Has next appt 8/8 with . Advised to continue current POC. F/u as advised. mbt

## 2022-08-09 ENCOUNTER — DOCUMENTATION ONLY (OUTPATIENT)
Dept: SURGERY | Age: 57
End: 2022-08-09

## 2022-08-09 NOTE — PROGRESS NOTES
Received a mady from Gifty Duncan at [facility] asking about this patient and if she was in our care 2/1/22 through 5/1/22. I reviewed her chart and did not see she was our patient until 4/4/22 and then saw Dr. Darlin Savage on 5/5/22. Surgery was not until 6/27/22. We did not have patient out of work until 6/27/22. I left this message on the disability claims person, Gifty Duncan at [facility] her to call back if she needed anything further.

## 2022-08-15 LAB
BACTERIA SPEC CULT: NORMAL
SERVICE CMNT-IMP: NORMAL

## 2022-08-18 ENCOUNTER — PATIENT OUTREACH (OUTPATIENT)
Dept: CASE MANAGEMENT | Age: 57
End: 2022-08-18

## 2022-08-18 ENCOUNTER — DOCUMENTATION ONLY (OUTPATIENT)
Dept: SURGERY | Age: 57
End: 2022-08-18

## 2022-08-18 NOTE — PROGRESS NOTES
Patient has graduated from the Transitions of Care Coordination  program on 8/18/22. Patient/family has the ability to self-manage at this time Care management goals have been completed. Patient was not referred to the [facility] team for further management. Goals Addressed                   This Visit's Progress     COMPLETED: Prevent complications post hospitalization. 6/29/22 [facility] 6/27-7/28 Bilateral breast mastectomy  Reviewed discharge instructions with patient using teachback. Reviewed meds with patient, education provided on new meds, using teachback. Reviewed red flags: fever,nausea,vomiting,diarrhea,sob,dizziness,chest pain, signs of DVT: redness/swelling/tender lower leg. JOSEPHINE with pcp declined at this time. F/u  7/5/22. Given info on [employer] as resource. Given CTN contact info if questions/concerns. Reviewed fall precautions with patient- stay hydrated, get up slowly and wait short while before starting to walk, wear good support shoes, remove scatter rugs and excess furniture, have family member with her when walking until dizziness subsides. CTN to check back in about a week,sooner prn.mbt    7/18/22 [facility] 7/11-7/15 Cellulitis of right breast  Reviewed discharge instructions with patient. Reviewed meds- insurance did not cover entire 14 days of Levaquin 250mg, sig 3 tabs per day. Would only dispense 14 tabs. Patient has contacted plastic surgeon's office for prior auth. Reviewed red flags: fever, increased redness/swelling/warmth, sob,chest discomfort,nausea,vomiting,diarrhea. JOSEPHINE with pcp, 7/21, Alicia Marin. Jordyn Sepulveda, 7/20. Reminded can call [facility] if needs acute visit (for non emergency concerns). CTN to check back in about a week. mbt  7/26/22  Reports she is doing good. Only a little discomfort. If is too active, may need to take pain med 1-2 x day. But definitely improving.   Sees Rad Onc on 8/4 and  again on 8/8- to f/u on wound on left breast that is not healing as well. Says  is keeping a close eye on it. No red flags noted otherwise. Advised to continue current POC, CTN to check back in about a week. mbt  8/3/22  Says healing is going slowlly. One spot on both sides is not healing well. Trouble sleeping at night due to discomfort. Continues to need pain med prn. Has next appt 8/8 with . Advised to continue current POC. F/u as advised. mbt  8/18/22  Reports she is doing ok now. Healing up well. Cellulitis has cleared up. No red flags noted. Advised to continue current POC and f/u as recommended. Wished her all the best. Call if concerns. mbt              Patient has Care Transition Nurse's contact information for any further questions, concerns, or needs.   Patients upcoming visits:    Future Appointments   Date Time Provider Joaquin Burk   9/12/2022  8:45 AM Bassam Fleming MD [facility] BS AMB

## 2022-08-18 NOTE — PROGRESS NOTES
Received a request from Isatu Diehl [place] [id number] on 08/16/22, requesting medicatl records dating from 02/01/22 to 05/012022.  Faxed ov notes and radiology reports to [phone] [de-identified]

## 2022-08-30 LAB
ACID FAST STN SPEC: NEGATIVE
ACID FAST STN SPEC: NEGATIVE
MYCOBACTERIUM SPEC QL CULT: NEGATIVE
MYCOBACTERIUM SPEC QL CULT: NEGATIVE
SPECIMEN PREPARATION: NORMAL
SPECIMEN PREPARATION: NORMAL
SPECIMEN SOURCE: NORMAL
SPECIMEN SOURCE: NORMAL

## 2022-09-12 ENCOUNTER — OFFICE VISIT (OUTPATIENT)
Dept: FAMILY MEDICINE CLINIC | Age: 57
End: 2022-09-12

## 2022-09-12 ENCOUNTER — HOSPITAL ENCOUNTER (OUTPATIENT)
Dept: LAB | Age: 57
Discharge: HOME | End: 2022-09-12
Payer: COMMERCIAL

## 2022-09-12 ENCOUNTER — OFFICE VISIT (OUTPATIENT)
Dept: ONCOLOGY | Age: 57
End: 2022-09-12
Payer: COMMERCIAL

## 2022-09-12 ENCOUNTER — TELEPHONE (OUTPATIENT)
Dept: FAMILY MEDICINE CLINIC | Age: 57
End: 2022-09-12

## 2022-09-12 VITALS
SYSTOLIC BLOOD PRESSURE: 121 MMHG | HEART RATE: 79 BPM | WEIGHT: 222 LBS | RESPIRATION RATE: 16 BRPM | OXYGEN SATURATION: 98 % | DIASTOLIC BLOOD PRESSURE: 80 MMHG | TEMPERATURE: 98.6 F | HEIGHT: 64 IN | BODY MASS INDEX: 37.9 KG/M2

## 2022-09-12 VITALS
TEMPERATURE: 98 F | RESPIRATION RATE: 18 BRPM | HEART RATE: 84 BPM | SYSTOLIC BLOOD PRESSURE: 148 MMHG | DIASTOLIC BLOOD PRESSURE: 93 MMHG | HEIGHT: 65 IN | OXYGEN SATURATION: 96 % | WEIGHT: 222 LBS | BODY MASS INDEX: 36.99 KG/M2

## 2022-09-12 DIAGNOSIS — F31.81 BIPOLAR 2 DISORDER, MAJOR DEPRESSIVE EPISODE (HCC): ICD-10-CM

## 2022-09-12 DIAGNOSIS — Z13.1 SCREENING FOR DIABETES MELLITUS: ICD-10-CM

## 2022-09-12 DIAGNOSIS — C50.111 MALIGNANT NEOPLASM OF CENTRAL PORTION OF RIGHT BREAST IN FEMALE, ESTROGEN RECEPTOR POSITIVE (HCC): Primary | ICD-10-CM

## 2022-09-12 DIAGNOSIS — Z17.0 MALIGNANT NEOPLASM OF CENTRAL PORTION OF RIGHT BREAST IN FEMALE, ESTROGEN RECEPTOR POSITIVE (HCC): Primary | ICD-10-CM

## 2022-09-12 DIAGNOSIS — Z01.419 WELL WOMAN EXAM WITH ROUTINE GYNECOLOGICAL EXAM: Primary | ICD-10-CM

## 2022-09-12 DIAGNOSIS — C50.111 MALIGNANT NEOPLASM OF CENTRAL PORTION OF RIGHT FEMALE BREAST, UNSPECIFIED ESTROGEN RECEPTOR STATUS (HCC): ICD-10-CM

## 2022-09-12 DIAGNOSIS — Z13.220 SCREENING FOR HYPERLIPIDEMIA: ICD-10-CM

## 2022-09-12 DIAGNOSIS — Z11.59 ENCOUNTER FOR HEPATITIS C SCREENING TEST FOR LOW RISK PATIENT: ICD-10-CM

## 2022-09-12 PROCEDURE — 99396 PREV VISIT EST AGE 40-64: CPT | Performed by: FAMILY MEDICINE

## 2022-09-12 PROCEDURE — 87624 HPV HI-RISK TYP POOLED RSLT: CPT

## 2022-09-12 PROCEDURE — 88175 CYTOPATH C/V AUTO FLUID REDO: CPT

## 2022-09-12 PROCEDURE — 99214 OFFICE O/P EST MOD 30 MIN: CPT | Performed by: INTERNAL MEDICINE

## 2022-09-12 NOTE — TELEPHONE ENCOUNTER
----- Message from Earlene Giles sent at 9/7/2022 11:11 AM EDT -----  Subject: Message to Provider    QUESTIONS  Information for Provider? PT is needing a call back today [phone], Pt   needs medical records for a short term disability claim. ---------------------------------------------------------------------------  --------------  Nafisa Carrier [facility]  [phone]; OK to leave message on voicemail  ---------------------------------------------------------------------------  --------------  SCRIPT ANSWERS  Relationship to Patient?  Self

## 2022-09-12 NOTE — PROGRESS NOTES
HPI:  Soren Nicholson is a 56 y.o. female presenting for well woman exam.     HPI  Chief Complaint   Patient presents with    Annual Wellness Visit     No acute concerns. Chronic medical conditions:   R breast cancer  Diagnosed earlier this year. S/p radiation therapy. Following Heme/ONK (Dr. Jhon Vega)      Diet: Well balanced         Allergies- reviewed: Allergies   Allergen Reactions    Phenergan [Promethazine] Other (comments)     IV only- Hallucinate    Other Medication Other (comments)     IV PHENERGAN--Makes Hallucinate \"Really\" bad. Chlorahexine WIPES & SOAP--Broke out in hives and was extremely itchy. Medications- reviewed:   Current Outpatient Medications   Medication Sig    acetaminophen (TYLENOL) 500 mg tablet Take 1,000 mg by mouth daily as needed for Pain. No current facility-administered medications for this visit.          Past Medical History- reviewed:  Past Medical History:   Diagnosis Date    Allergy-induced asthma     Anxiety and depression     Arthritis     hands    Bipolar 2 disorder ([address]     Breast cancer ([facility] 2022    Right and left breast    COVID-19 2022    Diverticulosis     Pyelonephritis 6/7/14    Thromboembolus ([address] 2020    post surgery-1 in R calf and 2 in R upper thigh         Past Surgical History- reviewed:   Past Surgical History:   Procedure Laterality Date    COLONOSCOPY N/A 6/13/2018    COLONOSCOPY performed by Nayely North MD at [address] [place] [address] Bilateral 6/27/2022    BREAST RECONSTRUCTION performed by Fahad Nickerson MD at [address] [place] [address] Bilateral 7/14/2022    RIGHT BREAST IRRIGATION AND DEBRIDEMENT REMOVAL OF RIGHT TISSUE EXPANDER AND FLEX HD, CLOSURE OF LEFT BREAST TRIPLE POINT WOUND performed by Fahad Nickerson MD at [address] [place] [address] MASTECTOMY Bilateral 6/27/2022    BILATERAL BREAST MASTECTOMIES AND RIGHT AXILLARY LYMPH NODE DISSECTION/BILATERAL BREAST RECONSTRUCTION WITH POSSIBLE TISSUE EXPANDER/POSSIBLE DIRECT TO IMPLANT/ ACELLULAR DERMAL MATRIX performed by Carson Whitley MD at [facility]    HX ORTHOPAEDIC Right 2020    tendon and ligament repair to right foot    HX OTHER SURGICAL  1988    vein ligation    HX PARTIAL COLECTOMY      partial sigmoid colectomy    HX ROTATOR CUFF REPAIR Left 2021    HX SHOULDER REPLACEMENT  2021    HX TONSILLECTOMY  1985    HX TUBAL LIGATION           Family History - reviewed:  No family history on file. Social History - reviewed:  Social History     Socioeconomic History    Marital status:      Spouse name: Not on file    Number of children: Not on file    Years of education: Not on file    Highest education level: Not on file   Occupational History    Occupation: Home Health Nurse   Tobacco Use    Smoking status: Former     Types: Cigarettes     Quit date:      Years since quittin.7     Passive exposure: Never    Smokeless tobacco: Never    Tobacco comments:     26 Years    Vaping Use    Vaping Use: Never used   Substance and Sexual Activity    Alcohol use: Yes     Alcohol/week: 3.0 standard drinks     Types: 1 Glasses of wine, 1 Cans of beer, 1 Shots of liquor per week    Drug use: No    Sexual activity: Not on file   Other Topics Concern    Not on file   Social History Narrative    Not on file     Social Determinants of Health     Financial Resource Strain: Low Risk     Difficulty of Paying Living Expenses: Not hard at all   Food Insecurity: No Food Insecurity    Worried About Running Out of Food in the Last Year: Never true    Ran Out of Food in the Last Year: Never true   Transportation Needs: No Transportation Needs    Lack of Transportation (Medical): No    Lack of Transportation (Non-Medical):  No   Physical Activity: Not on file   Stress: Not on file   Social Connections: Not on file   Intimate Partner Violence: Not on file   Housing Stability: Not on file         Immunizations - reviewed:   Immunization History   Administered Date(s) Administered    COVID-19, MODERNA Booster BLUE border, (age 18y+), IM, 50mcg/0.25mL 08/09/2022    COVID-19, PFIZER PURPLE top, DILUTE for use, (age 15 y+), IM, 30mcg/0.3mL 12/28/2020, 01/18/2021, 10/28/2021    Influenza, FLUARIX, FLULAVAL, FLUZONE (age 10 mo+) AND AFLURIA, (age 1 y+), PF, 0.5mL 12/12/2017    Pneumococcal Polysaccharide (PPSV-23) 06/01/2018    Tdap 05/25/2018     Flu: Declined   Shingrix: 1 [facility] reviewed -  Pap smear Due this year  Colonoscopy 2018- 2 polyps were found, was recommended colonoscopy in 5 years ( in 2023)       Review of Systems   CONSTITUTIONAL: Denies: fever, chills  CARDIOVASCULAR: Denies: chest pain, dyspnea on exertion  RESPIRATORY: Denies: cough, shortness of breath  ENDOCRINE: Denies: polydipsia/polyuria, palpitations  GI: Denies: vomiting, constipation  : Denies: dysuria, frequency/urgency  NEURO: Denies: dizzy/vertigo      Physical Exam  Visit Vitals  /80 (BP 1 Location: Left arm)   Pulse 79   Temp 98.6 °F (37 °C) (Oral)   Resp 16   Ht 5' 3.75\" (1.619 m)   Wt 222 lb (100.7 kg)   SpO2 98%   BMI 38.41 kg/m²       General appearance - alert, well appearing, and in no distress  Neck - supple, no significant adenopathy  Chest - clear to auscultation, no wheezes, rales or rhonchi, symmetric air entry  Heart - normal rate, regular rhythm, normal S1, S2, no murmurs, rubs, clicks or gallops  Abdomen - soft, nontender, nondistended, no masses or organomegaly  Neurological - alert, oriented, normal speech, no focal findings or movement disorder noted  Pelvic - Exam chaperoned by Ivory Nissen, Rn. External genitalia normal without rashes or lesions. Pink and moist vaginal mucosa. Scant white discharge. Cervix without lesions or abnormal discharge. Uterus non tender and normal size. No adnexal masses or tenderness. Assessment/Plan:    ICD-10-CM ICD-9-CM    1.  Well woman exam with routine gynecological exam  Z01.419 V72.31 PAP IG, APTIMA HPV AND RFX 16/18,45 ([id number])      METABOLIC PANEL, COMPREHENSIVE      METABOLIC PANEL, COMPREHENSIVE      PAP IG, APTIMA HPV AND RFX 16/18,45 ([id number])      2. Bipolar 2 disorder, major depressive episode ([facility]  F31.81 296.89       3. Malignant neoplasm of central portion of right female breast, unspecified estrogen receptor status (HCC)  C50.111 174.1 CBC W/O DIFF      CBC W/O DIFF      4. Encounter for hepatitis C screening test for low risk patient  Z11.59 V73.89 HEPATITIS C AB      HEPATITIS C AB      5. Screening for hyperlipidemia  Z13.220 V77.91 LIPID PANEL      LIPID PANEL      6. Screening for diabetes mellitus  Z13.1 V77.1 HEMOGLOBIN A1C WITH EAG      HEMOGLOBIN A1C WITH EAG            Follow-up and Dispositions    Return in about 1 year (around 9/12/2023) for Annual check up . I have discussed the diagnosis with the patient and the intended plan as seen in the above orders. Patient verbalized understanding of the plan and agrees with the plan. The patient has received an after-visit summary and questions were answered concerning future plans. I have discussed medication side effects and warnings with the patient as well. Informed patient to return to the office if new symptoms arise.         Mickie Martinez MD  Family Medicine Physician

## 2022-09-12 NOTE — PROGRESS NOTES
[facility] [place] at [address], [address] [facility] [address] [facility]  W: [phone]  F: [phone]      Reason for Visit:   Estephania Conklin is a 56 y.o. female who is seen in follow up for breast cancer    Breast Surgeon: Dr. Radu Riley surgery:  Dr. Anahi Wallis    Treatment History:   5/5/22 right breast bx:  IDC, gr 1, 8 mm, ER + at > 90%, WV + at 90%, HER 2 negative (IHC 2+; FISH ratio 1.8; sig/cell 3.73); right ax LN + for breast cancer  Mammaprint - Low risk luminal A  6/1/22 left breast core bx:  DCIS, gr 2, involving intraductal papilloma, ER + at 95%  6/27/22 left breast mastectomy: DCIS 20 mm, gr 2, pTis pNx cM0; right breast mastectomy:  IDC 17 mm, gr 1, ER + 95%, WV + 95%, HER-2 negative at IHC  1+, ki67 15%. 1/5 LN positive with 5.5 mm carcinoma, ER + 95%, WV +95%, HER-2 negative at [facility] 1+; DCIS present, not extensive, gr 2,  pT1c pN1a cM0  XRT started 9/6/22 - anticipated completion date 10/10/22    History of Present Illness: An abnormal mammogram led to the path above    Interval Hx: Patient presents today for follow up. Reports gr 1 fatigue, gr 2 insomnia, gr 1 anxiety, gr 1 pain rated 4/10 to right arm and chest, gr 1 headaches. FH:  No breast or ovarian cancer; no pancreas or prostate cancer    She reports mother's cousin had breast cancer.      LMP 9/2022    Past Medical History:   Diagnosis Date    Allergy-induced asthma     Anxiety and depression     Arthritis     hands    Bipolar 2 disorder ([address]     Breast cancer ([address] 2022    Right and left breast    COVID-19 2022    Diverticulosis     Pyelonephritis 6/7/14    Thromboembolus ([address] 2020    post surgery-1 in R calf and 2 in R upper thigh      Past Surgical History:   Procedure Laterality Date    COLONOSCOPY N/A 6/13/2018    COLONOSCOPY performed by Rodri Martines MD at [facility] [address] Bilateral 6/27/2022    BREAST RECONSTRUCTION performed by Chanel Hoffman MD at [facility] AMBULATORY OR    HX BREAST RECONSTRUCTION Bilateral 2022    RIGHT BREAST IRRIGATION AND DEBRIDEMENT REMOVAL OF RIGHT TISSUE EXPANDER AND FLEX HD, CLOSURE OF LEFT BREAST TRIPLE POINT WOUND performed by Abby Hassan MD at [address]    HX MASTECTOMY Bilateral 2022    BILATERAL BREAST MASTECTOMIES AND RIGHT AXILLARY LYMPH NODE DISSECTION/BILATERAL BREAST RECONSTRUCTION WITH POSSIBLE TISSUE EXPANDER/POSSIBLE DIRECT TO IMPLANT/ ACELLULAR DERMAL MATRIX performed by Weber Opitz, MD at [address]    HX ORTHOPAEDIC Right     tendon and ligament repair to right foot    HX OTHER SURGICAL  1988    vein ligation    HX PARTIAL COLECTOMY      partial sigmoid colectomy    HX ROTATOR CUFF REPAIR Left 2021    HX SHOULDER REPLACEMENT  2021    HX TONSILLECTOMY  1985    HX TUBAL LIGATION        Social History     Tobacco Use    Smoking status: Former     Types: Cigarettes     Quit date:      Years since quittin.    Smokeless tobacco: Never    Tobacco comments:     26 Years    Substance Use Topics    Alcohol use: Yes     Alcohol/week: 3.0 standard drinks     Types: 1 Glasses of wine, 1 Cans of beer, 1 Shots of liquor per week      History reviewed. No pertinent family history. Current Outpatient Medications   Medication Sig    acetaminophen (TYLENOL) 500 mg tablet Take 1,000 mg by mouth daily as needed for Pain. fluconazole (DIFLUCAN) 150 mg tablet Take if symptoms of yeast    oxyCODONE IR (ROXICODONE) 5 mg immediate release tablet Take 5 mg by mouth four (4) times daily as needed for Pain. ondansetron (ZOFRAN ODT) 4 mg disintegrating tablet Take 4 mg by mouth every eight (8) hours as needed for Nausea or Vomiting.    diazePAM (Valium) 5 mg tablet Take 5 mg by mouth every eight (8) hours as needed for Muscle Spasm(s). docusate sodium (Stool Softener) 100 mg capsule Take 100 mg by mouth two (2) times a day.  Bid while on pain medication    melatonin 5 mg tablet Take 10 mg by mouth nightly as needed. (Patient not taking: Reported on 9/12/2022)     No current facility-administered medications for this visit. Allergies   Allergen Reactions    Phenergan [Promethazine] Other (comments)     IV only- Hallucinate    Other Medication Other (comments)     IV PHENERGAN--Makes Hallucinate \"Really\" bad. Chlorahexine WIPES & SOAP--Broke out in hives and was extremely itchy. Review of Systems: A complete review of systems was obtained, negative except as described above. Physical Exam:     Visit Vitals  BP (!) 148/93   Pulse 84   Temp 98 °F (36.7 °C) (Temporal)   Resp 18   Ht 5' 5\" (1.651 m)   Wt 222 lb (100.7 kg)   SpO2 96%   BMI 36.94 kg/m²     ECOG PS: 0    Constitutional: Appears well-developed and well-nourished in no apparent distress      Mental status: Alert and awake, Oriented to person/place/time, Able to follow commands    Eyes: EOM normal, Sclera normal, No visible ocular discharge    HENT: Normocephalic, atraumatic    Mouth/Throat: Moist mucous membranes    External Ears normal    Neck: No visualized mass    Pulmonary/Chest: Respiratory effort normal, No visualized signs of difficulty breathing or respiratory distress    Musculoskeletal: Normal gait with no signs of ataxia, Normal range of motion of neck    Neurological: No facial asymmetry (Cranial nerve 7 motor function), No gaze palsy    Skin: right breast with erythema    Psychiatric: Normal affect, No hallucinations         Results:     Lab Results   Component Value Date/Time    WBC 5.0 07/15/2022 02:49 AM    HGB 9.4 (L) 07/15/2022 02:49 AM    HCT 29.7 (L) 07/15/2022 02:49 AM    PLATELET 518 17/60/3931 02:49 AM    MCV 95.2 07/15/2022 02:49 AM    ABS.  NEUTROPHILS 3.4 07/15/2022 02:49 AM     Lab Results   Component Value Date/Time    Sodium 140 07/14/2022 03:41 AM    Potassium 4.2 07/14/2022 03:41 AM    Chloride 107 07/14/2022 03:41 AM    CO2 24 07/14/2022 03:41 AM    Glucose 117 (H) 07/14/2022 03:41 AM    BUN 7 07/14/2022 03:41 AM    Creatinine 0.67 07/14/2022 03:41 AM    GFR est AA >60 07/14/2022 03:41 AM    GFR est non-AA >60 07/14/2022 03:41 AM    Calcium 9.0 07/14/2022 03:41 AM     Lab Results   Component Value Date/Time    Bilirubin, total 0.4 07/11/2022 03:46 PM    ALT (SGPT) 19 07/11/2022 03:46 PM    Alk. phosphatase 81 07/11/2022 03:46 PM    Protein, total 7.4 07/11/2022 03:46 PM    Albumin 3.2 (L) 07/11/2022 03:46 PM    Globulin 4.2 (H) 07/11/2022 03:46 PM     MRI breast 5/13/22  Right breast: A dominant dynamically enhancing mass with washout kinetics lies  at approximately 9:00 in the right nipple line, mid depth, measuring 1.1 cm AP,  1.5 cm cc and 1.1 cm TRV. An additional small enhancing mass lies at the nipple,  immediately posterior real or, with nipple inversion and enhancement  inseparable. This nipple areolar complex and enhancement measures 1.4 cm TRV,  1.2 cm AP and 0.9 cm CC. Between the dominant mass at 9:00 and the nipple, there  is a combined distance of 8.2 cm, where there is linear delayed enhancement in  the nipple line suspicious for ductal extension. There are no other dominant  abnormalities of morphology or enhancement within the right breast to suggest  additional disease, although the gland overall in the outer breast is enhancing. Lymph nodes in the right axilla are normal to indeterminate, but none contains a  biopsy clip artifact. Left breast: In the axillary breast, posterior depth at 1:30, nonmass  enhancement measures 3.7 cm AP, 1.1 cm TRV and approximately 2.4 cm CC. It shows  dynamic washout enhancement and is suspicious for contralateral malignancy. No  other dominant abnormality of morphology or enhancement appears in the left  breast. Left axillary lymph nodes are not enlarged, but are morphologically  indeterminate. IMPRESSION  . IMPRESSION:  1. Right BI-RADS 6, known malignancy. Left BI-RADS 4, suspicious.   2. RIGHT BREAST: Known invasive ductal carcinoma at 9:00, with additional  findings between this mass and the nipple for a distance of 8.2 cm, suspicious  for ductal extension to the nipple areolar complex as described above. Indeterminate lymph nodes, no biopsy clip artifact detected. 3. LEFT BREAST: Suspicious nonmass enhancement in the axillary breast 1:30, for  which ultrasonography could be attempted for biopsy localization to exclude  contralateral malignancy. However, this lesion may be sonographically subtle,  and MRI guided biopsy may be necessary to ensure concordance. Indeterminate left  axillary lymph nodes. 4. A summary portfolio has been created for reference and is available in PACS. 5/24/22 US breast L  IMPRESSION  No discrete mass is identified. BI-RADS Category 4 - Suspicious abnormality. .  RECOMMENDATION:  MRI guided biopsy which will be technically challenging due to the far posterior  position of the MRI finding. Records reviewed and summarized above. Pathology report(s) reviewed above. Radiology report(s) reviewed above. Assessment/plan:   1. Right breast IDC, gr 1, ER +, UT +, HER 2 negative, LN + by core:  cT2 cN1a cM0, stage IIB, prognostic stage IA  Pre/perimenopausal    6/1/22 left breast core bx:  DCIS, gr 2, involving intraductal papilloma, ER + at 95%  6/27/22 left breast mastectomy: DCIS 20 mm, gr 2, pTis pNx cM0; right breast mastectomy:  IDC 17 mm, gr 1, ER + 95%, UT + 95%, HER-2 negative at IHC  1+. 1/5 LN positive with 5.5 mm carcinoma, ER + 95%, UT +95%, HER-2 negative at IHC 1+, ki67 15%; DCIS present, not extensive, gr 2,  pT1c pN1a cM0, stage IIA, prognostic stage IA      We explained to the patient that the goal of systemic adjuvant therapy is to improve the chances for cure and decrease the risk of relapse. We explained why a patient can have microscopic cancer spread now even though physical examination, laboratory studies and imaging studies are negative for cancer.  We explained that the same treatments used now as adjuvant or preventive treatments rarely if ever are curative in women who develop metastases. Discussed that based on [place]-Rx, chemotherapy would be warranted (premenopausal with LN + disease). Discussed that the update to MINDACT validates mammaprint in this setting (> 48years old, LN + disease). We discussed the potential value of Mammaprint testing. The Stanley 70-gene prognostic profile (Mammaprint®), one of the first gene expression array-based prognosticators, classifies tumors as low-risk or high-risk for breast cancer recurrence. The clinical validity of the 70-gene prognostic profile has been demonstrated in multiple studies. Results from an international randomized trial, the Microarray in Node-Negative Disease May Avoid Chemotherapy (MINDACT) trial suggest that this genetic profile may identify subsets of patients who have a low likelihood of distant recurrence despite high-risk clinical features. In this trial, [id number] women, approximately [de-identified] percent of whom had lymph node-negative disease, underwent risk assessment by clinical criteria (using Adjuvant! Online) and by the 70-genetic profile. Patients with discordant clinical and genomic predictions were randomly assigned to receive or not receive adjuvant chemotherapy. Among patients in the intention-to-treat population who had a high clinical risk of recurrence but a low risk by [place] genetic profile, the five-year distant metastases-free survival rate was 95.9 percent with chemotherapy and 94.4 percent without chemotherapy (HR for distant metastasis or death 0.78, 95% CI 0.50-1.21). However, it should be noted that the MINDACT study was not powered to exclude a benefit of chemotherapy.  In analysis of discordant groups in the intention-to-treat population, adjuvant chemotherapy was associated with improvement in the rate of distant metastasis-free survival of 2.8 and 2 percent, respectively, for the high clinical/low genomic and low clinical/high genomic risk groups, although these differences were also not statistically significant. Based on MINDACT, ASCO has suggested Mac Della is one of several assays that might be used to determine prognosis for those with high clinical risk, hormone receptor-positive, HER2-negative breast cancer and no or limited (one to three) involved lymph nodes to inform decisions regarding withholding chemotherapy. Low risk mammaprint. With age > 48, and with the updated MINDACT results, I am ok with no chemotherapy    She will proceed now with XRT to the right breast. Started 9/6/22. Will avoid tamoxifen due to history of VTE    The risks and benefits of aromatase inhibitors (anastrozole, letrozole, and exemestane) were discussed in detail and the patient was informed of the following: Risks include the development of painful muscles and joints (arthralgia/myalgia) and bone loss. Muscle and joint pain can be severe but rarely result in any tissue damage; symptoms usually resolve in several weeks when the medication is stopped. Bone loss is common and a bone density test is recommended as a baseline and then yearly to every several years depending on initial results. The risk of fractures is increased by a few percent in patients taking these drugs, but careful monitoring of bone density and using bone protecting agents when indicated can minimize these risks. Unlike tamoxifen there is no increased risk of blood clots or endometrial cancer. AIs can cause or worsen vaginal dryness but women using these drugs should not use vaginal estrogen preparations for these symptoms. AIs can also cause or increase hot flashes. Any other symptoms should be reported. We discussed that for high risk women with breast cancer (having either received chemotherapy or < 28years old), ovarian suppression (such as goserelin 10.8 mg q 3 months ) + AI was superior in terms of overall survival than tamoxifen alone.   The added risks of worse QOL due to depression and worse menopausal symptoms were discussed with the addition of ovarian suppression. Following XRT, will start her on goserelin, followed by anastrozole. She may be a good candidate for BSO in the future. Will set up for goserelin 10/10/22 and see her back to start anastrozole. Ki67 is low, no indication for abemaciclib    2. Emotional well being/recurrent depression:  She has excellent support and is coping well with her disease    3. L DCIS:  Stage 0. See treatment for #1. S/p mastectomy, no XRT required    4. Back pain/right shoulder pain: negative bone scan 6/01/22    5. Right surgical infection: was admitted to the hospital 7/10/22 to 7/15/22 and had expander removed. Has completed antibiotics and healing well. 6. Genetic testing:   discussed potential ramifications of a positive test including the potential need for bilateral mastectomies and bilateral oophorectomies and the risk then for her family members to also have a mutation. VUS also discussed. Will give kit today for when she comes into to get her goserelin    I personally saw and evaluated the patient and performed the entire medical decision making. The history, physical exam, and documentation were performed by Cheryl Kim NP. I reviewed and verified the above documentation and modified it as needed. Right breast cancer, ER +, starting XRT. Will start goserelin and then see her back in Dec to start anastrozole. Genetic testing discussed today and kit given. .  [facility] December      I appreciate the opportunity to participate in Ms. [address] [facility] [place]'s care. Signed By: Nicholas Lang MD      No orders of the defined types were placed in this encounter.

## 2022-09-12 NOTE — PROGRESS NOTES
Esther Walters is a 56 y.o. female Follow up for the evaluation of breast cancer. 1. Have you been to the ER, urgent care clinic since your last visit? Hospitalized since your last visit? No    2. Have you seen or consulted any other health care providers outside of the [address] [place] [address] since your last visit? Include any pap smears or colon screening.  No

## 2022-09-13 LAB
ALBUMIN SERPL-MCNC: 3.9 G/DL (ref 3.5–5)
ALBUMIN/GLOB SERPL: 1.1 {RATIO} (ref 1.1–2.2)
ALP SERPL-CCNC: 91 U/L (ref 45–117)
ALT SERPL-CCNC: 35 U/L (ref 12–78)
ANION GAP SERPL CALC-SCNC: 4 MMOL/L (ref 5–15)
AST SERPL-CCNC: 21 U/L (ref 15–37)
BILIRUB SERPL-MCNC: 0.3 MG/DL (ref 0.2–1)
BUN SERPL-MCNC: 19 MG/DL (ref 6–20)
BUN/CREAT SERPL: 20 (ref 12–20)
CALCIUM SERPL-MCNC: 9.5 MG/DL (ref 8.5–10.1)
CHLORIDE SERPL-SCNC: 107 MMOL/L (ref 97–108)
CHOLEST SERPL-MCNC: 207 MG/DL
CO2 SERPL-SCNC: 29 MMOL/L (ref 21–32)
CREAT SERPL-MCNC: 0.96 MG/DL (ref 0.55–1.02)
ERYTHROCYTE [DISTWIDTH] IN BLOOD BY AUTOMATED COUNT: 13.9 % (ref 11.5–14.5)
EST. AVERAGE GLUCOSE BLD GHB EST-MCNC: 114 MG/DL
GLOBULIN SER CALC-MCNC: 3.7 G/DL (ref 2–4)
GLUCOSE SERPL-MCNC: 96 MG/DL (ref 65–100)
HBA1C MFR BLD: 5.6 % (ref 4–5.6)
HCT VFR BLD AUTO: 38.9 % (ref 35–47)
HCV AB SERPL QL IA: NONREACTIVE
HDLC SERPL-MCNC: 35 MG/DL
HDLC SERPL: 5.9 {RATIO} (ref 0–5)
HGB BLD-MCNC: 11.8 G/DL (ref 11.5–16)
LDLC SERPL CALC-MCNC: ABNORMAL MG/DL (ref 0–100)
LDLC SERPL DIRECT ASSAY-MCNC: 117 MG/DL (ref 0–100)
MCH RBC QN AUTO: 29 PG (ref 26–34)
MCHC RBC AUTO-ENTMCNC: 30.3 G/DL (ref 30–36.5)
MCV RBC AUTO: 95.6 FL (ref 80–99)
NRBC # BLD: 0 K/UL (ref 0–0.01)
NRBC BLD-RTO: 0 PER 100 WBC
PLATELET # BLD AUTO: 310 K/UL (ref 150–400)
PMV BLD AUTO: 10.9 FL (ref 8.9–12.9)
POTASSIUM SERPL-SCNC: 4.5 MMOL/L (ref 3.5–5.1)
PROT SERPL-MCNC: 7.6 G/DL (ref 6.4–8.2)
RBC # BLD AUTO: 4.07 M/UL (ref 3.8–5.2)
SODIUM SERPL-SCNC: 140 MMOL/L (ref 136–145)
TRIGL SERPL-MCNC: 549 MG/DL (ref ?–150)
VLDLC SERPL CALC-MCNC: ABNORMAL MG/DL
WBC # BLD AUTO: 7.6 K/UL (ref 3.6–11)

## 2022-09-26 DIAGNOSIS — C50.111 MALIGNANT NEOPLASM OF CENTRAL PORTION OF RIGHT BREAST IN FEMALE, ESTROGEN RECEPTOR POSITIVE (HCC): Primary | ICD-10-CM

## 2022-09-26 DIAGNOSIS — Z17.0 MALIGNANT NEOPLASM OF CENTRAL PORTION OF RIGHT BREAST IN FEMALE, ESTROGEN RECEPTOR POSITIVE (HCC): Primary | ICD-10-CM

## 2022-09-26 DIAGNOSIS — L30.9 DERMATITIS: ICD-10-CM

## 2022-09-26 RX ORDER — HYDROCORTISONE 25 MG/G
CREAM TOPICAL
Qty: 30 G | Refills: 3 | Status: SHIPPED | OUTPATIENT
Start: 2022-09-26

## 2022-10-10 ENCOUNTER — HOSPITAL ENCOUNTER (OUTPATIENT)
Dept: INFUSION THERAPY | Age: 57
Discharge: HOME | End: 2022-10-10
Payer: COMMERCIAL

## 2022-10-10 VITALS
TEMPERATURE: 98.6 F | WEIGHT: 224.9 LBS | OXYGEN SATURATION: 97 % | HEIGHT: 63 IN | SYSTOLIC BLOOD PRESSURE: 119 MMHG | HEART RATE: 80 BPM | DIASTOLIC BLOOD PRESSURE: 57 MMHG | BODY MASS INDEX: 39.85 KG/M2 | RESPIRATION RATE: 18 BRPM

## 2022-10-10 DIAGNOSIS — C50.111 MALIGNANT NEOPLASM OF CENTRAL PORTION OF RIGHT BREAST IN FEMALE, ESTROGEN RECEPTOR POSITIVE (HCC): Primary | ICD-10-CM

## 2022-10-10 DIAGNOSIS — Z17.0 MALIGNANT NEOPLASM OF CENTRAL PORTION OF RIGHT BREAST IN FEMALE, ESTROGEN RECEPTOR POSITIVE (HCC): Primary | ICD-10-CM

## 2022-10-10 PROCEDURE — 96402 CHEMO HORMON ANTINEOPL SQ/IM: CPT

## 2022-10-10 PROCEDURE — 74011250636 HC RX REV CODE- 250/636: Performed by: NURSE PRACTITIONER

## 2022-10-10 RX ADMIN — GOSERELIN ACETATE 10.8 MG: 10.8 IMPLANT SUBCUTANEOUS at 09:20

## 2022-11-07 ENCOUNTER — HOSPITAL ENCOUNTER (OUTPATIENT)
Dept: INFUSION THERAPY | Age: 57
End: 2022-11-07

## 2022-12-05 ENCOUNTER — APPOINTMENT (OUTPATIENT)
Dept: INFUSION THERAPY | Age: 57
End: 2022-12-05
Payer: COMMERCIAL

## 2022-12-05 ENCOUNTER — OFFICE VISIT (OUTPATIENT)
Dept: ONCOLOGY | Age: 57
End: 2022-12-05
Payer: COMMERCIAL

## 2022-12-05 VITALS
TEMPERATURE: 97.1 F | RESPIRATION RATE: 20 BRPM | HEART RATE: 72 BPM | SYSTOLIC BLOOD PRESSURE: 144 MMHG | DIASTOLIC BLOOD PRESSURE: 74 MMHG | HEIGHT: 63 IN | OXYGEN SATURATION: 98 % | WEIGHT: 228 LBS | BODY MASS INDEX: 40.4 KG/M2

## 2022-12-05 DIAGNOSIS — M25.50 ARTHRALGIA, UNSPECIFIED JOINT: ICD-10-CM

## 2022-12-05 DIAGNOSIS — C50.111 MALIGNANT NEOPLASM OF CENTRAL PORTION OF RIGHT BREAST IN FEMALE, ESTROGEN RECEPTOR POSITIVE (HCC): Primary | ICD-10-CM

## 2022-12-05 DIAGNOSIS — Z17.0 MALIGNANT NEOPLASM OF CENTRAL PORTION OF RIGHT BREAST IN FEMALE, ESTROGEN RECEPTOR POSITIVE (HCC): Primary | ICD-10-CM

## 2022-12-05 PROCEDURE — 99214 OFFICE O/P EST MOD 30 MIN: CPT | Performed by: INTERNAL MEDICINE

## 2022-12-05 RX ORDER — MELOXICAM 15 MG/1
15 TABLET ORAL DAILY
Qty: 14 TABLET | Refills: 1 | Status: SHIPPED | OUTPATIENT
Start: 2022-12-05 | End: 2022-12-19

## 2022-12-05 RX ORDER — DULOXETIN HYDROCHLORIDE 30 MG/1
CAPSULE, DELAYED RELEASE ORAL
Qty: 49 CAPSULE | Refills: 0 | Status: SHIPPED | OUTPATIENT
Start: 2022-12-05 | End: 2022-12-06 | Stop reason: DRUGHIGH

## 2022-12-05 RX ORDER — ANASTROZOLE 1 MG/1
1 TABLET ORAL DAILY
Qty: 90 TABLET | Refills: 3 | Status: SHIPPED | OUTPATIENT
Start: 2022-12-05

## 2022-12-05 NOTE — PROGRESS NOTES
[facility] is a 56 y.o. female follow up for breast cancer. 1. Have you been to the ER, urgent care clinic since your last visit? Hospitalized since your last visit?no    2. Have you seen or consulted any other health care providers outside of the [address] [place] [address] since your last visit? Include any pap smears or colon screening.  no

## 2022-12-05 NOTE — PROGRESS NOTES
Cancer [place] at [address], [address] [facility] [facility]  W: [phone]  F: [phone]      Reason for Visit:   Niyah Aleman is a 56 y.o. female who is seen in follow up for breast cancer    Breast Surgeon: Dr. Edmund Manzano surgery:  Dr. Cinda Meza    Treatment History:   5/5/22 right breast bx:  IDC, gr 1, 8 mm, ER + at > 90%, SD + at 90%, HER 2 negative (IHC 2+; FISH ratio 1.8; sig/cell 3.73); right ax LN + for breast cancer  Mammaprint - Low risk luminal A  6/1/22 left breast core bx:  DCIS, gr 2, involving intraductal papilloma, ER + at 95%  6/27/22 left breast mastectomy: DCIS 20 mm, gr 2, pTis pNx cM0; right breast mastectomy:  IDC 17 mm, gr 1, ER + 95%, SD + 95%, HER-2 negative at IHC  1+, ki67 15%. 1/5 LN positive with 5.5 mm carcinoma, ER + 95%, SD +95%, HER-2 negative at [facility] 1+; DCIS present, not extensive, gr 2,  pT1c pN1a cM0  XRT started 9/6/22 - anticipated completion date 10/10/22  Goserelin 10/10/22 - 1/2/23 (joint pain)    History of Present Illness: An abnormal mammogram led to the path above    Interval Hx: Patient presents today for follow up. Reports gr 2 fatigue, gr 1 hair loss, gr 2 hot flashes, gr 2 insomnia, gr 1 loss of cognition/concentration, gr 1 anxiety/depression, gr 2 pain rated 8/10. She states she has generalized joint pain that started about 1 month after goserelin. This started in her hands but slowly progressed to all joints. FH:  No breast or ovarian cancer; no pancreas or prostate cancer    She reports mother's cousin had breast cancer.      LMP 9/2022    Past Medical History:   Diagnosis Date    Allergy-induced asthma     Anxiety and depression     Arthritis     hands    Bipolar 2 disorder ([facility] [address]     Breast cancer ([facility] [facility] 2022    Right and left breast    COVID-19 2022    Diverticulosis     Pyelonephritis 6/7/14    Thromboembolus ([address] 2020    post surgery-1 in R calf and 2 in R upper thigh      Past Surgical History:   Procedure Laterality Date    COLONOSCOPY N/A 2018    COLONOSCOPY performed by Emily Myles MD at [facility] Bilateral 2022    BREAST RECONSTRUCTION performed by Cydney Banerjee MD at [address] [facility]    HX BREAST RECONSTRUCTION Bilateral 2022    RIGHT BREAST IRRIGATION AND DEBRIDEMENT REMOVAL OF RIGHT TISSUE EXPANDER AND FLEX HD, CLOSURE OF LEFT BREAST TRIPLE POINT WOUND performed by Cydney Banerjee MD at [address]    HX MASTECTOMY Bilateral 2022    BILATERAL BREAST MASTECTOMIES AND RIGHT AXILLARY LYMPH NODE DISSECTION/BILATERAL BREAST RECONSTRUCTION WITH POSSIBLE TISSUE EXPANDER/POSSIBLE DIRECT TO IMPLANT/ ACELLULAR DERMAL MATRIX performed by Annette Casper MD at [address] [facility]    HX ORTHOPAEDIC Right     tendon and ligament repair to right foot    HX OTHER SURGICAL      vein ligation    HX PARTIAL COLECTOMY      partial sigmoid colectomy    HX ROTATOR CUFF REPAIR Left 2021    HX SHOULDER REPLACEMENT  2021    HX TONSILLECTOMY  1985    HX TUBAL LIGATION        Social History     Tobacco Use    Smoking status: Former     Types: Cigarettes     Quit date:      Years since quittin.9     Passive exposure: Never    Smokeless tobacco: Never    Tobacco comments:     26 Years    Substance Use Topics    Alcohol use: Yes     Alcohol/week: 3.0 standard drinks     Types: 1 Glasses of wine, 1 Cans of beer, 1 Shots of liquor per week      History reviewed. No pertinent family history. Current Outpatient Medications   Medication Sig    meloxicam (MOBIC) 15 mg tablet Take 1 Tablet by mouth daily for 14 days. DULoxetine (CYMBALTA) 30 mg capsule Take 1 tablet (30mg) by mouth for 7 days then increase to 2 tablets (60mg daily)    anastrozole (ARIMIDEX) 1 mg tablet Take 1 mg by mouth daily. acetaminophen (TYLENOL) 500 mg tablet Take 1,000 mg by mouth daily as needed for Pain.      No current facility-administered medications for this visit. Allergies   Allergen Reactions    Phenergan [Promethazine] Other (comments)     IV only- Hallucinate    Other Medication Other (comments)     IV PHENERGAN--Makes Hallucinate \"Really\" bad. Chlorahexine WIPES & SOAP--Broke out in hives and was extremely itchy. Review of Systems: A complete review of systems was obtained, negative except as described above. Physical Exam:     Visit Vitals  BP (!) 144/74 (BP 1 Location: Left upper arm, BP Patient Position: Sitting, BP Cuff Size: Large adult)   Pulse 72   Temp 97.1 °F (36.2 °C) (Temporal)   Resp 20   Ht 5' 3.25\" (1.607 m)   Wt 228 lb (103.4 kg)   SpO2 98%   BMI 40.07 kg/m²     ECOG PS: 0    Constitutional: Appears well-developed and well-nourished in no apparent distress      Mental status: Alert and awake, Oriented to person/place/time, Able to follow commands    Eyes: EOM normal, Sclera normal, No visible ocular discharge    HENT: Normocephalic, atraumatic    Mouth/Throat: Moist mucous membranes    External Ears normal    Neck: No visualized mass    Pulmonary/Chest: Respiratory effort normal, No visualized signs of difficulty breathing or respiratory distress    Musculoskeletal: Normal gait with no signs of ataxia, Normal range of motion of neck    Neurological: No facial asymmetry (Cranial nerve 7 motor function), No gaze palsy    Skin: right breast with erythema    Psychiatric: Normal affect, No hallucinations         Results:     Lab Results   Component Value Date/Time    WBC 7.6 09/12/2022 03:25 PM    HGB 11.8 09/12/2022 03:25 PM    HCT 38.9 09/12/2022 03:25 PM    PLATELET 660 58/17/9421 03:25 PM    MCV 95.6 09/12/2022 03:25 PM    ABS.  NEUTROPHILS 3.4 07/15/2022 02:49 AM     Lab Results   Component Value Date/Time    Sodium 140 09/12/2022 03:25 PM    Potassium 4.5 09/12/2022 03:25 PM    Chloride 107 09/12/2022 03:25 PM    CO2 29 09/12/2022 03:25 PM    Glucose 96 09/12/2022 03:25 PM    BUN 19 09/12/2022 03:25 PM    Creatinine 0.96 09/12/2022 03:25 PM    GFR est AA >60 09/12/2022 03:25 PM    GFR est non-AA >60 09/12/2022 03:25 PM    Calcium 9.5 09/12/2022 03:25 PM     Lab Results   Component Value Date/Time    Bilirubin, total 0.3 09/12/2022 03:25 PM    ALT (SGPT) 35 09/12/2022 03:25 PM    Alk. phosphatase 91 09/12/2022 03:25 PM    Protein, total 7.6 09/12/2022 03:25 PM    Albumin 3.9 09/12/2022 03:25 PM    Globulin 3.7 09/12/2022 03:25 PM     MRI breast 5/13/22  Right breast: A dominant dynamically enhancing mass with washout kinetics lies  at approximately 9:00 in the right nipple line, mid depth, measuring 1.1 cm AP,  1.5 cm cc and 1.1 cm TRV. An additional small enhancing mass lies at the nipple,  immediately posterior real or, with nipple inversion and enhancement  inseparable. This nipple areolar complex and enhancement measures 1.4 cm TRV,  1.2 cm AP and 0.9 cm CC. Between the dominant mass at 9:00 and the nipple, there  is a combined distance of 8.2 cm, where there is linear delayed enhancement in  the nipple line suspicious for ductal extension. There are no other dominant  abnormalities of morphology or enhancement within the right breast to suggest  additional disease, although the gland overall in the outer breast is enhancing. Lymph nodes in the right axilla are normal to indeterminate, but none contains a  biopsy clip artifact. Left breast: In the axillary breast, posterior depth at 1:30, nonmass  enhancement measures 3.7 cm AP, 1.1 cm TRV and approximately 2.4 cm CC. It shows  dynamic washout enhancement and is suspicious for contralateral malignancy. No  other dominant abnormality of morphology or enhancement appears in the left  breast. Left axillary lymph nodes are not enlarged, but are morphologically  indeterminate. IMPRESSION  . IMPRESSION:  1. Right BI-RADS 6, known malignancy. Left BI-RADS 4, suspicious.   2. RIGHT BREAST: Known invasive ductal carcinoma at 9:00, with additional  findings between this mass and the nipple for a distance of 8.2 cm, suspicious  for ductal extension to the nipple areolar complex as described above. Indeterminate lymph nodes, no biopsy clip artifact detected. 3. LEFT BREAST: Suspicious nonmass enhancement in the axillary breast 1:30, for  which ultrasonography could be attempted for biopsy localization to exclude  contralateral malignancy. However, this lesion may be sonographically subtle,  and MRI guided biopsy may be necessary to ensure concordance. Indeterminate left  axillary lymph nodes. 4. A summary portfolio has been created for reference and is available in PACS. 5/24/22 US breast L  IMPRESSION  No discrete mass is identified. BI-RADS Category 4 - Suspicious abnormality. .  RECOMMENDATION:  MRI guided biopsy which will be technically challenging due to the far posterior  position of the MRI finding. Records reviewed and summarized above. Pathology report(s) reviewed above. Radiology report(s) reviewed above. Assessment/plan:   1. Right breast IDC, gr 1, ER +, OR +, HER 2 negative, LN + by core:  cT2 cN1a cM0, stage IIB, prognostic stage IA  Pre/perimenopausal    6/1/22 left breast core bx:  DCIS, gr 2, involving intraductal papilloma, ER + at 95%  6/27/22 left breast mastectomy: DCIS 20 mm, gr 2, pTis pNx cM0; right breast mastectomy:  IDC 17 mm, gr 1, ER + 95%, OR + 95%, HER-2 negative at IHC  1+. 1/5 LN positive with 5.5 mm carcinoma, ER + 95%, OR +95%, HER-2 negative at IHC 1+, ki67 15%; DCIS present, not extensive, gr 2,  pT1c pN1a cM0, stage IIA, prognostic stage IA      We explained to the patient that the goal of systemic adjuvant therapy is to improve the chances for cure and decrease the risk of relapse. We explained why a patient can have microscopic cancer spread now even though physical examination, laboratory studies and imaging studies are negative for cancer.  We explained that the same treatments used now as adjuvant or preventive treatments rarely if ever are curative in women who develop metastases. Discussed that based on [place]-[facility], chemotherapy would be warranted (premenopausal with LN + disease). Discussed that the update to MINDACT validates mammaprint in this setting (> 48years old, LN + disease). We discussed the potential value of Mammaprint testing. The [place] 70-gene prognostic profile (Mammaprint®), one of the first gene expression array-based prognosticators, classifies tumors as low-risk or high-risk for breast cancer recurrence. The clinical validity of the 70-gene prognostic profile has been demonstrated in multiple studies. Results from an international randomized trial, the Microarray in Node-Negative Disease May Avoid Chemotherapy (MINDACT) trial suggest that this genetic profile may identify subsets of patients who have a low likelihood of distant recurrence despite high-risk clinical features. In this trial, [id number] women, approximately [de-identified] percent of whom had lymph node-negative disease, underwent risk assessment by clinical criteria (using Adjuvant! Online) and by the 70-genetic profile. Patients with discordant clinical and genomic predictions were randomly assigned to receive or not receive adjuvant chemotherapy. Among patients in the intention-to-treat population who had a high clinical risk of recurrence but a low risk by [place] genetic profile, the five-year distant metastases-free survival rate was 95.9 percent with chemotherapy and 94.4 percent without chemotherapy (HR for distant metastasis or death 0.78, 95% CI 0.50-1.21). However, it should be noted that the MINDACT study was not powered to exclude a benefit of chemotherapy.  In analysis of discordant groups in the intention-to-treat population, adjuvant chemotherapy was associated with improvement in the rate of distant metastasis-free survival of 2.8 and 2 percent, respectively, for the high clinical/low genomic and low clinical/high genomic risk groups, although these differences were also not statistically significant. Based on MINDACT, [employer] has suggested [place] is one of several assays that might be used to determine prognosis for those with high clinical risk, hormone receptor-positive, HER2-negative breast cancer and no or limited (one to three) involved lymph nodes to inform decisions regarding withholding chemotherapy. Low risk mammaprint. With age > 48, and with the updated MINDACT results, I am ok with no chemotherapy    She will proceed now with XRT to the right breast. Started 9/6/22. Will avoid tamoxifen due to history of VTE    The risks and benefits of aromatase inhibitors (anastrozole, letrozole, and exemestane) were discussed in detail and the patient was informed of the following: Risks include the development of painful muscles and joints (arthralgia/myalgia) and bone loss. Muscle and joint pain can be severe but rarely result in any tissue damage; symptoms usually resolve in several weeks when the medication is stopped. Bone loss is common and a bone density test is recommended as a baseline and then yearly to every several years depending on initial results. The risk of fractures is increased by a few percent in patients taking these drugs, but careful monitoring of bone density and using bone protecting agents when indicated can minimize these risks. Unlike tamoxifen there is no increased risk of blood clots or endometrial cancer. AIs can cause or worsen vaginal dryness but women using these drugs should not use vaginal estrogen preparations for these symptoms. AIs can also cause or increase hot flashes. Any other symptoms should be reported. We discussed that for high risk women with breast cancer (having either received chemotherapy or < 28years old), ovarian suppression (such as goserelin 10.8 mg q 3 months ) + AI was superior in terms of overall survival than tamoxifen alone.   The added risks of worse QOL due to depression and worse menopausal symptoms were discussed with the addition of ovarian suppression. Following XRT, will start her on goserelin, followed by anastrozole. She may be a good candidate for BSO in the future. Started goserelin 10/10/22, she is having severe joint pain, see below. Will switch to Lupron on 1/3/2023 to see if any improvement. Will rx anastrozole but start in Jan to allow for improvement in joint pain. Ki67 is low, no indication for abemaciclib    2. Emotional well being/recurrent depression:  She has excellent support and is coping well with her disease    3. L DCIS:  Stage 0. See treatment for #1. S/p mastectomy, no XRT required    4. Back pain/right shoulder pain: negative bone scan 6/01/22    5. Right surgical infection: was admitted to the hospital 7/10/22 to 7/15/22 and had expander removed. Has completed antibiotics and healing well. 6. CHEK2 VUS: not clinically relevant at this time. 7. Arthralgias: started 1 month after goserelin, very bothersome to patient. Ordered Mobic 15mg daily x 14 days and to start cymbalta 30 mg daily x 7 days then increase to 60 mg daily. Will switch to lupron to see if any improvement in Jan.     I personally saw and evaluated the patient and performed the entire medical decision making. The history, physical exam, and documentation were performed by Kerstin Goldmann, NP. I reviewed and verified the above documentation and modified it as needed. Right breast cancer, ER +, on goserelin + anastrozole. Has not started anastrozole yet, having severe joint pain with goserelin, also with fatigue. Will stop goserelin and change to lupron in Jan.  Holding anastrozole until then. For severe joint pain, rx mobic and cymbalta, to start 30 mg daily x 7 days then 60 mg daily. RTC in Jan      I appreciate the opportunity to participate in Ms. [address] [place]'s care.     Signed By: Hardeep Keene MD      No orders of the defined types were placed in this encounter.

## 2022-12-06 RX ORDER — DULOXETIN HYDROCHLORIDE 60 MG/1
60 CAPSULE, DELAYED RELEASE ORAL DAILY
Qty: 90 CAPSULE | Refills: 3 | Status: SHIPPED | OUTPATIENT
Start: 2022-12-06

## 2023-01-03 ENCOUNTER — OFFICE VISIT (OUTPATIENT)
Dept: ONCOLOGY | Age: 58
End: 2023-01-03
Payer: COMMERCIAL

## 2023-01-03 ENCOUNTER — HOSPITAL ENCOUNTER (OUTPATIENT)
Dept: INFUSION THERAPY | Age: 58
Discharge: HOME | End: 2023-01-03
Payer: COMMERCIAL

## 2023-01-03 VITALS
OXYGEN SATURATION: 97 % | HEART RATE: 71 BPM | RESPIRATION RATE: 18 BRPM | DIASTOLIC BLOOD PRESSURE: 77 MMHG | BODY MASS INDEX: 40.18 KG/M2 | WEIGHT: 226.8 LBS | TEMPERATURE: 97.4 F | HEIGHT: 63 IN | SYSTOLIC BLOOD PRESSURE: 166 MMHG

## 2023-01-03 VITALS — DIASTOLIC BLOOD PRESSURE: 82 MMHG | SYSTOLIC BLOOD PRESSURE: 140 MMHG

## 2023-01-03 DIAGNOSIS — C50.111 MALIGNANT NEOPLASM OF CENTRAL PORTION OF RIGHT BREAST IN FEMALE, ESTROGEN RECEPTOR POSITIVE (HCC): Primary | ICD-10-CM

## 2023-01-03 DIAGNOSIS — E66.01 OBESITY, CLASS III, BMI 40-49.9 (MORBID OBESITY) (HCC): ICD-10-CM

## 2023-01-03 DIAGNOSIS — M25.50 ARTHRALGIA, UNSPECIFIED JOINT: ICD-10-CM

## 2023-01-03 DIAGNOSIS — Z17.0 MALIGNANT NEOPLASM OF CENTRAL PORTION OF RIGHT BREAST IN FEMALE, ESTROGEN RECEPTOR POSITIVE (HCC): Primary | ICD-10-CM

## 2023-01-03 PROCEDURE — 96402 CHEMO HORMON ANTINEOPL SQ/IM: CPT

## 2023-01-03 PROCEDURE — 74011250636 HC RX REV CODE- 250/636: Performed by: NURSE PRACTITIONER

## 2023-01-03 PROCEDURE — 99214 OFFICE O/P EST MOD 30 MIN: CPT | Performed by: INTERNAL MEDICINE

## 2023-01-03 RX ORDER — MELOXICAM 15 MG/1
TABLET ORAL
COMMUNITY
Start: 2022-12-29

## 2023-01-03 RX ORDER — DULOXETIN HYDROCHLORIDE 60 MG/1
60 CAPSULE, DELAYED RELEASE ORAL DAILY
Qty: 90 CAPSULE | Refills: 3 | Status: SHIPPED | OUTPATIENT
Start: 2023-01-03

## 2023-01-03 RX ADMIN — LEUPROLIDE ACETATE 3.75 MG: KIT at 09:25

## 2023-01-03 NOTE — PROGRESS NOTES
Identified pt with two pt identifiers(name and ). Reviewed record in preparation for visit and have obtained necessary documentation. Chief Complaint   Patient presents with    Breast Cancer        Vitals:    23 0938   BP: (!) 140/82       Health Maintenance Review: Patient reminded of \"due or due soon\" health maintenance. I have asked the patient to contact his/her primary care provider (PCP) for follow-up on his/her health maintenance. Immunization History   Administered Date(s) Administered    COVID-19, PFIZER PURPLE top, DILUTE for use, (age 15 y+), IM, 30mcg/0.3mL 2020, 2021, 10/28/2021    Influenza, FLUARIX, FLULAVAL, FLUZONE (age 10 mo+) AND AFLURIA, (age 1 y+), PF, 0.5mL 2017    Pneumococcal Polysaccharide (PPSV-23) 2018    Tdap 2018       Current Outpatient Medications   Medication Instructions    acetaminophen (TYLENOL) 1,000 mg, Oral, DAILY AS NEEDED    anastrozole (ARIMIDEX) 1 mg, Oral, DAILY    DULoxetine (CYMBALTA) 60 mg, Oral, DAILY    meloxicam (MOBIC) 15 mg tablet TAKE 1 TABLET BY MOUTH EVERY DAY FOR 2 WEEKS       Allergies   Allergen Reactions    Phenergan [Promethazine] Other (comments)     IV only- Hallucinate    Other Medication Other (comments)     IV PHENERGAN--Makes Hallucinate \"Really\" bad. Chlorahexine WIPES & SOAP--Broke out in hives and was extremely itchy.         Immunization History   Administered Date(s) Administered    COVID-19, PFIZER PURPLE top, DILUTE for use, (age 15 y+), IM, 30mcg/0.3mL 2020, 2021, 10/28/2021    Influenza, FLUARIX, FLULAVAL, FLUZONE (age 10 mo+) AND AFLURIA, (age 1 y+), PF, 0.5mL 2017    Pneumococcal Polysaccharide (PPSV-23) 2018    Tdap 2018       Past Medical History:   Diagnosis Date    Allergy-induced asthma     Anxiety and depression     Arthritis     hands    Bipolar 2 disorder ([facility]     Breast cancer ([facility] [facility]     Right and left breast    COVID-19     Diverticulosis Pyelonephritis 6/7/14    Thromboembolus ([facility] [address] 2020    post surgery-1 in R calf and 2 in R upper thigh           1. \"Have you been to the ER, urgent care clinic since your last visit? Hospitalized since your last visit? \" No    2. \"Have you seen or consulted any other health care providers outside of the [address] [place] [address] since your last visit? \" No

## 2023-01-03 NOTE — PROGRESS NOTES
[facility]  [address] [place] [address], [address] [place] [address]  W: [phone]  F: [phone]      Reason for Visit:   Rhina Dumont is a 56 y.o. female who is seen in follow up for breast cancer    Breast Surgeon: Dr. River Romo surgery:  Dr. Ester Lerma    Treatment History:   5/5/22 right breast bx:  IDC, gr 1, 8 mm, ER + at > 90%, OH + at 90%, HER 2 negative (IHC 2+; FISH ratio 1.8; sig/cell 3.73); right ax LN + for breast cancer  Mammaprint - Low risk luminal A  6/1/22 left breast core bx:  DCIS, gr 2, involving intraductal papilloma, ER + at 95%  6/27/22 left breast mastectomy: DCIS 20 mm, gr 2, pTis pNx cM0; right breast mastectomy:  IDC 17 mm, gr 1, ER + 95%, OH + 95%, HER-2 negative at IHC  1+, ki67 15%. 1/5 LN positive with 5.5 mm carcinoma, ER + 95%, OH +95%, HER-2 negative at [facility] 1+; DCIS present, not extensive, gr 2,  pT1c pN1a cM0  XRT started 9/6/22 - 10/10/22  Goserelin 10/10/22 - 1/2/23 (joint pain)  Lupron 1/3/23 - current  Anastrozole to start 1/10/23    History of Present Illness: An abnormal mammogram led to the path above    Interval Hx: Patient presents today for follow up. Reports gr 1 loss of appetite, gr 2 fatigue, gr 1 hot flashes, gr 1 insomnia, gr 1 loss of cognition/concentration, gr 1 pain/cramping rated 3/10 to hands/hips/knees, gr 1 headache, gr 1 loss of libido. She states she has generalized joint pain that started about 1 month after goserelin. This started in her hands but slowly progressed to all joints. FH:  No breast or ovarian cancer; no pancreas or prostate cancer    She reports mother's cousin had breast cancer.      LMP 9/2022    Past Medical History:   Diagnosis Date    Allergy-induced asthma     Anxiety and depression     Arthritis     hands    Bipolar 2 disorder ([address]     Breast cancer ([address] 2022    Right and left breast    COVID-19 2022    Diverticulosis     Pyelonephritis 6/7/14    Thromboembolus ([facility] 2020 post surgery-1 in R calf and 2 in R upper thigh      Past Surgical History:   Procedure Laterality Date    COLONOSCOPY N/A 2018    COLONOSCOPY performed by Gerard Carrillo MD at [address] Bilateral 2022    BREAST RECONSTRUCTION performed by Austin Butler MD at [facility]    HX BREAST RECONSTRUCTION Bilateral 2022    RIGHT BREAST IRRIGATION AND DEBRIDEMENT REMOVAL OF RIGHT TISSUE EXPANDER AND FLEX HD, CLOSURE OF LEFT BREAST TRIPLE POINT WOUND performed by Austin Butler MD at [address]    HX MASTECTOMY Bilateral 2022    BILATERAL BREAST MASTECTOMIES AND RIGHT AXILLARY LYMPH NODE DISSECTION/BILATERAL BREAST RECONSTRUCTION WITH POSSIBLE TISSUE EXPANDER/POSSIBLE DIRECT TO IMPLANT/ ACELLULAR DERMAL MATRIX performed by Genevieve Mejia MD at [facility]    HX ORTHOPAEDIC Right 2020    tendon and ligament repair to right foot    HX OTHER SURGICAL  1988    vein ligation    HX PARTIAL COLECTOMY      partial sigmoid colectomy    HX ROTATOR CUFF REPAIR Left 2021    HX SHOULDER REPLACEMENT  2021    HX TONSILLECTOMY      HX TUBAL LIGATION      AK UNLISTED PROCEDURE ABDOMEN PERITONEUM & OMENTUM  3/18/2022    To resolve diverticulitis      Social History     Tobacco Use    Smoking status: Former     Types: Cigarettes     Quit date:      Years since quittin.0     Passive exposure: Never    Smokeless tobacco: Never    Tobacco comments:     I quit many years ago, sometime in    Substance Use Topics    Alcohol use:  Yes     Alcohol/week: 17.0 standard drinks     Types: 4 Glasses of wine, 3 Cans of beer, 10 Standard drinks or equivalent per week      Family History   Problem Relation Age of Onset    Immune Disorder Mother         Lupus     Current Outpatient Medications   Medication Sig    meloxicam (MOBIC) 15 mg tablet TAKE 1 TABLET BY MOUTH EVERY DAY FOR 2 WEEKS    DULoxetine (CYMBALTA) 60 mg capsule Take 1 Capsule by mouth daily.    acetaminophen (TYLENOL) 500 mg tablet Take 1,000 mg by mouth daily as needed for Pain. anastrozole (ARIMIDEX) 1 mg tablet Take 1 mg by mouth daily. (Patient not taking: Reported on 1/3/2023)     No current facility-administered medications for this visit. Allergies   Allergen Reactions    Phenergan [Promethazine] Other (comments)     IV only- Hallucinate    Other Medication Other (comments)     IV PHENERGAN--Makes Hallucinate \"Really\" bad. Chlorahexine WIPES & SOAP--Broke out in hives and was extremely itchy. Review of Systems: A complete review of systems was obtained, negative except as described above. Physical Exam:     Visit Vitals  BP (!) 140/82 (BP 1 Location: Left upper arm, BP Patient Position: Sitting, BP Cuff Size: Adult)     ECOG PS: 0    Constitutional: Appears well-developed and well-nourished in no apparent distress      Mental status: Alert and awake, Oriented to person/place/time, Able to follow commands    Eyes: EOM normal, Sclera normal, No visible ocular discharge    HENT: Normocephalic, atraumatic    Mouth/Throat: Moist mucous membranes    External Ears normal    Neck: No visualized mass    Pulmonary/Chest: Respiratory effort normal, No visualized signs of difficulty breathing or respiratory distress    Musculoskeletal: Normal gait with no signs of ataxia, Normal range of motion of neck    Neurological: No facial asymmetry (Cranial nerve 7 motor function), No gaze palsy    Skin: no visible rash    Psychiatric: Normal affect, No hallucinations     Right breast: lateral swelling with tenderness and no erythema    Results:     Lab Results   Component Value Date/Time    WBC 7.6 09/12/2022 03:25 PM    HGB 11.8 09/12/2022 03:25 PM    HCT 38.9 09/12/2022 03:25 PM    PLATELET 012 66/67/0883 03:25 PM    MCV 95.6 09/12/2022 03:25 PM    ABS.  NEUTROPHILS 3.4 07/15/2022 02:49 AM     Lab Results   Component Value Date/Time    Sodium 140 09/12/2022 03:25 PM    Potassium 4.5 09/12/2022 03:25 PM    Chloride 107 09/12/2022 03:25 PM    CO2 29 09/12/2022 03:25 PM    Glucose 96 09/12/2022 03:25 PM    BUN 19 09/12/2022 03:25 PM    Creatinine 0.96 09/12/2022 03:25 PM    GFR est AA >60 09/12/2022 03:25 PM    GFR est non-AA >60 09/12/2022 03:25 PM    Calcium 9.5 09/12/2022 03:25 PM     Lab Results   Component Value Date/Time    Bilirubin, total 0.3 09/12/2022 03:25 PM    ALT (SGPT) 35 09/12/2022 03:25 PM    Alk. phosphatase 91 09/12/2022 03:25 PM    Protein, total 7.6 09/12/2022 03:25 PM    Albumin 3.9 09/12/2022 03:25 PM    Globulin 3.7 09/12/2022 03:25 PM     MRI breast 5/13/22  Right breast: A dominant dynamically enhancing mass with washout kinetics lies  at approximately 9:00 in the right nipple line, mid depth, measuring 1.1 cm AP,  1.5 cm cc and 1.1 cm TRV. An additional small enhancing mass lies at the nipple,  immediately posterior real or, with nipple inversion and enhancement  inseparable. This nipple areolar complex and enhancement measures 1.4 cm TRV,  1.2 cm AP and 0.9 cm CC. Between the dominant mass at 9:00 and the nipple, there  is a combined distance of 8.2 cm, where there is linear delayed enhancement in  the nipple line suspicious for ductal extension. There are no other dominant  abnormalities of morphology or enhancement within the right breast to suggest  additional disease, although the gland overall in the outer breast is enhancing. Lymph nodes in the right axilla are normal to indeterminate, but none contains a  biopsy clip artifact. Left breast: In the axillary breast, posterior depth at 1:30, nonmass  enhancement measures 3.7 cm AP, 1.1 cm TRV and approximately 2.4 cm CC. It shows  dynamic washout enhancement and is suspicious for contralateral malignancy. No  other dominant abnormality of morphology or enhancement appears in the left  breast. Left axillary lymph nodes are not enlarged, but are morphologically  indeterminate. IMPRESSION  . IMPRESSION:  1. Right BI-RADS 6, known malignancy. Left BI-RADS 4, suspicious. 2. RIGHT BREAST: Known invasive ductal carcinoma at 9:00, with additional  findings between this mass and the nipple for a distance of 8.2 cm, suspicious  for ductal extension to the nipple areolar complex as described above. Indeterminate lymph nodes, no biopsy clip artifact detected. 3. LEFT BREAST: Suspicious nonmass enhancement in the axillary breast 1:30, for  which ultrasonography could be attempted for biopsy localization to exclude  contralateral malignancy. However, this lesion may be sonographically subtle,  and MRI guided biopsy may be necessary to ensure concordance. Indeterminate left  axillary lymph nodes. 4. A summary portfolio has been created for reference and is available in PACS. 5/24/22 US breast L  IMPRESSION  No discrete mass is identified. BI-RADS Category 4 - Suspicious abnormality. .  RECOMMENDATION:  MRI guided biopsy which will be technically challenging due to the far posterior  position of the MRI finding. Records reviewed and summarized above. Pathology report(s) reviewed above. Radiology report(s) reviewed above. Assessment/plan:   1. Right breast IDC, gr 1, ER +, IN +, HER 2 negative, LN + by core:  cT2 cN1a cM0, stage IIB, prognostic stage IA  Pre/perimenopausal    6/1/22 left breast core bx:  DCIS, gr 2, involving intraductal papilloma, ER + at 95%  6/27/22 left breast mastectomy: DCIS 20 mm, gr 2, pTis pNx cM0; right breast mastectomy:  IDC 17 mm, gr 1, ER + 95%, IN + 95%, HER-2 negative at IHC  1+. 1/5 LN positive with 5.5 mm carcinoma, ER + 95%, IN +95%, HER-2 negative at IHC 1+, ki67 15%; DCIS present, not extensive, gr 2,  pT1c pN1a cM0, stage IIA, prognostic stage IA      We explained to the patient that the goal of systemic adjuvant therapy is to improve the chances for cure and decrease the risk of relapse.  We explained why a patient can have microscopic cancer spread now even though physical examination, laboratory studies and imaging studies are negative for cancer. We explained that the same treatments used now as adjuvant or preventive treatments rarely if ever are curative in women who develop metastases. Discussed that based on [facility]-Rx, chemotherapy would be warranted (premenopausal with LN + disease). Discussed that the update to MINDACT validates mammaprint in this setting (> 48years old, LN + disease). Low risk mammaprint. With age > 48, and with the updated MINDACT results, I am ok with no chemotherapy    She will proceed now with XRT to the right breast. Started 9/6/22 and completed 10/10/22. Will avoid tamoxifen due to history of VTE    We discussed that for high risk women with breast cancer (having either received chemotherapy or < 28years old), ovarian suppression (such as goserelin 10.8 mg q 3 months ) + AI was superior in terms of overall survival than tamoxifen alone. The added risks of worse QOL due to depression and worse menopausal symptoms were discussed with the addition of ovarian suppression. Following XRT, started her on goserelin, followed by anastrozole. She may be a good candidate for BSO in the future, will refer to gyn onc. Started goserelin 10/10/22, she is having severe joint pain, see below. Switched to Lupron on 1/3/2023 to see if any improvement. Rx anastrozole, she will start in 1 week. Ki67 is low, not gr 3, tumor not > 5 cm, no indication for abemaciclib    2. Emotional well being/recurrent depression:  She has excellent support and is coping well with her disease    3. L DCIS:  Stage 0. See treatment for #1. S/p mastectomy, no XRT required    4. Back pain/right shoulder pain: negative bone scan 6/01/22    5. Right surgical infection: was admitted to the hospital 7/10/22 to 7/15/22 and had expander removed. Has completed antibiotics and healing well. Resolved. 6. CHEK2 VUS: not clinically relevant at this time.     7. Arthralgias: started 1 month after goserelin, very bothersome to patient. Ordered Mobic 15mg daily x 14 days and to start cymbalta 30 mg daily x 7 days then increase to 60 mg daily with improvement. She is currently taking cymbalta 60mg daily. Switching to lupron today to see if any improvement. 8. Right breast pain: lateral aspect of breast with some swelling with no erythema but tenderness to palpation. She will follow up with breast surgeon. I personally saw and evaluated the patient and performed the entire medical decision making. The history, physical exam, and documentation were performed by Phani Wagner NP. I reviewed and verified the above documentation and modified it as needed. Right breast cancer, ER +, on lupron now and anastrozole. Had severe joint pain with goserelin, changed to lupron today, will start anastrozole in 1 week. Referring to gyn onc for consideration of BSO. RTC 3 months. Having right breast tenderness, she will follow up with Dr. Genevieve Brennan      I appreciate the opportunity to participate in Ms. [address] [facility] [address] [place]'s care. Signed By: Jesus Shaver MD      No orders of the defined types were placed in this encounter.

## 2023-01-03 NOTE — PROGRESS NOTES
[facility] Progress Note    Date: January 3, 2023    Name: Jaylene Benito    MRN: [id number]         : 1965    Ms. Westland Arrived ambulatory and in no distress for C1D1 Lupron Injection (LLQ). Assessment was completed, no acute issues at this time, no new complaints voiced. Chemotherapy Flowsheet 1/3/2023   Cycle C1D1   Date 1/3/2023   Drug / Regimen Lupron   Notes LLQ      Ms. Westland's vitals were reviewed. Visit Vitals  BP (!) 166/77 (BP 1 Location: Left arm, BP Patient Position: Sitting)   Pulse 71   Temp 97.4 °F (36.3 °C)   Resp 18   Ht 5' 3\" (1.6 m)   Wt 102.9 kg (226 lb 12.8 oz)   SpO2 97%   Breastfeeding No   BMI 40.18 kg/m²       Medications:  Medications Administered       leuprolide depot (LUPRON) injection 3.75 mg       Admin Date  2023 Action  Given Dose  3.75 mg Route  IntraMUSCular Administered By  Ida Hays RN                   Ms. Mindy Earl tolerated treatment well and was discharged from [place] [address] in stable condition. She is to return on 2023 at 0900am for her next appointment.     Yared Tapia RN  January 3, 2023

## 2023-01-05 ENCOUNTER — TELEPHONE (OUTPATIENT)
Dept: SURGERY | Age: 58
End: 2023-01-05

## 2023-01-05 ENCOUNTER — DOCUMENTATION ONLY (OUTPATIENT)
Dept: SURGERY | Age: 58
End: 2023-01-05

## 2023-01-05 ENCOUNTER — OFFICE VISIT (OUTPATIENT)
Dept: SURGERY | Age: 58
End: 2023-01-05
Payer: COMMERCIAL

## 2023-01-05 VITALS
HEART RATE: 103 BPM | SYSTOLIC BLOOD PRESSURE: 156 MMHG | DIASTOLIC BLOOD PRESSURE: 96 MMHG | BODY MASS INDEX: 40.04 KG/M2 | HEIGHT: 63 IN | WEIGHT: 226 LBS

## 2023-01-05 DIAGNOSIS — Z85.3 HX: BREAST CANCER: ICD-10-CM

## 2023-01-05 DIAGNOSIS — C50.111 MALIGNANT NEOPLASM OF CENTRAL PORTION OF RIGHT BREAST IN FEMALE, ESTROGEN RECEPTOR POSITIVE (HCC): Primary | ICD-10-CM

## 2023-01-05 DIAGNOSIS — R22.2 SOFT TISSUE SWELLING OF CHEST WALL: Primary | ICD-10-CM

## 2023-01-05 DIAGNOSIS — R07.9 RIGHT-SIDED CHEST PAIN: ICD-10-CM

## 2023-01-05 DIAGNOSIS — Z17.0 MALIGNANT NEOPLASM OF CENTRAL PORTION OF RIGHT BREAST IN FEMALE, ESTROGEN RECEPTOR POSITIVE (HCC): Primary | ICD-10-CM

## 2023-01-05 PROCEDURE — 99213 OFFICE O/P EST LOW 20 MIN: CPT | Performed by: SURGERY

## 2023-01-05 PROCEDURE — 76642 ULTRASOUND BREAST LIMITED: CPT | Performed by: SURGERY

## 2023-01-05 NOTE — LETTER
1/5/2023    Patient: Pierre Townsend   YOB: 1965   Date of Visit: 1/5/2023     Christine Hernandez MD  [address]  [address] [facility] [address]    Dear Christine Hernandez MD,      Thank you for referring Ms. Tuyet Mcgee to [address] [facility] for evaluation. My notes for this consultation are attached. If you have questions, please do not hesitate to call me. I look forward to following your patient along with you.       Sincerely,    David Stevenson MD

## 2023-01-05 NOTE — PROGRESS NOTES
Faxed over clinicals and referral to Dr Vásquez's office for consult date & time. Will follow up tomorrow.

## 2023-01-05 NOTE — PROGRESS NOTES
HISTORY OF PRESENT ILLNESS  Kim Lang is a 56 y.o. female. HPI ESTABLISHED patient her for RIGHT breast pain swelling which she noticed 2 weeks ago. The area is tender with light touch. RIGHT expander removed due to pseudomonas infection. Goserelin x 1 dose. Stopped because of HA and joint pain. Lupron injection 2 days ago, HA starting again and still with mild joint pain. Start anastrozole Tuesday  Considering oophorectomy   5/2022 RIGHT LOQ invasive ductal carcinoma, grade 1, + axillary node, ER >90%. WA 90%, Her 2 negative  LEFT axillary tail DCIS in an intraductal papilloma, grade 2, ER 95%  6/2022 bilateral mastectomy with expanders, RIGHT 17mm invasive ductal carcinoma, grade 2 +1/5 nodes, ER 95%. WA 95%, Her 2 -, and LEFT 20mm DCIS grade 2 in an intraductal papilloma, ER 95%  RIGHT expander removed (pseudemonas)  XRT 10/2022, lupron/anastrozole    ROS    Physical Exam  Chest:   Breasts:     Breasts are symmetrical.      Right: Tenderness present. No mass or skin change. Left: Absent. No mass, skin change or tenderness. Comments: Bilateral mastectomy  LEFT expander  Lymphadenopathy:      Upper Body:      Right upper body: No supraclavicular or axillary adenopathy. Left upper body: No supraclavicular or axillary adenopathy. BREAST ULTRASOUND  Indication: pain lateral RIGHT breast  Technique: The RIGHT chest tissue and axilla were scanned using a high-frequency linear-array near-field transducer  Findings: No abnormal mass, lesion, or shadowing noted. No axillary lymphadenopathy  Impression: Normal tissue   Disposition: No worrisome finding on ultrasound     Injected 8cc marcaine at the site of pain lateral RIGHT chest  ASSESSMENT and PLAN    ICD-10-CM ICD-9-CM    1.  Soft tissue swelling of chest wall  R22.2 786.6       Total time spent on chart review and patient visit: 20 minutes    RIGHT chest wall pain is likely musculoskeletal.  She had a normal bone scan 6 months ago.  She had radiation in the interim. The pain is quite severe with minimal palpation. No abnormality seen on ultrasound. No mass or lymphadenopathy  Marcaine injected to see if it is soft tissue neuropathy  Bone scan ordered. Referred to Dr Jevon Snyder to discuss reconstruction options  Did not tolerate Goserelin. Tried Lupron. Starting anastrozole next week.

## 2023-01-17 ENCOUNTER — DOCUMENTATION ONLY (OUTPATIENT)
Dept: SURGERY | Age: 58
End: 2023-01-17

## 2023-01-18 ENCOUNTER — OFFICE VISIT (OUTPATIENT)
Dept: GYNECOLOGY | Age: 58
End: 2023-01-18
Payer: COMMERCIAL

## 2023-01-18 VITALS
DIASTOLIC BLOOD PRESSURE: 112 MMHG | HEIGHT: 63 IN | SYSTOLIC BLOOD PRESSURE: 131 MMHG | WEIGHT: 220.8 LBS | HEART RATE: 93 BPM | BODY MASS INDEX: 39.12 KG/M2

## 2023-01-18 DIAGNOSIS — C50.111 MALIGNANT NEOPLASM OF CENTRAL PORTION OF RIGHT BREAST IN FEMALE, ESTROGEN RECEPTOR POSITIVE (HCC): Primary | ICD-10-CM

## 2023-01-18 DIAGNOSIS — Z17.0 MALIGNANT NEOPLASM OF CENTRAL PORTION OF RIGHT BREAST IN FEMALE, ESTROGEN RECEPTOR POSITIVE (HCC): Primary | ICD-10-CM

## 2023-01-18 PROCEDURE — 99204 OFFICE O/P NEW MOD 45 MIN: CPT | Performed by: OBSTETRICS & GYNECOLOGY

## 2023-01-18 RX ORDER — AMOXICILLIN AND CLAVULANATE POTASSIUM 875; 125 MG/1; MG/1
TABLET, FILM COATED ORAL
COMMUNITY
Start: 2023-01-15 | End: 2023-01-26

## 2023-01-18 RX ORDER — PANTOPRAZOLE SODIUM 40 MG/1
TABLET, DELAYED RELEASE ORAL EVERY MORNING
COMMUNITY
Start: 2023-01-15

## 2023-01-18 NOTE — LETTER
1/25/2023    Patient: Rhina Dumont   YOB: 1965   Date of Visit: 1/18/2023     Harrison Jaeger MD  [address] [facility] [address]  [address], [address] [place] [address] Drive  [address] [facility]  [address] [facility] [address]    Dear Jefrey Phenix, MD Larissa Schlatter, MD,      Thank you for referring Ms. Benz [place] to [facility] for evaluation. My notes for this consultation are attached. If you have questions, please do not hesitate to call me. I look forward to following your patient along with you.       Sincerely,    Amber Lara MD

## 2023-01-18 NOTE — H&P (VIEW-ONLY)
[address] [facility] [address], [address] [facility]  P [phone]  F [phone]    Office Note  Patient ID:  Name:  Rosalba Olson  MRN:  [id number]  :  1965/57 y.o. Date:  2023      HISTORY OF PRESENT ILLNESS:  Ms. Rosalba Olson is a 56 y.o. female who presents as a new patient from Dr. Lizeth Pal for ER + 95%, ME +95%, HER-2 negative breast cancer (history below per Dr. James Sewell note. Presenting for consideration of therapeutic BSO. Started Kailash Liu 1/3/2023. Breast cancer history per Dr. James Sewell note on 1/3/2023:  22 right breast bx:  IDC, gr 1, 8 mm, ER + at > 90%, ME + at 90%, HER 2 negative (IHC 2+; FISH ratio 1.8; sig/cell 3.73); right ax LN + for breast cancer  Mammaprint - Low risk luminal A  22 left breast core bx:  DCIS, gr 2, involving intraductal papilloma, ER + at 95%  22 left breast mastectomy: DCIS 20 mm, gr 2, pTis pNx cM0; right breast mastectomy:  IDC 17 mm, gr 1, ER + 95%, ME + 95%, HER-2 negative at IHC  1+, ki67 15%.  LN positive with 5.5 mm carcinoma, ER + 95%, ME +95%, HER-2 negative at [facility] 1+; DCIS present, not extensive, gr 2,  pT1c pN1a cM0  XRT started 22 - 10/10/22  Goserelin 10/10/22 - 23 (joint pain)  Lupron 1/3/23 - current  Anastrozole to start 1/10/23      Denies vaginal bleeding/discharge, abdominal/pelvic pain, nausea, vomiting, constipation, diarrhea, CP, SOB, hematuria, hematochezia, change in appetite or bowel movements, bloating, fevers, chills, or urinary symptoms. ROS:  A comprehensive review of systems was negative except for that written in the History of Present Illness.  , 10 point ROS      ECOG stGstrstastdstest:st st1st Problem List:  Patient Active Problem List    Diagnosis Date Noted    Cellulitis of right breast 2022    Breast cancer, right ([facility] 2022    Mild intermittent asthma without complication     Bipolar 2 disorder, major depressive episode ([facility] 2017    Anxiety 2014     PMH:  Past Medical History:   Diagnosis Date    Allergy-induced asthma     Anxiety and depression     Arthritis     hands    Bipolar 2 disorder ([facility]     Breast cancer ([facility]     Right and left breast    COVID-19     Diverticulosis     Pyelonephritis 14    Thromboembolus ([facility]     post surgery-1 in R calf and 2 in R upper thigh      PSH:  Past Surgical History:   Procedure Laterality Date    COLONOSCOPY N/A 2018    COLONOSCOPY performed by Andrzej Duncan MD at [address] [place] [address] Bilateral 2022    BREAST RECONSTRUCTION performed by Angelica Long MD at [address] [facility] Bilateral 2022    RIGHT BREAST IRRIGATION AND DEBRIDEMENT REMOVAL OF RIGHT TISSUE EXPANDER AND FLEX HD, CLOSURE OF LEFT BREAST TRIPLE POINT WOUND performed by Angelica Long MD at [address] [facility]    HX MASTECTOMY Bilateral 2022    BILATERAL BREAST MASTECTOMIES AND RIGHT AXILLARY LYMPH NODE DISSECTION/BILATERAL BREAST RECONSTRUCTION WITH POSSIBLE TISSUE EXPANDER/POSSIBLE DIRECT TO IMPLANT/ ACELLULAR DERMAL MATRIX performed by Margarita Reddy MD at [address] [facility]    HX ORTHOPAEDIC Right 2020    tendon and ligament repair to right foot    HX OTHER SURGICAL  1988    vein ligation    HX PARTIAL COLECTOMY      partial sigmoid colectomy    HX ROTATOR CUFF REPAIR Left 2021    HX SHOULDER REPLACEMENT  2021    HX TONSILLECTOMY  1985    HX TUBAL LIGATION      [place] UNLISTED PROCEDURE ABDOMEN PERITONEUM & OMENTUM  3/18/2022    To resolve diverticulitis      Social History:  Social History     Tobacco Use    Smoking status: Former     Types: Cigarettes     Quit date:      Years since quittin.0     Passive exposure: Never    Smokeless tobacco: Never    Tobacco comments:     I quit many years ago, sometime in    Substance Use Topics    Alcohol use:  Yes     Alcohol/week: 17.0 standard drinks     Types: 4 Glasses of wine, 3 Cans of beer, 10 Standard drinks or equivalent per week      Family History:  Family History   Problem Relation Age of Onset    Immune Disorder Mother         Lupus      Medications: (reviewed)  Current Outpatient Medications   Medication Sig    amoxicillin-clavulanate (AUGMENTIN) 875-125 mg per tablet     pantoprazole (PROTONIX) 40 mg tablet     ondansetron HCl (ZOFRAN PO) Take  by mouth.    meloxicam (MOBIC) 15 mg tablet TAKE 1 TABLET BY MOUTH EVERY DAY FOR 2 WEEKS    DULoxetine (CYMBALTA) 60 mg capsule Take 1 Capsule by mouth daily. anastrozole (ARIMIDEX) 1 mg tablet Take 1 mg by mouth daily. acetaminophen (TYLENOL) 500 mg tablet Take 1,000 mg by mouth daily as needed for Pain. No current facility-administered medications for this visit. Allergies: (reviewed)  Allergies   Allergen Reactions    Phenergan [Promethazine] Other (comments)     IV only- Hallucinate    Other Medication Other (comments)     IV PHENERGAN--Makes Hallucinate \"Really\" bad. Chlorahexine WIPES & SOAP--Broke out in hives and was extremely itchy. OBJECTIVE:    Physical Exam:  VITAL SIGNS: Vitals:    01/18/23 0854   BP: (!) 131/112   Pulse: 93   Weight: 220 lb 12.8 oz (100.2 kg)   Height: 5' 3\" (1.6 m)     Body mass index is 39.11 kg/m². GENERAL LEYDI: Conversant, alert, oriented. No acute distress. HEENT: HEENT. No thyroid enlargement. No JVD. Neck: Supple without restrictions. RESPIRATORY: Clear to auscultation and percussion to the bases. No CVAT. CARDIOVASC: RRR without murmur/rub. GASTROINT: soft, non-tender, without masses or organomegaly   MUSCULOSKEL: no joint tenderness, deformity or swelling       EXTREMITIES: extremities normal, atraumatic, no cyanosis or edema   PELVIC: Exam chaperoned by nurse. Normal appearing external genitalia. On speculum exam, normal appearing vagina and cervix. On bimanual exam, the cervix and uterus are normal size and mobile. No evidence of adnexal masses or nodularity. RECTAL: deferred   MORENITA SURVEY: No suspicious lymphadenopathy or edema noted. NEURO: Grossly intact. No acute deficit. Lab Date as available:    Lab Results   Component Value Date/Time    WBC 7.6 09/12/2022 03:25 PM    HGB 11.8 09/12/2022 03:25 PM    HCT 38.9 09/12/2022 03:25 PM    PLATELET 750 69/68/2737 03:25 PM    MCV 95.6 09/12/2022 03:25 PM     Lab Results   Component Value Date/Time    Sodium 140 09/12/2022 03:25 PM    Potassium 4.5 09/12/2022 03:25 PM    Chloride 107 09/12/2022 03:25 PM    CO2 29 09/12/2022 03:25 PM    Anion gap 4 (L) 09/12/2022 03:25 PM    Glucose 96 09/12/2022 03:25 PM    BUN 19 09/12/2022 03:25 PM    Creatinine 0.96 09/12/2022 03:25 PM    BUN/Creatinine ratio 20 09/12/2022 03:25 PM    GFR est AA >60 09/12/2022 03:25 PM    GFR est non-AA >60 09/12/2022 03:25 PM    Calcium 9.5 09/12/2022 03:25 PM         IMPRESSION/PLAN:    Ms. Mirna Loredo is a 56 y.o. female who presents as a new patient from Dr. Bradly Schaeffer for ER + 95%, NC +95%, HER-2 negative breast cancer (history above per Dr. Shirley Peters note). Presenting for consideration of therapeutic BSO. Started Sukhdev Golden 1/3/2023. Problems:     Patient Active Problem List    Diagnosis Date Noted    Cellulitis of right breast 07/11/2022    Breast cancer, right ([facility] 05/12/2022    Mild intermittent asthma without complication 17/70/4591    Bipolar 2 disorder, major depressive episode ([facility] 08/28/2017    Anxiety 01/07/2014       I reviewed Ms. Rashid Mcgee's course to date, including her medical records, recent studies, physical exam, and review of symptoms. Counseled patient regarding indication for therapeutic BSO. She would like to proceed with surgery. Plan for laparoscopic BSO, possible laparotomy. She has also had a sigmoid colectomy for diverticulitis. Counseled patient that this may make her surgery more difficult, although we will not know until we are intra-operative. Will post for surgery at the patient's convenience.  No indication for a hysterectomy, and the patient does not desire a hysterectomy. Reviewed risks, benefits, indications, and alternatives of surgery. Will collect CBCD, CMP, CXR, and EKG prior to surgery. All questions and concerns were addressed with the patient and she is comfortable with the plan.      Defined Sensitive Document    >50% of total time allocated to visit dedicated to counseling, 50  minutes total.    Signed By: Armando Rodriguez MD     1/18/2023/9:18 AM

## 2023-01-18 NOTE — PROGRESS NOTES
New Patient, Referred by Dr. Tyron Cassidy for surgical consult, recently hospitalized for diverticulitis, she was hospitalized at [facility], she states she has had no vaginal bleeding since October 2022    1. Have you been to the ER, urgent care clinic since your last visit? Hospitalized since your last visit?  no    2. Have you seen or consulted any other health care providers outside of the [address] [place] [address] since your last visit? Include any pap smears or colon screening.    no

## 2023-01-18 NOTE — PROGRESS NOTES
[address] [facility] [address], [address] [facility], [place] [phone]  F [phone]    Office Note  Patient ID:  Name:  Meena Luna  MRN:  [id number]  :  1965/57 y.o. Date:  2023      HISTORY OF PRESENT ILLNESS:  Ms. Meena Luna is a 56 y.o. female who presents as a new patient from Dr. Cheryl Springer for ER + 95%, SD +95%, HER-2 negative breast cancer (history below per Dr. Ximena Mena note. Presenting for consideration of therapeutic BSO. Started Christina Margarito 1/3/2023     Breast cancer history per Dr. Ximena Mena note on 1/3/2023:  22 right breast bx:  IDC, gr 1, 8 mm, ER + at > 90%, SD + at 90%, HER 2 negative (IHC 2+; FISH ratio 1.8; sig/cell 3.73); right ax LN + for breast cancer  Mammaprint - Low risk luminal A  22 left breast core bx:  DCIS, gr 2, involving intraductal papilloma, ER + at 95%  22 left breast mastectomy: DCIS 20 mm, gr 2, pTis pNx cM0; right breast mastectomy:  IDC 17 mm, gr 1, ER + 95%, SD + 95%, HER-2 negative at IHC  1+, ki67 15%.  LN positive with 5.5 mm carcinoma, ER + 95%, SD +95%, HER-2 negative at [facility] 1+; DCIS present, not extensive, gr 2,  pT1c pN1a cM0  XRT started 22 - 10/10/22  Goserelin 10/10/22 - 23 (joint pain)  Lupron 1/3/23 - current  Anastrozole to start 1/10/23      Denies vaginal bleeding/discharge, abdominal/pelvic pain, nausea, vomiting, constipation, diarrhea, CP, SOB, hematuria, hematochezia, change in appetite or bowel movements, bloating, fevers, chills, or urinary symptoms.       ROS:  {Ros - complete:[id number]} , 10 point ROS      ECOG Grade: ***      Problem List:  Patient Active Problem List    Diagnosis Date Noted    Cellulitis of right breast 2022    Breast cancer, right ([facility] 2022    Mild intermittent asthma without complication     Bipolar 2 disorder, major depressive episode ([facility] 2017    Anxiety 2014     PMH:  Past Medical History:   Diagnosis Date    Allergy-induced asthma     Anxiety and depression     Arthritis     hands    Bipolar 2 disorder ([facility]     Breast cancer ([facility]     Right and left breast    COVID-19     Diverticulosis     Pyelonephritis 14    Thromboembolus ([facility]     post surgery-1 in R calf and 2 in R upper thigh      PSH:  Past Surgical History:   Procedure Laterality Date    COLONOSCOPY N/A 2018    COLONOSCOPY performed by Dayana De La Vega MD at [address] Bilateral 2022    BREAST RECONSTRUCTION performed by Kannan Mortensen MD at [address] Bilateral 2022    RIGHT BREAST IRRIGATION AND DEBRIDEMENT REMOVAL OF RIGHT TISSUE EXPANDER AND FLEX HD, CLOSURE OF LEFT BREAST TRIPLE POINT WOUND performed by Kannan Mortensen MD at [address]    HX MASTECTOMY Bilateral 2022    BILATERAL BREAST MASTECTOMIES AND RIGHT AXILLARY LYMPH NODE DISSECTION/BILATERAL BREAST RECONSTRUCTION WITH POSSIBLE TISSUE EXPANDER/POSSIBLE DIRECT TO IMPLANT/ ACELLULAR DERMAL MATRIX performed by Kiko Beyer MD at [address] [facility]    HX ORTHOPAEDIC Right 2020    tendon and ligament repair to right foot    HX OTHER SURGICAL  1988    vein ligation    HX PARTIAL COLECTOMY      partial sigmoid colectomy    HX ROTATOR CUFF REPAIR Left 2021    HX SHOULDER REPLACEMENT  2021    HX TONSILLECTOMY  1985    HX TUBAL LIGATION      [place] UNLISTED PROCEDURE ABDOMEN PERITONEUM & OMENTUM  3/18/2022    To resolve diverticulitis      Social History:  Social History     Tobacco Use    Smoking status: Former     Types: Cigarettes     Quit date:      Years since quittin.0     Passive exposure: Never    Smokeless tobacco: Never    Tobacco comments:     I quit many years ago, sometime in    Substance Use Topics    Alcohol use:  Yes     Alcohol/week: 17.0 standard drinks     Types: 4 Glasses of wine, 3 Cans of beer, 10 Standard drinks or equivalent per week      Family History:  Family History   Problem Relation Age of Onset    Immune Disorder Mother         Lupus      Medications: (reviewed)  Current Outpatient Medications   Medication Sig    amoxicillin-clavulanate (AUGMENTIN) 875-125 mg per tablet     pantoprazole (PROTONIX) 40 mg tablet     ondansetron HCl (ZOFRAN PO) Take  by mouth.    meloxicam (MOBIC) 15 mg tablet TAKE 1 TABLET BY MOUTH EVERY DAY FOR 2 WEEKS    DULoxetine (CYMBALTA) 60 mg capsule Take 1 Capsule by mouth daily. anastrozole (ARIMIDEX) 1 mg tablet Take 1 mg by mouth daily. acetaminophen (TYLENOL) 500 mg tablet Take 1,000 mg by mouth daily as needed for Pain. No current facility-administered medications for this visit. Allergies: (reviewed)  Allergies   Allergen Reactions    Phenergan [Promethazine] Other (comments)     IV only- Hallucinate    Other Medication Other (comments)     IV PHENERGAN--Makes Hallucinate \"Really\" bad. Chlorahexine WIPES & SOAP--Broke out in hives and was extremely itchy. Gyn History:   Last pap: ***  History of abnormal pap: ***  History of STI: ***  Menses: ***  Contraception: ***    OBJECTIVE:    Physical Exam:  VITAL SIGNS: Vitals:    01/18/23 0854   BP: (!) 131/112   Pulse: 93   Weight: 220 lb 12.8 oz (100.2 kg)   Height: 5' 3\" (1.6 m)     Body mass index is 39.11 kg/m². GENERAL LEYDI: Conversant, alert, oriented. No acute distress. HEENT: HEENT. No thyroid enlargement. No JVD. Neck: Supple without restrictions. RESPIRATORY: Clear to auscultation and percussion to the bases. No CVAT. CARDIOVASC: RRR without murmur/rub. GASTROINT: {Exam; abdomen:70008}   MUSCULOSKEL: {msk exam:200968}       EXTREMITIES: {Exam; extremity:5109}   PELVIC: Exam chaperoned by nurse. Normal appearing external genitalia. On speculum exam, normal appearing vagina and cervix. On bimanual exam, the cervix and uterus are normal size and mobile. No evidence of adnexal masses or nodularity.  ***   RECTAL: ***   MORENITA SURVEY: No suspicious lymphadenopathy or edema noted. NEURO: Grossly intact. No acute deficit. Lab Date as available:    Lab Results   Component Value Date/Time    WBC 7.6 09/12/2022 03:25 PM    HGB 11.8 09/12/2022 03:25 PM    HCT 38.9 09/12/2022 03:25 PM    PLATELET 105 22/32/7149 03:25 PM    MCV 95.6 09/12/2022 03:25 PM     Lab Results   Component Value Date/Time    Sodium 140 09/12/2022 03:25 PM    Potassium 4.5 09/12/2022 03:25 PM    Chloride 107 09/12/2022 03:25 PM    CO2 29 09/12/2022 03:25 PM    Anion gap 4 (L) 09/12/2022 03:25 PM    Glucose 96 09/12/2022 03:25 PM    BUN 19 09/12/2022 03:25 PM    Creatinine 0.96 09/12/2022 03:25 PM    BUN/Creatinine ratio 20 09/12/2022 03:25 PM    GFR est AA >60 09/12/2022 03:25 PM    GFR est non-AA >60 09/12/2022 03:25 PM    Calcium 9.5 09/12/2022 03:25 PM         IMPRESSION/PLAN:    Ms. Li Escalona is a 56 y.o. female who presents with ***    Problems:     Patient Active Problem List    Diagnosis Date Noted    Cellulitis of right breast 07/11/2022    Breast cancer, right ([facility] 05/12/2022    Mild intermittent asthma without complication 49/67/2142    Bipolar 2 disorder, major depressive episode ([facility] 08/28/2017    Anxiety 01/07/2014       I reviewed Ms. Reyna Mcgee's course to date, including her medical records, recent studies, physical exam, and review of symptoms.  ***    Defined Sensitive Document    >50% of total time allocated to visit dedicated to counseling, *** minutes total.    Signed By: Amanda Ng MD     1/18/2023/9:18 AM indication for a hysterectomy, and the patient does not desire a hysterectomy. Reviewed risks, benefits, indications, and alternatives of surgery. Will collect CBCD, CMP, CXR, and EKG prior to surgery. All questions and concerns were addressed with the patient and she is comfortable with the plan.      Defined Sensitive Document    >50% of total time allocated to visit dedicated to counseling, 50  minutes total.    Signed By: Doug Yan MD     1/18/2023/9:18 AM

## 2023-01-25 ENCOUNTER — HOSPITAL ENCOUNTER (OUTPATIENT)
Dept: PREADMISSION TESTING | Age: 58
Discharge: HOME | End: 2023-01-25
Payer: COMMERCIAL

## 2023-01-25 VITALS
HEART RATE: 30 BPM | BODY MASS INDEX: 37.15 KG/M2 | WEIGHT: 217.59 LBS | SYSTOLIC BLOOD PRESSURE: 127 MMHG | TEMPERATURE: 97.9 F | HEIGHT: 64 IN | DIASTOLIC BLOOD PRESSURE: 84 MMHG

## 2023-01-25 LAB
ALBUMIN SERPL-MCNC: 3.7 G/DL (ref 3.5–5)
ALBUMIN/GLOB SERPL: 1 (ref 1.1–2.2)
ALP SERPL-CCNC: 91 U/L (ref 45–117)
ALT SERPL-CCNC: 34 U/L (ref 12–78)
ANION GAP SERPL CALC-SCNC: 4 MMOL/L (ref 5–15)
AST SERPL-CCNC: 23 U/L (ref 15–37)
ATRIAL RATE: 59 BPM
BASOPHILS # BLD: 0 K/UL (ref 0–0.1)
BASOPHILS NFR BLD: 1 % (ref 0–1)
BILIRUB SERPL-MCNC: 0.4 MG/DL (ref 0.2–1)
BUN SERPL-MCNC: 16 MG/DL (ref 6–20)
BUN/CREAT SERPL: 25 (ref 12–20)
CALCIUM SERPL-MCNC: 9.7 MG/DL (ref 8.5–10.1)
CALCULATED P AXIS, ECG09: 21 DEGREES
CALCULATED R AXIS, ECG10: -10 DEGREES
CALCULATED T AXIS, ECG11: 10 DEGREES
CHLORIDE SERPL-SCNC: 108 MMOL/L (ref 97–108)
CO2 SERPL-SCNC: 26 MMOL/L (ref 21–32)
CREAT SERPL-MCNC: 0.64 MG/DL (ref 0.55–1.02)
DIAGNOSIS, 93000: NORMAL
DIFFERENTIAL METHOD BLD: ABNORMAL
EOSINOPHIL # BLD: 0.2 K/UL (ref 0–0.4)
EOSINOPHIL NFR BLD: 3 % (ref 0–7)
ERYTHROCYTE [DISTWIDTH] IN BLOOD BY AUTOMATED COUNT: 13.3 % (ref 11.5–14.5)
GLOBULIN SER CALC-MCNC: 3.6 G/DL (ref 2–4)
GLUCOSE SERPL-MCNC: 96 MG/DL (ref 65–100)
HCT VFR BLD AUTO: 38.7 % (ref 35–47)
HGB BLD-MCNC: 12.3 G/DL (ref 11.5–16)
IMM GRANULOCYTES # BLD AUTO: 0 K/UL (ref 0–0.04)
IMM GRANULOCYTES NFR BLD AUTO: 1 % (ref 0–0.5)
LYMPHOCYTES # BLD: 1.2 K/UL (ref 0.8–3.5)
LYMPHOCYTES NFR BLD: 22 % (ref 12–49)
MCH RBC QN AUTO: 29.7 PG (ref 26–34)
MCHC RBC AUTO-ENTMCNC: 31.8 G/DL (ref 30–36.5)
MCV RBC AUTO: 93.5 FL (ref 80–99)
MONOCYTES # BLD: 0.5 K/UL (ref 0–1)
MONOCYTES NFR BLD: 9 % (ref 5–13)
NEUTS SEG # BLD: 3.6 K/UL (ref 1.8–8)
NEUTS SEG NFR BLD: 64 % (ref 32–75)
NRBC # BLD: 0 K/UL (ref 0–0.01)
NRBC BLD-RTO: 0 PER 100 WBC
P-R INTERVAL, ECG05: 144 MS
PLATELET # BLD AUTO: 281 K/UL (ref 150–400)
PMV BLD AUTO: 11.7 FL (ref 8.9–12.9)
POTASSIUM SERPL-SCNC: 4.1 MMOL/L (ref 3.5–5.1)
PROT SERPL-MCNC: 7.3 G/DL (ref 6.4–8.2)
Q-T INTERVAL, ECG07: 410 MS
QRS DURATION, ECG06: 96 MS
QTC CALCULATION (BEZET), ECG08: 405 MS
RBC # BLD AUTO: 4.14 M/UL (ref 3.8–5.2)
SODIUM SERPL-SCNC: 138 MMOL/L (ref 136–145)
VENTRICULAR RATE, ECG03: 59 BPM
WBC # BLD AUTO: 5.5 K/UL (ref 3.6–11)

## 2023-01-25 PROCEDURE — 80053 COMPREHEN METABOLIC PANEL: CPT

## 2023-01-25 PROCEDURE — 36415 COLL VENOUS BLD VENIPUNCTURE: CPT

## 2023-01-25 PROCEDURE — 93005 ELECTROCARDIOGRAM TRACING: CPT

## 2023-01-25 PROCEDURE — 85025 COMPLETE CBC W/AUTO DIFF WBC: CPT

## 2023-01-25 NOTE — PERIOP NOTES
COPY OF COVID VACCINATION CARD PLACED ON CHART      PT HAS VERY SENSITIVE SKIN, INSTRUCTED TO TRY CHG SOAP ON A SMALL AREA OF SKIN, IF THERE IS ANY REACTION, TO JUST USE ANTIBACTERIAL SOAP FOR PRE OP SHOWERS

## 2023-01-25 NOTE — PERIOP NOTES
[address] INSTRUCTIONS    Surgery Date:   01/26/2023    Your surgeon's office or [facility] staff will call you between 4 PM- 8 PM the day before surgery with your arrival time. If your surgery is on a Monday, you will receive a call the preceding Friday. Please report to [facility] Patient Access/Admitting on the 1st floor. Bring your insurance card, photo identification, and any copayment ( if applicable). If you are going home the same day of your surgery, you must have a responsible adult to drive you home. You need to have a responsible adult to stay with you the first 24 hours after surgery and you should not drive a car for 24 hours following your surgery. Nothing to eat or drink after midnight the night before surgery. This includes no water, gum, mints, coffee, juice, etc.  Please note special instructions, if applicable, below for medications. Do NOT drink alcohol or smoke 24 hours before surgery. STOP smoking for 14 days prior as it helps with breathing and healing after surgery. If you are being admitted to the hospital, please leave personal belongings/luggage in your car until you have an assigned hospital room number. Please wear comfortable clothes. Wear your glasses instead of contacts. We ask that all money, jewelry and valuables be left at home. Wear no make up, particularly mascara, the day of surgery. All body piercings, rings, and jewelry need to be removed and left at home. Please remove any nail polish or artificial nails from your fingernails. Please wear your hair loose or down. Please no pony-tails, buns, or any metal hair accessories. If you shower the morning of surgery, please do not apply any lotions or powders afterwards. You may wear deodorant, unless having breast surgery. Do not shave any body area within 24 hours of your surgery. Please follow all instructions to avoid any potential surgical cancellation.   Should your physical condition change, (i.e. fever, cold, flu, etc.) please notify your surgeon as soon as possible. It is important to be on time. If a situation occurs where you may be delayed, please call:  [phone] / [phone] on the day of surgery. The Preadmission Testing staff can be reached at [phone]. Special instructions: NONE      Current Outpatient Medications   Medication Sig    amoxicillin-clavulanate (AUGMENTIN) 875-125 mg per tablet FINISHING 1/30/23    pantoprazole (PROTONIX) 40 mg tablet Every morning. ondansetron HCl (ZOFRAN PO) Take  by mouth.    meloxicam (MOBIC) 15 mg tablet TAKE 1 TABLET BY MOUTH EVERY DAY FOR 2 WEEKS    DULoxetine (CYMBALTA) 60 mg capsule Take 1 Capsule by mouth daily. (Patient taking differently: Take 60 mg by mouth Every morning.)    anastrozole (ARIMIDEX) 1 mg tablet Take 1 mg by mouth daily. (Patient taking differently: Take 1 mg by mouth Every morning.)    acetaminophen (TYLENOL) 500 mg tablet Take 1,000 mg by mouth daily as needed for Pain. No current facility-administered medications for this encounter. YOU MUST ONLY TAKE THESE MEDICATIONS THE MORNING OF SURGERY WITH A SIP OF WATER: PROTONIX, CYMBALTA,     MEDICATIONS TO TAKE THE MORNING OF SURGERY ONLY IF NEEDED: TYLENOL    Stop all vitamins, herbal medicines and Aspirin containing products 7 days prior to surgery. Stop any non-steroidal anti-inflammatory drugs (i.e. Ibuprofen, Naproxen, Advil, Aleve) 3 days before surgery. You may take Tylenol. Preventing Infections Before and After - Your Surgery    IMPORTANT INSTRUCTIONS      You play an important role in your health and preparation for surgery. To reduce the germs on your skin you will need to shower with CHG soap (Chorhexidine gluconate 4%) two times before surgery. CHG soap (Hibiclens, Hex-A-Clens or store brand)  CHG soap will be provided at your Preadmission Testing (PAT) appointment.   If you do not have a PAT appointment before surgery, you may arrange to  CHG soap from our office or purchase CHG soap at a pharmacy, grocery or department store. You need to purchase TWO 4 ounce bottles to use for your 2 showers. Steps to follow:  Meghana Mahamed your hair with your normal shampoo and your body with regular soap and rinse well to remove shampoo and soap from your skin. Wet a clean washcloth and turn off the shower. Put CHG soap on washcloth and apply to your entire body from the neck down. Do not use on your head, face or private parts(genitals). Do not use CHG soap on open sores, wounds or areas of skin irritation. Wash you body gently for 5 minutes. Do not wash your skin too hard. This soap does not create lather. Pay special attention to your underarms and from your belly button to your feet. Turn the shower back on and rinse well to get CHG soap off your body. Pat your skin dry with a clean, dry towel. Do not apply lotions or moisturizer. Put on clean clothes and sleep on fresh bed sheets and do not allow pets to sleep with you. Shower with CHG soap 2 times before your surgery  The evening before your surgery  The morning of your surgery      Tips to help prevent infections after your surgery:  Protect your surgical wound from germs:  Hand washing is the most important thing you and your caregivers can do to prevent infections. Keep your bandage clean and dry! Do not touch your surgical wound. Use clean, freshly washed towels and washcloths every time you shower; do not share bath linens with others. Until your surgical wound is healed, wear clothing and sleep on bed linens each day that are clean and freshly washed. Do not allow pets to sleep in your bed with you or touch your surgical wound. Do not smoke - smoking delays wound healing. This may be a good time to stop smoking. If you have diabetes, it is important for you to manage your blood sugar levels properly before your surgery as well as after your surgery.  Poorly managed blood sugar levels slow down wound healing and prevent you from healing completely. Patient Information Regarding COVID Restrictions    Day of Procedure    Please park in the parking deck or any designated visitor parking lot. Enter the facility through the Main Entrance of the hospital.  On the day of surgery, please provide the cell phone number of the person who will be waiting for you to the Patient Access representative at the time of registration. Masks are highly recommended in the hospital, but not required. Once your procedure and the immediate recovery period is completed, a nurse in the recovery area will contact your designated visitor to inform them of your room number or to otherwise review other pertinent information regarding your care. Social distancing practices are strongly encouraged in waiting areas and the cafeteria. The patient was contacted  in person. She verbalized understanding of all instructions does not  need reinforcement.

## 2023-01-26 ENCOUNTER — HOSPITAL ENCOUNTER (OUTPATIENT)
Age: 58
Setting detail: OUTPATIENT SURGERY
Discharge: HOME | End: 2023-01-26
Attending: OBSTETRICS & GYNECOLOGY | Admitting: OBSTETRICS & GYNECOLOGY
Payer: COMMERCIAL

## 2023-01-26 ENCOUNTER — ANESTHESIA (OUTPATIENT)
Dept: SURGERY | Age: 58
End: 2023-01-26
Payer: COMMERCIAL

## 2023-01-26 ENCOUNTER — ANESTHESIA EVENT (OUTPATIENT)
Dept: SURGERY | Age: 58
End: 2023-01-26
Payer: COMMERCIAL

## 2023-01-26 VITALS
OXYGEN SATURATION: 94 % | TEMPERATURE: 97.8 F | BODY MASS INDEX: 37.15 KG/M2 | RESPIRATION RATE: 20 BRPM | HEART RATE: 85 BPM | DIASTOLIC BLOOD PRESSURE: 84 MMHG | HEIGHT: 64 IN | SYSTOLIC BLOOD PRESSURE: 130 MMHG | WEIGHT: 217.59 LBS

## 2023-01-26 DIAGNOSIS — G89.18 POST-OP PAIN: Primary | ICD-10-CM

## 2023-01-26 LAB — HCG UR QL: NEGATIVE

## 2023-01-26 PROCEDURE — 74011250636 HC RX REV CODE- 250/636: Performed by: NURSE ANESTHETIST, CERTIFIED REGISTERED

## 2023-01-26 PROCEDURE — 77030039895 HC SYST SMK EVAC LAP COVD -B: Performed by: OBSTETRICS & GYNECOLOGY

## 2023-01-26 PROCEDURE — 2709999900 HC NON-CHARGEABLE SUPPLY: Performed by: OBSTETRICS & GYNECOLOGY

## 2023-01-26 PROCEDURE — 77030007955 HC PCH ENDOSC SPEC J&J -B: Performed by: OBSTETRICS & GYNECOLOGY

## 2023-01-26 PROCEDURE — 74011250636 HC RX REV CODE- 250/636: Performed by: OBSTETRICS & GYNECOLOGY

## 2023-01-26 PROCEDURE — 74011000250 HC RX REV CODE- 250: Performed by: OBSTETRICS & GYNECOLOGY

## 2023-01-26 PROCEDURE — 88305 TISSUE EXAM BY PATHOLOGIST: CPT

## 2023-01-26 PROCEDURE — 77030002933 HC SUT MCRYL J&J -A: Performed by: OBSTETRICS & GYNECOLOGY

## 2023-01-26 PROCEDURE — 77030039147 HC PWDR HEMSTS SURGICEL JNJ -D: Performed by: OBSTETRICS & GYNECOLOGY

## 2023-01-26 PROCEDURE — 81025 URINE PREGNANCY TEST: CPT

## 2023-01-26 PROCEDURE — 77030008602 HC TRCR ENDOSC EPATH J&J -B: Performed by: OBSTETRICS & GYNECOLOGY

## 2023-01-26 PROCEDURE — 77030041236 HC APPL SURG ENDO JNJ -B: Performed by: OBSTETRICS & GYNECOLOGY

## 2023-01-26 PROCEDURE — 77030008684 HC TU ET CUF COVD -B: Performed by: ANESTHESIOLOGY

## 2023-01-26 PROCEDURE — 77030013079 HC BLNKT BAIR HGGR 3M -A: Performed by: ANESTHESIOLOGY

## 2023-01-26 PROCEDURE — 74011250636 HC RX REV CODE- 250/636: Performed by: ANESTHESIOLOGY

## 2023-01-26 PROCEDURE — 74011250637 HC RX REV CODE- 250/637: Performed by: OBSTETRICS & GYNECOLOGY

## 2023-01-26 PROCEDURE — 77030008603 HC TRCR ENDOSC EPATH J&J -C: Performed by: OBSTETRICS & GYNECOLOGY

## 2023-01-26 PROCEDURE — 76210000020 HC REC RM PH II FIRST 0.5 HR: Performed by: OBSTETRICS & GYNECOLOGY

## 2023-01-26 PROCEDURE — 76060000033 HC ANESTHESIA 1 TO 1.5 HR: Performed by: OBSTETRICS & GYNECOLOGY

## 2023-01-26 PROCEDURE — 77030026438 HC STYL ET INTUB CARD -A: Performed by: ANESTHESIOLOGY

## 2023-01-26 PROCEDURE — 77030031139 HC SUT VCRL2 J&J -A: Performed by: OBSTETRICS & GYNECOLOGY

## 2023-01-26 PROCEDURE — 76210000006 HC OR PH I REC 0.5 TO 1 HR: Performed by: OBSTETRICS & GYNECOLOGY

## 2023-01-26 PROCEDURE — 74011000250 HC RX REV CODE- 250: Performed by: NURSE ANESTHETIST, CERTIFIED REGISTERED

## 2023-01-26 PROCEDURE — 76010000161 HC OR TIME 1 TO 1.5 HR INTENSV-TIER 1: Performed by: OBSTETRICS & GYNECOLOGY

## 2023-01-26 PROCEDURE — 77030012799 HC TRCR GELPRT BLN AMR -B: Performed by: OBSTETRICS & GYNECOLOGY

## 2023-01-26 RX ORDER — ROCURONIUM BROMIDE 10 MG/ML
INJECTION, SOLUTION INTRAVENOUS AS NEEDED
Status: DISCONTINUED | OUTPATIENT
Start: 2023-01-26 | End: 2023-01-26 | Stop reason: HOSPADM

## 2023-01-26 RX ORDER — DIPHENHYDRAMINE HYDROCHLORIDE 50 MG/ML
12.5 INJECTION, SOLUTION INTRAMUSCULAR; INTRAVENOUS AS NEEDED
Status: DISCONTINUED | OUTPATIENT
Start: 2023-01-26 | End: 2023-01-26 | Stop reason: HOSPADM

## 2023-01-26 RX ORDER — HEPARIN SODIUM 5000 [USP'U]/ML
5000 INJECTION, SOLUTION INTRAVENOUS; SUBCUTANEOUS ONCE
Status: COMPLETED | OUTPATIENT
Start: 2023-01-26 | End: 2023-01-26

## 2023-01-26 RX ORDER — SODIUM CHLORIDE, SODIUM LACTATE, POTASSIUM CHLORIDE, CALCIUM CHLORIDE 600; 310; 30; 20 MG/100ML; MG/100ML; MG/100ML; MG/100ML
25 INJECTION, SOLUTION INTRAVENOUS CONTINUOUS
Status: DISCONTINUED | OUTPATIENT
Start: 2023-01-26 | End: 2023-01-26 | Stop reason: HOSPADM

## 2023-01-26 RX ORDER — ROPIVACAINE HYDROCHLORIDE 5 MG/ML
30 INJECTION, SOLUTION EPIDURAL; INFILTRATION; PERINEURAL AS NEEDED
Status: DISCONTINUED | OUTPATIENT
Start: 2023-01-26 | End: 2023-01-26 | Stop reason: HOSPADM

## 2023-01-26 RX ORDER — ACETAMINOPHEN 325 MG/1
650 TABLET ORAL ONCE
Status: DISCONTINUED | OUTPATIENT
Start: 2023-01-26 | End: 2023-01-26 | Stop reason: HOSPADM

## 2023-01-26 RX ORDER — LIDOCAINE HYDROCHLORIDE 20 MG/ML
INJECTION, SOLUTION EPIDURAL; INFILTRATION; INTRACAUDAL; PERINEURAL AS NEEDED
Status: DISCONTINUED | OUTPATIENT
Start: 2023-01-26 | End: 2023-01-26 | Stop reason: HOSPADM

## 2023-01-26 RX ORDER — MIDAZOLAM HYDROCHLORIDE 1 MG/ML
1 INJECTION, SOLUTION INTRAMUSCULAR; INTRAVENOUS AS NEEDED
Status: DISCONTINUED | OUTPATIENT
Start: 2023-01-26 | End: 2023-01-26 | Stop reason: HOSPADM

## 2023-01-26 RX ORDER — TRAMADOL HYDROCHLORIDE 50 MG/1
50 TABLET ORAL ONCE
Status: COMPLETED | OUTPATIENT
Start: 2023-01-26 | End: 2023-01-26

## 2023-01-26 RX ORDER — FENTANYL CITRATE 50 UG/ML
INJECTION, SOLUTION INTRAMUSCULAR; INTRAVENOUS AS NEEDED
Status: DISCONTINUED | OUTPATIENT
Start: 2023-01-26 | End: 2023-01-26 | Stop reason: HOSPADM

## 2023-01-26 RX ORDER — SODIUM CHLORIDE, SODIUM LACTATE, POTASSIUM CHLORIDE, CALCIUM CHLORIDE 600; 310; 30; 20 MG/100ML; MG/100ML; MG/100ML; MG/100ML
100 INJECTION, SOLUTION INTRAVENOUS CONTINUOUS
Status: DISCONTINUED | OUTPATIENT
Start: 2023-01-26 | End: 2023-01-26 | Stop reason: HOSPADM

## 2023-01-26 RX ORDER — SODIUM CHLORIDE 0.9 % (FLUSH) 0.9 %
5-40 SYRINGE (ML) INJECTION AS NEEDED
Status: DISCONTINUED | OUTPATIENT
Start: 2023-01-26 | End: 2023-01-26 | Stop reason: HOSPADM

## 2023-01-26 RX ORDER — DEXMEDETOMIDINE HYDROCHLORIDE 100 UG/ML
INJECTION, SOLUTION INTRAVENOUS AS NEEDED
Status: DISCONTINUED | OUTPATIENT
Start: 2023-01-26 | End: 2023-01-26 | Stop reason: HOSPADM

## 2023-01-26 RX ORDER — ONDANSETRON 2 MG/ML
4 INJECTION INTRAMUSCULAR; INTRAVENOUS AS NEEDED
Status: DISCONTINUED | OUTPATIENT
Start: 2023-01-26 | End: 2023-01-26 | Stop reason: HOSPADM

## 2023-01-26 RX ORDER — SUCCINYLCHOLINE CHLORIDE 20 MG/ML
INJECTION INTRAMUSCULAR; INTRAVENOUS AS NEEDED
Status: DISCONTINUED | OUTPATIENT
Start: 2023-01-26 | End: 2023-01-26 | Stop reason: HOSPADM

## 2023-01-26 RX ORDER — MIDAZOLAM HYDROCHLORIDE 1 MG/ML
INJECTION, SOLUTION INTRAMUSCULAR; INTRAVENOUS AS NEEDED
Status: DISCONTINUED | OUTPATIENT
Start: 2023-01-26 | End: 2023-01-26 | Stop reason: HOSPADM

## 2023-01-26 RX ORDER — FENTANYL CITRATE 50 UG/ML
25 INJECTION, SOLUTION INTRAMUSCULAR; INTRAVENOUS
Status: DISCONTINUED | OUTPATIENT
Start: 2023-01-26 | End: 2023-01-26 | Stop reason: HOSPADM

## 2023-01-26 RX ORDER — SODIUM CHLORIDE 0.9 % (FLUSH) 0.9 %
5-40 SYRINGE (ML) INJECTION EVERY 8 HOURS
Status: DISCONTINUED | OUTPATIENT
Start: 2023-01-26 | End: 2023-01-26 | Stop reason: HOSPADM

## 2023-01-26 RX ORDER — BUPIVACAINE HYDROCHLORIDE 5 MG/ML
INJECTION, SOLUTION EPIDURAL; INTRACAUDAL AS NEEDED
Status: DISCONTINUED | OUTPATIENT
Start: 2023-01-26 | End: 2023-01-26 | Stop reason: HOSPADM

## 2023-01-26 RX ORDER — LIDOCAINE HYDROCHLORIDE 10 MG/ML
0.1 INJECTION, SOLUTION EPIDURAL; INFILTRATION; INTRACAUDAL; PERINEURAL AS NEEDED
Status: DISCONTINUED | OUTPATIENT
Start: 2023-01-26 | End: 2023-01-26 | Stop reason: HOSPADM

## 2023-01-26 RX ORDER — TRAMADOL HYDROCHLORIDE 50 MG/1
50 TABLET ORAL
Qty: 20 TABLET | Refills: 0 | Status: SHIPPED | OUTPATIENT
Start: 2023-01-26 | End: 2023-01-31

## 2023-01-26 RX ORDER — MIDAZOLAM HYDROCHLORIDE 1 MG/ML
0.5 INJECTION, SOLUTION INTRAMUSCULAR; INTRAVENOUS
Status: DISCONTINUED | OUTPATIENT
Start: 2023-01-26 | End: 2023-01-26 | Stop reason: HOSPADM

## 2023-01-26 RX ORDER — MORPHINE SULFATE 2 MG/ML
2 INJECTION, SOLUTION INTRAMUSCULAR; INTRAVENOUS
Status: DISCONTINUED | OUTPATIENT
Start: 2023-01-26 | End: 2023-01-26 | Stop reason: HOSPADM

## 2023-01-26 RX ORDER — NORETHINDRONE AND ETHINYL ESTRADIOL 0.5-0.035
KIT ORAL AS NEEDED
Status: DISCONTINUED | OUTPATIENT
Start: 2023-01-26 | End: 2023-01-26 | Stop reason: HOSPADM

## 2023-01-26 RX ORDER — HYDROMORPHONE HYDROCHLORIDE 2 MG/ML
INJECTION, SOLUTION INTRAMUSCULAR; INTRAVENOUS; SUBCUTANEOUS AS NEEDED
Status: DISCONTINUED | OUTPATIENT
Start: 2023-01-26 | End: 2023-01-26 | Stop reason: HOSPADM

## 2023-01-26 RX ORDER — KETOROLAC TROMETHAMINE 30 MG/ML
INJECTION, SOLUTION INTRAMUSCULAR; INTRAVENOUS AS NEEDED
Status: DISCONTINUED | OUTPATIENT
Start: 2023-01-26 | End: 2023-01-26 | Stop reason: HOSPADM

## 2023-01-26 RX ORDER — FENTANYL CITRATE 50 UG/ML
50 INJECTION, SOLUTION INTRAMUSCULAR; INTRAVENOUS AS NEEDED
Status: DISCONTINUED | OUTPATIENT
Start: 2023-01-26 | End: 2023-01-26 | Stop reason: HOSPADM

## 2023-01-26 RX ORDER — ONDANSETRON 2 MG/ML
INJECTION INTRAMUSCULAR; INTRAVENOUS AS NEEDED
Status: DISCONTINUED | OUTPATIENT
Start: 2023-01-26 | End: 2023-01-26 | Stop reason: HOSPADM

## 2023-01-26 RX ORDER — DEXAMETHASONE SODIUM PHOSPHATE 4 MG/ML
INJECTION, SOLUTION INTRA-ARTICULAR; INTRALESIONAL; INTRAMUSCULAR; INTRAVENOUS; SOFT TISSUE AS NEEDED
Status: DISCONTINUED | OUTPATIENT
Start: 2023-01-26 | End: 2023-01-26 | Stop reason: HOSPADM

## 2023-01-26 RX ORDER — HYDROMORPHONE HYDROCHLORIDE 1 MG/ML
0.5 INJECTION, SOLUTION INTRAMUSCULAR; INTRAVENOUS; SUBCUTANEOUS
Status: DISCONTINUED | OUTPATIENT
Start: 2023-01-26 | End: 2023-01-26 | Stop reason: HOSPADM

## 2023-01-26 RX ORDER — PROPOFOL 10 MG/ML
INJECTION, EMULSION INTRAVENOUS AS NEEDED
Status: DISCONTINUED | OUTPATIENT
Start: 2023-01-26 | End: 2023-01-26 | Stop reason: HOSPADM

## 2023-01-26 RX ORDER — SODIUM CHLORIDE 9 MG/ML
25 INJECTION, SOLUTION INTRAVENOUS CONTINUOUS
Status: DISCONTINUED | OUTPATIENT
Start: 2023-01-26 | End: 2023-01-26 | Stop reason: HOSPADM

## 2023-01-26 RX ADMIN — EPHEDRINE SULFATE 10 MG: 50 INJECTION INTRAVENOUS at 07:46

## 2023-01-26 RX ADMIN — LIDOCAINE HYDROCHLORIDE 80 MG: 20 INJECTION, SOLUTION EPIDURAL; INFILTRATION; INTRACAUDAL; PERINEURAL at 07:32

## 2023-01-26 RX ADMIN — SUGAMMADEX 200 MG: 100 INJECTION, SOLUTION INTRAVENOUS at 08:39

## 2023-01-26 RX ADMIN — PROPOFOL 25 MG: 10 INJECTION, EMULSION INTRAVENOUS at 08:34

## 2023-01-26 RX ADMIN — ROCURONIUM BROMIDE 30 MG: 10 SOLUTION INTRAVENOUS at 07:40

## 2023-01-26 RX ADMIN — TRAMADOL HYDROCHLORIDE 50 MG: 50 TABLET, FILM COATED ORAL at 09:20

## 2023-01-26 RX ADMIN — MIDAZOLAM 2 MG: 1 INJECTION INTRAMUSCULAR; INTRAVENOUS at 07:22

## 2023-01-26 RX ADMIN — FENTANYL CITRATE 25 MCG: 50 INJECTION, SOLUTION INTRAMUSCULAR; INTRAVENOUS at 08:57

## 2023-01-26 RX ADMIN — HYDROMORPHONE HYDROCHLORIDE 1 MG: 2 INJECTION, SOLUTION INTRAMUSCULAR; INTRAVENOUS; SUBCUTANEOUS at 08:05

## 2023-01-26 RX ADMIN — ONDANSETRON HYDROCHLORIDE 4 MG: 2 INJECTION, SOLUTION INTRAMUSCULAR; INTRAVENOUS at 07:40

## 2023-01-26 RX ADMIN — DEXAMETHASONE SODIUM PHOSPHATE 4 MG: 4 INJECTION, SOLUTION INTRAMUSCULAR; INTRAVENOUS at 07:40

## 2023-01-26 RX ADMIN — KETOROLAC TROMETHAMINE 15 MG: 30 INJECTION, SOLUTION INTRAMUSCULAR; INTRAVENOUS at 08:30

## 2023-01-26 RX ADMIN — SUCCINYLCHOLINE CHLORIDE 120 MG: 20 INJECTION, SOLUTION INTRAMUSCULAR; INTRAVENOUS at 07:33

## 2023-01-26 RX ADMIN — EPHEDRINE SULFATE 10 MG: 50 INJECTION INTRAVENOUS at 08:03

## 2023-01-26 RX ADMIN — SODIUM CHLORIDE, POTASSIUM CHLORIDE, SODIUM LACTATE AND CALCIUM CHLORIDE: 600; 310; 30; 20 INJECTION, SOLUTION INTRAVENOUS at 08:30

## 2023-01-26 RX ADMIN — HEPARIN SODIUM 5000 UNITS: 5000 INJECTION INTRAVENOUS; SUBCUTANEOUS at 07:02

## 2023-01-26 RX ADMIN — ROCURONIUM BROMIDE 10 MG: 10 SOLUTION INTRAVENOUS at 07:32

## 2023-01-26 RX ADMIN — SODIUM CHLORIDE, POTASSIUM CHLORIDE, SODIUM LACTATE AND CALCIUM CHLORIDE 25 ML/HR: 600; 310; 30; 20 INJECTION, SOLUTION INTRAVENOUS at 07:07

## 2023-01-26 RX ADMIN — HYDROMORPHONE HYDROCHLORIDE 1 MG: 2 INJECTION, SOLUTION INTRAMUSCULAR; INTRAVENOUS; SUBCUTANEOUS at 08:43

## 2023-01-26 RX ADMIN — DEXMEDETOMIDINE HYDROCHLORIDE 20 MCG: 100 INJECTION, SOLUTION, CONCENTRATE INTRAVENOUS at 07:22

## 2023-01-26 RX ADMIN — FENTANYL CITRATE 100 MCG: 50 INJECTION, SOLUTION INTRAMUSCULAR; INTRAVENOUS at 07:32

## 2023-01-26 RX ADMIN — PROPOFOL 140 MG: 10 INJECTION, EMULSION INTRAVENOUS at 07:32

## 2023-01-26 RX ADMIN — FENTANYL CITRATE 25 MCG: 50 INJECTION, SOLUTION INTRAMUSCULAR; INTRAVENOUS at 09:02

## 2023-01-26 RX ADMIN — CEFOXITIN SODIUM 2 G: 2 POWDER, FOR SOLUTION INTRAVENOUS at 07:45

## 2023-01-26 NOTE — INTERVAL H&P NOTE
Date of Surgery Update:  Natasha Cunningham was seen and examined. History and physical has been reviewed. The patient has been examined.  There have been no significant clinical changes since the completion of the originally dated History and Physical.    Signed By: Lisa Mae MD     January 26, 2023 6:55 AM

## 2023-01-26 NOTE — DISCHARGE INSTRUCTIONS
[address] [facility] [address]  P [phone]  F [phone]       Elida Sky      Dear Ms. Navneet Berman,      Please review your instructions with your nurse and ask any questions so you have all the information you need to recover well at home. If you do not feel you have everything you need to succeed and be safe after you leave the hospital, please discuss these concerns with your nurse. As always, call for any questions at home. Take Home Medications     See Discharge Medication Review provided to you by your nurse. If you did not receive one, request this prior to your discharge. It is important that you take your medications as they are prescribed. Your prescriptions are sent to your pharmacy on record. Keep your medications in the bottles provided by the pharmacist and keep a list of the medication names, dosages, times to be taken and what they are for in your wallet. Do not take other medications without consulting your doctor. If you are prescribed enoxaparin (Lovenox) or Apixaban (Eliquis) following your surgery and are taking a baby aspirin daily, please stop taking the Aspirin until your course of enoxaparin/Eliquis is completed. You may take Tylenol and Ibuprofen or Aleve for pain. If this is not sufficient to control your pain, Tramadol or another pain medication will be prescribed for you. You should take a daily gentle stool softener such as a colace pill or dulcolax suppository for constipation as this is not uncommon after surgery and/or while on pain medication. If not prescribed, this can be found over the counter. If constipation persists for >24 hours you should take a dose of Milk of Magnesia. Call if your constipation continues. Diet    Stay hydrated and drink fluids such as gatorade and water. This will also help prevent constipation and dehydration.  Limit any usual caffeine intake such as soda, tea and coffee as these may serve to dehydrate you. For the first 2-3 days following your procedure, keep a low fiber diet avoiding raw vegetables or fruits with skin. Choose a diet consisting of soup, cereal, yogurt, eggs, fish, Boost/Ensure. Avoid fatty/greasy foods. If nauseated, keep your diet limited to liquids and call if this persists. Activity    If possible, have someone with you at all times until you feel stable. Gradually increase your activity each day. There are generally no restrictions on walking, climbing stairs or riding in a car. Walk at least 4 times per day. Walking will help reduce the risk of blood blots, constipation and pneumonia. Lena Tavera are okay. If you have an incision, no tub bathing/swimming for 3-4 weeks. No swimming or other submerging under water for the same amount of time. No driving for 2 week and/or while on pain medication. If you had surgery, the following restrictions are in place:  No heavy lifting greater than 10 lbs for the first 3 weeks following surgery and then no more than 25 lbs for the next 3 weeks. Incision    You should expect some discomfort in the area of your incision, particularly as you increase your activity. If you notice an area of increasing redness or new drainage, please call your doctor. Many incisions will have buried absorbable sutures, which do not need to be removed and are covered by protective glue. Please allow this glue to come off on its own. Causes For Concern    If any of the following occur, please call our office and speak with the Nurse/aid who will help you with your problem or ask the doctor to call you. Problems with the incision, including increasing pain, swelling, redness or drainage. Inability to pass urine   Increasing abdominal pain despite medication  Persisting nausea or vomiting. Fever or chills and a temperature >101F  Constipation (no bowel movement for three days).    Diarrhea (more than three watery stools within 24 hours). Excessive vaginal or wound bleeding. If after hours and you cannot reach an on-call physician, call 911. Follow-Up    Call [phone] to schedule the following appointments with Dr. Mady Lake:  Telephone virtual-visit in 10-14 days to discuss your pathology results. In-office visit for your postoperative exam in 6 weeks from surgery. Information obtained by :  I understand that if any problems occur once I am at home I am to contact my physician. I understand and acknowledge receipt of the instructions indicated above. Physician's or R.N.'s Signature                                                                  Date/Time                                                                                                                                            Patient or Representative Signature                                                          Date/Time      ______________________________________________________________________    Anesthesia Discharge Instructions    After general anesthesia or intervenous sedation, for 24 hours or while taking prescription Narcotics:  Limit your activities  Do not drive or operate hazardous machinery  If you have not urinated within 8 hours after discharge, please contact your surgeon on call. Do not make important personal or business decisions  Do not drink alcoholic beverages    Report the following to your surgeon:  Excessive pain, swelling, redness or odor of or around the surgical area  Temperature over 100.5 degrees  Nausea and vomiting lasting longer than 4 hours or if unable to take medication  Any signs of decreased circulation or nerve impairment to extremity:  Change in color, persistent numbness, tingling, coldness or increased pain.   Any questions

## 2023-01-26 NOTE — OP NOTES
Gynecologic Oncology Operative Report    Jhonathan Mccurdy  1/26/2023    PREOPERATIVE DIAGNOSIS:  1) ER+ breast cancer    POSTOPERATIVE DIAGNOSIS:  1) ER+ breast cancer    PROCEDURE: Laparoscopic bilateral salpingo-oophorectomy    Surgeon:  Christy Blankenship MD    Assistant:  Mamadou Montoya surgical assistance was required throughout the case due to the complexity of the procedure. Alex assisted with dissection, development of the retroperitoneal spaces, and identification of pertinent anatomy during the procedure. Anesthesia:  General endotracheal anesthesia    EBL:  <10 cc    Preoperative antibiotics: Cefoxitan 2 grams IV    VTE Prophylaxis: SCDs, Heparin 6,245 unit SQ    Complications:  None    Specimens:  bilateral fallopian tubes and ovaries    Operative indications:  56 y.o. female with ER+ breast cancer who desires therapeutic BSO     Operative findings: On laparoscopic survey, the patient had a prior sigmoid colectomy with staples visualized. Some thin adhesions to the left adnexa and posterior uterus. Normal appearing uterus and bilateral tubes and ovaries. Normal appearing small bowel, omentum, liver edge, and diaphragm. Operative note:  After the risks, benefits, indications, and alternatives of the procedure were discussed with the patient and informed consent was obtained, the patient was taken to the operating room. She was positively identified, administered general anesthesia, and then placed in the dorsal lithotomy position in [address] [place] [address]. An exam under anesthesia was performed with the above findings. She was then prepped and draped in the usual fashion. A justice catheter was inserted. An 76UC umbilical incision was then made with #15-blade and carried down to the underlying fascia. The fascia was incised and the peritoneal cavity entered via an open Ricardo technique. 10-mm balloon trocar was then placed and intra-peritoneal placement confirmed via direct visualization.  The abdomen was surveyed with the above findings. The abdomen was then insufflated with CO2 to a pressure of 15mmHg. Bilateral right and left lower quadrant 5-mm trocars were then inserted under direct visualization. The patient was placed in Trendelenburg and pelvic washings were obtained. Attention was then turned to the pelvis. Bilateral pelvic side-walls were entered and the bilateral ureters identified and preserved. Bilateral infundibulopelvic ligaments were skeletonized, then sealed and divided with the EnSeal device. Dissection was carried along the mesosalpinx to the level of the uterine fundus. The tube and utero-ovarian ligament were then sealed and divided with the EnSeal device at the level of the uterine fundus. The specimens were then placed into a 10-mm EndoCatch bag and removed via the umbilical incision. The intra-abdominal pressure was then decreased and all planes of dissection were hemostatic. The lateral trocars were removed under direct visualization. SurgiFlo powder was placed along all planes of dissection. The insufflation was released prior to removal of the umbilical port-site. The umbilical fascia was reapproximated with a figure-of-8 stitch using 0-Vicryl on a UR-6 needle. All skin incisions were closed 4-0 monocryl subcuticular stitch. Skin glue was applied to all skin incisions. The patient was taken out of stirrups, awakened from anesthesia, and taken to the recovery room in stable condition. The patient tolerated the procedure well. All instrument, sponge, and needle counts were correct.         Marisela Kaur MD  1/26/2023  8:44 AM

## 2023-01-26 NOTE — PROGRESS NOTES
01/26/23 0813   Family Communication   Family Update Message Surgeon working   Delivery Origin Nurse    Relationship to Patient Spouse    Phone Number MARGARITO COMER [phone]   Family/Significant Other Update Called

## 2023-01-26 NOTE — ROUTINE PROCESS
Patient: Maegan Lozano MRN: [id number]  SSN: [id number]   YOB: 1965  Age: 56 y.o. Sex: female     Patient is status post Procedure(s):  LAPAROSCOPIC BILATERAL SALPINGO OOPHORECTOMY.     Surgeon(s) and Role:     * Sultana Pepper MD - Primary    Local/Dose/Irrigation:  See [facility]                  Peripheral IV 01/26/23 Left Wrist (Active)            Airway - Endotracheal Tube 01/26/23 Oral (Active)                   Dressing/Packing:  Incision 01/26/23 Abdomen-Dressing/Treatment: Surgical glue (01/26/23 0700)  Incision 01/26/23 Perineum-Dressing/Treatment: Peripad (01/26/23 0700)    Splint/Cast:  ]

## 2023-01-26 NOTE — PROGRESS NOTES
Discharge instructions reviewed with patient and family using teachback . All questions have been answered. Prescriptions sent to [facility] pharmacy. Vital signs stable, pain appropriately managed. Patient wheeled off the unit with PACU staff.

## 2023-01-26 NOTE — ANESTHESIA PREPROCEDURE EVALUATION
Relevant Problems   No relevant active problems       Anesthetic History   No history of anesthetic complications            Review of Systems / Medical History  Patient summary reviewed, nursing notes reviewed and pertinent labs reviewed    Pulmonary  Within defined limits          Asthma        Neuro/Psych   Within defined limits      Psychiatric history     Cardiovascular  Within defined limits                Exercise tolerance: >4 METS     GI/Hepatic/Renal  Within defined limits   GERD    Renal disease: stones       Endo/Other  Within defined limits      Arthritis and cancer     Other Findings              Physical Exam    Airway  Mallampati: II  TM Distance: > 6 cm  Neck ROM: normal range of motion   Mouth opening: Normal     Cardiovascular  Regular rate and rhythm,  S1 and S2 normal,  no murmur, click, rub, or gallop             Dental  No notable dental hx       Pulmonary  Breath sounds clear to auscultation               Abdominal  GI exam deferred       Other Findings            Anesthetic Plan    ASA: 2  Anesthesia type: general          Induction: Intravenous  Anesthetic plan and risks discussed with: Patient

## 2023-01-26 NOTE — ANESTHESIA POSTPROCEDURE EVALUATION
Procedure(s):  LAPAROSCOPIC BILATERAL SALPINGO OOPHORECTOMY. general    Anesthesia Post Evaluation        Patient location during evaluation: PACU  Note status: Adequate. Level of consciousness: responsive to verbal stimuli and sleepy but conscious  Pain management: satisfactory to patient  Airway patency: patent  Anesthetic complications: no  Cardiovascular status: acceptable  Respiratory status: acceptable  Hydration status: acceptable  Comments: +Post-Anesthesia Evaluation and Assessment    Patient: Mirna Loredo MRN: [id number]  SSN: [id number]   YOB: 1965  Age: 56 y.o. Sex: female          Cardiovascular Function/Vital Signs    /84 (BP Patient Position: At rest)   Pulse 85   Temp 36.6 °C (97.8 °F)   Resp 20   Ht 5' 4\" (1.626 m)   Wt 98.7 kg (217 lb 9.5 oz)   SpO2 94%   BMI 37.35 kg/m²     Patient is status post Procedure(s):  LAPAROSCOPIC BILATERAL SALPINGO OOPHORECTOMY. Nausea/Vomiting: Controlled. Postoperative hydration reviewed and adequate. Pain:  Pain Scale 1: Numeric (0 - 10) (01/26/23 0908)  Pain Intensity 1: 4 (01/26/23 0908)   Managed. Neurological Status:   Neuro (WDL): Within Defined Limits (01/26/23 0908)   At baseline. Mental Status and Level of Consciousness: Arousable. Pulmonary Status:   O2 Device: None (Room air) (01/26/23 8180)   Adequate oxygenation and airway patent. Complications related to anesthesia: None    Post-anesthesia assessment completed. No concerns. I have evaluated the patient and the patient is stable and ready to be discharged from PACU .     Signed By: Sagar Zambrano MD    1/26/2023        INITIAL Post-op Vital signs:   Vitals Value Taken Time   /84 01/26/23 0938   Temp 36.6 °C (97.8 °F) 01/26/23 0938   Pulse 85 01/26/23 0938   Resp 20 01/26/23 0938   SpO2 94 % 01/26/23 7997

## 2023-01-26 NOTE — PERIOP NOTES
SURGICEL POWDER WAS GIVEN TO THE STERILE FIELD TO BE USED BY MD DURING PROCEDURE  REF: [id number]  LOT: [id number]  EXP: 04/30/2024

## 2023-01-30 ENCOUNTER — HOSPITAL ENCOUNTER (OUTPATIENT)
Dept: INFUSION THERAPY | Age: 58
End: 2023-01-30

## 2023-01-31 ENCOUNTER — HOSPITAL ENCOUNTER (OUTPATIENT)
Dept: NUCLEAR MEDICINE | Age: 58
Discharge: HOME | End: 2023-01-31
Attending: SURGERY
Payer: COMMERCIAL

## 2023-01-31 ENCOUNTER — TRANSCRIBE ORDER (OUTPATIENT)
Dept: SCHEDULING | Age: 58
End: 2023-01-31

## 2023-01-31 DIAGNOSIS — R07.9 RIGHT-SIDED CHEST PAIN: ICD-10-CM

## 2023-01-31 DIAGNOSIS — C50.911 MALIGNANT NEOPLASM OF RIGHT BREAST (HCC): Primary | ICD-10-CM

## 2023-01-31 DIAGNOSIS — Z85.3 HX: BREAST CANCER: ICD-10-CM

## 2023-01-31 PROCEDURE — 78306 BONE IMAGING WHOLE BODY: CPT

## 2023-02-08 ENCOUNTER — OFFICE VISIT (OUTPATIENT)
Dept: FAMILY MEDICINE CLINIC | Age: 58
End: 2023-02-08

## 2023-02-08 ENCOUNTER — VIRTUAL VISIT (OUTPATIENT)
Dept: GYNECOLOGY | Age: 58
End: 2023-02-08
Payer: COMMERCIAL

## 2023-02-08 VITALS
HEART RATE: 82 BPM | WEIGHT: 220.2 LBS | DIASTOLIC BLOOD PRESSURE: 82 MMHG | BODY MASS INDEX: 37.59 KG/M2 | HEIGHT: 64 IN | SYSTOLIC BLOOD PRESSURE: 136 MMHG | OXYGEN SATURATION: 98 % | TEMPERATURE: 98 F

## 2023-02-08 DIAGNOSIS — Z09 HOSPITAL DISCHARGE FOLLOW-UP: ICD-10-CM

## 2023-02-08 DIAGNOSIS — Z17.0 MALIGNANT NEOPLASM OF CENTRAL PORTION OF RIGHT BREAST IN FEMALE, ESTROGEN RECEPTOR POSITIVE (HCC): Primary | ICD-10-CM

## 2023-02-08 DIAGNOSIS — R07.81 RIB PAIN ON RIGHT SIDE: Primary | ICD-10-CM

## 2023-02-08 DIAGNOSIS — C50.111 MALIGNANT NEOPLASM OF CENTRAL PORTION OF RIGHT FEMALE BREAST, UNSPECIFIED ESTROGEN RECEPTOR STATUS (HCC): ICD-10-CM

## 2023-02-08 DIAGNOSIS — C50.111 MALIGNANT NEOPLASM OF CENTRAL PORTION OF RIGHT BREAST IN FEMALE, ESTROGEN RECEPTOR POSITIVE (HCC): Primary | ICD-10-CM

## 2023-02-08 DIAGNOSIS — K57.92 DIVERTICULITIS: ICD-10-CM

## 2023-02-08 PROCEDURE — 99024 POSTOP FOLLOW-UP VISIT: CPT | Performed by: OBSTETRICS & GYNECOLOGY

## 2023-02-08 PROCEDURE — 99213 OFFICE O/P EST LOW 20 MIN: CPT | Performed by: FAMILY MEDICINE

## 2023-02-08 PROCEDURE — 1111F DSCHRG MED/CURRENT MED MERGE: CPT | Performed by: FAMILY MEDICINE

## 2023-02-08 RX ORDER — LIDOCAINE 50 MG/G
PATCH TOPICAL
Qty: 3 EACH | Refills: 3 | Status: SHIPPED | OUTPATIENT
Start: 2023-02-08

## 2023-02-08 NOTE — PROGRESS NOTES
[address] [place] [address] Mathias Moritz 415, 8742 [facility]  P [phone]  F [phone]    Office Note  Patient ID:  Name:  Rashad Ly  MRN:  [id number]  :  1965/57 y.o. Date:  2023      HISTORY OF PRESENT ILLNESS:  Ms. Rashad Ly is a 56 y.o. female who presents as a new patient from Dr. Deleon Brought for ER + 95%, [place] +95%, HER-2 negative breast cancer (history above per Dr. Brendon Larsen note). Presenting for consideration of therapeutic BSO. Started Daniel Clay 1/3/2023. On 2023 underwent Laparoscopic bilateral salpingo-oophorectomy. Final pathology benign. Presents today via virtual visit to discuss pathology. Doing well since surgery without issues. Initial History:  Ms. Rashad Ly is a 56 y.o. female who presents as a new patient from Dr. Deleon Brought for ER + 95%, AZ +95%, HER-2 negative breast cancer (history below per Dr. Brendon Larsen note. Presenting for consideration of therapeutic BSO. Started Daniel Clay 1/3/2023. Breast cancer history per Dr. Brendon Larsen note on 1/3/2023:  22 right breast bx:  IDC, gr 1, 8 mm, ER + at > 90%, AZ + at 90%, HER 2 negative (IHC 2+; FISH ratio 1.8; sig/cell 3.73); right ax LN + for breast cancer  Mammaprint - Low risk luminal A  22 left breast core bx:  DCIS, gr 2, involving intraductal papilloma, ER + at 95%  22 left breast mastectomy: DCIS 20 mm, gr 2, pTis pNx cM0; right breast mastectomy:  IDC 17 mm, gr 1, ER + 95%, AZ + 95%, HER-2 negative at IHC  1+, ki67 15%.   LN positive with 5.5 mm carcinoma, ER + 95%, AZ +95%, HER-2 negative at [facility] 1+; DCIS present, not extensive, gr 2,  pT1c pN1a cM0  XRT started 22 - 10/10/22  Goserelin 10/10/22 - 23 (joint pain)  Lupron 1/3/23 - current  Anastrozole to start 1/10/23      Denies vaginal bleeding/discharge, abdominal/pelvic pain, nausea, vomiting, constipation, diarrhea, CP, SOB, hematuria, hematochezia, change in appetite or bowel movements, bloating, fevers, chills, or urinary symptoms. Pathology Review:   1/26/2023:  * * *FINAL PATHOLOGIC DIAGNOSIS* * *   Fallopian tubes and ovaries, bilateral salpingo oophorectomy:        Fallopian tubes:  Two fallopian tubes, one with benign paratubal   cyst; otherwise unremarkable. Ovaries:  Atrophic changes with bilateral serosal inclusion cysts. ROS:  A comprehensive review of systems was negative except for that written in the History of Present Illness.  , 10 point ROS      ECOG stGstrstastdstest:st st1st Problem List:  Patient Active Problem List    Diagnosis Date Noted    Cellulitis of right breast 07/11/2022    Breast cancer, right ([address] 05/12/2022    Mild intermittent asthma without complication 09/81/2620    Bipolar 2 disorder, major depressive episode ([address] 08/28/2017    Anxiety 01/07/2014     PMH:  Past Medical History:   Diagnosis Date    Allergy-induced asthma     Anxiety and depression     Arthritis     hands    Bipolar 2 disorder ([address]     Breast cancer ([address] 2022    Right and left breast    Chronic pain 2022    with Injections of Lupin/ Zoladex    COVID-19 2022    Diverticulosis     GERD (gastroesophageal reflux disease) 2023    Pyelonephritis 06/07/2014    Thromboembolus ([address] 2020    post surgery-1 in R calf and 2 in R upper thigh      PSH:  Past Surgical History:   Procedure Laterality Date    COLONOSCOPY N/A 06/13/2018    COLONOSCOPY performed by Jayce Underwood MD at [address] [place] [address] Bilateral 06/27/2022    BREAST RECONSTRUCTION performed by Tina Perez MD at [address] [place] [address] Bilateral 07/14/2022    RIGHT BREAST IRRIGATION AND DEBRIDEMENT REMOVAL OF RIGHT TISSUE EXPANDER AND FLEX HD, CLOSURE OF LEFT BREAST TRIPLE POINT WOUND performed by Tina Perez MD at [address] [place] [address]    HX MASTECTOMY Bilateral 06/27/2022    BILATERAL BREAST MASTECTOMIES AND RIGHT AXILLARY LYMPH NODE DISSECTION/BILATERAL BREAST RECONSTRUCTION WITH POSSIBLE TISSUE EXPANDER/POSSIBLE DIRECT TO IMPLANT/ ACELLULAR DERMAL MATRIX performed by Mandi Sahu MD at [address]    HX ORTHOPAEDIC Right     tendon and ligament repair to right foot    HX OTHER SURGICAL  1988    vein ligation    HX PARTIAL COLECTOMY      partial sigmoid colectomy    HX ROTATOR CUFF REPAIR Left 2021    HX SHOULDER REPLACEMENT  2021    HX TONSILLECTOMY  1985    HX TUBAL LIGATION        Social History:  Social History     Tobacco Use    Smoking status: Former     Packs/day: 1.00     Years: 4.00     Pack years: 4.00     Types: Cigarettes     Quit date: 1991     Years since quittin.1     Passive exposure: Never    Smokeless tobacco: Never    Tobacco comments:     I quit many years ago, sometime in    Substance Use Topics    Alcohol use: Yes     Alcohol/week: 3.0 standard drinks     Types: 1 Glasses of wine, 1 Cans of beer, 1 Shots of liquor per week     Comment: social      Family History:  Family History   Problem Relation Age of Onset    Immune Disorder Mother         Lupus    Headache Mother         Migraines    Migraines Mother     Post-op Nausea/Vomiting Mother     Elevated Lipids Father     Hypertension Father     Psychiatric Disorder Father     Cancer Paternal Grandfather       Medications: (reviewed)  Current Outpatient Medications   Medication Sig    apixaban (ELIQUIS) 2.5 mg tablet Take 1 Tablet by mouth two (2) times a day for 28 days. Indications: deep vein thrombosis prevention    pantoprazole (PROTONIX) 40 mg tablet Every morning. ondansetron HCl (ZOFRAN PO) Take  by mouth.    meloxicam (MOBIC) 15 mg tablet TAKE 1 TABLET BY MOUTH EVERY DAY FOR 2 WEEKS    DULoxetine (CYMBALTA) 60 mg capsule Take 1 Capsule by mouth daily. (Patient taking differently: Take 60 mg by mouth Every morning.)    anastrozole (ARIMIDEX) 1 mg tablet Take 1 mg by mouth daily.  (Patient taking differently: Take 1 mg by mouth Every morning.)    acetaminophen (TYLENOL) 500 mg tablet Take 1,000 mg by mouth daily as needed for Pain. (Patient not taking: Reported on 1/26/2023)     No current facility-administered medications for this visit. Allergies: (reviewed)  Allergies   Allergen Reactions    Other Medication Other (comments)     IV PHENERGAN--Makes Hallucinate \"Really\" bad. OBJECTIVE:  *deferred today given video-conference visit for ongoing COVID-19 pandemic*   Physical Exam:  VITAL SIGNS: There were no vitals filed for this visit. There is no height or weight on file to calculate BMI. GENERAL LEYDI: Conversant, alert, oriented. No acute distress. HEENT: HEENT. No thyroid enlargement. No JVD. Neck: Supple without restrictions. RESPIRATORY: Clear to auscultation and percussion to the bases. No CVAT. CARDIOVASC: RRR without murmur/rub. GASTROINT: soft, non-tender, without masses or organomegaly   MUSCULOSKEL: no joint tenderness, deformity or swelling       EXTREMITIES: extremities normal, atraumatic, no cyanosis or edema   PELVIC: Exam chaperoned by nurse. Normal appearing external genitalia. On speculum exam, normal appearing vagina and cervix. On bimanual exam, the cervix and uterus are normal size and mobile. No evidence of adnexal masses or nodularity. RECTAL: deferred   MORENITA SURVEY: No suspicious lymphadenopathy or edema noted. NEURO: Grossly intact. No acute deficit.        Lab Date as available:    Lab Results   Component Value Date/Time    WBC 5.5 01/25/2023 08:51 AM    HGB 12.3 01/25/2023 08:51 AM    HCT 38.7 01/25/2023 08:51 AM    PLATELET 901 64/02/9123 08:51 AM    MCV 93.5 01/25/2023 08:51 AM     Lab Results   Component Value Date/Time    Sodium 138 01/25/2023 08:51 AM    Potassium 4.1 01/25/2023 08:51 AM    Chloride 108 01/25/2023 08:51 AM    CO2 26 01/25/2023 08:51 AM    Anion gap 4 (L) 01/25/2023 08:51 AM    Glucose 96 01/25/2023 08:51 AM    BUN 16 01/25/2023 08:51 AM    Creatinine 0.64 01/25/2023 08:51 AM    BUN/Creatinine ratio 25 (H) 01/25/2023 08:51 AM    GFR est AA >60 09/12/2022 03:25 PM    GFR est non-AA >60 09/12/2022 03:25 PM    Calcium 9.7 01/25/2023 08:51 AM         IMPRESSION/PLAN:    Ms. Anoop Wagner is a 56 y.o. female who presents as a new patient from Dr. Jaclyn Monk for ER + 95%, [place] +95%, HER-2 negative breast cancer (history above per Dr. Linda Marroquinw note). Presenting for consideration of therapeutic BSO. Pina Atkins 1/3/2023. On 1/26/2023 underwent Laparoscopic bilateral salpingo-oophorectomy. Final pathology benign. Problems:     Patient Active Problem List    Diagnosis Date Noted    Cellulitis of right breast 07/11/2022    Breast cancer, right ([facility] 05/12/2022    Mild intermittent asthma without complication 54/19/2840    Bipolar 2 disorder, major depressive episode ([facility] 08/28/2017    Anxiety 01/07/2014       Reviewed patient's course to date, including her benign surgical pathology. She reports healing well from surgery. RTC in 4 weeks for postoperative visit. At that time, given her benign pathology, the patient may be discharged from Gynecologic Oncology clinic. Encouraged patient to follow-up with Dr. Jaclyn Monk. All questions and concerns were addressed with the patient and she is comfortable with the plan. An electronic signature was used to authenticate this note. Abbie Ho MD    Pursuant to the emergency declaration unde the [facility], [facility] waiver authority and the [employer] and [employer] Act, this Virtual Visit was conducted, with patient's consent, to reduce the patient's risk of exposure to COVID-19 and provide continuity of care for an established patient. Patient identification was verified at the start of the visit: Yes    Services were provided through a video synchronous discussion virtually to substitute for in-person clinic visit. Patient was at her individual home, while the provider was in his/her respective office.     I spent at least 15 minutes with this established patient, and >50% of the time was spent counseling and/or coordinating care regarding pathology     Ameya Mcgarry MD

## 2023-02-08 NOTE — PROGRESS NOTES
Transitional Care Management Progress Note    Patient: Suzanne Knight  : 1965  PCP: Nguyen Peña MD    Date of admission:   Date of discharge: 1/15    Patient was contacted by Transitional Care Management services within two days after her discharge: No. This encounter and supporting documentation was reviewed if available. Medication reconciliation was performed today (2023). Assessment/Plan:   Diagnoses and all orders for this visit:    1. Rib pain on right side  -     lidocaine (LIDODERM) 5 %; Apply patch to the affected area for 12 hours a day and remove for 12 hours a day. 2. Hospital discharge follow-up  -     OR [address] [facility] RECONCILED W/CURRENT MED LIST    3. Diverticulitis  -     REFERRAL TO GASTROENTEROLOGY    4. Malignant neoplasm of central portion of right female breast, unspecified estrogen receptor status ([facility]  -Follow up with breast specialist, radiology oncologist       The complexity of medical decision making for this patient's transitional care is  Low    Follow-up and Dispositions    Return in about 7 months (around 2023) for Annual visit . Subjective:   Suzanne Knight is a 56 y.o. female presenting today for follow-up after being discharged from [facility]. There is no discharge summary for review today. Will request in from the hospital.  The main problem requiring admission was Diverticulitis. Complications during admission: None (reported by the patient). The patient presented to the hospital with abdominal pain associated with nausea and vomiting. She was diagnosed with acute diverticulitis and was admitted to the hospital for the treatment with IV antibiotics due to inability to tolerate PO. Was evaluated by GI at the hospital and was recommended to get colonoscopy in the nearest future since she has had 2 bouts of diverticulitis within the last several months.      Interval history/Current status: Since the hospital discharge she reports feeling better but is still having some difficulties with the appetite. She also underwent laparoscopic  bilateral salpingo-oophorectomy on January 26th, 2023. She has been experiencing the pain in the right rib cage. Was evaluated by breast specialist during her routine care for breast cancer and was told that there was a swelling, received the dose of IM NSAID. Medications marked \"taking\" at this time:  Home Medications    Medication Sig Start Date End Date Taking? Authorizing Provider   lidocaine (LIDODERM) 5 % Apply patch to the affected area for 12 hours a day and remove for 12 hours a day. 2/8/23  Yes Therese Paniagua MD   apixaban (ELIQUIS) 2.5 mg tablet Take 1 Tablet by mouth two (2) times a day for 28 days. Indications: deep vein thrombosis prevention 1/26/23 2/23/23 Yes Rina Hernandez PA-C   DULoxetine (CYMBALTA) 60 mg capsule Take 1 Capsule by mouth daily. Patient taking differently: Take 60 mg by mouth Every morning. 1/3/23  Yes Christie Trotter NP   anastrozole (ARIMIDEX) 1 mg tablet Take 1 mg by mouth daily. Patient taking differently: Take 1 mg by mouth Every morning. 12/5/22  Yes Christie Trotter NP   acetaminophen (TYLENOL) 500 mg tablet Take 1,000 mg by mouth daily as needed for Pain. Yes Provider, Historical   pantoprazole (PROTONIX) 40 mg tablet Every morning. Patient not taking: Reported on 2/8/2023 1/15/23   Provider, Historical   ondansetron HCl (ZOFRAN PO) Take  by mouth.   Patient not taking: Reported on 2/8/2023    Provider, Historical   meloxicam (MOBIC) 15 mg tablet TAKE 1 TABLET BY MOUTH EVERY DAY FOR 2 WEEKS  Patient not taking: Reported on 2/8/2023 12/29/22   Provider, Historical        Review of Systems:  Negative except written in HPI       Patient Active Problem List   Diagnosis Code    Anxiety F41.9    Bipolar 2 disorder, major depressive episode ([facility] F31.81    Mild intermittent asthma without complication T59.75    Breast cancer, right ([facility] C50.911 Cellulitis of right breast N61.0     Patient Active Problem List    Diagnosis Date Noted    Cellulitis of right breast 07/11/2022    Breast cancer, right ([facility] 05/12/2022    Mild intermittent asthma without complication 75/12/7401    Bipolar 2 disorder, major depressive episode ([facility] 08/28/2017    Anxiety 01/07/2014     Current Outpatient Medications   Medication Sig Dispense Refill    lidocaine (LIDODERM) 5 % Apply patch to the affected area for 12 hours a day and remove for 12 hours a day. 3 Each 3    apixaban (ELIQUIS) 2.5 mg tablet Take 1 Tablet by mouth two (2) times a day for 28 days. Indications: deep vein thrombosis prevention 56 Tablet 0    DULoxetine (CYMBALTA) 60 mg capsule Take 1 Capsule by mouth daily. (Patient taking differently: Take 60 mg by mouth Every morning.) 90 Capsule 3    anastrozole (ARIMIDEX) 1 mg tablet Take 1 mg by mouth daily. (Patient taking differently: Take 1 mg by mouth Every morning.) 90 Tablet 3    acetaminophen (TYLENOL) 500 mg tablet Take 1,000 mg by mouth daily as needed for Pain.      pantoprazole (PROTONIX) 40 mg tablet Every morning. (Patient not taking: Reported on 2/8/2023)      ondansetron HCl (ZOFRAN PO) Take  by mouth. (Patient not taking: Reported on 2/8/2023)      meloxicam (MOBIC) 15 mg tablet TAKE 1 TABLET BY MOUTH EVERY DAY FOR 2 WEEKS (Patient not taking: Reported on 2/8/2023)       Allergies   Allergen Reactions    Other Medication Other (comments)     IV PHENERGAN--Makes Hallucinate \"Really\" bad.             Past Medical History:   Diagnosis Date    Allergy-induced asthma     Anxiety and depression     Arthritis     hands    Bipolar 2 disorder ([facility]     Breast cancer ([facility] 2022    Right and left breast    Chronic pain 2022    with Injections of Lupin/ Zoladex    COVID-19 2022    Diverticulosis     GERD (gastroesophageal reflux disease) 2023    Pyelonephritis 06/07/2014    Thromboembolus ([facility] 2020    post surgery-1 in R calf and 2 in R upper thigh     Social History     Tobacco Use    Smoking status: Former     Packs/day: 1.00     Years: 4.00     Pack years: 4.00     Types: Cigarettes     Quit date: 1991     Years since quittin.1     Passive exposure: Never    Smokeless tobacco: Never    Tobacco comments:     I quit many years ago, sometime in    Substance Use Topics    Alcohol use: Yes     Alcohol/week: 3.0 standard drinks     Types: 1 Glasses of wine, 1 Cans of beer, 1 Shots of liquor per week     Comment: social          Objective:   /82 (BP 1 Location: Right arm, BP Patient Position: Sitting, BP Cuff Size: Large adult)   Pulse 82   Temp 98 °F (36.7 °C) (Oral)   Ht 5' 4\" (1.626 m)   Wt 220 lb 3.2 oz (99.9 kg)   SpO2 98%   BMI 37.80 kg/m²      Physical Examination: General appearance - alert, well appearing, and in no distress  Mental status - alert, oriented to person, place, and time  Chest - clear to auscultation, no wheezes, rales or rhonchi, symmetric air entry  Heart - normal rate, regular rhythm, normal S1, S2, no murmurs, rubs, clicks or gallops  Abdomen - soft, nontender, nondistended, no masses or organomegaly+minimal tenderness on the right side along the rib cage. We discussed the expected course, resolution and complications of the diagnosis(es) in detail. Medication risks, benefits, costs, interactions, and alternatives were discussed as indicated. I advised her to contact the office if her condition worsens, changes or fails to improve as anticipated. She expressed understanding with the diagnosis(es) and plan.      Marnie Hernández MD

## 2023-02-08 NOTE — PROGRESS NOTES
Virtual Post op Visit to discuss pathology report, surgery was on 1/26/2023    1. Have you been to the ER, urgent care clinic since your last visit? Hospitalized since your last visit? Yes, surgery with Dr. Cesar Licona on 1/26/2023    2. Have you seen or consulted any other health care providers outside of the [address] [place] [address] since your last visit? Include any pap smears or colon screening.    no

## 2023-02-08 NOTE — PROGRESS NOTES
Identified pt with two pt identifiers(name and ). Reviewed record in preparation for visit and have obtained necessary documentation. Chief Complaint   Patient presents with    Hospital Follow Up        Health Maintenance Due   Topic    Shingles Vaccine (1 of 2)    Flu Vaccine (1)    COVID-19 Vaccine (6 - Booster for [employer] series)       Visit Vitals  /82 (BP 1 Location: Right arm, BP Patient Position: Sitting, BP Cuff Size: Large adult)   Pulse 82   Temp 98 °F (36.7 °C) (Oral)   Ht 5' 4\" (1.626 m)   Wt 220 lb 3.2 oz (99.9 kg)   SpO2 98%   BMI 37.80 kg/m²         Coordination of Care Questionnaire:  :   1) Have you been to an emergency room, urgent care, or hospitalized since your last visit? If yes, where when, and reason for visit? yes [facility] [phone] for diverticulitis      2. Have seen or consulted any other health care provider since your last visit? If yes, where when, and reason for visit?   NO

## 2023-02-10 ENCOUNTER — HOSPITAL ENCOUNTER (OUTPATIENT)
Dept: CT IMAGING | Age: 58
Discharge: HOME | End: 2023-02-10
Attending: PLASTIC SURGERY
Payer: COMMERCIAL

## 2023-02-10 DIAGNOSIS — C50.911 MALIGNANT NEOPLASM OF RIGHT BREAST (HCC): ICD-10-CM

## 2023-02-10 PROCEDURE — 74174 CTA ABD&PLVS W/CONTRAST: CPT

## 2023-02-10 PROCEDURE — 74011000636 HC RX REV CODE- 636: Performed by: PLASTIC SURGERY

## 2023-02-10 RX ADMIN — IOPAMIDOL 100 ML: 755 INJECTION, SOLUTION INTRAVENOUS at 13:18

## 2023-02-22 ENCOUNTER — HOSPITAL ENCOUNTER (OUTPATIENT)
Dept: RADIATION THERAPY | Age: 58
Discharge: HOME | End: 2023-02-22

## 2023-02-27 ENCOUNTER — HOSPITAL ENCOUNTER (OUTPATIENT)
Dept: INFUSION THERAPY | Age: 58
End: 2023-02-27

## 2023-03-08 ENCOUNTER — OFFICE VISIT (OUTPATIENT)
Dept: GYNECOLOGY | Age: 58
End: 2023-03-08
Payer: COMMERCIAL

## 2023-03-08 VITALS
DIASTOLIC BLOOD PRESSURE: 84 MMHG | HEIGHT: 64 IN | SYSTOLIC BLOOD PRESSURE: 109 MMHG | WEIGHT: 223 LBS | BODY MASS INDEX: 38.07 KG/M2 | HEART RATE: 80 BPM

## 2023-03-08 DIAGNOSIS — C50.111 MALIGNANT NEOPLASM OF CENTRAL PORTION OF RIGHT BREAST IN FEMALE, ESTROGEN RECEPTOR POSITIVE (HCC): Primary | ICD-10-CM

## 2023-03-08 DIAGNOSIS — Z17.0 MALIGNANT NEOPLASM OF CENTRAL PORTION OF RIGHT BREAST IN FEMALE, ESTROGEN RECEPTOR POSITIVE (HCC): Primary | ICD-10-CM

## 2023-03-08 PROCEDURE — 99024 POSTOP FOLLOW-UP VISIT: CPT | Performed by: OBSTETRICS & GYNECOLOGY

## 2023-03-08 NOTE — PROGRESS NOTES
[facility] [address], [address] [facility] [phone]  F [phone]    Office Note  Patient ID:  Name:  Nita Vinson  MRN:  [id number]  :  1965/57 y.o. Date:  3/8/2023      HISTORY OF PRESENT ILLNESS:  Ms. Nita Vinson is a 56 y.o. female who presents as a new patient from Dr. Pedro Luis Prasad for ER + 95%, [place] +95%, HER-2 negative breast cancer (history above per Dr. Salomon Hare note). Presenting for consideration of therapeutic BSO. Started Prudence Route 1/3/2023. On 2023 underwent Laparoscopic bilateral salpingo-oophorectomy. Final pathology benign. Presents today via virtual visit to discuss pathology. Doing well since surgery without issues. Initial History:  Ms. Nita Vinson is a 56 y.o. female who presents as a new patient from Dr. Pedro Luis Prasad for ER + 95%, [place] +95%, HER-2 negative breast cancer (history below per Dr. Salomon Hare note. Presenting for consideration of therapeutic BSO. Started Prudence [address] 1/3/2023. Breast cancer history per Dr. Salomon Hare note on 1/3/2023:  22 right breast bx:  IDC, gr 1, 8 mm, ER + at > 90%, [place] + at 90%, HER 2 negative (IHC 2+; FISH ratio 1.8; sig/cell 3.73); right ax LN + for breast cancer  Mammaprint - Low risk luminal A  22 left breast core bx:  DCIS, gr 2, involving intraductal papilloma, ER + at 95%  22 left breast mastectomy: DCIS 20 mm, gr 2, pTis pNx cM0; right breast mastectomy:  IDC 17 mm, gr 1, ER + 95%, [place] + 95%, HER-2 negative at IHC  1+, ki67 15%.   LN positive with 5.5 mm carcinoma, ER + 95%, [place] +95%, HER-2 negative at [facility] 1+; DCIS present, not extensive, gr 2,  pT1c pN1a cM0  XRT started 22 - 10/10/22  Goserelin 10/10/22 - 23 (joint pain)  Lupron 1/3/23 - current  Anastrozole to start 1/10/23      Denies vaginal bleeding/discharge, abdominal/pelvic pain, nausea, vomiting, constipation, diarrhea, CP, SOB, hematuria, hematochezia, change in appetite or bowel movements, bloating, fevers, chills, or urinary symptoms. Pathology Review:   1/26/2023:  * * *FINAL PATHOLOGIC DIAGNOSIS* * *   Fallopian tubes and ovaries, bilateral salpingo oophorectomy:        Fallopian tubes:  Two fallopian tubes, one with benign paratubal   cyst; otherwise unremarkable. Ovaries:  Atrophic changes with bilateral serosal inclusion cysts. ROS:  A comprehensive review of systems was negative except for that written in the History of Present Illness.  , 10 point ROS      ECOG stGstrstastdstest:st st1st Problem List:  Patient Active Problem List    Diagnosis Date Noted    Cellulitis of right breast 07/11/2022    Breast cancer, right ([address] 05/12/2022    Mild intermittent asthma without complication 00/41/2957    Bipolar 2 disorder, major depressive episode ([address] 08/28/2017    Anxiety 01/07/2014     PMH:  Past Medical History:   Diagnosis Date    Allergy-induced asthma     Anxiety and depression     Arthritis     hands    Bipolar 2 disorder ([address]     Breast cancer ([address] 2022    Right and left breast    Chronic pain 2022    with Injections of Lupin/ Zoladex    COVID-19 2022    Diverticulosis     GERD (gastroesophageal reflux disease) 2023    Pyelonephritis 06/07/2014    Thromboembolus ([address] 2020    post surgery-1 in R calf and 2 in R upper thigh      PSH:  Past Surgical History:   Procedure Laterality Date    COLONOSCOPY N/A 06/13/2018    COLONOSCOPY performed by Madhavi Garcia MD at [address] [place] [address] Bilateral 06/27/2022    BREAST RECONSTRUCTION performed by Dylan Nance MD at [address] [place] [address] Bilateral 07/14/2022    RIGHT BREAST IRRIGATION AND DEBRIDEMENT REMOVAL OF RIGHT TISSUE EXPANDER AND FLEX HD, CLOSURE OF LEFT BREAST TRIPLE POINT WOUND performed by Dylan Nance MD at [address] [place] [address]    HX MASTECTOMY Bilateral 06/27/2022    BILATERAL BREAST MASTECTOMIES AND RIGHT AXILLARY LYMPH NODE DISSECTION/BILATERAL BREAST RECONSTRUCTION WITH POSSIBLE TISSUE EXPANDER/POSSIBLE DIRECT TO IMPLANT/ ACELLULAR DERMAL MATRIX performed by Speedy Hollingsworth MD at [facility]    HX ORTHOPAEDIC Right     tendon and ligament repair to right foot    HX OTHER SURGICAL  1988    vein ligation    HX PARTIAL COLECTOMY      partial sigmoid colectomy    HX ROTATOR CUFF REPAIR Left 2021    HX SALPINGO-OOPHORECTOMY Bilateral 2023    Laparoscopic bilateral salpingo-oophorectomy, Dr. Eligio Goldberg     [address]  2021    [facility] [place] [address]      Social History:  Social History     Tobacco Use    Smoking status: Former     Packs/day: 1.00     Years: 4.00     Pack years: 4.00     Types: Cigarettes     Quit date: 1991     Years since quittin.2     Passive exposure: Never    Smokeless tobacco: Never    Tobacco comments:     I quit many years ago, sometime in    Substance Use Topics    Alcohol use: Yes     Alcohol/week: 3.0 standard drinks     Types: 1 Glasses of wine, 1 Cans of beer, 1 Shots of liquor per week     Comment: social      Family History:  Family History   Problem Relation Age of Onset    Immune Disorder Mother         Lupus    Headache Mother         Migraines    Migraines Mother     Post-op Nausea/Vomiting Mother     Elevated Lipids Father     Hypertension Father     Psychiatric Disorder Father     Cancer Paternal Grandfather       Medications: (reviewed)  Current Outpatient Medications   Medication Sig    lidocaine (LIDODERM) 5 % Apply patch to the affected area for 12 hours a day and remove for 12 hours a day. DULoxetine (CYMBALTA) 60 mg capsule Take 1 Capsule by mouth daily. (Patient taking differently: Take 60 mg by mouth Every morning.)    anastrozole (ARIMIDEX) 1 mg tablet Take 1 mg by mouth daily. (Patient taking differently: Take 1 mg by mouth Every morning.)    acetaminophen (TYLENOL) 500 mg tablet Take 1,000 mg by mouth daily as needed for Pain.    pantoprazole (PROTONIX) 40 mg tablet Every morning. (Patient not taking: No sig reported)    ondansetron HCl (ZOFRAN PO) Take  by mouth. (Patient not taking: No sig reported)    meloxicam (MOBIC) 15 mg tablet TAKE 1 TABLET BY MOUTH EVERY DAY FOR 2 WEEKS (Patient not taking: No sig reported)     No current facility-administered medications for this visit. Allergies: (reviewed)  Allergies   Allergen Reactions    Other Medication Other (comments)     IV PHENERGAN--Makes Hallucinate \"Really\" bad. OBJECTIVE:  Physical Exam:  Visit Vitals  /84 (BP 1 Location: Left upper arm, BP Patient Position: Sitting)   Pulse 80   Ht 5' 4.02\" (1.626 m)   Wt 223 lb (101.2 kg)   BMI 38.26 kg/m²      General: Alert and oriented. No acute distress. Well-nourished  Gastrointestinal: soft, non-tender, non-distended, no masses or organomegaly. Well-healed incision. Musculoskeletal: normal gait. No joint tenderness, deformity or swelling. No muscular tenderness. Extremities: extremities normal, atraumatic, no cyanosis or edema. Pelvic: deferred  Neuro: Grossly intact. Normal gait and movement. No acute deficit  Skin: No evidence of rashes or skin changes. IMPRESSION/PLAN:    Ms. Purvi Chery is a 56 y.o. female who presents as a new patient from Dr. Sweta Mccauley for ER + 95%, [place] +95%, HER-2 negative breast cancer (history above per Dr. Kenn Schmid note). Presenting for consideration of therapeutic BSO. Started Hortensia Dawn 1/3/2023. On 1/26/2023 underwent Laparoscopic bilateral salpingo-oophorectomy. Final pathology benign. Problems:     Patient Active Problem List    Diagnosis Date Noted    Cellulitis of right breast 07/11/2022    Breast cancer, right ([facility] 05/12/2022    Mild intermittent asthma without complication 36/85/5941    Bipolar 2 disorder, major depressive episode ([facility] 08/28/2017    Anxiety 01/07/2014       Reviewed patient's course to date, including her benign surgical pathology. She has healed well from surgery.  Given her benign pathology, the patient may be discharged from Gynecologic Oncology clinic. Encouraged patient to follow-up with Dr. Vamsi Paz. All questions and concerns were addressed with the patient and she is comfortable with the plan. An electronic signature was used to authenticate this note.      Reyna Jauregui MD

## 2023-03-27 ENCOUNTER — APPOINTMENT (OUTPATIENT)
Dept: INFUSION THERAPY | Age: 58
End: 2023-03-27

## 2023-03-27 ENCOUNTER — OFFICE VISIT (OUTPATIENT)
Dept: ONCOLOGY | Age: 58
End: 2023-03-27
Payer: COMMERCIAL

## 2023-03-27 VITALS
BODY MASS INDEX: 37.57 KG/M2 | RESPIRATION RATE: 17 BRPM | WEIGHT: 219 LBS | DIASTOLIC BLOOD PRESSURE: 93 MMHG | TEMPERATURE: 97.5 F | OXYGEN SATURATION: 95 % | SYSTOLIC BLOOD PRESSURE: 139 MMHG | HEART RATE: 87 BPM

## 2023-03-27 DIAGNOSIS — G89.29 CHRONIC MIDLINE LOW BACK PAIN WITHOUT SCIATICA: ICD-10-CM

## 2023-03-27 DIAGNOSIS — C50.111 MALIGNANT NEOPLASM OF CENTRAL PORTION OF RIGHT BREAST IN FEMALE, ESTROGEN RECEPTOR POSITIVE (HCC): Primary | ICD-10-CM

## 2023-03-27 DIAGNOSIS — R10.9 FLANK PAIN: ICD-10-CM

## 2023-03-27 DIAGNOSIS — M25.611 DECREASED ROM OF RIGHT SHOULDER: ICD-10-CM

## 2023-03-27 DIAGNOSIS — Z17.0 MALIGNANT NEOPLASM OF CENTRAL PORTION OF RIGHT BREAST IN FEMALE, ESTROGEN RECEPTOR POSITIVE (HCC): Primary | ICD-10-CM

## 2023-03-27 DIAGNOSIS — M54.50 CHRONIC MIDLINE LOW BACK PAIN WITHOUT SCIATICA: ICD-10-CM

## 2023-03-27 PROCEDURE — 99214 OFFICE O/P EST MOD 30 MIN: CPT | Performed by: INTERNAL MEDICINE

## 2023-03-27 RX ORDER — MELOXICAM 15 MG/1
TABLET ORAL
Qty: 14 TABLET | Refills: 2 | Status: SHIPPED | OUTPATIENT
Start: 2023-03-27

## 2023-03-27 RX ORDER — EXEMESTANE 25 MG/1
25 TABLET ORAL DAILY
Qty: 90 TABLET | Refills: 2 | Status: SHIPPED | OUTPATIENT
Start: 2023-03-27 | End: 2023-06-25

## 2023-03-27 NOTE — PROGRESS NOTES
Valerie Au is a 56 y.o. female here today for breast cancer follow up. 1. Have you been to the ER, urgent care clinic since your last visit? Hospitalized since your last visit? Yes JW Diverticulitis 1/2023      2. Have you seen or consulted any other health care providers outside of the [address] [place] [address] since your last visit? Include any pap smears or colon screening.  no

## 2023-03-27 NOTE — PROGRESS NOTES
[facility] [place] at [address], [address] [facility] [address] [facility]  W: [phone]  F: [phone]      Reason for Visit:   Sarah Bruce is a 56 y.o. female who is seen in follow up for breast cancer    Breast Surgeon: Dr. Jared Ferrera surgery:  Dr. Prakash Temple    Treatment History:   5/5/22 right breast bx:  IDC, gr 1, 8 mm, ER + at > 90%, [place] + at 90%, HER 2 negative (IHC 2+; FISH ratio 1.8; sig/cell 3.73); right ax LN + for breast cancer  Mammaprint - Low risk luminal A  6/1/22 left breast core bx:  DCIS, gr 2, involving intraductal papilloma, ER + at 95%  6/27/22 left breast mastectomy: DCIS 20 mm, gr 2, pTis pNx cM0; right breast mastectomy:  IDC 17 mm, gr 1, ER + 95%, [place] + 95%, HER-2 negative at IHC  1+, ki67 15%. 1/5 LN positive with 5.5 mm carcinoma, ER + 95%, [place] +95%, HER-2 negative at [facility] 1+; DCIS present, not extensive, gr 2,  pT1c pN1a cM0  XRT started 9/6/22 - 10/10/22  Goserelin 10/10/22 - 1/2/23 (joint pain)  Lupron 1/3/23 (stopped after BSO)  BSO 1/26/23 - pathology negative  Anastrozole 1/10/23 - 3/27/23    History of Present Illness: An abnormal mammogram led to the path above    Interval Hx: Patient presents today for follow up. Reports gr 1 constipation, gr 2 fatigue, gr 2 hot flashes, gr 2 insomnia, gr 1 loss of concentration, gr 1 pain/cramping rated 5/10 to knees/back/fingers, gr 1 decreased libido. FH:  No breast or ovarian cancer; no pancreas or prostate cancer    She reports mother's cousin had breast cancer.      LMP 9/2022    Past Medical History:   Diagnosis Date    Allergy-induced asthma     Anxiety and depression     Arthritis     hands    Bipolar 2 disorder ([address]     Breast cancer ([address] 2022    Right and left breast    Chronic pain 2022    with Injections of Lupin/ Zoladex    COVID-19 2022    Diverticulosis     GERD (gastroesophageal reflux disease) 2023    Pyelonephritis 06/07/2014    Thromboembolus ([address] 2020    post surgery-1 in R calf and 2 in R upper thigh      Past Surgical History:   Procedure Laterality Date    COLONOSCOPY N/A 2018    COLONOSCOPY performed by Alesha Uribe MD at [address] Bilateral 2022    BREAST RECONSTRUCTION performed by Todd Powell MD at [facility]    HX BREAST RECONSTRUCTION Bilateral 2022    RIGHT BREAST IRRIGATION AND DEBRIDEMENT REMOVAL OF RIGHT TISSUE EXPANDER AND FLEX HD, CLOSURE OF LEFT BREAST TRIPLE POINT WOUND performed by Todd Powell MD at [address]    HX MASTECTOMY Bilateral 2022    BILATERAL BREAST MASTECTOMIES AND RIGHT AXILLARY LYMPH NODE DISSECTION/BILATERAL BREAST RECONSTRUCTION WITH POSSIBLE TISSUE EXPANDER/POSSIBLE DIRECT TO IMPLANT/ ACELLULAR DERMAL MATRIX performed by Carlee Harry MD at [address] [facility]    HX ORTHOPAEDIC Right 2020    tendon and ligament repair to right foot    HX OTHER SURGICAL  1988    vein ligation    HX PARTIAL COLECTOMY      partial sigmoid colectomy    HX ROTATOR CUFF REPAIR Left 2021    HX SALPINGO-OOPHORECTOMY Bilateral 2023    Laparoscopic bilateral salpingo-oophorectomy, Dr. Puma Patel     [address]  2021    [address] [facility] [address]      Social History     Tobacco Use    Smoking status: Former     Packs/day: 1.00     Years: 4.00     Pack years: 4.00     Types: Cigarettes     Quit date: 1991     Years since quittin.2     Passive exposure: Never    Smokeless tobacco: Never    Tobacco comments:     I quit many years ago, sometime in    Substance Use Topics    Alcohol use:  Yes     Alcohol/week: 3.0 standard drinks     Types: 1 Glasses of wine, 1 Cans of beer, 1 Shots of liquor per week     Comment: social      Family History   Problem Relation Age of Onset    Immune Disorder Mother         Lupus    Headache Mother         Migraines    Migraines Mother     Post-op Nausea/Vomiting Mother     Elevated Lipids Father Hypertension Father     Psychiatric Disorder Father     Cancer Paternal Grandfather      Current Outpatient Medications   Medication Sig    exemestane (AROMASIN) 25 mg tablet Take 1 Tablet by mouth daily for 90 days. meloxicam (MOBIC) 15 mg tablet TAKE 1 TABLET BY MOUTH EVERY DAY FOR 2 WEEKS    lidocaine (LIDODERM) 5 % Apply patch to the affected area for 12 hours a day and remove for 12 hours a day. ondansetron HCl (ZOFRAN PO) Take  by mouth. DULoxetine (CYMBALTA) 60 mg capsule Take 1 Capsule by mouth daily. (Patient taking differently: Take 60 mg by mouth Every morning.)    acetaminophen (TYLENOL) 500 mg tablet Take 1,000 mg by mouth daily as needed for Pain.    pantoprazole (PROTONIX) 40 mg tablet Every morning. (Patient not taking: Reported on 3/27/2023)     No current facility-administered medications for this visit. Allergies   Allergen Reactions    Other Medication Other (comments)     IV PHENERGAN--Makes Hallucinate \"Really\" bad. Review of Systems: A complete review of systems was obtained, negative except as described above.     Physical Exam:     Visit Vitals  BP (!) 139/93 (BP 1 Location: Left arm, BP Patient Position: Sitting)   Pulse 87   Temp 97.5 °F (36.4 °C) (Temporal)   Resp 17   Wt 219 lb (99.3 kg)   SpO2 95%   BMI 37.57 kg/m²     ECOG PS: 0    Constitutional: Appears well-developed and well-nourished in no apparent distress      Mental status: Alert and awake, Oriented to person/place/time, Able to follow commands    Eyes: EOM normal, Sclera normal, No visible ocular discharge    HENT: Normocephalic, atraumatic    Mouth/Throat: Moist mucous membranes    External Ears normal    Neck: No visualized mass    Pulmonary/Chest: Respiratory effort normal, No visualized signs of difficulty breathing or respiratory distress    Musculoskeletal: Normal gait with no signs of ataxia, Normal range of motion of neck    Neurological: No facial asymmetry (Cranial nerve 7 motor function), No gaze palsy    Skin: no visible rash    Psychiatric: Normal affect, No hallucinations     Results:     Lab Results   Component Value Date/Time    WBC 5.5 01/25/2023 08:51 AM    HGB 12.3 01/25/2023 08:51 AM    HCT 38.7 01/25/2023 08:51 AM    PLATELET 795 20/17/9346 08:51 AM    MCV 93.5 01/25/2023 08:51 AM    ABS. NEUTROPHILS 3.6 01/25/2023 08:51 AM     Lab Results   Component Value Date/Time    Sodium 138 01/25/2023 08:51 AM    Potassium 4.1 01/25/2023 08:51 AM    Chloride 108 01/25/2023 08:51 AM    CO2 26 01/25/2023 08:51 AM    Glucose 96 01/25/2023 08:51 AM    BUN 16 01/25/2023 08:51 AM    Creatinine 0.64 01/25/2023 08:51 AM    GFR est AA >60 09/12/2022 03:25 PM    GFR est non-AA >60 09/12/2022 03:25 PM    Calcium 9.7 01/25/2023 08:51 AM     Lab Results   Component Value Date/Time    Bilirubin, total 0.4 01/25/2023 08:51 AM    ALT (SGPT) 34 01/25/2023 08:51 AM    Alk. phosphatase 91 01/25/2023 08:51 AM    Protein, total 7.3 01/25/2023 08:51 AM    Albumin 3.7 01/25/2023 08:51 AM    Globulin 3.6 01/25/2023 08:51 AM     MRI breast 5/13/22  Right breast: A dominant dynamically enhancing mass with washout kinetics lies  at approximately 9:00 in the right nipple line, mid depth, measuring 1.1 cm AP,  1.5 cm cc and 1.1 cm TRV. An additional small enhancing mass lies at the nipple,  immediately posterior real or, with nipple inversion and enhancement  inseparable. This nipple areolar complex and enhancement measures 1.4 cm TRV,  1.2 cm AP and 0.9 cm CC. Between the dominant mass at 9:00 and the nipple, there  is a combined distance of 8.2 cm, where there is linear delayed enhancement in  the nipple line suspicious for ductal extension. There are no other dominant  abnormalities of morphology or enhancement within the right breast to suggest  additional disease, although the gland overall in the outer breast is enhancing.   Lymph nodes in the right axilla are normal to indeterminate, but none contains a  biopsy clip artifact. Left breast: In the axillary breast, posterior depth at 1:30, nonmass  enhancement measures 3.7 cm AP, 1.1 cm TRV and approximately 2.4 cm CC. It shows  dynamic washout enhancement and is suspicious for contralateral malignancy. No  other dominant abnormality of morphology or enhancement appears in the left  breast. Left axillary lymph nodes are not enlarged, but are morphologically  indeterminate. IMPRESSION  . IMPRESSION:  1. Right BI-RADS 6, known malignancy. Left BI-RADS 4, suspicious. 2. RIGHT BREAST: Known invasive ductal carcinoma at 9:00, with additional  findings between this mass and the nipple for a distance of 8.2 cm, suspicious  for ductal extension to the nipple areolar complex as described above. Indeterminate lymph nodes, no biopsy clip artifact detected. 3. LEFT BREAST: Suspicious nonmass enhancement in the axillary breast 1:30, for  which ultrasonography could be attempted for biopsy localization to exclude  contralateral malignancy. However, this lesion may be sonographically subtle,  and MRI guided biopsy may be necessary to ensure concordance. Indeterminate left  axillary lymph nodes. 4. A summary portfolio has been created for reference and is available in PACS. 5/24/22 US breast L  IMPRESSION  No discrete mass is identified. BI-RADS Category 4 - Suspicious abnormality. .  RECOMMENDATION:  MRI guided biopsy which will be technically challenging due to the far posterior  position of the MRI finding. Records reviewed and summarized above. Pathology report(s) reviewed above. Radiology report(s) reviewed above. Assessment/plan:   1.  Right breast IDC, gr 1, ER +, [place] +, HER 2 negative, LN + by core:  cT2 cN1a cM0, stage IIB, prognostic stage IA  Pre/perimenopausal    6/1/22 left breast core bx:  DCIS, gr 2, involving intraductal papilloma, ER + at 95%  6/27/22 left breast mastectomy: DCIS 20 mm, gr 2, pTis pNx cM0; right breast mastectomy:  IDC 17 mm, gr 1, ER + 95%, [place] + 95%, HER-2 negative at IHC  1+. 1/5 LN positive with 5.5 mm carcinoma, ER + 95%, [place] +95%, HER-2 negative at IHC 1+, ki67 15%; DCIS present, not extensive, gr 2,  pT1c pN1a cM0, stage IIA, prognostic stage IA      We explained to the patient that the goal of systemic adjuvant therapy is to improve the chances for cure and decrease the risk of relapse. We explained why a patient can have microscopic cancer spread now even though physical examination, laboratory studies and imaging studies are negative for cancer. We explained that the same treatments used now as adjuvant or preventive treatments rarely if ever are curative in women who develop metastases. Discussed that based on [place]-Rx, chemotherapy would be warranted (premenopausal with LN + disease). Discussed that the update to MINDACT validates mammaprint in this setting (> 48years old, LN + disease). Low risk mammaprint. With age > 48, and with the updated MINDACT results, I am ok with no chemotherapy    She will proceed now with XRT to the right breast. Started 9/6/22 and completed 10/10/22. Will avoid tamoxifen due to history of VTE    We discussed that for high risk women with breast cancer (having either received chemotherapy or < 28years old), ovarian suppression (such as goserelin 10.8 mg q 3 months ) + AI was superior in terms of overall survival than tamoxifen alone. The added risks of worse QOL due to depression and worse menopausal symptoms were discussed with the addition of ovarian suppression. Following XRT, started her on goserelin, followed by anastrozole. Started goserelin 10/10/22, she is having severe joint pain, see below. Switched to Lupron on 1/3/2023 to see if any improvement. She underwent BSO 1/26/23. She started anastrozole 1/2023 and now reports pain to back/hands and knees. Will HOLD anastrozole x2 weeks and switch to exemestane. She will call if no improvement in pain.      Ki67 is low, not gr 3, tumor not > 5 cm, no indication for abemaciclib    2. Emotional well being/recurrent depression:  She has excellent support and is coping well with her disease    3. L DCIS:  Stage 0. See treatment for #1. S/p mastectomy, no XRT required    4. Back pain/right shoulder pain: negative bone scan 6/01/22 and 1/31/23. Back pain now worsened today, possibly due to AI. She also has concern for kidney infection, UA w/ reflex culture ordered. Will switch to exemestane and if no improvement will perform CT scan lumbar spine. 5. Right surgical infection: was admitted to the hospital 7/10/22 to 7/15/22 and had expander removed. Has completed antibiotics and healing well. Resolved. 6. CHEK2 VUS: not clinically relevant at this time. 7. Arthralgias: started 1 month after goserelin, very bothersome to patient. Switched to lupron 1/3/23 and then BSO 1/26/23. She is currently taking cymbalta 60mg daily. Worsened recently, will switch to exemestane. Discussed tart cherry juice and omega 3 FA, 3.3g/day    8. Right breast pain: seen by Dr. Chata Rodríguez and Dr. Yesy Pedroza. Believed to be post XRT and has decreased ROM- referral to physical therapy placed. I personally saw and evaluated the patient and performed the entire medical decision making. The history, physical exam, and documentation were performed by Tamika Garcia NP. I reviewed and verified the above documentation and modified it as needed. Right breast cancer, ER +, on anastrozole, VANCE. Still having severe joint pains, bone scan negative. Will stop anastrozole for 2 weeks and change to exemestane 25 mg daily. Rx in. Refilled mobic. Discussed tart cherry juice and omega 3 FA as well. Rtc 3 months. UA today for dysuria. Referral to PT. Continue cymbalta for joint pain. I appreciate the opportunity to participate in Ms. [address] [place]'s care. Signed By: Ole Stanley MD      No orders of the defined types were placed in this encounter.

## 2023-03-29 DIAGNOSIS — C50.111 MALIGNANT NEOPLASM OF CENTRAL PORTION OF RIGHT BREAST IN FEMALE, ESTROGEN RECEPTOR POSITIVE (HCC): ICD-10-CM

## 2023-03-29 DIAGNOSIS — Z17.0 MALIGNANT NEOPLASM OF CENTRAL PORTION OF RIGHT BREAST IN FEMALE, ESTROGEN RECEPTOR POSITIVE (HCC): ICD-10-CM

## 2023-03-29 DIAGNOSIS — M25.611 DECREASED ROM OF RIGHT SHOULDER: Primary | ICD-10-CM

## 2023-03-29 LAB
APPEARANCE UR: CLEAR
BACTERIA #/AREA URNS HPF: NORMAL /[HPF]
BILIRUB UR QL STRIP: NEGATIVE
CASTS URNS QL MICRO: NORMAL /LPF
COLOR UR: YELLOW
EPI CELLS #/AREA URNS HPF: NORMAL /HPF (ref 0–10)
GLUCOSE UR QL STRIP: NEGATIVE
HGB UR QL STRIP: NEGATIVE
KETONES UR QL STRIP: NEGATIVE
LEUKOCYTE ESTERASE UR QL STRIP: NEGATIVE
MICRO URNS: NORMAL
MICRO URNS: NORMAL
NITRITE UR QL STRIP: NEGATIVE
PH UR STRIP: 7.5 [PH] (ref 5–7.5)
PROT UR QL STRIP: NEGATIVE
RBC #/AREA URNS HPF: NORMAL /HPF (ref 0–2)
SP GR UR STRIP: 1.02 (ref 1–1.03)
URINALYSIS REFLEX, 377202: NORMAL
UROBILINOGEN UR STRIP-MCNC: 1 MG/DL (ref 0.2–1)
WBC #/AREA URNS HPF: NORMAL /HPF (ref 0–5)

## 2023-04-03 ENCOUNTER — TELEPHONE (OUTPATIENT)
Dept: ONCOLOGY | Age: 58
End: 2023-04-03

## 2023-04-18 ENCOUNTER — HOSPITAL ENCOUNTER (OUTPATIENT)
Dept: PHYSICAL THERAPY | Age: 58
Discharge: HOME | End: 2023-04-18
Payer: COMMERCIAL

## 2023-04-18 DIAGNOSIS — Z17.0 MALIGNANT NEOPLASM OF CENTRAL PORTION OF RIGHT BREAST IN FEMALE, ESTROGEN RECEPTOR POSITIVE (HCC): ICD-10-CM

## 2023-04-18 DIAGNOSIS — M25.611 DECREASED ROM OF RIGHT SHOULDER: Primary | ICD-10-CM

## 2023-04-18 DIAGNOSIS — C50.111 MALIGNANT NEOPLASM OF CENTRAL PORTION OF RIGHT BREAST IN FEMALE, ESTROGEN RECEPTOR POSITIVE (HCC): ICD-10-CM

## 2023-04-18 PROCEDURE — 97530 THERAPEUTIC ACTIVITIES: CPT | Performed by: PHYSICAL THERAPIST

## 2023-04-18 PROCEDURE — 97162 PT EVAL MOD COMPLEX 30 MIN: CPT | Performed by: PHYSICAL THERAPIST

## 2023-04-18 PROCEDURE — 97110 THERAPEUTIC EXERCISES: CPT | Performed by: PHYSICAL THERAPIST

## 2023-04-18 NOTE — PROGRESS NOTES
PT ONCOLOGY EVAL/DAILY NOTE    Patient Name: Sarah Bruce  Date:2023  : 1965  [x]  Patient  Verified  Payor: Wesley Sullivan / Plan: Manpreet Henao Se HMO / Product Type: HMO /    In time:1:05 PM  Out time:2:05PM  Total Treatment Time (min): 60  Total Timed Codes (min): 45     Visit #:  20    Treatment Area: Decreased ROM of right shoulder [M25.611]  Malignant neoplasm of central portion of right breast in female, estrogen receptor positive ([facility] [address] [C50.111, Z17.0]    SUBJECTIVE    Current symptoms/Complaints: Pt c/o's of global joint pain since undergoing tx's for B breast cancer, including B mastectomies, radiation, expander removal R (from infection) , 6 months in May 1, is planning on SINGH flap procedure B by Dr Marybeth Zafar. Hx of L RTC repair as well as arthroscopic \"clean up\". Pt has difficulty with B shoulders, weakness, difficulty performing ADL's fixing hair, washing hair, and job requirements as Director of Nursing. Pt states that she is constant pain 4/10 from anti hormonal treatments. Was switched from Anastrozole to Examethastane. Began yesterday, hasn't noticed any less pain. Pt states that joint pain has never gone away, has trouble sleeping, can't sleep on L side. Pt has 7 grandchildren live all over, one on the way in this area, can't  or hold grandchildren.  Pt likes to take Pilates when available, mat and reformer has \"a lot of weakness and I like the stretching at the end\" Pt has next appointment with Pilates at [facility] tomorrow with Seth Peng"    [x] Constant []Intermittent    []Improving  [x]Staying the Same  []Getting worse    Subjective functional status:     Present Symptoms/History:   Treatment History:   22 right breast bx:  IDC, gr 1, 8 mm, ER + at > 90%, OR + at 90%, HER 2 negative ([facility] 2+; FISH ratio 1.8; sig/cell 3.73); right ax LN + for breast cancer  Mammaprint - Low risk luminal A  22 left breast core bx:  DCIS, gr 2, involving intraductal papilloma, ER + at 95%  6/27/22 left breast mastectomy: DCIS 20 mm, gr 2, pTis pNx cM0; right breast mastectomy:  IDC 17 mm, gr 1, ER + 95%, W[place] + 95%, HER-2 negative at IHC  1+, ki67 15%. 1/5 LN positive with 5.5 mm carcinoma, ER + 95%, [place] +95%, HER-2 negative at [facility] 1+; DCIS present, not extensive, gr 2,  pT1c pN1a cM0  XRT started 9/6/22 - 10/10/22  Goserelin 10/10/22 - 1/2/23 (joint pain)  Lupron 1/3/23 (stopped after BSO)  BSO 1/26/23 - pathology negative  Anastrozole 1/10/23 - 3/27/23  Examethastane-started 4/17/2023     Referring Provider: Alistair Ho NP   Medical Oncologist: Dr Merline Fish Oncologist: Dr Valentina Rivero Surgeon: Dr Bob Boone   Primary Care Physician: Hamilton Bravo MD  Next Appointment: May 1st with Dr Bob Boone to schedule SINGH surgery, returns to Dr Alpa Walter in June  Diagnosis: B shoulder pain and weakness  Precautions/Indications: lymphedema risk, frequent hot flashes  History of Present Illness:  Treatment History:   5/5/22 right breast bx:  IDC, gr 1, 8 mm, ER + at > 90%, [place] + at 90%, HER 2 negative (IHC 2+; FISH ratio 1.8; sig/cell 3.73); right ax LN + for breast cancer  Mammaprint - Low risk luminal A  6/1/22 left breast core bx:  DCIS, gr 2, involving intraductal papilloma, ER + at 95%  6/27/22 left breast mastectomy: DCIS 20 mm, gr 2, pTis pNx cM0; right breast mastectomy:  IDC 17 mm, gr 1, ER + 95%, [place] + 95%, HER-2 negative at IHC  1+, ki67 15%. 1/5 LN positive with 5.5 mm carcinoma, ER + 95%, [place] +95%, HER-2 negative at [facility] 1+; DCIS present, not extensive, gr 2,  pT1c pN1a cM0  XRT started 9/6/22 - 10/10/22  Goserelin 10/10/22 - 1/2/23 (joint pain)  Lupron 1/3/23 (stopped after BSO)  BSO 1/26/23 - pathology negative  Anastrozole 1/10/23 - 3/27/23       Pain Intensity:4 on a scale of 0-10  Pain Aggravating Factors: \"using my arms for anything\"  Pain Relieving Factors: \"some movement\"        Has your activity changed since diagnosis?    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  Left: No            Right:   No        Upper Extremity AROM:                                                                                L                                   R  Shoulder Flexion                     100                               91 with end range discomfort/tightness  B                                                   Shoulder Abduction                80                                 65 with end range discomfort/tightness   B  Shoulder Extension                 15                                  11          Shoulder ER                            30                                  21    with end range discomfort/tightness  B  Shoulder IR                             25                                   11     with end range discomfort/tightness B                                                                                                                                                        UPPER QUARTER MUSCLE STRENGTH  KEY                                                              R            L  0 - No Contraction        C1, C2 Neck Flex       4-                                       1 - Trace                       C3 Side Flex          4-           4                                                 2 - Poor                         C4 Sh Elev               4 -          4                                             3 - Fair                          C5 Deltoid/Biceps  3             3                                   4 - Good                       C6 Wrist Ext           4            4                                            5 - Normal                     C7 Triceps             3         3                                                                                  C8 Thumb Ext        4   4                                                                                          T1 Hand Inst            4             4                                              MMT: See above                                     R                    L  Shoulder flexion          2                    3         Shoulder ER                2                      3  Shoulder IR                 2                      3      Neurological: Sensations: Hypersensitivity to LT in R axilla at bra line, and anterior chest                    Palpation: TTP R subscap/lat/teres, axilla, pec minor, palpable skin/fascial tightness from axilla to forearm     Posture: IR at B g-h jt , protracted scapulae    30 min Therapeutic Exercise:  [x] See flow sheet :   Rationale: increase ROM and increase strength to improve the patients ability to perform ADL's required for return to work and/or community      15 min Therapeutic Activity:  []  See flow sheet :   Rationale:  Pt education on lymphedema risk reduction, neuromusculoskeltal side effects of cancer and it's treatments, importance of compliance to HEP                 With   [] TE   [] TA   [] neuro   [] other: Patient Education: [] Review HEP    [] Progressed/Changed HEP based on:   [x] positioning   [x] body mechanics   [x] transfers   [] heat/ice application    [x] other: posture and breath        Pain Level (0-10 scale) post treatment: 4-5    ASSESSMENT/Changes in Function: Pt is a 66 y.o female whose chief c/o is pain and difficulty using B UE's for job requirements and for ADL's. Pt presents with B upper quadrant decreased strength and motion (R>L),and  impaired posture.  Patient will benefit from skilled PT services to address functional mobility deficits, address ROM deficits, address strength deficits, analyze and address soft tissue restrictions, analyze and cue movement patterns, analyze and modify body mechanics/ergonomics, assess and modify postural abnormalities, and instruct in home and community integration to address rehab goals per plan of care          Goals:  See Plan of Care    PLAN  []  Upgrade activities as tolerated     [x]  Continue plan of care  []  Update interventions per flow sheet       []  Discharge due to:_  []  Other:_      Angel Luis Flores, PT 4/18/2023  1:14 PM

## 2023-04-18 NOTE — THERAPY EVALUATION
Physical Therapy at [facility],   a part of [address] [facility]  [address]  [place], [address]  Phone: [phone]  Fax: [phone]    Plan of Care/Statement of Necessity for Physical Therapy Services  2-15    Patient name: Rupert Hale  : 1965  Provider#: [id number]  Referral source: Melia Cerna NP      Medical/Treatment Diagnosis: Decreased ROM of right shoulder [M25.611]  Malignant neoplasm of central portion of right breast in female, estrogen receptor positive ([facility] [address] [C50.111, Z17.0]     Prior Hospitalization: see medical history     Comorbidities: B breast cancer, VAMSI, asthma  Prior Level of Function: Pt was able to use B UE's for her job requirements as Director of Nursing and for all ADL's involving use of UE's at or above shoulder level  Medications: Verified on Patient Summary List    Start of Care: 2023      Onset Date: 2023       The Plan of Care and following information is based on the information from the initial evaluation. Assessment/ key information: Pt is a 66 y.o female whose chief c/o is pain and difficulty using B UE's for job requirements and for ADL's. Pt presents with B upper quadrant decreased strength and motion (R>L),and  impaired posture.  Patient will benefit from skilled PT services to address functional mobility deficits, address ROM deficits, address strength deficits, analyze and address soft tissue restrictions, analyze and cue movement patterns, analyze and modify body mechanics/ergonomics, assess and modify postural abnormalities, and instruct in home and community integration to address rehab goals per plan of care    Evaluation Complexity History MEDIUM  Complexity : 1-2 comorbidities / personal factors will impact the outcome/ POC ; Examination MEDIUM Complexity : 3 Standardized tests and measures addressing body structure, function, activity limitation and / or participation in recreation  ;Presentation LOW Complexity : Stable, uncomplicated  ;Clinical Decision Making MEDIUM Complexity : FOTO score of 26-74  Overall Complexity Rating: MEDIUM    Problem List: pain affecting function, decrease ROM, decrease strength, edema affecting function, decrease ADL/ functional abilitiies, and decrease flexibility/ joint mobility   Treatment Plan may include any combination of the following: Therapeutic exercise, Neuromuscular reeducation, Manual therapy, Therapeutic activity, and Electric stim unattended   Patient / Family readiness to learn indicated by: asking questions and interest  Persons(s) to be included in education: patient (P)  Barriers to Learning/Limitations: None  Patient Goal (s): relief of pain and stiffness\"  Patient Self Reported Health Status: good  Rehabilitation Potential: good    Short Term Goals: To be accomplished in 2 treatments:  1) Pt will be Independent with skin care routine to decrease risk of lymphedema and infection. 2) Pt will know which signs and symptoms to report immediately to her physician concerning their condition. Long Term Goals: To be accomplished in 20 treatments:  1) Pt will be have reduced pain and stiffness  by 4-5 levels on NPS when using B  UE for lifting, pushing, pulling, gripping, and ADL's involving use of B UE above shoulder level  2) Pt will be Independent with HEP for pain management, utilizing correct breath pattern, strengthening, and motion exercises. 3) Pt will utilize correct breath and movement patterns during all ADL's and exercises involving reaching above shoulder level. 4) Pt will have increased motion and strength for B shoulder elevation, ER/IR, and Abduction in order to perform all ADL's, return to Pilates routine,  and to return to full duty at work. Frequency / Duration: Patient to be seen 1-2 times per week for 20 treatments.     Patient/ Caregiver education and instruction: activity modification and exercises    [x]  Plan of care has been reviewed with AMY Holcomb, PT 4/18/2023   ________________________________________________________________________    I certify that the above Therapy Services are being furnished while the patient is under my care. I agree with the treatment plan and certify that this therapy is necessary.     [de-identified] Signature:____________________  Date:____________Time: _________      Indu Gracia NP

## 2023-04-20 ENCOUNTER — HOSPITAL ENCOUNTER (OUTPATIENT)
Dept: PHYSICAL THERAPY | Age: 58
Discharge: HOME | End: 2023-04-20
Payer: COMMERCIAL

## 2023-04-20 PROCEDURE — 97110 THERAPEUTIC EXERCISES: CPT

## 2023-04-20 PROCEDURE — 97140 MANUAL THERAPY 1/> REGIONS: CPT

## 2023-04-24 ENCOUNTER — APPOINTMENT (OUTPATIENT)
Dept: INFUSION THERAPY | Age: 58
End: 2023-04-24

## 2023-04-25 ENCOUNTER — APPOINTMENT (OUTPATIENT)
Dept: PHYSICAL THERAPY | Age: 58
End: 2023-04-25
Payer: COMMERCIAL

## 2023-05-01 ENCOUNTER — APPOINTMENT (OUTPATIENT)
Facility: HOSPITAL | Age: 58
End: 2023-05-01
Payer: COMMERCIAL

## 2023-05-02 PROCEDURE — 97110 THERAPEUTIC EXERCISES: CPT

## 2023-05-02 PROCEDURE — 97140 MANUAL THERAPY 1/> REGIONS: CPT

## 2023-05-02 PROCEDURE — 9990 CHARGE CONVERSION

## 2023-05-04 ENCOUNTER — HOSPITAL ENCOUNTER (OUTPATIENT)
Dept: PHYSICAL THERAPY | Age: 58
Discharge: HOME | End: 2023-05-04
Payer: COMMERCIAL

## 2023-05-04 PROCEDURE — 97110 THERAPEUTIC EXERCISES: CPT

## 2023-05-04 PROCEDURE — 97140 MANUAL THERAPY 1/> REGIONS: CPT

## 2023-05-04 PROCEDURE — 9990 CHARGE CONVERSION

## 2023-05-09 ENCOUNTER — APPOINTMENT (OUTPATIENT)
Dept: PHYSICAL THERAPY | Age: 58
End: 2023-05-09
Payer: COMMERCIAL

## 2023-05-09 ENCOUNTER — HOSPITAL ENCOUNTER (OUTPATIENT)
Facility: HOSPITAL | Age: 58
Setting detail: RECURRING SERIES
Discharge: HOME | End: 2023-05-12
Payer: COMMERCIAL

## 2023-05-09 PROCEDURE — 97110 THERAPEUTIC EXERCISES: CPT

## 2023-05-09 PROCEDURE — 97140 MANUAL THERAPY 1/> REGIONS: CPT

## 2023-05-09 NOTE — PROGRESS NOTES
applicable:           Details if applicable:            Details if applicable:     45     Total Total         Modalities Rationale:     decrease pain and increase tissue extensibility to improve patient's ability to progress to PLOF and address remaining functional goals. min [] Estim Unattended,             type/location:       []  w/ice    []  w/heat        min [] Estim Attended,             type/location:       []  w/ice   []  w/heat         []  w/US   []  TENS insruct            min []  Mechanical Traction,        type/lbs:        []  pro      []  sup           []  int       []  cont            []  before manual           []  after manual     min []  Ultrasound,         settings/location:     10 min  unbilled []  Ice     [x]  Heat            location/position: lumbar/B shoulders/ supine         min []  Vasopneumatic Device,      press/temp:   pre-treatment girth :    post-treatment girth :    measured at (landmark       location) : If using vaso (only need to measure limb vaso being performed on)        min []  Other:        Skin assessment post-treatment (if applicable):    [x]  intact    []  redness- no adverse reaction                 []redness - adverse reaction:          [x]  Patient Education billed concurrently with other procedures   [x] Review HEP    [] Progressed/Changed HEP, detail:    [] Other detail:         Other Objective/Functional Measures  NT    Pain Level at end of session (0-10 scale): 0/10      Assessment   Frequent cues for muscle relaxation during manual therapy but overall tolerated exercises well.      Patient will continue to benefit from skilled PT / OT services to modify and progress therapeutic interventions, analyze and address functional mobility deficits, analyze and address ROM deficits, analyze and address strength deficits, analyze and address soft tissue restrictions, and instruct in home and community integration to address functional deficits and attain remaining

## 2023-05-11 ENCOUNTER — HOSPITAL ENCOUNTER (OUTPATIENT)
Facility: HOSPITAL | Age: 58
Setting detail: RECURRING SERIES
Discharge: HOME | End: 2023-05-14
Payer: COMMERCIAL

## 2023-05-11 ENCOUNTER — APPOINTMENT (OUTPATIENT)
Dept: PHYSICAL THERAPY | Age: 58
End: 2023-05-11
Payer: COMMERCIAL

## 2023-05-11 PROCEDURE — 97110 THERAPEUTIC EXERCISES: CPT

## 2023-05-11 PROCEDURE — 97140 MANUAL THERAPY 1/> REGIONS: CPT

## 2023-05-16 ENCOUNTER — HOSPITAL ENCOUNTER (OUTPATIENT)
Facility: HOSPITAL | Age: 58
Setting detail: RECURRING SERIES
Discharge: HOME | End: 2023-05-19
Payer: COMMERCIAL

## 2023-05-16 ENCOUNTER — APPOINTMENT (OUTPATIENT)
Dept: PHYSICAL THERAPY | Age: 58
End: 2023-05-16
Payer: COMMERCIAL

## 2023-05-16 PROCEDURE — G0283 ELEC STIM OTHER THAN WOUND: HCPCS

## 2023-05-16 PROCEDURE — 97140 MANUAL THERAPY 1/> REGIONS: CPT

## 2023-05-16 PROCEDURE — 97110 THERAPEUTIC EXERCISES: CPT

## 2023-05-16 RX ORDER — EXEMESTANE 25 MG/1
25 TABLET ORAL DAILY
Qty: 30 TABLET | Refills: 2 | COMMUNITY
Start: 2023-03-27 | End: 2023-06-27

## 2023-05-16 NOTE — PROGRESS NOTES
PHYSICAL THERAPY - DAILY TREATMENT NOTE (updated 3/23)      Date: 2023          Patient Name:  Luke Ghotra :  1965   Medical   Diagnosis:  Pain in right shoulder [M25.511] Treatment Diagnosis:  M25.513  RIGHT SHOULDER PAIN and M25.512  LEFT SHOULDER PAIN    [facility]  [facility] - * Insurance:   Payor: Tenzin Hartmann / Plan: [employer] HMO / Product Type: *No Product type* /                     Patient  verified yes     Visit #   Current  / Total 7 30   Time   In / Out 5:50 PM 7:05 PM   Total Treatment Time 75   Total Timed Codes 75         SUBJECTIVE    Pain Level (0-10 scale):8/10    Any medication changes, allergies to medications, adverse drug reactions, diagnosis change, or new procedure performed?: [x] No    [] Yes (see summary sheet for update)  Medications: Verified on Patient Summary List    Subjective functional status/changes:     Patient reports continued muscle spasm around R shoulder blade and neck area. OBJECTIVE      Therapeutic Procedures: Tx Min Billable or 1:1 Min (if diff from Tx Min) Procedure, Rationale, Specifics   30  [id number] Therapeutic Exercise (timed):  increase ROM, strength, coordination, balance, and proprioception to improve patient's ability to progress to PLOF and address remaining functional goals. (see flow sheet as applicable)     Details if applicable:     30  [id number] Manual Therapy (timed):  decrease pain, increase ROM, increase tissue extensibility, and decrease trigger points to improve patient's ability to progress to PLOF and address remaining functional goals. The manual therapy interventions were performed at a separate and distinct time from the therapeutic activities interventions . (see flow sheet as applicable)     Details if applicable:    R STM/TPR rhomboids, subscapularis, infraspinatus, pec major/minor. Gentle STM to tolerance R antecubital fossa, medial arm to address cording and fascial tightness.  TrP R periscap, UT/LS,

## 2023-05-16 NOTE — PROGRESS NOTES
Physical Therapy at [facility],   a part of [address]  [address]  [place], [address]  Phone: [phone]  Fax: [phone]     PHYSICAL THERAPY PROGRESS NOTE  Patient Name:  Keli Coates :  1965   Treatment/Medical Diagnosis: Pain in right shoulder [M25.511]   Referral Source:  [facility] - *     Date of Initial Visit:  2023 Attended Visits:  7 Missed Visits:  1     SUMMARY OF TREATMENT/ASSESSMENT:  Pt has been receiving PT interventions for pain and stiffness in R shoulder complex. She has made slow but steady progress with increased motion and decreased pain. GOAL STATUS:    1) Pt will be Independent with skin care routine to decrease risk of lymphedema and infection. MET  2) Pt will know which signs and symptoms to report immediately to her physician concerning their condition. MET  3) Pt will be have reduced pain and stiffness  by 4-5 levels on NPS when using B  UE for lifting, pushing, pulling, gripping, and ADL's involving use of B UE above shoulder level PROGRESSING  4) Pt will be Independent with HEP for pain management, utilizing correct breath pattern, strengthening, and motion exercises. PROGRESSING  5) Pt will utilize correct breath and movement patterns during all ADL's and exercises involving reaching above shoulder level. PROGRESSING  6) Pt will have increased motion and strength for B shoulder elevation, ER/IR, and Abduction in order to perform all ADL's, return to Pilates routine,  and to return to full duty at work. PROGRESSING           RECOMMENDATIONS  Pt would benefit from continued PT 1-2xwk for an additional 4-6 weeks in order to complete all rehab goals above. [address], PT       2023       6:50 PM    If you have any questions/comments please contact us directly at [phone]. Thank you for allowing us to assist in the care of your patient.

## 2023-05-18 ENCOUNTER — HOSPITAL ENCOUNTER (OUTPATIENT)
Facility: HOSPITAL | Age: 58
Setting detail: RECURRING SERIES
Discharge: HOME | End: 2023-05-21
Payer: COMMERCIAL

## 2023-05-18 ENCOUNTER — APPOINTMENT (OUTPATIENT)
Dept: PHYSICAL THERAPY | Age: 58
End: 2023-05-18
Payer: COMMERCIAL

## 2023-05-18 PROCEDURE — G0283 ELEC STIM OTHER THAN WOUND: HCPCS

## 2023-05-18 PROCEDURE — 97110 THERAPEUTIC EXERCISES: CPT

## 2023-05-18 PROCEDURE — 97140 MANUAL THERAPY 1/> REGIONS: CPT

## 2023-05-24 ENCOUNTER — APPOINTMENT (OUTPATIENT)
Facility: HOSPITAL | Age: 58
End: 2023-05-24
Payer: COMMERCIAL

## 2023-05-31 ENCOUNTER — HOSPITAL ENCOUNTER (OUTPATIENT)
Facility: HOSPITAL | Age: 58
Setting detail: RECURRING SERIES
Discharge: HOME | End: 2023-06-03
Payer: COMMERCIAL

## 2023-05-31 PROCEDURE — 97140 MANUAL THERAPY 1/> REGIONS: CPT

## 2023-05-31 PROCEDURE — 97110 THERAPEUTIC EXERCISES: CPT

## 2023-05-31 NOTE — PROGRESS NOTES
PHYSICAL THERAPY - DAILY TREATMENT NOTE (updated 3/23)      Date: 2023          Patient Name:  Camden Gibbs :  1965   Medical   Diagnosis:  Pain in right shoulder [M25.511] Treatment Diagnosis:  M25.513  RIGHT SHOULDER PAIN and M25.512  LEFT SHOULDER PAIN    Referral Source:  [facility] - * Insurance:   Payor: Monica Jacobsen / Plan: [employer] HMO / Product Type: *No Product type* /                     Patient  verified yes     Visit #   Current  / Total 9 30   Time   In / Out  1:00 PM 2:10 PM   Total Treatment Time 70   Total Timed Codes 50         SUBJECTIVE    Pain Level (0-10 scale):   10/10    Any medication changes, allergies to medications, adverse drug reactions, diagnosis change, or new procedure performed?: [x] No    [] Yes (see summary sheet for update)  Medications: Verified on Patient Summary List    Subjective functional status/changes:     Patient reports she has had a rough couple of days. She reports increased muscle spasms along her periscapular region and a constant ache in her R shoulder. She has been doing her HEP, applying heat and using her Tens unit. OBJECTIVE      Therapeutic Procedures: Tx Min Billable or 1:1 Min (if diff from Tx Min) Procedure, Rationale, Specifics   35 35 [id number] Therapeutic Exercise (timed):  increase ROM, strength, coordination, balance, and proprioception to improve patient's ability to progress to PLOF and address remaining functional goals. (see flow sheet as applicable)     Details if applicable:  obtained objective measures    15 15 [id number] Manual Therapy (timed):  decrease pain, increase ROM, increase tissue extensibility, and decrease trigger points to improve patient's ability to progress to PLOF and address remaining functional goals. The manual therapy interventions were performed at a separate and distinct time from the therapeutic activities interventions .  (see flow sheet as applicable)     Details if applicable:    R STM/TPR

## 2023-06-06 DIAGNOSIS — M25.519 SHOULDER PAIN WITH HISTORY OF REPAIR OF ROTATOR CUFF: Primary | ICD-10-CM

## 2023-06-06 DIAGNOSIS — Z98.890 SHOULDER PAIN WITH HISTORY OF REPAIR OF ROTATOR CUFF: Primary | ICD-10-CM

## 2023-06-06 RX ORDER — LETROZOLE 2.5 MG/1
2.5 TABLET, FILM COATED ORAL DAILY
Qty: 90 TABLET | Refills: 3 | Status: SHIPPED | OUTPATIENT
Start: 2023-06-06

## 2023-06-09 ENCOUNTER — TRANSCRIBE ORDERS (OUTPATIENT)
Facility: HOSPITAL | Age: 58
End: 2023-06-09

## 2023-06-09 DIAGNOSIS — M75.121 NONTRAUMATIC COMPLETE TEAR OF BOTH ROTATOR CUFFS: Primary | ICD-10-CM

## 2023-06-09 DIAGNOSIS — M75.122 NONTRAUMATIC COMPLETE TEAR OF BOTH ROTATOR CUFFS: Primary | ICD-10-CM

## 2023-06-20 ENCOUNTER — HOSPITAL ENCOUNTER (OUTPATIENT)
Facility: HOSPITAL | Age: 58
Discharge: HOME | End: 2023-06-23
Attending: ORTHOPAEDIC SURGERY
Payer: COMMERCIAL

## 2023-06-20 DIAGNOSIS — M75.121 NONTRAUMATIC COMPLETE TEAR OF BOTH ROTATOR CUFFS: ICD-10-CM

## 2023-06-20 DIAGNOSIS — M75.122 NONTRAUMATIC COMPLETE TEAR OF BOTH ROTATOR CUFFS: ICD-10-CM

## 2023-06-20 PROCEDURE — 6360000004 HC RX CONTRAST MEDICATION: Performed by: RADIOLOGY

## 2023-06-20 PROCEDURE — 2500000003 HC RX 250 WO HCPCS: Performed by: RADIOLOGY

## 2023-06-20 PROCEDURE — 73201 CT UPPER EXTREMITY W/DYE: CPT

## 2023-06-20 PROCEDURE — 23350 INJECTION FOR SHOULDER X-RAY: CPT

## 2023-06-20 RX ORDER — LIDOCAINE HYDROCHLORIDE 10 MG/ML
10 INJECTION, SOLUTION EPIDURAL; INFILTRATION; INTRACAUDAL; PERINEURAL ONCE
Status: COMPLETED | OUTPATIENT
Start: 2023-06-20 | End: 2023-06-20

## 2023-06-20 RX ADMIN — IOHEXOL 10 ML: 180 INJECTION INTRAVENOUS at 14:35

## 2023-06-20 RX ADMIN — LIDOCAINE HYDROCHLORIDE 10 ML: 10 INJECTION, SOLUTION EPIDURAL; INFILTRATION; INTRACAUDAL; PERINEURAL at 14:34

## 2023-06-27 ENCOUNTER — HOSPITAL ENCOUNTER (OUTPATIENT)
Facility: HOSPITAL | Age: 58
Discharge: HOME | End: 2023-06-30
Payer: COMMERCIAL

## 2023-06-27 ENCOUNTER — OFFICE VISIT (OUTPATIENT)
Age: 58
End: 2023-06-27
Payer: COMMERCIAL

## 2023-06-27 VITALS
DIASTOLIC BLOOD PRESSURE: 94 MMHG | RESPIRATION RATE: 16 BRPM | HEART RATE: 77 BPM | OXYGEN SATURATION: 96 % | WEIGHT: 225 LBS | BODY MASS INDEX: 38.6 KG/M2 | TEMPERATURE: 98.1 F | SYSTOLIC BLOOD PRESSURE: 146 MMHG

## 2023-06-27 VITALS
HEIGHT: 64 IN | WEIGHT: 224.87 LBS | BODY MASS INDEX: 38.39 KG/M2 | HEART RATE: 72 BPM | RESPIRATION RATE: 16 BRPM | SYSTOLIC BLOOD PRESSURE: 170 MMHG | DIASTOLIC BLOOD PRESSURE: 91 MMHG | TEMPERATURE: 97.1 F

## 2023-06-27 DIAGNOSIS — M25.50 ARTHRALGIA, UNSPECIFIED JOINT: ICD-10-CM

## 2023-06-27 DIAGNOSIS — M54.50 CHRONIC LOW BACK PAIN WITHOUT SCIATICA, UNSPECIFIED BACK PAIN LATERALITY: ICD-10-CM

## 2023-06-27 DIAGNOSIS — G89.29 CHRONIC LOW BACK PAIN WITHOUT SCIATICA, UNSPECIFIED BACK PAIN LATERALITY: ICD-10-CM

## 2023-06-27 DIAGNOSIS — C50.111 MALIGNANT NEOPLASM OF CENTRAL PORTION OF RIGHT BREAST IN FEMALE, ESTROGEN RECEPTOR POSITIVE (HCC): Primary | ICD-10-CM

## 2023-06-27 DIAGNOSIS — Z17.0 MALIGNANT NEOPLASM OF CENTRAL PORTION OF RIGHT BREAST IN FEMALE, ESTROGEN RECEPTOR POSITIVE (HCC): Primary | ICD-10-CM

## 2023-06-27 LAB
BASOPHILS # BLD: 0 K/UL (ref 0–0.1)
BASOPHILS NFR BLD: 1 % (ref 0–1)
DIFFERENTIAL METHOD BLD: ABNORMAL
EOSINOPHIL # BLD: 0.2 K/UL (ref 0–0.4)
EOSINOPHIL NFR BLD: 4 % (ref 0–7)
ERYTHROCYTE [DISTWIDTH] IN BLOOD BY AUTOMATED COUNT: 13.7 % (ref 11.5–14.5)
HCT VFR BLD AUTO: 42 % (ref 35–47)
HGB BLD-MCNC: 13.3 G/DL (ref 11.5–16)
IMM GRANULOCYTES # BLD AUTO: 0 K/UL (ref 0–0.04)
IMM GRANULOCYTES NFR BLD AUTO: 1 % (ref 0–0.5)
LYMPHOCYTES # BLD: 1.5 K/UL (ref 0.8–3.5)
LYMPHOCYTES NFR BLD: 29 % (ref 12–49)
MCH RBC QN AUTO: 30 PG (ref 26–34)
MCHC RBC AUTO-ENTMCNC: 31.7 G/DL (ref 30–36.5)
MCV RBC AUTO: 94.6 FL (ref 80–99)
MONOCYTES # BLD: 0.6 K/UL (ref 0–1)
MONOCYTES NFR BLD: 11 % (ref 5–13)
NEUTS SEG # BLD: 2.8 K/UL (ref 1.8–8)
NEUTS SEG NFR BLD: 54 % (ref 32–75)
NRBC # BLD: 0 K/UL (ref 0–0.01)
NRBC BLD-RTO: 0 PER 100 WBC
PLATELET # BLD AUTO: 292 K/UL (ref 150–400)
PMV BLD AUTO: 10.8 FL (ref 8.9–12.9)
RBC # BLD AUTO: 4.44 M/UL (ref 3.8–5.2)
RHEUMATOID FACT SERPL-ACNC: <10 IU/ML
WBC # BLD AUTO: 5.1 K/UL (ref 3.6–11)

## 2023-06-27 PROCEDURE — 85025 COMPLETE CBC W/AUTO DIFF WBC: CPT

## 2023-06-27 PROCEDURE — 36415 COLL VENOUS BLD VENIPUNCTURE: CPT

## 2023-06-27 PROCEDURE — 86901 BLOOD TYPING SEROLOGIC RH(D): CPT

## 2023-06-27 PROCEDURE — 86850 RBC ANTIBODY SCREEN: CPT

## 2023-06-27 PROCEDURE — 86900 BLOOD TYPING SEROLOGIC ABO: CPT

## 2023-06-27 PROCEDURE — 86431 RHEUMATOID FACTOR QUANT: CPT

## 2023-06-27 PROCEDURE — 99214 OFFICE O/P EST MOD 30 MIN: CPT | Performed by: INTERNAL MEDICINE

## 2023-06-27 RX ORDER — METHYLPREDNISOLONE 4 MG/1
TABLET ORAL
Qty: 21 TABLET | Refills: 0 | Status: SHIPPED | OUTPATIENT
Start: 2023-06-27 | End: 2023-07-03

## 2023-06-27 RX ORDER — DICLOFENAC SODIUM 75 MG/1
75 TABLET, DELAYED RELEASE ORAL 2 TIMES DAILY
COMMUNITY
Start: 2021-12-27

## 2023-06-27 ASSESSMENT — PAIN DESCRIPTION - ORIENTATION: ORIENTATION: OTHER (COMMENT)

## 2023-06-27 ASSESSMENT — PAIN DESCRIPTION - FREQUENCY: FREQUENCY: CONTINUOUS

## 2023-06-27 ASSESSMENT — PAIN SCALES - GENERAL: PAINLEVEL_OUTOF10: 10

## 2023-06-27 ASSESSMENT — PAIN DESCRIPTION - DESCRIPTORS: DESCRIPTORS: ACHING

## 2023-06-27 ASSESSMENT — PAIN DESCRIPTION - LOCATION: LOCATION: OTHER (COMMENT)

## 2023-06-27 ASSESSMENT — PAIN DESCRIPTION - PAIN TYPE: TYPE: CHRONIC PAIN

## 2023-06-28 PROBLEM — C50.911 BREAST CANCER, RIGHT (HCC): Status: ACTIVE | Noted: 2022-05-12

## 2023-06-28 LAB
ABO + RH BLD: NORMAL
BLOOD GROUP ANTIBODIES SERPL: NORMAL
SPECIMEN EXP DATE BLD: NORMAL

## 2023-07-06 DIAGNOSIS — C50.111 MALIGNANT NEOPLASM OF CENTRAL PORTION OF RIGHT BREAST IN FEMALE, ESTROGEN RECEPTOR POSITIVE (HCC): ICD-10-CM

## 2023-07-06 DIAGNOSIS — G89.29 CHRONIC LOW BACK PAIN WITHOUT SCIATICA, UNSPECIFIED BACK PAIN LATERALITY: Primary | ICD-10-CM

## 2023-07-06 DIAGNOSIS — Z17.0 MALIGNANT NEOPLASM OF CENTRAL PORTION OF RIGHT BREAST IN FEMALE, ESTROGEN RECEPTOR POSITIVE (HCC): ICD-10-CM

## 2023-07-06 DIAGNOSIS — M54.50 CHRONIC LOW BACK PAIN WITHOUT SCIATICA, UNSPECIFIED BACK PAIN LATERALITY: Primary | ICD-10-CM

## 2023-07-06 RX ORDER — METHYLPREDNISOLONE 4 MG/1
TABLET ORAL
Qty: 21 TABLET | Refills: 0 | Status: SHIPPED | OUTPATIENT
Start: 2023-07-06 | End: 2023-07-12

## 2023-07-11 ENCOUNTER — HOSPITAL ENCOUNTER (INPATIENT)
Facility: HOSPITAL | Age: 58
LOS: 3 days | Discharge: HOME | DRG: 584 | End: 2023-07-14
Attending: PLASTIC SURGERY | Admitting: PLASTIC SURGERY
Payer: COMMERCIAL

## 2023-07-11 ENCOUNTER — ANESTHESIA (OUTPATIENT)
Facility: HOSPITAL | Age: 58
DRG: 584 | End: 2023-07-11
Payer: COMMERCIAL

## 2023-07-11 ENCOUNTER — ANESTHESIA EVENT (OUTPATIENT)
Facility: HOSPITAL | Age: 58
DRG: 584 | End: 2023-07-11
Payer: COMMERCIAL

## 2023-07-11 PROBLEM — C50.919 BREAST CANCER IN FEMALE (HCC): Status: ACTIVE | Noted: 2023-07-11

## 2023-07-11 PROCEDURE — 6360000002 HC RX W HCPCS: Performed by: ANESTHESIOLOGY

## 2023-07-11 PROCEDURE — 3600000005 HC SURGERY LEVEL 5 BASE: Performed by: PLASTIC SURGERY

## 2023-07-11 PROCEDURE — C9399 UNCLASSIFIED DRUGS OR BIOLOG: HCPCS | Performed by: NURSE ANESTHETIST, CERTIFIED REGISTERED

## 2023-07-11 PROCEDURE — 6360000002 HC RX W HCPCS: Performed by: PLASTIC SURGERY

## 2023-07-11 PROCEDURE — 2500000003 HC RX 250 WO HCPCS: Performed by: PLASTIC SURGERY

## 2023-07-11 PROCEDURE — 2580000003 HC RX 258: Performed by: PLASTIC SURGERY

## 2023-07-11 PROCEDURE — 94761 N-INVAS EAR/PLS OXIMETRY MLT: CPT

## 2023-07-11 PROCEDURE — 3700000001 HC ADD 15 MINUTES (ANESTHESIA): Performed by: PLASTIC SURGERY

## 2023-07-11 PROCEDURE — 6370000000 HC RX 637 (ALT 250 FOR IP): Performed by: PLASTIC SURGERY

## 2023-07-11 PROCEDURE — 88305 TISSUE EXAM BY PATHOLOGIST: CPT

## 2023-07-11 PROCEDURE — C1713 ANCHOR/SCREW BN/BN,TIS/BN: HCPCS | Performed by: PLASTIC SURGERY

## 2023-07-11 PROCEDURE — 2500000003 HC RX 250 WO HCPCS: Performed by: NURSE ANESTHETIST, CERTIFIED REGISTERED

## 2023-07-11 PROCEDURE — 7100000001 HC PACU RECOVERY - ADDTL 15 MIN: Performed by: PLASTIC SURGERY

## 2023-07-11 PROCEDURE — 0HB5XZZ EXCISION OF CHEST SKIN, EXTERNAL APPROACH: ICD-10-PCS | Performed by: SURGERY

## 2023-07-11 PROCEDURE — 2000000000 HC ICU R&B

## 2023-07-11 PROCEDURE — 0HRV077 REPLACEMENT OF BILATERAL BREAST USING DEEP INFERIOR EPIGASTRIC ARTERY PERFORATOR FLAP, OPEN APPROACH: ICD-10-PCS | Performed by: SURGERY

## 2023-07-11 PROCEDURE — C9290 INJ, BUPIVACAINE LIPOSOME: HCPCS | Performed by: PLASTIC SURGERY

## 2023-07-11 PROCEDURE — 0HPT0NZ REMOVAL OF TISSUE EXPANDER FROM RIGHT BREAST, OPEN APPROACH: ICD-10-PCS | Performed by: PLASTIC SURGERY

## 2023-07-11 PROCEDURE — C1889 IMPLANT/INSERT DEVICE, NOC: HCPCS | Performed by: PLASTIC SURGERY

## 2023-07-11 PROCEDURE — 0HPU0NZ REMOVAL OF TISSUE EXPANDER FROM LEFT BREAST, OPEN APPROACH: ICD-10-PCS | Performed by: PLASTIC SURGERY

## 2023-07-11 PROCEDURE — 6360000002 HC RX W HCPCS: Performed by: NURSE ANESTHETIST, CERTIFIED REGISTERED

## 2023-07-11 PROCEDURE — 3600000015 HC SURGERY LEVEL 5 ADDTL 15MIN: Performed by: PLASTIC SURGERY

## 2023-07-11 PROCEDURE — 2580000003 HC RX 258: Performed by: NURSE ANESTHETIST, CERTIFIED REGISTERED

## 2023-07-11 PROCEDURE — 2709999900 HC NON-CHARGEABLE SUPPLY: Performed by: PLASTIC SURGERY

## 2023-07-11 PROCEDURE — 7100000000 HC PACU RECOVERY - FIRST 15 MIN: Performed by: PLASTIC SURGERY

## 2023-07-11 PROCEDURE — 3700000000 HC ANESTHESIA ATTENDED CARE: Performed by: PLASTIC SURGERY

## 2023-07-11 PROCEDURE — C1760 CLOSURE DEV, VASC: HCPCS | Performed by: PLASTIC SURGERY

## 2023-07-11 DEVICE — THE GEM MICROVASCULAR ANASTOMOTIC COUPLER DEVICE RINGS ARE MADE OF POLYETHYLENE AND SURGICAL GRADE STAINLESS STEEL PINS. A PROTECTIVE COVER AND JAW ASSEMBLY PROTECT THE RINGS AND ALLOW FOR EASY LOADING ONTO THE ANASTOMOTIC INSTRUMENT. BOTH THE PROTECTIVE COVER AND JAW ASSEMBLY ARE DISPOSABLE. THE GEM MICROVASCULAR ANASTOMOTIC COUPLER DEVICE IS SINGLE-USE AND AVAILABLE IN VARIOUS SIZES
Type: IMPLANTABLE DEVICE | Site: BREAST | Status: FUNCTIONAL
Brand: GEM MICROVASCULAR ANASTOMOTIC COUPLER

## 2023-07-11 DEVICE — CLIP HEMSTAT TI CHEVRON SHP INTLOK ATRAUM TEETH MICROCLP: Type: IMPLANTABLE DEVICE | Site: BREAST | Status: FUNCTIONAL

## 2023-07-11 DEVICE — CLIP SURG INT USE TI HEMSTAT MICROCLP: Type: IMPLANTABLE DEVICE | Site: BREAST | Status: FUNCTIONAL

## 2023-07-11 RX ORDER — PROPOFOL 10 MG/ML
INJECTION, EMULSION INTRAVENOUS PRN
Status: DISCONTINUED | OUTPATIENT
Start: 2023-07-11 | End: 2023-07-11 | Stop reason: SDUPTHER

## 2023-07-11 RX ORDER — ONDANSETRON 2 MG/ML
8 INJECTION INTRAMUSCULAR; INTRAVENOUS EVERY 8 HOURS PRN
Status: DISCONTINUED | OUTPATIENT
Start: 2023-07-11 | End: 2023-07-14 | Stop reason: HOSPADM

## 2023-07-11 RX ORDER — ACETAMINOPHEN 500 MG
1000 TABLET ORAL EVERY 6 HOURS
Status: COMPLETED | OUTPATIENT
Start: 2023-07-11 | End: 2023-07-13

## 2023-07-11 RX ORDER — MIDAZOLAM HYDROCHLORIDE 1 MG/ML
INJECTION INTRAMUSCULAR; INTRAVENOUS PRN
Status: DISCONTINUED | OUTPATIENT
Start: 2023-07-11 | End: 2023-07-11 | Stop reason: SDUPTHER

## 2023-07-11 RX ORDER — HEPARIN SODIUM 10000 [USP'U]/ML
INJECTION, SOLUTION INTRAVENOUS; SUBCUTANEOUS PRN
Status: DISCONTINUED | OUTPATIENT
Start: 2023-07-11 | End: 2023-07-11 | Stop reason: SDUPTHER

## 2023-07-11 RX ORDER — VECURONIUM BROMIDE 1 MG/ML
INJECTION, POWDER, LYOPHILIZED, FOR SOLUTION INTRAVENOUS PRN
Status: DISCONTINUED | OUTPATIENT
Start: 2023-07-11 | End: 2023-07-11 | Stop reason: SDUPTHER

## 2023-07-11 RX ORDER — DIPHENHYDRAMINE HYDROCHLORIDE 50 MG/ML
12.5 INJECTION INTRAMUSCULAR; INTRAVENOUS
Status: DISCONTINUED | OUTPATIENT
Start: 2023-07-11 | End: 2023-07-11 | Stop reason: HOSPADM

## 2023-07-11 RX ORDER — GABAPENTIN 300 MG/1
300 CAPSULE ORAL EVERY 8 HOURS
Status: DISCONTINUED | OUTPATIENT
Start: 2023-07-11 | End: 2023-07-14 | Stop reason: HOSPADM

## 2023-07-11 RX ORDER — DEXAMETHASONE SODIUM PHOSPHATE 4 MG/ML
INJECTION, SOLUTION INTRA-ARTICULAR; INTRALESIONAL; INTRAMUSCULAR; INTRAVENOUS; SOFT TISSUE PRN
Status: DISCONTINUED | OUTPATIENT
Start: 2023-07-11 | End: 2023-07-11 | Stop reason: SDUPTHER

## 2023-07-11 RX ORDER — PROPOFOL 10 MG/ML
INJECTION, EMULSION INTRAVENOUS CONTINUOUS PRN
Status: DISCONTINUED | OUTPATIENT
Start: 2023-07-11 | End: 2023-07-11 | Stop reason: SDUPTHER

## 2023-07-11 RX ORDER — IBUPROFEN 200 MG
600 TABLET ORAL EVERY 6 HOURS
Status: COMPLETED | OUTPATIENT
Start: 2023-07-11 | End: 2023-07-13

## 2023-07-11 RX ORDER — DOCUSATE SODIUM 100 MG/1
100 CAPSULE, LIQUID FILLED ORAL 2 TIMES DAILY
Status: DISCONTINUED | OUTPATIENT
Start: 2023-07-11 | End: 2023-07-14 | Stop reason: HOSPADM

## 2023-07-11 RX ORDER — PAPAVERINE HYDROCHLORIDE 30 MG/ML
INJECTION INTRAMUSCULAR; INTRAVENOUS PRN
Status: DISCONTINUED | OUTPATIENT
Start: 2023-07-11 | End: 2023-07-11 | Stop reason: ALTCHOICE

## 2023-07-11 RX ORDER — SODIUM CHLORIDE, SODIUM LACTATE, POTASSIUM CHLORIDE, CALCIUM CHLORIDE 600; 310; 30; 20 MG/100ML; MG/100ML; MG/100ML; MG/100ML
INJECTION, SOLUTION INTRAVENOUS CONTINUOUS
Status: DISCONTINUED | OUTPATIENT
Start: 2023-07-11 | End: 2023-07-12

## 2023-07-11 RX ORDER — HYDROMORPHONE HYDROCHLORIDE 2 MG/1
2 TABLET ORAL EVERY 4 HOURS PRN
Status: DISCONTINUED | OUTPATIENT
Start: 2023-07-11 | End: 2023-07-14 | Stop reason: HOSPADM

## 2023-07-11 RX ORDER — SODIUM CHLORIDE, SODIUM LACTATE, POTASSIUM CHLORIDE, CALCIUM CHLORIDE 600; 310; 30; 20 MG/100ML; MG/100ML; MG/100ML; MG/100ML
INJECTION, SOLUTION INTRAVENOUS
Status: COMPLETED | OUTPATIENT
Start: 2023-07-11 | End: 2023-07-11

## 2023-07-11 RX ORDER — ONDANSETRON 2 MG/ML
4 INJECTION INTRAMUSCULAR; INTRAVENOUS
Status: DISCONTINUED | OUTPATIENT
Start: 2023-07-11 | End: 2023-07-11 | Stop reason: HOSPADM

## 2023-07-11 RX ORDER — FENTANYL CITRATE 50 UG/ML
INJECTION, SOLUTION INTRAMUSCULAR; INTRAVENOUS PRN
Status: DISCONTINUED | OUTPATIENT
Start: 2023-07-11 | End: 2023-07-11 | Stop reason: SDUPTHER

## 2023-07-11 RX ORDER — HYDROMORPHONE HYDROCHLORIDE 2 MG/1
4 TABLET ORAL EVERY 4 HOURS PRN
Status: DISCONTINUED | OUTPATIENT
Start: 2023-07-11 | End: 2023-07-14 | Stop reason: HOSPADM

## 2023-07-11 RX ORDER — HEPARIN SODIUM 5000 [USP'U]/ML
5000 INJECTION, SOLUTION INTRAVENOUS; SUBCUTANEOUS EVERY 12 HOURS
Status: DISCONTINUED | OUTPATIENT
Start: 2023-07-12 | End: 2023-07-14 | Stop reason: HOSPADM

## 2023-07-11 RX ORDER — HYDROMORPHONE HYDROCHLORIDE 2 MG/ML
INJECTION, SOLUTION INTRAMUSCULAR; INTRAVENOUS; SUBCUTANEOUS PRN
Status: DISCONTINUED | OUTPATIENT
Start: 2023-07-11 | End: 2023-07-11 | Stop reason: SDUPTHER

## 2023-07-11 RX ORDER — SODIUM CHLORIDE, SODIUM LACTATE, POTASSIUM CHLORIDE, CALCIUM CHLORIDE 600; 310; 30; 20 MG/100ML; MG/100ML; MG/100ML; MG/100ML
INJECTION, SOLUTION INTRAVENOUS CONTINUOUS PRN
Status: DISCONTINUED | OUTPATIENT
Start: 2023-07-11 | End: 2023-07-11 | Stop reason: SDUPTHER

## 2023-07-11 RX ORDER — DULOXETIN HYDROCHLORIDE 30 MG/1
60 CAPSULE, DELAYED RELEASE ORAL DAILY
Status: DISCONTINUED | OUTPATIENT
Start: 2023-07-12 | End: 2023-07-14 | Stop reason: HOSPADM

## 2023-07-11 RX ORDER — MORPHINE SULFATE 1 MG/ML
2 INJECTION, SOLUTION EPIDURAL; INTRATHECAL; INTRAVENOUS EVERY 4 HOURS PRN
Status: DISCONTINUED | OUTPATIENT
Start: 2023-07-11 | End: 2023-07-12

## 2023-07-11 RX ORDER — ONDANSETRON 2 MG/ML
INJECTION INTRAMUSCULAR; INTRAVENOUS PRN
Status: DISCONTINUED | OUTPATIENT
Start: 2023-07-11 | End: 2023-07-11 | Stop reason: SDUPTHER

## 2023-07-11 RX ORDER — HEPARIN SODIUM 10000 [USP'U]/100ML
INJECTION, SOLUTION INTRAVENOUS CONTINUOUS PRN
Status: DISCONTINUED | OUTPATIENT
Start: 2023-07-11 | End: 2023-07-11 | Stop reason: ALTCHOICE

## 2023-07-11 RX ORDER — HEPARIN SODIUM 5000 [USP'U]/ML
5000 INJECTION, SOLUTION INTRAVENOUS; SUBCUTANEOUS EVERY 12 HOURS
Status: DISCONTINUED | OUTPATIENT
Start: 2023-07-11 | End: 2023-07-11

## 2023-07-11 RX ORDER — LABETALOL HYDROCHLORIDE 5 MG/ML
INJECTION, SOLUTION INTRAVENOUS PRN
Status: DISCONTINUED | OUTPATIENT
Start: 2023-07-11 | End: 2023-07-11 | Stop reason: SDUPTHER

## 2023-07-11 RX ADMIN — VECURONIUM BROMIDE 1 MG: 1 INJECTION, POWDER, LYOPHILIZED, FOR SOLUTION INTRAVENOUS at 08:46

## 2023-07-11 RX ADMIN — PROPOFOL 25 MCG/KG/MIN: 10 INJECTION, EMULSION INTRAVENOUS at 07:55

## 2023-07-11 RX ADMIN — VECURONIUM BROMIDE 2 MG: 1 INJECTION, POWDER, LYOPHILIZED, FOR SOLUTION INTRAVENOUS at 11:37

## 2023-07-11 RX ADMIN — FENTANYL CITRATE 50 MCG: 50 INJECTION, SOLUTION INTRAMUSCULAR; INTRAVENOUS at 11:34

## 2023-07-11 RX ADMIN — GABAPENTIN 300 MG: 300 CAPSULE ORAL at 19:36

## 2023-07-11 RX ADMIN — PROPOFOL 20 MG: 10 INJECTION, EMULSION INTRAVENOUS at 08:28

## 2023-07-11 RX ADMIN — SODIUM CHLORIDE, POTASSIUM CHLORIDE, SODIUM LACTATE AND CALCIUM CHLORIDE: 600; 310; 30; 20 INJECTION, SOLUTION INTRAVENOUS at 12:32

## 2023-07-11 RX ADMIN — SUGAMMADEX 200 MG: 100 INJECTION, SOLUTION INTRAVENOUS at 17:06

## 2023-07-11 RX ADMIN — VECURONIUM BROMIDE 2 MG: 1 INJECTION, POWDER, LYOPHILIZED, FOR SOLUTION INTRAVENOUS at 10:06

## 2023-07-11 RX ADMIN — SODIUM CHLORIDE, POTASSIUM CHLORIDE, SODIUM LACTATE AND CALCIUM CHLORIDE: 600; 310; 30; 20 INJECTION, SOLUTION INTRAVENOUS at 14:52

## 2023-07-11 RX ADMIN — ONDANSETRON 8 MG: 2 INJECTION INTRAMUSCULAR; INTRAVENOUS at 22:18

## 2023-07-11 RX ADMIN — SODIUM CHLORIDE, POTASSIUM CHLORIDE, SODIUM LACTATE AND CALCIUM CHLORIDE: 600; 310; 30; 20 INJECTION, SOLUTION INTRAVENOUS at 06:39

## 2023-07-11 RX ADMIN — HEPARIN SODIUM 5000 UNITS: 10000 INJECTION INTRAVENOUS; SUBCUTANEOUS at 12:31

## 2023-07-11 RX ADMIN — VECURONIUM BROMIDE 2 MG: 1 INJECTION, POWDER, LYOPHILIZED, FOR SOLUTION INTRAVENOUS at 08:13

## 2023-07-11 RX ADMIN — VECURONIUM BROMIDE 1 MG: 1 INJECTION, POWDER, LYOPHILIZED, FOR SOLUTION INTRAVENOUS at 09:09

## 2023-07-11 RX ADMIN — IBUPROFEN 600 MG: 200 TABLET, FILM COATED ORAL at 20:21

## 2023-07-11 RX ADMIN — CEFAZOLIN SODIUM 2 MG: 1 POWDER, FOR SOLUTION INTRAMUSCULAR; INTRAVENOUS at 16:07

## 2023-07-11 RX ADMIN — VECURONIUM BROMIDE 8 MG: 1 INJECTION, POWDER, LYOPHILIZED, FOR SOLUTION INTRAVENOUS at 07:38

## 2023-07-11 RX ADMIN — SODIUM CHLORIDE, POTASSIUM CHLORIDE, SODIUM LACTATE AND CALCIUM CHLORIDE: 600; 310; 30; 20 INJECTION, SOLUTION INTRAVENOUS at 18:16

## 2023-07-11 RX ADMIN — ACETAMINOPHEN 1000 MG: 500 TABLET, FILM COATED ORAL at 19:36

## 2023-07-11 RX ADMIN — VECURONIUM BROMIDE 2 MG: 1 INJECTION, POWDER, LYOPHILIZED, FOR SOLUTION INTRAVENOUS at 12:58

## 2023-07-11 RX ADMIN — CEFAZOLIN SODIUM 2000 MG: 1 POWDER, FOR SOLUTION INTRAMUSCULAR; INTRAVENOUS at 12:03

## 2023-07-11 RX ADMIN — FENTANYL CITRATE 100 MCG: 50 INJECTION, SOLUTION INTRAMUSCULAR; INTRAVENOUS at 08:23

## 2023-07-11 RX ADMIN — VECURONIUM BROMIDE 1 MG: 1 INJECTION, POWDER, LYOPHILIZED, FOR SOLUTION INTRAVENOUS at 15:38

## 2023-07-11 RX ADMIN — FENTANYL CITRATE 50 MCG: 50 INJECTION, SOLUTION INTRAMUSCULAR; INTRAVENOUS at 10:47

## 2023-07-11 RX ADMIN — FENTANYL CITRATE 100 MCG: 50 INJECTION, SOLUTION INTRAMUSCULAR; INTRAVENOUS at 07:35

## 2023-07-11 RX ADMIN — FENTANYL CITRATE 50 MCG: 50 INJECTION, SOLUTION INTRAMUSCULAR; INTRAVENOUS at 15:49

## 2023-07-11 RX ADMIN — FENTANYL CITRATE 100 MCG: 50 INJECTION, SOLUTION INTRAMUSCULAR; INTRAVENOUS at 08:16

## 2023-07-11 RX ADMIN — VECURONIUM BROMIDE 1 MG: 1 INJECTION, POWDER, LYOPHILIZED, FOR SOLUTION INTRAVENOUS at 09:26

## 2023-07-11 RX ADMIN — VECURONIUM BROMIDE 2 MG: 1 INJECTION, POWDER, LYOPHILIZED, FOR SOLUTION INTRAVENOUS at 13:32

## 2023-07-11 RX ADMIN — PROPOFOL 200 MG: 10 INJECTION, EMULSION INTRAVENOUS at 07:37

## 2023-07-11 RX ADMIN — HYDROMORPHONE HYDROCHLORIDE 4 MG: 2 TABLET ORAL at 22:27

## 2023-07-11 RX ADMIN — VECURONIUM BROMIDE 1 MG: 1 INJECTION, POWDER, LYOPHILIZED, FOR SOLUTION INTRAVENOUS at 12:13

## 2023-07-11 RX ADMIN — CEFAZOLIN SODIUM 2000 MG: 1 POWDER, FOR SOLUTION INTRAMUSCULAR; INTRAVENOUS at 08:03

## 2023-07-11 RX ADMIN — VECURONIUM BROMIDE 2 MG: 1 INJECTION, POWDER, LYOPHILIZED, FOR SOLUTION INTRAVENOUS at 11:08

## 2023-07-11 RX ADMIN — HYDROMORPHONE HYDROCHLORIDE 0.5 MG: 1 INJECTION, SOLUTION INTRAMUSCULAR; INTRAVENOUS; SUBCUTANEOUS at 18:19

## 2023-07-11 RX ADMIN — VECURONIUM BROMIDE 1 MG: 1 INJECTION, POWDER, LYOPHILIZED, FOR SOLUTION INTRAVENOUS at 10:46

## 2023-07-11 RX ADMIN — Medication 10 MG: at 17:31

## 2023-07-11 RX ADMIN — HYDROMORPHONE HYDROCHLORIDE 0.6 MG: 2 INJECTION, SOLUTION INTRAMUSCULAR; INTRAVENOUS; SUBCUTANEOUS at 16:37

## 2023-07-11 RX ADMIN — DEXAMETHASONE SODIUM PHOSPHATE 8 MG: 4 INJECTION, SOLUTION INTRAMUSCULAR; INTRAVENOUS at 08:03

## 2023-07-11 RX ADMIN — VECURONIUM BROMIDE 3 MG: 1 INJECTION, POWDER, LYOPHILIZED, FOR SOLUTION INTRAVENOUS at 11:43

## 2023-07-11 RX ADMIN — SODIUM CHLORIDE, POTASSIUM CHLORIDE, SODIUM LACTATE AND CALCIUM CHLORIDE: 600; 310; 30; 20 INJECTION, SOLUTION INTRAVENOUS at 08:19

## 2023-07-11 RX ADMIN — VECURONIUM BROMIDE 2 MG: 1 INJECTION, POWDER, LYOPHILIZED, FOR SOLUTION INTRAVENOUS at 08:38

## 2023-07-11 RX ADMIN — DOCUSATE SODIUM 100 MG: 100 CAPSULE, LIQUID FILLED ORAL at 20:26

## 2023-07-11 RX ADMIN — VECURONIUM BROMIDE 2 MG: 1 INJECTION, POWDER, LYOPHILIZED, FOR SOLUTION INTRAVENOUS at 15:09

## 2023-07-11 RX ADMIN — FENTANYL CITRATE 100 MCG: 50 INJECTION, SOLUTION INTRAMUSCULAR; INTRAVENOUS at 08:44

## 2023-07-11 RX ADMIN — FENTANYL CITRATE 50 MCG: 50 INJECTION, SOLUTION INTRAMUSCULAR; INTRAVENOUS at 14:28

## 2023-07-11 RX ADMIN — MIDAZOLAM HYDROCHLORIDE 2 MG: 1 INJECTION, SOLUTION INTRAMUSCULAR; INTRAVENOUS at 07:33

## 2023-07-11 RX ADMIN — VECURONIUM BROMIDE 2 MG: 1 INJECTION, POWDER, LYOPHILIZED, FOR SOLUTION INTRAVENOUS at 14:28

## 2023-07-11 RX ADMIN — FENTANYL CITRATE 150 MCG: 50 INJECTION, SOLUTION INTRAMUSCULAR; INTRAVENOUS at 08:09

## 2023-07-11 RX ADMIN — FENTANYL CITRATE 100 MCG: 50 INJECTION, SOLUTION INTRAMUSCULAR; INTRAVENOUS at 08:53

## 2023-07-11 RX ADMIN — FENTANYL CITRATE 50 MCG: 50 INJECTION, SOLUTION INTRAMUSCULAR; INTRAVENOUS at 07:29

## 2023-07-11 RX ADMIN — VECURONIUM BROMIDE 1 MG: 1 INJECTION, POWDER, LYOPHILIZED, FOR SOLUTION INTRAVENOUS at 14:04

## 2023-07-11 RX ADMIN — ONDANSETRON HYDROCHLORIDE 4 MG: 2 SOLUTION INTRAMUSCULAR; INTRAVENOUS at 17:06

## 2023-07-11 RX ADMIN — MIDAZOLAM HYDROCHLORIDE 3 MG: 1 INJECTION, SOLUTION INTRAMUSCULAR; INTRAVENOUS at 07:29

## 2023-07-11 RX ADMIN — FENTANYL CITRATE 100 MCG: 50 INJECTION, SOLUTION INTRAMUSCULAR; INTRAVENOUS at 09:31

## 2023-07-11 RX ADMIN — SODIUM CHLORIDE, POTASSIUM CHLORIDE, SODIUM LACTATE AND CALCIUM CHLORIDE: 600; 310; 30; 20 INJECTION, SOLUTION INTRAVENOUS at 10:47

## 2023-07-11 ASSESSMENT — PAIN SCALES - GENERAL
PAINLEVEL_OUTOF10: 3
PAINLEVEL_OUTOF10: 3
PAINLEVEL_OUTOF10: 5
PAINLEVEL_OUTOF10: 6
PAINLEVEL_OUTOF10: 7
PAINLEVEL_OUTOF10: 4

## 2023-07-11 ASSESSMENT — PAIN DESCRIPTION - ORIENTATION
ORIENTATION: ANTERIOR
ORIENTATION: LEFT;RIGHT
ORIENTATION: LEFT;RIGHT
ORIENTATION: RIGHT

## 2023-07-11 ASSESSMENT — PAIN DESCRIPTION - DESCRIPTORS
DESCRIPTORS: ACHING

## 2023-07-11 ASSESSMENT — PAIN DESCRIPTION - LOCATION
LOCATION: BREAST

## 2023-07-11 ASSESSMENT — PAIN - FUNCTIONAL ASSESSMENT: PAIN_FUNCTIONAL_ASSESSMENT: 0-10

## 2023-07-11 NOTE — PERIOP NOTE
[address] [facility] [address] Escudero Challenger at bedside to change vioptix sensor on left breast.

## 2023-07-11 NOTE — OP NOTE
Name: Olaf Mckinney  Surgeon: Gertrudis Burch MD   Account #: [de-identified]   Surgery Date: 23   : 1965  Age: 66 y.o. Location: [address] [place] [address]    OPERATIVE REPORT     PREOPERATIVE DIAGNOSES:   1. Right breast cancer. 2.  Acquired absence of bilateral breasts. 3.  History of radiation therapy to right breast.   4.  Failed prosthetic breast reconstruction right breast.    POSTOPERATIVE DIAGNOSES:   Same     OPERATIVE PROCEDURE:   1. Excision of bilateral mastectomy scars, 15 cm each (Dr. Yfn Mosley on the right, Dr. Bob Thomas on the left). 2.  Preparation of right breasts/chest for SHELL flap, 700 sq cm each side (Dr. Yfn Mosley on the right). 3.  Removal of intact left breast tissue expander (Dr. Bob Thomas on the left). 4.  Left breast capsulectomy (Dr. Bob Thomas on the left). 5.  Extrapleural rib resection of bilateral fourth costal cartilages (Dr. Yfn Mosley on the right, Dr. Bob Thomas on the left). 6.  Delayed bilateral breast reconstruction with SHELL (deep inferior epigastric artery ) flaps (both as Co-Surgeons). 7.  Intraoperative bilateral TAP (transversus abdominis plane) regional block (injection) with ultrasound guidance (Dr. Yfn Mosley on the right, Dr. Bob Thomas on the left). SURGEON:  Gertrudis Burch MD    CO-SURGEONPreeti Yousif MD     ANESTHESIA: General endotracheal.     INDICATIONS: The patient is a 66 y.o. female who was previously diagnosed with breast cancer. She had undergone bilateral mastectomies. She underwent bilateral tissue expander reconstruction with Adkins-pattern mastectomy. She completed radiation therapy on the right side. She had failed implant reconstruction on the right side and was a candidate for autologous tissue breast reconstruction. Bilateral microsurgical  flap reconstruction was recommended.  The procedure, as well as the alternatives, possible complications, and anticipated scars were outlined with the patient and she agrees to proceed having given
was harvested by ligating the origin of the vessels with hemostatic clips. It was brought to the right chest and placed in the pocket. Lower pole skin from the breast was excised and sent for permanent section. This was larger than on the left side, due to the radiation effects. The skin island was marked as a large oval.  The remainder was de-epithelialized with scissors. The flap was positioned, the vessels were prepared under the microscope, and they were irrigated with heparin saline solution. The venous anastomosis was performed first.  The internal mammary vein was clipped inferiorly and clamped superiorly. It was divided, prepared, and irrigated. Next an end-to-end anastomosis was performed with a 3.0 mm venous  in antegrade fashion. The clamp was taken down and excellent backfill noted. Next the arterial anastomosis was performed. The internal mammary artery was clipped inferiorly and clamped superiorly. It was divided, prepared, and irrigated. Then the anastomosis was performed with interrupted #9-0 nylon sutures in hand sewn fashion. The clamps were taken down and excellent perfusion of the flap noted. Twentyt mL of exparel was infiltrated laterally in the breast pocket along the intercostal nerves. A 19 Fr hubless John drain was placed in the breast pocket, brought out through a #15 blade stab incision in the lateral inframammary fold and secured with a #2-0 nylon suture. The flap was anchored to the chest wall superomedially with a #2-0 vicryl suture. The flap was stapled in the breast pocket. Both SHELL flaps were then inset with buried #3-0 monocryl sutures, followed by #3-0 barbed monocryl suture in running subcuticular fashion. The abdomen was then closed after placing the patient in semi-Bauman position. No fascia nor muscle had been removed from the abdomen.   The anterior rectus sheath was closed primarily on each side with #2-0 barbed PDS suture in double-layer,

## 2023-07-11 NOTE — H&P
Pre-op History and Physical    CC: Malignant neoplasm of female breast, unspecified estrogen receptor status, unspecified laterality, unspecified site of breast ([address]) [C50.919]   HPI: 66y.o. year old female with Malignant neoplasm of female breast, unspecified estrogen receptor status, unspecified laterality, unspecified site of breast ([address]) Sudhir Whitehead for Procedure(s):  REMOVAL OF LEFT BREAST TISSUE EXPANDER, BILATERAL BREAST RECONSTRUCTION WITH SHELL MICROSURGICAL FREE FLAPS.   Past medical history:   Past Medical History:   Diagnosis Date    Allergy-induced asthma     Anxiety and depression     Arthritis     hands    Bipolar 2 disorder ([address])     Breast cancer ([address]) 2022    Right and left breast    Chronic pain 2022    with Injections of Lupin/ Zoladex    COVID-19 2022    Diverticulitis     Diverticulosis     GERD (gastroesophageal reflux disease) 2023    Pyelonephritis 06/07/2014    Thromboembolus ([address]) 2020    post surgery-1 in R calf and 2 in R upper thigh      Past surgical history:   Past Surgical History:   Procedure Laterality Date    BREAST RECONSTRUCTION Bilateral 06/27/2022    BREAST RECONSTRUCTION performed by Capri Blackwood MD at [address] [place] [address] Bilateral 07/14/2022    RIGHT BREAST IRRIGATION AND DEBRIDEMENT REMOVAL OF RIGHT TISSUE EXPANDER AND FLEX HD, CLOSURE OF LEFT BREAST TRIPLE POINT WOUND performed by Capri Blackwood MD at [facility] N/A 06/13/2018    COLONOSCOPY performed by Paul Choi MD at [address] [place] [address] PARTIAL COLECTOMY  2022    partial sigmoid colectomy    MASTECTOMY Bilateral 06/27/2022    BILATERAL BREAST MASTECTOMIES AND RIGHT AXILLARY LYMPH NODE DISSECTION/BILATERAL BREAST RECONSTRUCTION WITH POSSIBLE TISSUE EXPANDER/POSSIBLE DIRECT TO IMPLANT/ ACELLULAR DERMAL MATRIX performed by Elizabeth Maldonado MD at [id number] [place] [address]    MRI BREAST BX USING DEVICE LEFT Left 06/01/2022    MRI BREAST BX USING DEVICE LEFT 6/1/2022 [facility] RAD MRI

## 2023-07-12 PROCEDURE — 6360000002 HC RX W HCPCS: Performed by: PLASTIC SURGERY

## 2023-07-12 PROCEDURE — 2000000000 HC ICU R&B

## 2023-07-12 PROCEDURE — 6370000000 HC RX 637 (ALT 250 FOR IP): Performed by: PLASTIC SURGERY

## 2023-07-12 PROCEDURE — 94761 N-INVAS EAR/PLS OXIMETRY MLT: CPT

## 2023-07-12 PROCEDURE — 2700000000 HC OXYGEN THERAPY PER DAY

## 2023-07-12 PROCEDURE — 2580000003 HC RX 258: Performed by: PLASTIC SURGERY

## 2023-07-12 RX ORDER — MORPHINE SULFATE 2 MG/ML
2 INJECTION, SOLUTION INTRAMUSCULAR; INTRAVENOUS EVERY 4 HOURS PRN
Status: DISCONTINUED | OUTPATIENT
Start: 2023-07-12 | End: 2023-07-14 | Stop reason: HOSPADM

## 2023-07-12 RX ORDER — SODIUM CHLORIDE 9 MG/ML
INJECTION, SOLUTION INTRAVENOUS CONTINUOUS
Status: DISPENSED | OUTPATIENT
Start: 2023-07-12 | End: 2023-07-12

## 2023-07-12 RX ORDER — IBUPROFEN 600 MG/1
600 TABLET ORAL EVERY 6 HOURS PRN
Status: DISCONTINUED | OUTPATIENT
Start: 2023-07-13 | End: 2023-07-14 | Stop reason: HOSPADM

## 2023-07-12 RX ORDER — ACETAMINOPHEN 325 MG/1
650 TABLET ORAL EVERY 6 HOURS PRN
Status: DISCONTINUED | OUTPATIENT
Start: 2023-07-13 | End: 2023-07-14 | Stop reason: HOSPADM

## 2023-07-12 RX ADMIN — IBUPROFEN 600 MG: 200 TABLET, FILM COATED ORAL at 00:11

## 2023-07-12 RX ADMIN — SODIUM CHLORIDE, POTASSIUM CHLORIDE, SODIUM LACTATE AND CALCIUM CHLORIDE: 600; 310; 30; 20 INJECTION, SOLUTION INTRAVENOUS at 03:49

## 2023-07-12 RX ADMIN — WATER 2000 MG: 1 INJECTION INTRAMUSCULAR; INTRAVENOUS; SUBCUTANEOUS at 08:08

## 2023-07-12 RX ADMIN — GABAPENTIN 300 MG: 300 CAPSULE ORAL at 09:01

## 2023-07-12 RX ADMIN — SODIUM CHLORIDE: 9 INJECTION, SOLUTION INTRAVENOUS at 14:30

## 2023-07-12 RX ADMIN — ACETAMINOPHEN 1000 MG: 500 TABLET, FILM COATED ORAL at 23:41

## 2023-07-12 RX ADMIN — IBUPROFEN 600 MG: 200 TABLET, FILM COATED ORAL at 05:58

## 2023-07-12 RX ADMIN — IBUPROFEN 600 MG: 200 TABLET, FILM COATED ORAL at 10:40

## 2023-07-12 RX ADMIN — HYDROMORPHONE HYDROCHLORIDE 4 MG: 2 TABLET ORAL at 16:07

## 2023-07-12 RX ADMIN — HYDROMORPHONE HYDROCHLORIDE 4 MG: 2 TABLET ORAL at 07:05

## 2023-07-12 RX ADMIN — MORPHINE SULFATE 2 MG: 2 INJECTION, SOLUTION INTRAMUSCULAR; INTRAVENOUS at 23:36

## 2023-07-12 RX ADMIN — MORPHINE SULFATE 2 MG: 2 INJECTION, SOLUTION INTRAMUSCULAR; INTRAVENOUS at 14:42

## 2023-07-12 RX ADMIN — ACETAMINOPHEN 1000 MG: 500 TABLET, FILM COATED ORAL at 00:10

## 2023-07-12 RX ADMIN — GABAPENTIN 300 MG: 300 CAPSULE ORAL at 01:07

## 2023-07-12 RX ADMIN — ACETAMINOPHEN 1000 MG: 500 TABLET, FILM COATED ORAL at 10:40

## 2023-07-12 RX ADMIN — HEPARIN SODIUM 5000 UNITS: 5000 INJECTION INTRAVENOUS; SUBCUTANEOUS at 00:12

## 2023-07-12 RX ADMIN — MORPHINE SULFATE 2 MG: 2 INJECTION, SOLUTION INTRAMUSCULAR; INTRAVENOUS at 18:42

## 2023-07-12 RX ADMIN — GABAPENTIN 300 MG: 300 CAPSULE ORAL at 17:07

## 2023-07-12 RX ADMIN — DOCUSATE SODIUM 100 MG: 100 CAPSULE, LIQUID FILLED ORAL at 20:51

## 2023-07-12 RX ADMIN — WATER 2000 MG: 1 INJECTION INTRAMUSCULAR; INTRAVENOUS; SUBCUTANEOUS at 00:11

## 2023-07-12 RX ADMIN — DOCUSATE SODIUM 100 MG: 100 CAPSULE, LIQUID FILLED ORAL at 08:32

## 2023-07-12 RX ADMIN — HYDROMORPHONE HYDROCHLORIDE 4 MG: 2 TABLET ORAL at 12:02

## 2023-07-12 RX ADMIN — IBUPROFEN 600 MG: 200 TABLET, FILM COATED ORAL at 17:08

## 2023-07-12 RX ADMIN — ACETAMINOPHEN 1000 MG: 500 TABLET, FILM COATED ORAL at 05:57

## 2023-07-12 RX ADMIN — MORPHINE SULFATE 2 MG: 2 INJECTION, SOLUTION INTRAMUSCULAR; INTRAVENOUS at 10:39

## 2023-07-12 RX ADMIN — DULOXETINE HYDROCHLORIDE 60 MG: 30 CAPSULE, DELAYED RELEASE ORAL at 08:32

## 2023-07-12 RX ADMIN — IBUPROFEN 600 MG: 200 TABLET, FILM COATED ORAL at 23:42

## 2023-07-12 RX ADMIN — WATER 2000 MG: 1 INJECTION INTRAMUSCULAR; INTRAVENOUS; SUBCUTANEOUS at 16:08

## 2023-07-12 RX ADMIN — HEPARIN SODIUM 5000 UNITS: 5000 INJECTION INTRAVENOUS; SUBCUTANEOUS at 08:31

## 2023-07-12 RX ADMIN — ACETAMINOPHEN 1000 MG: 500 TABLET, FILM COATED ORAL at 17:07

## 2023-07-12 RX ADMIN — HYDROMORPHONE HYDROCHLORIDE 4 MG: 2 TABLET ORAL at 20:46

## 2023-07-12 RX ADMIN — HEPARIN SODIUM 5000 UNITS: 5000 INJECTION INTRAVENOUS; SUBCUTANEOUS at 20:52

## 2023-07-12 ASSESSMENT — PAIN DESCRIPTION - LOCATION
LOCATION: BACK
LOCATION: BACK;OTHER (COMMENT)
LOCATION: BACK
LOCATION: BACK;BREAST
LOCATION: ABDOMEN;BACK
LOCATION: BACK
LOCATION: ABDOMEN;BACK
LOCATION: BACK;BREAST

## 2023-07-12 ASSESSMENT — PAIN SCALES - GENERAL
PAINLEVEL_OUTOF10: 7
PAINLEVEL_OUTOF10: 7
PAINLEVEL_OUTOF10: 8
PAINLEVEL_OUTOF10: 8
PAINLEVEL_OUTOF10: 7
PAINLEVEL_OUTOF10: 8
PAINLEVEL_OUTOF10: 2
PAINLEVEL_OUTOF10: 3
PAINLEVEL_OUTOF10: 7
PAINLEVEL_OUTOF10: 6

## 2023-07-12 ASSESSMENT — PAIN DESCRIPTION - DESCRIPTORS
DESCRIPTORS: ACHING
DESCRIPTORS: ACHING;BURNING
DESCRIPTORS: ACHING;BURNING
DESCRIPTORS: ACHING
DESCRIPTORS: ACHING;BURNING
DESCRIPTORS: ACHING

## 2023-07-12 ASSESSMENT — PAIN DESCRIPTION - ORIENTATION
ORIENTATION: LOWER;POSTERIOR
ORIENTATION: LOWER
ORIENTATION: ANTERIOR;POSTERIOR
ORIENTATION: ANTERIOR
ORIENTATION: POSTERIOR

## 2023-07-12 ASSESSMENT — PAIN DESCRIPTION - PAIN TYPE: TYPE: ACUTE PAIN

## 2023-07-12 NOTE — PROGRESS NOTES
POD#1 s/p delayed bilat breast recon with SHELL flaps  C/o back pain, abdomen and breasts feel OK  Afeb VSS  Drains serosang  Vioptix: StO2 right = 84%, left = 45%, both steady trends  Awake and alert  Breasts: flaps warm and soft, incisions CDI; no hematoma  Abd: inc CDI; umbo intact  Stable, flaps look great  Plan: clear liquid diet, cont flap checks  OOB to chair  D/C gramajo and IV fluids  DVT proph  Incent spir

## 2023-07-12 NOTE — PLAN OF CARE
Problem: Pain  Goal: Verbalizes/displays adequate comfort level or baseline comfort level  7/11/2023 2042 by Tiffanie Ramos RN  Outcome: Progressing  Flowsheets (Taken 7/11/2023 2000)  Verbalizes/displays adequate comfort level or baseline comfort level:   Encourage patient to monitor pain and request assistance   Assess pain using appropriate pain scale   Administer analgesics based on type and severity of pain and evaluate response     Problem: ABCDS Injury Assessment  Goal: Absence of physical injury  Outcome: Progressing

## 2023-07-12 NOTE — PERIOP NOTE
TRANSFER - OUT REPORT:    Verbal report given to Carlos Coronado on [address]  being transferred to ICU 5 for routine post-op       Report consisted of patient's Situation, Background, Assessment and   Recommendations(SBAR). Information from the following report(s) Nurse Handoff Report, Intake/Output, and MAR was reviewed with the receiving nurse. Lines:   Peripheral IV 07/11/23 Left;Posterior Hand (Active)   Site Assessment Clean, dry & intact 07/11/23 1741   Line Status Capped 07/11/23 1741   Phlebitis Assessment No symptoms 07/11/23 1741   Infiltration Assessment 0 07/11/23 1741   Alcohol Cap Used Yes 07/11/23 1741   Dressing Status Clean, dry & intact 07/11/23 1741   Dressing Type Transparent 07/11/23 1741       Peripheral IV 07/11/23 Right Forearm (Active)   Site Assessment Clean, dry & intact 07/11/23 1741   Line Status Infusing 07/11/23 [address] Connections checked and tightened 07/11/23 0645   Phlebitis Assessment No symptoms 07/11/23 1741   Infiltration Assessment 0 07/11/23 1741   Alcohol Cap Used Yes 07/11/23 1741   Dressing Status Clean, dry & intact 07/11/23 1741   Dressing Type Transparent 07/11/23 1741   Dressing Intervention New 07/11/23 0645        Opportunity for questions and clarification was provided.       Patient transported with:  Monitor and Registered Nurse

## 2023-07-12 NOTE — CARE COORDINATION
07/12/23 1457   Service Assessment   Patient Orientation Unable to Assess; Alert and Oriented   Cognition Alert   History Provided By [facility]   Primary Caregiver Self   Support Systems Spouse/Significant Other   PCP Verified by CM Yes   Last Visit to PCP Within last 6 months   Prior Functional Level Independent in ADLs/IADLs   Can patient return to prior living arrangement Yes   Family able to assist with home care needs: Yes   Would you like for me to discuss the discharge plan with any other family members/significant others, and if so, who? Yes  ( Bill Monday)   Financial Resources None   [employer] None   Social/Functional History   Lives With Spouse   Type of [address] [place] [address]  Two level   Active  Yes   Discharge Planning   Type of Residence House   Current Services Prior To Admission None   Patient expects to be discharged to: House     RUR 5% Green    I spoke with pt's  Bill Monday as patient would like to rest. Harriet Ritchie is the pt's main . He can be reached on his cell [phone]. Patient had elective surgery. Pt's  is hopeful for a discharge this Friday 7/14. Prior to admission patient was working, driving and independent with all of her ADLs. He plan is to return home and follow up with PCP and Surgeon. Pt's  will provide transportation home. No other issues or concerns at this time.    IZABELLA Vega

## 2023-07-12 NOTE — PROGRESS NOTES
9219 Pt assessed, see flowsheet. Dr. Brittaney Lubin at bedside to assess pt. Plan- OOB, gramajo out, DC fluids. See MAR. Pt medicated with dilaudid 4mg PO for 7/10 back pain. Flaps c/d/I, no bleeding or hematoma. ABHI drains X4 draining small amount or Serosanguinous fluids. 0800 Gramajo removed, pt 1 assist OOB to recliner. Full CHG performed.  at bedside - updated. 1030 Returned to bed 2 assist to maintain 30 degree angle. IV morphine 2mg given following transfer for breakthrough pain at incision site. VVS.     1045 Pt resting quietly, no complaints of pain at this time. 1300 Notified Dr. Brittaney Lubin per order set of Lt Vioptix reading 27. Small amount of serous drainage under Tegaderm. Flap is pink, cap refill less than 3 seconds and warm to touch. No signs of rejection. 1400 Pt up to bathroom, unsuccessful with voiding, bladder scan with 39ml but may not be accurate due to post op surgical incision anatomy. Notified Dr. Brittaney Lubin - orders received and placed for NS at 125ml/hr for a total of 1 liter (over 8 hours). See MAR. Pt up to chair. [address] left for Dr. Brittaney Lubin, pt has not voided since gramajo was removed at 8am with multiple attempts up to commode. 8506 Per Dr. Brittaney Lubin straight cath X1.     3957 Sterile Straight cath performed -500 ml clear yellow urine obtained.

## 2023-07-13 PROCEDURE — 94761 N-INVAS EAR/PLS OXIMETRY MLT: CPT

## 2023-07-13 PROCEDURE — 6360000002 HC RX W HCPCS: Performed by: PLASTIC SURGERY

## 2023-07-13 PROCEDURE — 1100000000 HC RM PRIVATE

## 2023-07-13 PROCEDURE — 6370000000 HC RX 637 (ALT 250 FOR IP): Performed by: PLASTIC SURGERY

## 2023-07-13 PROCEDURE — 2700000000 HC OXYGEN THERAPY PER DAY

## 2023-07-13 RX ADMIN — MORPHINE SULFATE 2 MG: 2 INJECTION, SOLUTION INTRAMUSCULAR; INTRAVENOUS at 20:27

## 2023-07-13 RX ADMIN — MORPHINE SULFATE 2 MG: 2 INJECTION, SOLUTION INTRAMUSCULAR; INTRAVENOUS at 04:00

## 2023-07-13 RX ADMIN — ONDANSETRON 8 MG: 2 INJECTION INTRAMUSCULAR; INTRAVENOUS at 14:42

## 2023-07-13 RX ADMIN — IBUPROFEN 600 MG: 200 TABLET, FILM COATED ORAL at 05:44

## 2023-07-13 RX ADMIN — DULOXETINE HYDROCHLORIDE 60 MG: 30 CAPSULE, DELAYED RELEASE ORAL at 08:15

## 2023-07-13 RX ADMIN — DOCUSATE SODIUM 100 MG: 100 CAPSULE, LIQUID FILLED ORAL at 20:26

## 2023-07-13 RX ADMIN — HEPARIN SODIUM 5000 UNITS: 5000 INJECTION INTRAVENOUS; SUBCUTANEOUS at 20:27

## 2023-07-13 RX ADMIN — HYDROMORPHONE HYDROCHLORIDE 4 MG: 2 TABLET ORAL at 12:56

## 2023-07-13 RX ADMIN — MORPHINE SULFATE 2 MG: 2 INJECTION, SOLUTION INTRAMUSCULAR; INTRAVENOUS at 10:03

## 2023-07-13 RX ADMIN — ACETAMINOPHEN 1000 MG: 500 TABLET, FILM COATED ORAL at 05:45

## 2023-07-13 RX ADMIN — HEPARIN SODIUM 5000 UNITS: 5000 INJECTION INTRAVENOUS; SUBCUTANEOUS at 08:30

## 2023-07-13 RX ADMIN — DOCUSATE SODIUM 100 MG: 100 CAPSULE, LIQUID FILLED ORAL at 08:16

## 2023-07-13 RX ADMIN — GABAPENTIN 300 MG: 300 CAPSULE ORAL at 16:43

## 2023-07-13 RX ADMIN — MORPHINE SULFATE 2 MG: 2 INJECTION, SOLUTION INTRAMUSCULAR; INTRAVENOUS at 14:42

## 2023-07-13 RX ADMIN — HYDROMORPHONE HYDROCHLORIDE 4 MG: 2 TABLET ORAL at 16:59

## 2023-07-13 RX ADMIN — HYDROMORPHONE HYDROCHLORIDE 2 MG: 2 TABLET ORAL at 08:16

## 2023-07-13 RX ADMIN — GABAPENTIN 300 MG: 300 CAPSULE ORAL at 01:35

## 2023-07-13 RX ADMIN — IBUPROFEN 600 MG: 600 TABLET, FILM COATED ORAL at 18:34

## 2023-07-13 RX ADMIN — IBUPROFEN 600 MG: 200 TABLET, FILM COATED ORAL at 11:24

## 2023-07-13 RX ADMIN — ACETAMINOPHEN 1000 MG: 500 TABLET, FILM COATED ORAL at 11:24

## 2023-07-13 RX ADMIN — GABAPENTIN 300 MG: 300 CAPSULE ORAL at 08:30

## 2023-07-13 RX ADMIN — HYDROMORPHONE HYDROCHLORIDE 4 MG: 2 TABLET ORAL at 01:41

## 2023-07-13 ASSESSMENT — PAIN DESCRIPTION - FREQUENCY
FREQUENCY: CONTINUOUS

## 2023-07-13 ASSESSMENT — PAIN SCALES - GENERAL
PAINLEVEL_OUTOF10: 2
PAINLEVEL_OUTOF10: 7
PAINLEVEL_OUTOF10: 8
PAINLEVEL_OUTOF10: 6
PAINLEVEL_OUTOF10: 3
PAINLEVEL_OUTOF10: 6
PAINLEVEL_OUTOF10: 7
PAINLEVEL_OUTOF10: 7
PAINLEVEL_OUTOF10: 5
PAINLEVEL_OUTOF10: 3
PAINLEVEL_OUTOF10: 2
PAINLEVEL_OUTOF10: 7
PAINLEVEL_OUTOF10: 9
PAINLEVEL_OUTOF10: 8

## 2023-07-13 ASSESSMENT — PAIN DESCRIPTION - LOCATION
LOCATION: ABDOMEN
LOCATION: ABDOMEN;BACK;CHEST
LOCATION: BACK
LOCATION: BACK;ABDOMEN;SHOULDER
LOCATION: ABDOMEN
LOCATION: BACK;SHOULDER

## 2023-07-13 ASSESSMENT — PAIN DESCRIPTION - DESCRIPTORS
DESCRIPTORS: ACHING;SPASM
DESCRIPTORS: ACHING
DESCRIPTORS: ACHING;OTHER (COMMENT)
DESCRIPTORS: ACHING
DESCRIPTORS: SHARP;ACHING
DESCRIPTORS: ACHING;OTHER (COMMENT)

## 2023-07-13 ASSESSMENT — PAIN DESCRIPTION - ONSET
ONSET: ON-GOING

## 2023-07-13 ASSESSMENT — PAIN DESCRIPTION - ORIENTATION
ORIENTATION: ANTERIOR
ORIENTATION: RIGHT
ORIENTATION: ANTERIOR
ORIENTATION: RIGHT

## 2023-07-13 ASSESSMENT — PAIN DESCRIPTION - PAIN TYPE
TYPE: ACUTE PAIN
TYPE: ACUTE PAIN

## 2023-07-13 NOTE — PLAN OF CARE
Problem: Pain  Goal: Verbalizes/displays adequate comfort level or baseline comfort level  Outcome: HH/HSPC Progressing     Problem: Safety - Adult  Goal: Free from fall injury  Outcome: [facility] Progressing     Problem: Discharge Planning  Goal: Discharge to home or other facility with appropriate resources  Outcome: [facility] 114 Progressing     Problem: Skin/Tissue Integrity  Goal: Absence of new skin breakdown  Description: 1. Monitor for areas of redness and/or skin breakdown  2. Assess vascular access sites hourly  3. Every 4-6 hours minimum:  Change oxygen saturation probe site  4. Every 4-6 hours:  If on nasal continuous positive airway pressure, respiratory therapy assess nares and determine need for appliance change or resting period.   Outcome: [facility] 114 Progressing     Problem: ABCDS Injury Assessment  Goal: Absence of physical injury  Outcome: [facility] 114 Progressing

## 2023-07-13 NOTE — PROGRESS NOTES
0700- Bedside and Verbal shift change report given to MAC Quick  (oncoming nurse) by Bal Choudhury RN  (offgoing nurse). Report included the following information Nurse Handoff Report, Adult Overview, Intake/Output, MAR, Recent Results, Cardiac Rhythm SR, Alarm Parameters, and Quality Measures. Dual flap assessment with MAC Summers.     0800- Assessment completed, see flow sheet. 2424- Assisted patient to recliner. 4534- Assisted patient back to bed. Straight cath performed, 900ml urine drained. 1200- Reassessment completed, see flow sheet. 1300- Assisted patient back to recliner. 1600- Reassessment completed, see flow sheet. Nausea resolved per patient. 1605- TRANSFER - OUT REPORT:    Verbal report given to [facility] [address] [place] [address], RN  on [employer]  being transferred to 4th floor for routine progression of patient care       Report consisted of patient's Situation, Background, Assessment and   Recommendations(SBAR). Information from the following report(s) Nurse Handoff Report, Adult Overview, Surgery Report, Intake/Output, MAR, Cardiac Rhythm SR, and Quality Measures was reviewed with the receiving nurse.            Lines:   Peripheral IV 07/11/23 Left;Posterior Hand (Active)   Site Assessment Clean, dry & intact 07/13/23 1600   Line Status Flushed;Capped 07/13/23 1600   Line Care Connections checked and tightened 07/13/23 1600   Phlebitis Assessment No symptoms 07/13/23 1600   Infiltration Assessment 0 07/13/23 1600   Alcohol Cap Used Yes 07/13/23 1600   Dressing Status Clean, dry & intact 07/13/23 1600   Dressing Type Transparent 07/13/23 1600       Peripheral IV 07/11/23 Right Forearm (Active)   Site Assessment Clean, dry & intact 07/13/23 1600   Line Status Flushed;Capped 07/13/23 1600   Line Care Connections checked and tightened 07/13/23 1600   Phlebitis Assessment No symptoms 07/13/23 1600   Infiltration Assessment 0 07/13/23 1600   Alcohol Cap Used Yes 07/13/23 1600   Dressing Status Clean, dry

## 2023-07-13 NOTE — PROGRESS NOTES
Plastic Surgery Progress Note    POD 2 s/p delayed b/l breast recon w/SHELL flaps    Doing well. Has back pain, complaining of inability to sleep. remains on oxygen. Has been hypertensive    Vitals:    07/13/23 0530 07/13/23 0543 07/13/23 0545 07/13/23 0603   BP: (!) 164/77 (!) 175/61 (!) 168/75 (!) 107/56   Pulse: 77 76 83 83   Resp: 12 12 11 15   Temp:       TempSrc:       SpO2: 97% 96% 96% 97%   Weight:       Height:            Gen: NAD, comfortable  HEENT: NCAT  CV: reg rate and rhythm  Resp: normal resp effort RA  Breast: viotpix R 77% L 40%  Flaps warm, well perfused. Mastectomy flaps viable  Gerardo drains s/s  Abdomen: incision c/d/I, umbo perfused.  Gerardo drains s/s    POD 2 s/p SHELL flap    - q4hr flap check  - drain care  - gen diet  - aggressive incentive spirometry  - ambulate  - DVT ppx SQH and SCDs  - transfer to floor  - anticipate d/c Friday

## 2023-07-13 NOTE — PLAN OF CARE
Problem: Pain  Goal: Verbalizes/displays adequate comfort level or baseline comfort level  Outcome: Progressing     Problem: Safety - Adult  Goal: Free from fall injury  Outcome: Progressing     Problem: Discharge Planning  Goal: Discharge to home or other facility with appropriate resources  Outcome: Progressing  Flowsheets (Taken 7/12/2023 2000)  Discharge to home or other facility with appropriate resources:   Identify barriers to discharge with patient and caregiver   Arrange for needed discharge resources and transportation as appropriate   Identify discharge learning needs (meds, wound care, etc)     Problem: Skin/Tissue Integrity  Goal: Absence of new skin breakdown  Description: 1. Monitor for areas of redness and/or skin breakdown  2. Assess vascular access sites hourly  3. Every 4-6 hours minimum:  Change oxygen saturation probe site  4. Every 4-6 hours:  If on nasal continuous positive airway pressure, respiratory therapy assess nares and determine need for appliance change or resting period.   Outcome: Progressing     Problem: ABCDS Injury Assessment  Goal: Absence of physical injury  Outcome: Progressing

## 2023-07-13 NOTE — CARE COORDINATION
Transition of Care Plan - RUR 8%:  Medical management continues  POD #2 s/p delayed b/l breast recon w/SHELL flaps  CM following - patient is iADLs, works and drives at baseline,  is supportive  Home with family when medically stable; d/c anticipated for 7/14   to transport at discharge  Outpatient follow-up

## 2023-07-14 VITALS
WEIGHT: 225.53 LBS | OXYGEN SATURATION: 96 % | SYSTOLIC BLOOD PRESSURE: 120 MMHG | TEMPERATURE: 98.4 F | RESPIRATION RATE: 18 BRPM | HEART RATE: 103 BPM | DIASTOLIC BLOOD PRESSURE: 78 MMHG | HEIGHT: 64 IN | BODY MASS INDEX: 38.5 KG/M2

## 2023-07-14 PROCEDURE — 6360000002 HC RX W HCPCS: Performed by: PLASTIC SURGERY

## 2023-07-14 PROCEDURE — 6370000000 HC RX 637 (ALT 250 FOR IP): Performed by: PLASTIC SURGERY

## 2023-07-14 RX ADMIN — GABAPENTIN 300 MG: 300 CAPSULE ORAL at 08:24

## 2023-07-14 RX ADMIN — MORPHINE SULFATE 2 MG: 2 INJECTION, SOLUTION INTRAMUSCULAR; INTRAVENOUS at 03:26

## 2023-07-14 RX ADMIN — HYDROMORPHONE HYDROCHLORIDE 4 MG: 2 TABLET ORAL at 10:11

## 2023-07-14 RX ADMIN — GABAPENTIN 300 MG: 300 CAPSULE ORAL at 00:44

## 2023-07-14 RX ADMIN — MORPHINE SULFATE 2 MG: 2 INJECTION, SOLUTION INTRAMUSCULAR; INTRAVENOUS at 08:21

## 2023-07-14 RX ADMIN — HYDROMORPHONE HYDROCHLORIDE 4 MG: 2 TABLET ORAL at 06:02

## 2023-07-14 RX ADMIN — HEPARIN SODIUM 5000 UNITS: 5000 INJECTION INTRAVENOUS; SUBCUTANEOUS at 08:24

## 2023-07-14 RX ADMIN — HYDROMORPHONE HYDROCHLORIDE 4 MG: 2 TABLET ORAL at 00:43

## 2023-07-14 RX ADMIN — DULOXETINE HYDROCHLORIDE 60 MG: 30 CAPSULE, DELAYED RELEASE ORAL at 08:24

## 2023-07-14 RX ADMIN — DOCUSATE SODIUM 100 MG: 100 CAPSULE, LIQUID FILLED ORAL at 08:24

## 2023-07-14 ASSESSMENT — PAIN - FUNCTIONAL ASSESSMENT
PAIN_FUNCTIONAL_ASSESSMENT: PREVENTS OR INTERFERES WITH MANY ACTIVE NOT PASSIVE ACTIVITIES
PAIN_FUNCTIONAL_ASSESSMENT: ACTIVITIES ARE NOT PREVENTED

## 2023-07-14 ASSESSMENT — PAIN DESCRIPTION - ORIENTATION
ORIENTATION: RIGHT;LEFT
ORIENTATION: RIGHT;LEFT

## 2023-07-14 ASSESSMENT — PAIN DESCRIPTION - LOCATION
LOCATION: CHEST;HIP
LOCATION: BACK;CHEST
LOCATION: ABDOMEN;BREAST;BACK
LOCATION: BACK;ABDOMEN
LOCATION: BREAST;SHOULDER

## 2023-07-14 ASSESSMENT — PAIN DESCRIPTION - DESCRIPTORS
DESCRIPTORS: SHARP;ACHING
DESCRIPTORS: ACHING

## 2023-07-14 ASSESSMENT — PAIN SCALES - GENERAL
PAINLEVEL_OUTOF10: 8
PAINLEVEL_OUTOF10: 6
PAINLEVEL_OUTOF10: 10
PAINLEVEL_OUTOF10: 0
PAINLEVEL_OUTOF10: 1
PAINLEVEL_OUTOF10: 7

## 2023-07-14 NOTE — PLAN OF CARE
Problem: Pain  Goal: Verbalizes/displays adequate comfort level or baseline comfort level  3/22/4671 7953 by Dulce Renteria RN  Outcome: Progressing  7/13/2023 1848 by Larisa Marley RN  Outcome: [address] [address] Progressing     Problem: Safety - Adult  Goal: Free from fall injury  7/43/0926 0983 by Dulce Renteria RN  Outcome: Progressing  7/13/2023 1848 by Larisa Marley RN  Outcome: [address] [address] Progressing     Problem: Discharge Planning  Goal: Discharge to home or other facility with appropriate resources  3/89/5501 1872 by Dulce Renteria RN  Outcome: Progressing  7/13/2023 1848 by Larisa Marley RN  Outcome: [address] [address] Progressing     Problem: Skin/Tissue Integrity  Goal: Absence of new skin breakdown  Description: 1. Monitor for areas of redness and/or skin breakdown  2. Assess vascular access sites hourly  3. Every 4-6 hours minimum:  Change oxygen saturation probe site  4. Every 4-6 hours:  If on nasal continuous positive airway pressure, respiratory therapy assess nares and determine need for appliance change or resting period.   8/09/0416 6717 by Dulce Renteria RN  Outcome: Progressing  7/13/2023 1848 by Larisa Marley RN  Outcome: [address] [address] Progressing     Problem: ABCDS Injury Assessment  Goal: Absence of physical injury  3/35/2626 5446 by Dulce Renteria RN  Outcome: Progressing  7/13/2023 1848 by Larisa Marley RN  Outcome: [address] [address] Progressing

## 2023-07-14 NOTE — PLAN OF CARE
Problem: Pain  Goal: Verbalizes/displays adequate comfort level or baseline comfort level  7/14/2023 0956 by Estelita Jhaveri RN  Outcome: Adequate for Discharge  7/31/6954 0676 by Truman Boxer, RN  Outcome: Progressing     Problem: Safety - Adult  Goal: Free from fall injury  7/14/2023 0956 by Estelita Jhaveri RN  Outcome: Adequate for Discharge  6/06/3843 5115 by Truman Boxer, RN  Outcome: Progressing     Problem: Discharge Planning  Goal: Discharge to home or other facility with appropriate resources  7/14/2023 0956 by Estelita Jhaveri RN  Outcome: Adequate for Discharge  1/80/7018 0121 by Truman Boxer, RN  Outcome: Progressing     Problem: Skin/Tissue Integrity  Goal: Absence of new skin breakdown  Description: 1. Monitor for areas of redness and/or skin breakdown  2. Assess vascular access sites hourly  3. Every 4-6 hours minimum:  Change oxygen saturation probe site  4. Every 4-6 hours:  If on nasal continuous positive airway pressure, respiratory therapy assess nares and determine need for appliance change or resting period.   7/14/2023 0956 by Estelita Jhaveri RN  Outcome: Adequate for Discharge  3/29/0458 5331 by Truman Boxer, RN  Outcome: Progressing     Problem: ABCDS Injury Assessment  Goal: Absence of physical injury  7/14/2023 0956 by Estelita Jhaveri RN  Outcome: Adequate for Discharge  1/14/4971 3996 by Truman Boxer, RN  Outcome: Progressing

## 2023-07-14 NOTE — PROGRESS NOTES
POD#3 s/p bilat SHELL flap breast recon  Pain controlled; was able to void last night  AVSS  Drains serosang  Awake and alert, sitting upright in chair beside bed  Breasts: SHELL flaps warm and soft; normal cap refill both sides  Abd: inc CDI; umbo viable  Stable  Plan: D/C home today  F/up with me in office on Weds  I sent her rxs electronically to her pharmacy through my office EMR    D/C summary dictation #[id number]

## 2023-07-14 NOTE — CARE COORDINATION
7/14/2023 8:27 AM   Care Management Progress Note    Malignant neoplasm of female breast, unspecified estrogen receptor status, unspecified laterality, unspecified site of breast ([address]) [C50.919]  Breast cancer in female ([address]) [C50.919]      RUR:  5%  Risk Level: [x]Low []Moderate []High  Value-based purchasing: [] Yes [x] No  Bundle patient: [] Yes [x] No   Specify:     Transition of care plan:  Discharge home today, pt transferred from ICU yesterday PM   Home with family today, no CM needs identified   Outpatient follow-up.   Pt's family to transport

## 2023-07-15 NOTE — DISCHARGE SUMMARY
[facility] SUMMARY    Name:  Agustin Garnica  MR#:  [id number]  :  1965  ACCOUNT #:  [de-identified]  ADMIT DATE:  2023  DISCHARGE DATE:  2023      PRINCIPAL DIAGNOSIS:  History of right breast cancer. SECONDARY DIAGNOSES:  1. Acquired absence of bilateral breasts. 2.  Radiation effects to right breast.  3.  Failed expander reconstruction. PROCEDURE:  On 2023, the patient underwent delayed bilateral breast reconstruction with SHELL free flaps by Dr. Angela Washington and Dr. Rox Galvan:  The patient is a 47-year-old female with the history noted above. She had DVT in the past but hypercoagulable workup was normal.  She had SHELL flap breast reconstruction as noted above. There were no complications of the surgery. She was monitored per protocol and did well. She had issues with urinary retention on postoperative day 1 and 2, but was able to void by the evening of postop day 2, and was discharged to home on postoperative day 3 tolerating all pain regimen and diet, and able to void. FOLLOWUP:  Followup is with Dr. Nehemiah Salguero in five days. DISCHARGE MEDICATIONS:  1. Dilaudid 2 mg p.o. q.4 hours p.r.n. pain. 2.  Gabapentin 300 mg p.o. q.8 hours p.r.n. pain. 3.  Cefadroxil 500 mg p.o. b.i.d.  4.  Aspirin 325 mg p.o. daily x2 weeks. 5.  Colace 100 mg p.o. b.i.d. The patient will resume her Cymbalta dose daily. SPECIAL INSTRUCTIONS:  ABHI drain care. No bra wearing. The patient may shower. DISPOSITION:  Home. CONDITION ON DISCHARGE:  Stable. DISCHARGE DIET:  Regular.       Mikael Cheney MD      [id number]/  D:  2023 6:40  T:  07/15/2023 3:55  JOB #:  [id number]

## 2023-07-17 NOTE — PATIENT INSTRUCTIONS
I saw the patient and independently performed the critical or key portions of the service with the fellow present. I discussed the case with the fellow and have reviewed and agree with the fellow's documented note.  Stop budesonide as symptoms better and there is no diarrhea.  Follow-up Dr. Trevino regarding esophageal motility disorder evaluation as patient has dysphagia    Sophie Vazquez MD   If you do not hear from a team member, please call [phone] to speak with this team directly.  ---------------------------------------------------------------------------------------------------------------------      Please call Joe Chiu at our office ([phone]) after you have made the appointment with the specialist.  Your referral for your insurance will not be done until she has the date and time of your specialty appointment!!       Davon Oliver MD  [facility], [address] [place] [address].  [address] [place] [address] Dontrell Bolivar  [place] [address]  Office: [phone]    Gifty Poole MD  [facility]  [address]  [place] [address]  Phone: [phone]  Fax: [phone]     Well Visit, Women 48 to 72: Care Instructions  Your Care Instructions    Physical exams can help you stay healthy. Your doctor has checked your overall health and may have suggested ways to take good care of yourself. He or she also may have recommended tests. At home, you can help prevent illness with healthy eating, regular exercise, and other steps. Follow-up care is a key part of your treatment and safety. Be sure to make and go to all appointments, and call your doctor if you are having problems. It's also a good idea to know your test results and keep a list of the medicines you take. How can you care for yourself at home? · Reach and stay at a healthy weight. This will lower your risk for many problems, such as obesity, diabetes, heart disease, and high blood pressure. · Get at least 30 minutes of exercise on most days of the week. Walking is a good choice. You also may want to do other activities, such as running, swimming, cycling, or playing tennis or team sports. · Do not smoke. Smoking can make health problems worse. If you need help quitting, talk to your doctor about stop-smoking programs and medicines. These can increase your chances of quitting for good. · Protect your skin from too much sun.  When you're outdoors from 10 a.m. to 4 p.m., stay in the shade or cover up with clothing and a hat with a wide brim. Wear sunglasses that block UV rays. Even when it's cloudy, put broad-spectrum sunscreen (SPF 30 or higher) on any exposed skin. · See a dentist one or two times a year for checkups and to have your teeth cleaned. · Wear a seat belt in the car. · Limit alcohol to 1 drink a day. Too much alcohol can cause health problems. Follow your doctor's advice about when to have certain tests. These tests can spot problems early. · Cholesterol. Your doctor will tell you how often to have this done based on your age, family history, or other things that can increase your risk for heart attack and stroke. · Blood pressure. Have your blood pressure checked during a routine doctor visit. Your doctor will tell you how often to check your blood pressure based on your age, your blood pressure results, and other factors. · Mammogram. Ask your doctor how often you should have a mammogram, which is an X-ray of your breasts. A mammogram can spot breast cancer before it can be felt and when it is easiest to treat. · Pap test and pelvic exam. Ask your doctor how often you should have a Pap test. You may not need to have a Pap test as often as you used to. · Vision. Have your eyes checked every year or two or as often as your doctor suggests. Some experts recommend that you have yearly exams for glaucoma and other age-related eye problems starting at age 48. · Hearing. Tell your doctor if you notice any change in your hearing. You can have tests to find out how well you hear. · Diabetes. Ask your doctor whether you should have tests for diabetes. · Colon cancer. You should begin tests for colon cancer at age 48. You may have one of several tests. Your doctor will tell you how often to have tests based on your age and risk.  Risks include whether you already had a precancerous polyp removed from your colon or whether your parents, sisters and brothers, or children have had colon cancer. · Thyroid disease. Talk to your doctor about whether to have your thyroid checked as part of a regular physical exam. Women have an increased chance of a thyroid problem. · Osteoporosis. You should begin tests for bone density at age 72. If you are younger than 72, ask your doctor whether you have factors that may increase your risk for this disease. You may want to have this test before age 72. · Heart attack and stroke risk. At least every 4 to 6 years, you should have your risk for heart attack and stroke assessed. Your doctor uses factors such as your age, blood pressure, cholesterol, and whether you smoke or have diabetes to show what your risk for a heart attack or stroke is over the next 10 years. When should you call for help? Watch closely for changes in your health, and be sure to contact your doctor if you have any problems or symptoms that concern you. Where can you learn more? Go to http://lilia-rohit.info/. Enter L852 in the search box to learn more about \"Well Visit, Women 50 to 72: Care Instructions. \"  Current as of: May 12, 2017  Content Version: 11.4  © 4539-6314 Healthwise, [employer]. Care instructions adapted under license by [employer] (which disclaims liability or warranty for this information). If you have questions about a medical condition or this instruction, always ask your healthcare professional. [address] any warranty or liability for your use of this information. Your physician has referred you for a procedure/imaging as discussed. A member of the [facility] Team will contact you within 24 hours to schedule.

## 2023-07-19 ENCOUNTER — APPOINTMENT (OUTPATIENT)
Facility: HOSPITAL | Age: 58
DRG: 863 | End: 2023-07-19
Payer: COMMERCIAL

## 2023-07-19 ENCOUNTER — HOSPITAL ENCOUNTER (INPATIENT)
Facility: HOSPITAL | Age: 58
LOS: 3 days | Discharge: HOME | DRG: 863 | End: 2023-07-22
Attending: EMERGENCY MEDICINE | Admitting: FAMILY MEDICINE
Payer: COMMERCIAL

## 2023-07-19 DIAGNOSIS — T81.49XA SURGICAL SITE INFECTION: ICD-10-CM

## 2023-07-19 DIAGNOSIS — N61.0 CELLULITIS OF RIGHT BREAST: Primary | ICD-10-CM

## 2023-07-19 PROBLEM — G89.29 OTHER CHRONIC PAIN: Status: ACTIVE | Noted: 2023-07-19

## 2023-07-19 PROBLEM — L03.313 CELLULITIS OF CHEST WALL: Status: ACTIVE | Noted: 2023-07-19

## 2023-07-19 PROBLEM — F31.81 BIPOLAR 2 DISORDER, MAJOR DEPRESSIVE EPISODE (HCC): Status: ACTIVE | Noted: 2017-08-28

## 2023-07-19 PROBLEM — Z98.890 HISTORY OF AUGMENTATION OF BOTH BREASTS: Status: ACTIVE | Noted: 2023-07-19

## 2023-07-19 LAB
ALBUMIN SERPL-MCNC: 2.9 G/DL (ref 3.5–5)
ALBUMIN/GLOB SERPL: 0.7 (ref 1.1–2.2)
ALP SERPL-CCNC: 70 U/L (ref 45–117)
ALT SERPL-CCNC: 24 U/L (ref 12–78)
ANION GAP SERPL CALC-SCNC: 4 MMOL/L (ref 5–15)
AST SERPL-CCNC: 21 U/L (ref 15–37)
BASOPHILS # BLD: 0.1 K/UL (ref 0–0.1)
BASOPHILS NFR BLD: 1 % (ref 0–1)
BILIRUB SERPL-MCNC: 0.7 MG/DL (ref 0.2–1)
BUN SERPL-MCNC: 9 MG/DL (ref 6–20)
BUN/CREAT SERPL: 12 (ref 12–20)
CALCIUM SERPL-MCNC: 9.6 MG/DL (ref 8.5–10.1)
CHLORIDE SERPL-SCNC: 105 MMOL/L (ref 97–108)
CO2 SERPL-SCNC: 29 MMOL/L (ref 21–32)
COMMENT:: NORMAL
CREAT SERPL-MCNC: 0.75 MG/DL (ref 0.55–1.02)
CRP SERPL-MCNC: 10.3 MG/DL (ref 0–0.6)
DIFFERENTIAL METHOD BLD: ABNORMAL
EOSINOPHIL # BLD: 0.6 K/UL (ref 0–0.4)
EOSINOPHIL NFR BLD: 7 % (ref 0–7)
ERYTHROCYTE [DISTWIDTH] IN BLOOD BY AUTOMATED COUNT: 14.2 % (ref 11.5–14.5)
ERYTHROCYTE [SEDIMENTATION RATE] IN BLOOD: 56 MM/HR (ref 0–30)
GLOBULIN SER CALC-MCNC: 4.3 G/DL (ref 2–4)
GLUCOSE SERPL-MCNC: 110 MG/DL (ref 65–100)
HCT VFR BLD AUTO: 36.5 % (ref 35–47)
HGB BLD-MCNC: 11.6 G/DL (ref 11.5–16)
IMM GRANULOCYTES # BLD AUTO: 0.1 K/UL (ref 0–0.04)
IMM GRANULOCYTES NFR BLD AUTO: 1 % (ref 0–0.5)
LACTATE SERPL-SCNC: 0.8 MMOL/L (ref 0.4–2)
LYMPHOCYTES # BLD: 1.4 K/UL (ref 0.8–3.5)
LYMPHOCYTES NFR BLD: 16 % (ref 12–49)
MCH RBC QN AUTO: 30.3 PG (ref 26–34)
MCHC RBC AUTO-ENTMCNC: 31.8 G/DL (ref 30–36.5)
MCV RBC AUTO: 95.3 FL (ref 80–99)
MONOCYTES # BLD: 0.7 K/UL (ref 0–1)
MONOCYTES NFR BLD: 8 % (ref 5–13)
NEUTS SEG # BLD: 5.7 K/UL (ref 1.8–8)
NEUTS SEG NFR BLD: 67 % (ref 32–75)
NRBC # BLD: 0 K/UL (ref 0–0.01)
NRBC BLD-RTO: 0 PER 100 WBC
PLATELET # BLD AUTO: 325 K/UL (ref 150–400)
PMV BLD AUTO: 10.5 FL (ref 8.9–12.9)
POTASSIUM SERPL-SCNC: 4.2 MMOL/L (ref 3.5–5.1)
PROCALCITONIN SERPL-MCNC: <0.05 NG/ML
PROT SERPL-MCNC: 7.2 G/DL (ref 6.4–8.2)
RBC # BLD AUTO: 3.83 M/UL (ref 3.8–5.2)
SODIUM SERPL-SCNC: 138 MMOL/L (ref 136–145)
SPECIMEN HOLD: NORMAL
WBC # BLD AUTO: 8.5 K/UL (ref 3.6–11)

## 2023-07-19 PROCEDURE — 84145 PROCALCITONIN (PCT): CPT

## 2023-07-19 PROCEDURE — 2580000003 HC RX 258

## 2023-07-19 PROCEDURE — 80053 COMPREHEN METABOLIC PANEL: CPT

## 2023-07-19 PROCEDURE — 87040 BLOOD CULTURE FOR BACTERIA: CPT

## 2023-07-19 PROCEDURE — 6370000000 HC RX 637 (ALT 250 FOR IP)

## 2023-07-19 PROCEDURE — 1100000000 HC RM PRIVATE

## 2023-07-19 PROCEDURE — 87077 CULTURE AEROBIC IDENTIFY: CPT

## 2023-07-19 PROCEDURE — 85652 RBC SED RATE AUTOMATED: CPT

## 2023-07-19 PROCEDURE — 6360000002 HC RX W HCPCS

## 2023-07-19 PROCEDURE — 2500000003 HC RX 250 WO HCPCS

## 2023-07-19 PROCEDURE — 87070 CULTURE OTHR SPECIMN AEROBIC: CPT

## 2023-07-19 PROCEDURE — 96365 THER/PROPH/DIAG IV INF INIT: CPT

## 2023-07-19 PROCEDURE — 83605 ASSAY OF LACTIC ACID: CPT

## 2023-07-19 PROCEDURE — 99285 EMERGENCY DEPT VISIT HI MDM: CPT

## 2023-07-19 PROCEDURE — 87205 SMEAR GRAM STAIN: CPT

## 2023-07-19 PROCEDURE — 87186 SC STD MICRODIL/AGAR DIL: CPT

## 2023-07-19 PROCEDURE — 96366 THER/PROPH/DIAG IV INF ADDON: CPT

## 2023-07-19 PROCEDURE — 71260 CT THORAX DX C+: CPT

## 2023-07-19 PROCEDURE — 96375 TX/PRO/DX INJ NEW DRUG ADDON: CPT

## 2023-07-19 PROCEDURE — 85025 COMPLETE CBC W/AUTO DIFF WBC: CPT

## 2023-07-19 PROCEDURE — 36415 COLL VENOUS BLD VENIPUNCTURE: CPT

## 2023-07-19 PROCEDURE — 6360000004 HC RX CONTRAST MEDICATION

## 2023-07-19 PROCEDURE — 86140 C-REACTIVE PROTEIN: CPT

## 2023-07-19 RX ORDER — SODIUM CHLORIDE 0.9 % (FLUSH) 0.9 %
5-40 SYRINGE (ML) INJECTION EVERY 12 HOURS SCHEDULED
Status: DISCONTINUED | OUTPATIENT
Start: 2023-07-19 | End: 2023-07-22 | Stop reason: HOSPADM

## 2023-07-19 RX ORDER — HYDROMORPHONE HYDROCHLORIDE 1 MG/ML
1 INJECTION, SOLUTION INTRAMUSCULAR; INTRAVENOUS; SUBCUTANEOUS ONCE
Status: COMPLETED | OUTPATIENT
Start: 2023-07-19 | End: 2023-07-19

## 2023-07-19 RX ORDER — ONDANSETRON 2 MG/ML
4 INJECTION INTRAMUSCULAR; INTRAVENOUS EVERY 6 HOURS PRN
Status: DISCONTINUED | OUTPATIENT
Start: 2023-07-19 | End: 2023-07-22 | Stop reason: HOSPADM

## 2023-07-19 RX ORDER — POLYETHYLENE GLYCOL 3350 17 G/17G
17 POWDER, FOR SOLUTION ORAL DAILY PRN
Status: DISCONTINUED | OUTPATIENT
Start: 2023-07-19 | End: 2023-07-22 | Stop reason: HOSPADM

## 2023-07-19 RX ORDER — DOCUSATE SODIUM 100 MG/1
100 CAPSULE, LIQUID FILLED ORAL 2 TIMES DAILY
Status: DISCONTINUED | OUTPATIENT
Start: 2023-07-19 | End: 2023-07-22 | Stop reason: HOSPADM

## 2023-07-19 RX ORDER — GABAPENTIN 300 MG/1
300 CAPSULE ORAL 3 TIMES DAILY
Status: DISCONTINUED | OUTPATIENT
Start: 2023-07-19 | End: 2023-07-22 | Stop reason: HOSPADM

## 2023-07-19 RX ORDER — HYDROMORPHONE HYDROCHLORIDE 2 MG/1
2 TABLET ORAL EVERY 4 HOURS PRN
Status: DISCONTINUED | OUTPATIENT
Start: 2023-07-19 | End: 2023-07-22 | Stop reason: HOSPADM

## 2023-07-19 RX ORDER — SODIUM CHLORIDE, SODIUM LACTATE, POTASSIUM CHLORIDE, AND CALCIUM CHLORIDE .6; .31; .03; .02 G/100ML; G/100ML; G/100ML; G/100ML
30 INJECTION, SOLUTION INTRAVENOUS ONCE
Status: COMPLETED | OUTPATIENT
Start: 2023-07-19 | End: 2023-07-19

## 2023-07-19 RX ORDER — SODIUM CHLORIDE 0.9 % (FLUSH) 0.9 %
5-40 SYRINGE (ML) INJECTION PRN
Status: DISCONTINUED | OUTPATIENT
Start: 2023-07-19 | End: 2023-07-22 | Stop reason: HOSPADM

## 2023-07-19 RX ORDER — SODIUM CHLORIDE 9 MG/ML
INJECTION, SOLUTION INTRAVENOUS PRN
Status: DISCONTINUED | OUTPATIENT
Start: 2023-07-19 | End: 2023-07-19

## 2023-07-19 RX ORDER — SODIUM CHLORIDE 9 MG/ML
INJECTION, SOLUTION INTRAVENOUS PRN
Status: DISCONTINUED | OUTPATIENT
Start: 2023-07-19 | End: 2023-07-22 | Stop reason: HOSPADM

## 2023-07-19 RX ORDER — ENOXAPARIN SODIUM 100 MG/ML
30 INJECTION SUBCUTANEOUS 2 TIMES DAILY
Status: DISCONTINUED | OUTPATIENT
Start: 2023-07-19 | End: 2023-07-22 | Stop reason: HOSPADM

## 2023-07-19 RX ORDER — ACETAMINOPHEN 650 MG/1
650 SUPPOSITORY RECTAL EVERY 6 HOURS PRN
Status: DISCONTINUED | OUTPATIENT
Start: 2023-07-19 | End: 2023-07-22 | Stop reason: HOSPADM

## 2023-07-19 RX ORDER — ACETAMINOPHEN 325 MG/1
650 TABLET ORAL EVERY 6 HOURS PRN
Status: DISCONTINUED | OUTPATIENT
Start: 2023-07-19 | End: 2023-07-22 | Stop reason: HOSPADM

## 2023-07-19 RX ORDER — ASPIRIN 325 MG
325 TABLET, DELAYED RELEASE (ENTERIC COATED) ORAL DAILY
Status: DISCONTINUED | OUTPATIENT
Start: 2023-07-20 | End: 2023-07-22 | Stop reason: HOSPADM

## 2023-07-19 RX ORDER — ONDANSETRON 4 MG/1
4 TABLET, ORALLY DISINTEGRATING ORAL EVERY 8 HOURS PRN
Status: DISCONTINUED | OUTPATIENT
Start: 2023-07-19 | End: 2023-07-22 | Stop reason: HOSPADM

## 2023-07-19 RX ADMIN — IOPAMIDOL 100 ML: 755 INJECTION, SOLUTION INTRAVENOUS at 18:53

## 2023-07-19 RX ADMIN — PIPERACILLIN AND TAZOBACTAM 4500 MG: 4; .5 INJECTION, POWDER, LYOPHILIZED, FOR SOLUTION INTRAVENOUS at 17:54

## 2023-07-19 RX ADMIN — ENOXAPARIN SODIUM 30 MG: 100 INJECTION SUBCUTANEOUS at 22:59

## 2023-07-19 RX ADMIN — SODIUM CHLORIDE, POTASSIUM CHLORIDE, SODIUM LACTATE AND CALCIUM CHLORIDE 3036 ML: 600; 310; 30; 20 INJECTION, SOLUTION INTRAVENOUS at 17:53

## 2023-07-19 RX ADMIN — HYDROMORPHONE HYDROCHLORIDE 1 MG: 1 INJECTION, SOLUTION INTRAMUSCULAR; INTRAVENOUS; SUBCUTANEOUS at 18:09

## 2023-07-19 RX ADMIN — SODIUM CHLORIDE, PRESERVATIVE FREE 10 ML: 5 INJECTION INTRAVENOUS at 23:00

## 2023-07-19 RX ADMIN — SODIUM CHLORIDE, PRESERVATIVE FREE 10 ML: 5 INJECTION INTRAVENOUS at 23:15

## 2023-07-19 RX ADMIN — Medication 2500 MG: at 19:51

## 2023-07-19 RX ADMIN — HYDROMORPHONE HYDROCHLORIDE 2 MG: 2 TABLET ORAL at 21:52

## 2023-07-19 RX ADMIN — GABAPENTIN 300 MG: 300 CAPSULE ORAL at 22:58

## 2023-07-19 RX ADMIN — PIPERACILLIN AND TAZOBACTAM 3375 MG: 3; .375 INJECTION, POWDER, LYOPHILIZED, FOR SOLUTION INTRAVENOUS at 23:19

## 2023-07-19 RX ADMIN — DOCUSATE SODIUM 100 MG: 100 CAPSULE, LIQUID FILLED ORAL at 23:04

## 2023-07-19 ASSESSMENT — ENCOUNTER SYMPTOMS
COLOR CHANGE: 1
ABDOMINAL PAIN: 1
COUGH: 0
VOMITING: 0
ABDOMINAL PAIN: 0
SHORTNESS OF BREATH: 0
EYES NEGATIVE: 1
NAUSEA: 0
DIARRHEA: 0
CONSTIPATION: 0
RESPIRATORY NEGATIVE: 1

## 2023-07-19 ASSESSMENT — PAIN - FUNCTIONAL ASSESSMENT
PAIN_FUNCTIONAL_ASSESSMENT: 0-10
PAIN_FUNCTIONAL_ASSESSMENT: 0-10

## 2023-07-19 ASSESSMENT — PAIN SCALES - GENERAL
PAINLEVEL_OUTOF10: 7
PAINLEVEL_OUTOF10: 10

## 2023-07-19 NOTE — ED TRIAGE NOTES
Pt arrives to the ER for complaints of right breast pain, fevers and sweats that started on 7/16. Pt reports that she had a FLAP surgical procedure on 7/11 with a ABHI drain placement. Pt reports that she saw her surgeon this morning and looked at the ABHI drain site and was told she has cellulitis and was given Levaquin, pt states she took this around 1030. Surgeon told patient to come into the ER if symptoms did not improve.

## 2023-07-19 NOTE — ED PROVIDER NOTES
images such as CT, Ultrasound and MRI are read by the radiologist. Plain radiographic images are visualized and preliminarily interpreted by the emergency physician with the below findings:        Interpretation per the Radiologist below, if available at the time of this note:    CT CHEST ABDOMEN PELVIS W CONTRAST Additional Contrast? Radiologist Recommendation   Final Result      1. Postoperative changes post breast reconstructions as well as postoperative   changes in the anterior abdominal wall. No drainable fluid collection   2. No acute abnormality of the chest abdomen or pelvis. No evidence of   metastatic disease           LABS:  Labs Reviewed   CBC WITH AUTO DIFFERENTIAL - Abnormal; Notable for the following components:       Result Value    Immature Granulocytes 1 (*)     Eosinophils Absolute 0.6 (*)     Absolute Immature Granulocyte 0.1 (*)     All other components within normal limits   COMPREHENSIVE METABOLIC PANEL - Abnormal; Notable for the following components:    Anion Gap 4 (*)     Glucose 110 (*)     Albumin 2.9 (*)     Globulin 4.3 (*)     Albumin/Globulin Ratio 0.7 (*)     All other components within normal limits   SEDIMENTATION RATE - Abnormal; Notable for the following components:    Sed Rate, Automated 56 (*)     All other components within normal limits   C-REACTIVE PROTEIN - Abnormal; Notable for the following components:    CRP 10.30 (*)     All other components within normal limits   CULTURE, BLOOD 1   CULTURE, BLOOD 2   CULTURE, WOUND   CULTURE, WOUND   EXTRA TUBES HOLD   LACTATE, SEPSIS   PROCALCITONIN   LACTATE, SEPSIS   BASIC METABOLIC PANEL W/ REFLEX TO MG FOR LOW K   CBC WITH AUTO DIFFERENTIAL       All other labs were within normal range or not returned as of this dictation.     EMERGENCY DEPARTMENT COURSE and DIFFERENTIAL DIAGNOSIS/MDM:   Vitals:    Vitals:    07/19/23 1757 07/19/23 1900 07/19/23 2206 07/19/23 2230   BP: (!) 123/57 112/76 (!) 112/49 104/62   Pulse: 75   79   Resp: recognition program.  Efforts were made to edit the dictations but occasionally words are mis-transcribed.)    Dawna Galicia PA-C (electronically signed)  Emergency Attending Physician / Physician Assistant / Nurse Practitioner             Dawna Galicia PA-C  07/19/23 6864

## 2023-07-19 NOTE — ED NOTES
Assumed care of pt at this time. Pt had FLAP procedure last Tuesday and has had swelling, redness to abdominal incision site and right breast incision site, worsening in the last few days. Given levaquin without relief of sx. Surgeon told pt to come to ED. Pt has long lower abdominal incision that is red, swollen and painful. Bilateral lower abdominal daniel drains, draining. Pt also had inicision to right lower breast that is painful, red and swollen. Breast daniel drains removed in office earlier today. Pt told to come to ED for probably cellulitis. 1st blood culture set drawn in triage, unable to obtain 2nd set due to veins blowing twice. Per Neli HORNER, ok to proceed without the 2nd set of cx. Abx and fluids hanging.       Kristin Ayon RN  07/19/23 9068

## 2023-07-20 LAB
ANION GAP SERPL CALC-SCNC: 5 MMOL/L (ref 5–15)
BASOPHILS # BLD: 0 K/UL (ref 0–0.1)
BASOPHILS NFR BLD: 1 % (ref 0–1)
BUN SERPL-MCNC: 6 MG/DL (ref 6–20)
BUN/CREAT SERPL: 9 (ref 12–20)
CALCIUM SERPL-MCNC: 9.2 MG/DL (ref 8.5–10.1)
CHLORIDE SERPL-SCNC: 109 MMOL/L (ref 97–108)
CO2 SERPL-SCNC: 28 MMOL/L (ref 21–32)
CREAT SERPL-MCNC: 0.69 MG/DL (ref 0.55–1.02)
DIFFERENTIAL METHOD BLD: ABNORMAL
EOSINOPHIL # BLD: 0.5 K/UL (ref 0–0.4)
EOSINOPHIL NFR BLD: 8 % (ref 0–7)
ERYTHROCYTE [DISTWIDTH] IN BLOOD BY AUTOMATED COUNT: 14.2 % (ref 11.5–14.5)
GLUCOSE SERPL-MCNC: 101 MG/DL (ref 65–100)
HCT VFR BLD AUTO: 33.5 % (ref 35–47)
HGB BLD-MCNC: 10.6 G/DL (ref 11.5–16)
IMM GRANULOCYTES # BLD AUTO: 0.1 K/UL (ref 0–0.04)
IMM GRANULOCYTES NFR BLD AUTO: 1 % (ref 0–0.5)
LACTATE SERPL-SCNC: 0.6 MMOL/L (ref 0.4–2)
LYMPHOCYTES # BLD: 1.1 K/UL (ref 0.8–3.5)
LYMPHOCYTES NFR BLD: 17 % (ref 12–49)
MCH RBC QN AUTO: 30.6 PG (ref 26–34)
MCHC RBC AUTO-ENTMCNC: 31.6 G/DL (ref 30–36.5)
MCV RBC AUTO: 96.8 FL (ref 80–99)
MONOCYTES # BLD: 0.6 K/UL (ref 0–1)
MONOCYTES NFR BLD: 9 % (ref 5–13)
NEUTS SEG # BLD: 4.3 K/UL (ref 1.8–8)
NEUTS SEG NFR BLD: 64 % (ref 32–75)
NRBC # BLD: 0 K/UL (ref 0–0.01)
NRBC BLD-RTO: 0 PER 100 WBC
PLATELET # BLD AUTO: 283 K/UL (ref 150–400)
PMV BLD AUTO: 9.8 FL (ref 8.9–12.9)
POTASSIUM SERPL-SCNC: 3.9 MMOL/L (ref 3.5–5.1)
RBC # BLD AUTO: 3.46 M/UL (ref 3.8–5.2)
SODIUM SERPL-SCNC: 142 MMOL/L (ref 136–145)
WBC # BLD AUTO: 6.6 K/UL (ref 3.6–11)

## 2023-07-20 PROCEDURE — 80048 BASIC METABOLIC PNL TOTAL CA: CPT

## 2023-07-20 PROCEDURE — 6370000000 HC RX 637 (ALT 250 FOR IP)

## 2023-07-20 PROCEDURE — 6360000002 HC RX W HCPCS

## 2023-07-20 PROCEDURE — 87186 SC STD MICRODIL/AGAR DIL: CPT

## 2023-07-20 PROCEDURE — 85025 COMPLETE CBC W/AUTO DIFF WBC: CPT

## 2023-07-20 PROCEDURE — 2580000003 HC RX 258

## 2023-07-20 PROCEDURE — 1100000000 HC RM PRIVATE

## 2023-07-20 PROCEDURE — 87077 CULTURE AEROBIC IDENTIFY: CPT

## 2023-07-20 PROCEDURE — 36415 COLL VENOUS BLD VENIPUNCTURE: CPT

## 2023-07-20 PROCEDURE — 6370000000 HC RX 637 (ALT 250 FOR IP): Performed by: STUDENT IN AN ORGANIZED HEALTH CARE EDUCATION/TRAINING PROGRAM

## 2023-07-20 PROCEDURE — 87205 SMEAR GRAM STAIN: CPT

## 2023-07-20 PROCEDURE — 87070 CULTURE OTHR SPECIMN AEROBIC: CPT

## 2023-07-20 PROCEDURE — 99223 1ST HOSP IP/OBS HIGH 75: CPT | Performed by: FAMILY MEDICINE

## 2023-07-20 PROCEDURE — 83605 ASSAY OF LACTIC ACID: CPT

## 2023-07-20 RX ORDER — DULOXETIN HYDROCHLORIDE 30 MG/1
60 CAPSULE, DELAYED RELEASE ORAL DAILY
Status: DISCONTINUED | OUTPATIENT
Start: 2023-07-20 | End: 2023-07-22 | Stop reason: HOSPADM

## 2023-07-20 RX ADMIN — ASPIRIN 325 MG: 325 TABLET, COATED ORAL at 08:33

## 2023-07-20 RX ADMIN — ACETAMINOPHEN 650 MG: 325 TABLET ORAL at 06:08

## 2023-07-20 RX ADMIN — SODIUM CHLORIDE, PRESERVATIVE FREE 10 ML: 5 INJECTION INTRAVENOUS at 21:53

## 2023-07-20 RX ADMIN — HYDROMORPHONE HYDROCHLORIDE 2 MG: 2 TABLET ORAL at 21:51

## 2023-07-20 RX ADMIN — GABAPENTIN 300 MG: 300 CAPSULE ORAL at 13:26

## 2023-07-20 RX ADMIN — DULOXETINE HYDROCHLORIDE 60 MG: 30 CAPSULE, DELAYED RELEASE ORAL at 14:06

## 2023-07-20 RX ADMIN — GABAPENTIN 300 MG: 300 CAPSULE ORAL at 08:33

## 2023-07-20 RX ADMIN — DOCUSATE SODIUM 100 MG: 100 CAPSULE, LIQUID FILLED ORAL at 08:33

## 2023-07-20 RX ADMIN — VANCOMYCIN HYDROCHLORIDE 1250 MG: 1.25 INJECTION, POWDER, LYOPHILIZED, FOR SOLUTION INTRAVENOUS at 21:13

## 2023-07-20 RX ADMIN — HYDROMORPHONE HYDROCHLORIDE 2 MG: 2 TABLET ORAL at 14:47

## 2023-07-20 RX ADMIN — SODIUM CHLORIDE, PRESERVATIVE FREE 10 ML: 5 INJECTION INTRAVENOUS at 21:54

## 2023-07-20 RX ADMIN — SODIUM CHLORIDE, PRESERVATIVE FREE 10 ML: 5 INJECTION INTRAVENOUS at 08:34

## 2023-07-20 RX ADMIN — ACETAMINOPHEN 650 MG: 325 TABLET ORAL at 21:52

## 2023-07-20 RX ADMIN — PIPERACILLIN AND TAZOBACTAM 3375 MG: 3; .375 INJECTION, POWDER, LYOPHILIZED, FOR SOLUTION INTRAVENOUS at 10:37

## 2023-07-20 RX ADMIN — DOCUSATE SODIUM 100 MG: 100 CAPSULE, LIQUID FILLED ORAL at 21:39

## 2023-07-20 RX ADMIN — GABAPENTIN 300 MG: 300 CAPSULE ORAL at 21:39

## 2023-07-20 RX ADMIN — HYDROMORPHONE HYDROCHLORIDE 2 MG: 2 TABLET ORAL at 06:26

## 2023-07-20 RX ADMIN — ACETAMINOPHEN 650 MG: 325 TABLET ORAL at 12:08

## 2023-07-20 RX ADMIN — HYDROMORPHONE HYDROCHLORIDE 2 MG: 2 TABLET ORAL at 02:09

## 2023-07-20 RX ADMIN — VANCOMYCIN HYDROCHLORIDE 1250 MG: 1.25 INJECTION, POWDER, LYOPHILIZED, FOR SOLUTION INTRAVENOUS at 08:34

## 2023-07-20 RX ADMIN — PIPERACILLIN AND TAZOBACTAM 3375 MG: 3; .375 INJECTION, POWDER, LYOPHILIZED, FOR SOLUTION INTRAVENOUS at 16:56

## 2023-07-20 RX ADMIN — HYDROMORPHONE HYDROCHLORIDE 2 MG: 2 TABLET ORAL at 10:35

## 2023-07-20 RX ADMIN — ENOXAPARIN SODIUM 30 MG: 100 INJECTION SUBCUTANEOUS at 21:40

## 2023-07-20 RX ADMIN — ENOXAPARIN SODIUM 30 MG: 100 INJECTION SUBCUTANEOUS at 08:33

## 2023-07-20 ASSESSMENT — PAIN SCALES - GENERAL
PAINLEVEL_OUTOF10: 0
PAINLEVEL_OUTOF10: 2
PAINLEVEL_OUTOF10: 5
PAINLEVEL_OUTOF10: 8
PAINLEVEL_OUTOF10: 7
PAINLEVEL_OUTOF10: 7
PAINLEVEL_OUTOF10: 8
PAINLEVEL_OUTOF10: 2
PAINLEVEL_OUTOF10: 8
PAINLEVEL_OUTOF10: 2
PAINLEVEL_OUTOF10: 0

## 2023-07-20 ASSESSMENT — PAIN DESCRIPTION - DESCRIPTORS
DESCRIPTORS: SORE;ACHING
DESCRIPTORS: ACHING;SHARP;BURNING
DESCRIPTORS: ACHING;SORE
DESCRIPTORS: ACHING
DESCRIPTORS: ACHING

## 2023-07-20 ASSESSMENT — PAIN DESCRIPTION - LOCATION
LOCATION: ABDOMEN;BREAST
LOCATION: BREAST;ABDOMEN
LOCATION: ABDOMEN;BREAST

## 2023-07-20 ASSESSMENT — PAIN DESCRIPTION - ORIENTATION
ORIENTATION: RIGHT;LEFT
ORIENTATION: RIGHT;LEFT;LOWER
ORIENTATION: LEFT;RIGHT
ORIENTATION: ANTERIOR
ORIENTATION: RIGHT;LEFT;ANTERIOR

## 2023-07-20 ASSESSMENT — PAIN DESCRIPTION - PAIN TYPE
TYPE: SURGICAL PAIN

## 2023-07-20 ASSESSMENT — PAIN DESCRIPTION - FREQUENCY
FREQUENCY: CONTINUOUS
FREQUENCY: CONTINUOUS
FREQUENCY: INTERMITTENT

## 2023-07-20 ASSESSMENT — PAIN - FUNCTIONAL ASSESSMENT
PAIN_FUNCTIONAL_ASSESSMENT: ACTIVITIES ARE NOT PREVENTED

## 2023-07-20 NOTE — PROGRESS NOTES
[address] Dosing Services: Antimicrobial Stewardship Daily Doc 7/19/2023  Consult for antibiotic dosing of Zosyn by Dr. Kev Jeff  Indication: Postop infection s/p bilat SHELL flap procedure  Day of Therapy: 1    Ht Readings from Last 1 Encounters:   07/19/23 1.626 m (5' 4\")        Wt Readings from Last 1 Encounters:   07/19/23 101.2 kg (223 lb)      Non-Kinetic Antimicrobial Dosing Regimen:   Current Regimen:  Zosyn 4.5 grams Q6 hours  Recommendation: Dose adjusted to 3.375 g Q8 hrs over 240 mins per extended infusion protocol  Dose administration notes: Doses given appropriately as scheduled    Pharmacist: Latasha LuuD  [phone]

## 2023-07-20 NOTE — CARE COORDINATION
7/20/2023  Case Management Initial Evaluation    3:58 PM  Patient is a readmission--last admission a couple of weeks ago for surgery. No changes to demographics--lives with , works as a nurse, independent at baseline. Mother in law is in town helping with care for the next couple of weeks. No anticipated discharge needs. 07/20/23 1554   Service Assessment   Patient Orientation Alert and Oriented   Cognition Alert   History Provided By Patient; Child/Family   Primary Caregiver Self   Support Systems Spouse/Significant Other;Family Members   Patient's Healthcare Decision Maker is: Legal Next of Kin   PCP Verified by CM Yes   Last Visit to PCP Within last 6 months   Prior Functional Level Independent in ADLs/IADLs   Current Functional Level Independent in ADLs/IADLs   Can patient return to prior living arrangement Yes   Ability to make needs known: Good   Family able to assist with home care needs: Yes   Would you like for me to discuss the discharge plan with any other family members/significant others, and if so, who? Yes  (as needed--mother in law at bedside)   Financial Resources Other (Comment)  (has health insurance)   Community Resources None   Social/Functional History   Lives With Spouse   Type of [address] [place] [address]  Two level   Home Access Stairs to enter with [address] Help From Family   ADL Assistance Independent   Ambulation Assistance Independent   Transfer Assistance Independent   Active  Yes   Occupation Full time employment   Type of Occupation RN   Discharge [facility] Prior To Admission None   Potential Assistance Needed N/A   DME Ordered?  No   Potential Assistance Purchasing Medications No   Type of Home Care Services None   Patient expects to be discharged to: House   One/Two Story Residence Two story   Services At/After Discharge   Transition of Care Consult

## 2023-07-20 NOTE — PROGRESS NOTES
POD#9 s/p bilateral breast reconstruction with microsurgical free flaps (SHELL flaps)  Admitted last night feeling poorly, with cellulitis lateral right chest, at site where surgical drain was removed yesterday  She had been on cefadroxil after surgery, switched to oral levofloxacin (given pseudomonas infection of right breast 1 year ago) but failed outpatient oral therapy  Now on vancomycin and zosyn  Still feels poorly; lateral right chest is very painful to touch; left inframammary fold (IMF) slightly painful  Normal WBC, lactate, UA, CT chest/abd/pelvis, HR, and BP  Tm=99.5, currently 99.1  Erythema of lateral right chest is perhaps slightly better, not worse than yesterday, still very tender to the touch; there is nothing draining and I removed the bandage because tape had been placed directly on the very tender erythematous skin  Left IMF has minimal erythema, may be moisture/fungal rash  Both SHELL flaps are warm and soft  Abdomen: inc CDI; umbo viable; no erythema, no tenderness  Imp: cellulitis lateral right chest without evidence of abscess or other source of infection  Agree with IV abx; hopefully home when cellulitis improves/resolves on oral abx  She has a follow up in my office next Wednesday; continue both abdominal drains at least until then  Please call with ?s

## 2023-07-20 NOTE — CARE COORDINATION
7/20/2023  Care Management Note    9:14 AM  Attempted initial evaluation--patient unavailable. Noted from chart that she was discharged on the 14th of this month home with family assistance. She lives with her  and is independent with ADLs at baseline. Physician notes indicate she will likely go home with oral antibiotics, and with both drains in until follow up next week. Will continue to follow and complete initial evaluation as able.      IZABELLA Naranjo

## 2023-07-21 LAB
ANION GAP SERPL CALC-SCNC: 4 MMOL/L (ref 5–15)
BACTERIA SPEC CULT: NORMAL
BACTERIA SPEC CULT: NORMAL
BASOPHILS # BLD: 0 K/UL (ref 0–0.1)
BASOPHILS NFR BLD: 1 % (ref 0–1)
BUN SERPL-MCNC: 9 MG/DL (ref 6–20)
BUN/CREAT SERPL: 17 (ref 12–20)
CALCIUM SERPL-MCNC: 8.7 MG/DL (ref 8.5–10.1)
CHLORIDE SERPL-SCNC: 111 MMOL/L (ref 97–108)
CO2 SERPL-SCNC: 27 MMOL/L (ref 21–32)
CREAT SERPL-MCNC: 0.54 MG/DL (ref 0.55–1.02)
DIFFERENTIAL METHOD BLD: ABNORMAL
EOSINOPHIL # BLD: 0.5 K/UL (ref 0–0.4)
EOSINOPHIL NFR BLD: 10 % (ref 0–7)
ERYTHROCYTE [DISTWIDTH] IN BLOOD BY AUTOMATED COUNT: 13.6 % (ref 11.5–14.5)
GLUCOSE SERPL-MCNC: 110 MG/DL (ref 65–100)
HCT VFR BLD AUTO: 31.6 % (ref 35–47)
HGB BLD-MCNC: 10 G/DL (ref 11.5–16)
IMM GRANULOCYTES # BLD AUTO: 0.1 K/UL (ref 0–0.04)
IMM GRANULOCYTES NFR BLD AUTO: 1 % (ref 0–0.5)
LYMPHOCYTES # BLD: 1.1 K/UL (ref 0.8–3.5)
LYMPHOCYTES NFR BLD: 21 % (ref 12–49)
MCH RBC QN AUTO: 30.1 PG (ref 26–34)
MCHC RBC AUTO-ENTMCNC: 31.6 G/DL (ref 30–36.5)
MCV RBC AUTO: 95.2 FL (ref 80–99)
MONOCYTES # BLD: 0.6 K/UL (ref 0–1)
MONOCYTES NFR BLD: 10 % (ref 5–13)
NEUTS SEG # BLD: 3 K/UL (ref 1.8–8)
NEUTS SEG NFR BLD: 57 % (ref 32–75)
NRBC # BLD: 0 K/UL (ref 0–0.01)
NRBC BLD-RTO: 0 PER 100 WBC
PLATELET # BLD AUTO: 298 K/UL (ref 150–400)
PMV BLD AUTO: 9.9 FL (ref 8.9–12.9)
POTASSIUM SERPL-SCNC: 3.9 MMOL/L (ref 3.5–5.1)
RBC # BLD AUTO: 3.32 M/UL (ref 3.8–5.2)
SERVICE CMNT-IMP: NORMAL
SODIUM SERPL-SCNC: 142 MMOL/L (ref 136–145)
VANCOMYCIN SERPL-MCNC: 13.8 UG/ML
WBC # BLD AUTO: 5.3 K/UL (ref 3.6–11)

## 2023-07-21 PROCEDURE — 2580000003 HC RX 258

## 2023-07-21 PROCEDURE — 6370000000 HC RX 637 (ALT 250 FOR IP)

## 2023-07-21 PROCEDURE — 1100000000 HC RM PRIVATE

## 2023-07-21 PROCEDURE — 6370000000 HC RX 637 (ALT 250 FOR IP): Performed by: STUDENT IN AN ORGANIZED HEALTH CARE EDUCATION/TRAINING PROGRAM

## 2023-07-21 PROCEDURE — 80202 ASSAY OF VANCOMYCIN: CPT

## 2023-07-21 PROCEDURE — 36415 COLL VENOUS BLD VENIPUNCTURE: CPT

## 2023-07-21 PROCEDURE — 6360000002 HC RX W HCPCS

## 2023-07-21 PROCEDURE — 6360000002 HC RX W HCPCS: Performed by: FAMILY MEDICINE

## 2023-07-21 PROCEDURE — 2580000003 HC RX 258: Performed by: FAMILY MEDICINE

## 2023-07-21 PROCEDURE — 85025 COMPLETE CBC W/AUTO DIFF WBC: CPT

## 2023-07-21 PROCEDURE — 99232 SBSQ HOSP IP/OBS MODERATE 35: CPT | Performed by: FAMILY MEDICINE

## 2023-07-21 PROCEDURE — 80048 BASIC METABOLIC PNL TOTAL CA: CPT

## 2023-07-21 RX ADMIN — GABAPENTIN 300 MG: 300 CAPSULE ORAL at 13:29

## 2023-07-21 RX ADMIN — GABAPENTIN 300 MG: 300 CAPSULE ORAL at 20:03

## 2023-07-21 RX ADMIN — PIPERACILLIN AND TAZOBACTAM 3375 MG: 3; .375 INJECTION, POWDER, LYOPHILIZED, FOR SOLUTION INTRAVENOUS at 16:25

## 2023-07-21 RX ADMIN — HYDROMORPHONE HYDROCHLORIDE 2 MG: 2 TABLET ORAL at 04:51

## 2023-07-21 RX ADMIN — HYDROMORPHONE HYDROCHLORIDE 2 MG: 2 TABLET ORAL at 14:46

## 2023-07-21 RX ADMIN — VANCOMYCIN HYDROCHLORIDE 1500 MG: 1.5 INJECTION, POWDER, LYOPHILIZED, FOR SOLUTION INTRAVENOUS at 09:56

## 2023-07-21 RX ADMIN — PIPERACILLIN AND TAZOBACTAM 3375 MG: 3; .375 INJECTION, POWDER, LYOPHILIZED, FOR SOLUTION INTRAVENOUS at 00:41

## 2023-07-21 RX ADMIN — SODIUM CHLORIDE, PRESERVATIVE FREE 10 ML: 5 INJECTION INTRAVENOUS at 21:01

## 2023-07-21 RX ADMIN — SODIUM CHLORIDE, PRESERVATIVE FREE 10 ML: 5 INJECTION INTRAVENOUS at 20:11

## 2023-07-21 RX ADMIN — ENOXAPARIN SODIUM 30 MG: 100 INJECTION SUBCUTANEOUS at 20:03

## 2023-07-21 RX ADMIN — SODIUM CHLORIDE, PRESERVATIVE FREE 10 ML: 5 INJECTION INTRAVENOUS at 08:14

## 2023-07-21 RX ADMIN — GABAPENTIN 300 MG: 300 CAPSULE ORAL at 08:13

## 2023-07-21 RX ADMIN — ASPIRIN 325 MG: 325 TABLET, COATED ORAL at 08:13

## 2023-07-21 RX ADMIN — HYDROMORPHONE HYDROCHLORIDE 2 MG: 2 TABLET ORAL at 20:03

## 2023-07-21 RX ADMIN — HYDROMORPHONE HYDROCHLORIDE 2 MG: 2 TABLET ORAL at 09:55

## 2023-07-21 RX ADMIN — DOCUSATE SODIUM 100 MG: 100 CAPSULE, LIQUID FILLED ORAL at 20:03

## 2023-07-21 RX ADMIN — ACETAMINOPHEN 650 MG: 325 TABLET ORAL at 04:51

## 2023-07-21 RX ADMIN — PIPERACILLIN AND TAZOBACTAM 3375 MG: 3; .375 INJECTION, POWDER, LYOPHILIZED, FOR SOLUTION INTRAVENOUS at 08:13

## 2023-07-21 RX ADMIN — ENOXAPARIN SODIUM 30 MG: 100 INJECTION SUBCUTANEOUS at 08:13

## 2023-07-21 RX ADMIN — DOCUSATE SODIUM 100 MG: 100 CAPSULE, LIQUID FILLED ORAL at 08:13

## 2023-07-21 RX ADMIN — DULOXETINE HYDROCHLORIDE 60 MG: 30 CAPSULE, DELAYED RELEASE ORAL at 09:55

## 2023-07-21 ASSESSMENT — PAIN - FUNCTIONAL ASSESSMENT
PAIN_FUNCTIONAL_ASSESSMENT: ACTIVITIES ARE NOT PREVENTED
PAIN_FUNCTIONAL_ASSESSMENT: ACTIVITIES ARE NOT PREVENTED

## 2023-07-21 ASSESSMENT — PAIN DESCRIPTION - DESCRIPTORS
DESCRIPTORS: ACHING;BURNING
DESCRIPTORS: ACHING;SORE
DESCRIPTORS: ACHING;SORE
DESCRIPTORS: ACHING;BURNING

## 2023-07-21 ASSESSMENT — PAIN DESCRIPTION - LOCATION
LOCATION: ABDOMEN
LOCATION: BREAST;ABDOMEN
LOCATION: ABDOMEN;BREAST
LOCATION: ABDOMEN

## 2023-07-21 ASSESSMENT — PAIN SCALES - GENERAL
PAINLEVEL_OUTOF10: 7
PAINLEVEL_OUTOF10: 7
PAINLEVEL_OUTOF10: 0
PAINLEVEL_OUTOF10: 7
PAINLEVEL_OUTOF10: 0
PAINLEVEL_OUTOF10: 0
PAINLEVEL_OUTOF10: 8

## 2023-07-21 ASSESSMENT — PAIN DESCRIPTION - ORIENTATION
ORIENTATION: LOWER
ORIENTATION: RIGHT;LEFT;ANTERIOR
ORIENTATION: LOWER
ORIENTATION: RIGHT;LEFT;ANTERIOR

## 2023-07-21 ASSESSMENT — PAIN DESCRIPTION - FREQUENCY
FREQUENCY: INTERMITTENT
FREQUENCY: INTERMITTENT

## 2023-07-21 ASSESSMENT — PAIN DESCRIPTION - PAIN TYPE
TYPE: SURGICAL PAIN
TYPE: SURGICAL PAIN

## 2023-07-21 NOTE — CARE COORDINATION
7/21/2023  Case Management Progress Note    12:29 PM  Patient is 66year old female admitted 7/19 with surgical site infection  Patient's RUR is 9% green/low risk for readmission  Covid test: none noted  Chart reviewed--patient discussed at interdisciplinary rounds  Per rounds this morning patient is primarily pending culture/sensitivity data for discharge. It may come in today, and depending on the results she could either go home on PO antibiotics or need an ID consult. Patient is hopeful she can go home sooner than later. She does not have any noted needs for discharge and has good family support at home. Will continue to follow and assist with discharge planning.      Transition of Care Plan   Continue medical management/treatment  Home with family assistance when ready   Follow up outpatient as indicated  Family will transport at discharge  CM will continue to follow    IZABELLA Elmore

## 2023-07-21 NOTE — PLAN OF CARE
Problem: Pain  Goal: Verbalizes/displays adequate comfort level or baseline comfort level  Outcome: Progressing     Problem: Safety - Adult  Goal: Free from fall injury  Outcome: Progressing     Problem: Skin/Tissue Integrity - Adult  Goal: Incisions, wounds, or drain sites healing without S/S of infection  Outcome: Progressing     Problem: Gastrointestinal - Adult  Goal: Maintains adequate nutritional intake  Outcome: Progressing

## 2023-07-22 VITALS
OXYGEN SATURATION: 93 % | RESPIRATION RATE: 16 BRPM | DIASTOLIC BLOOD PRESSURE: 63 MMHG | TEMPERATURE: 98.3 F | HEART RATE: 67 BPM | BODY MASS INDEX: 38.07 KG/M2 | WEIGHT: 223 LBS | HEIGHT: 64 IN | SYSTOLIC BLOOD PRESSURE: 108 MMHG

## 2023-07-22 LAB
ANION GAP SERPL CALC-SCNC: 4 MMOL/L (ref 5–15)
BACTERIA SPEC CULT: ABNORMAL
BACTERIA SPEC CULT: ABNORMAL
BASOPHILS # BLD: 0.1 K/UL (ref 0–0.1)
BASOPHILS NFR BLD: 1 % (ref 0–1)
BUN SERPL-MCNC: 8 MG/DL (ref 6–20)
BUN/CREAT SERPL: 12 (ref 12–20)
CALCIUM SERPL-MCNC: 8.9 MG/DL (ref 8.5–10.1)
CHLORIDE SERPL-SCNC: 111 MMOL/L (ref 97–108)
CO2 SERPL-SCNC: 27 MMOL/L (ref 21–32)
CREAT SERPL-MCNC: 0.66 MG/DL (ref 0.55–1.02)
DIFFERENTIAL METHOD BLD: ABNORMAL
EOSINOPHIL # BLD: 0.7 K/UL (ref 0–0.4)
EOSINOPHIL NFR BLD: 11 % (ref 0–7)
ERYTHROCYTE [DISTWIDTH] IN BLOOD BY AUTOMATED COUNT: 13.6 % (ref 11.5–14.5)
GLUCOSE SERPL-MCNC: 100 MG/DL (ref 65–100)
GRAM STN SPEC: ABNORMAL
GRAM STN SPEC: ABNORMAL
HCT VFR BLD AUTO: 32.2 % (ref 35–47)
HGB BLD-MCNC: 10 G/DL (ref 11.5–16)
IMM GRANULOCYTES # BLD AUTO: 0.1 K/UL (ref 0–0.04)
IMM GRANULOCYTES NFR BLD AUTO: 1 % (ref 0–0.5)
LYMPHOCYTES # BLD: 1.1 K/UL (ref 0.8–3.5)
LYMPHOCYTES NFR BLD: 18 % (ref 12–49)
MCH RBC QN AUTO: 29.9 PG (ref 26–34)
MCHC RBC AUTO-ENTMCNC: 31.1 G/DL (ref 30–36.5)
MCV RBC AUTO: 96.1 FL (ref 80–99)
MONOCYTES # BLD: 0.5 K/UL (ref 0–1)
MONOCYTES NFR BLD: 8 % (ref 5–13)
NEUTS SEG # BLD: 3.6 K/UL (ref 1.8–8)
NEUTS SEG NFR BLD: 61 % (ref 32–75)
NRBC # BLD: 0 K/UL (ref 0–0.01)
NRBC BLD-RTO: 0 PER 100 WBC
PLATELET # BLD AUTO: 351 K/UL (ref 150–400)
PMV BLD AUTO: 10.2 FL (ref 8.9–12.9)
POTASSIUM SERPL-SCNC: 4 MMOL/L (ref 3.5–5.1)
RBC # BLD AUTO: 3.35 M/UL (ref 3.8–5.2)
SERVICE CMNT-IMP: ABNORMAL
SODIUM SERPL-SCNC: 142 MMOL/L (ref 136–145)
WBC # BLD AUTO: 5.9 K/UL (ref 3.6–11)

## 2023-07-22 PROCEDURE — 6360000002 HC RX W HCPCS

## 2023-07-22 PROCEDURE — 6370000000 HC RX 637 (ALT 250 FOR IP): Performed by: STUDENT IN AN ORGANIZED HEALTH CARE EDUCATION/TRAINING PROGRAM

## 2023-07-22 PROCEDURE — 36415 COLL VENOUS BLD VENIPUNCTURE: CPT

## 2023-07-22 PROCEDURE — 80048 BASIC METABOLIC PNL TOTAL CA: CPT

## 2023-07-22 PROCEDURE — 99238 HOSP IP/OBS DSCHRG MGMT 30/<: CPT | Performed by: FAMILY MEDICINE

## 2023-07-22 PROCEDURE — 85025 COMPLETE CBC W/AUTO DIFF WBC: CPT

## 2023-07-22 PROCEDURE — 2580000003 HC RX 258

## 2023-07-22 PROCEDURE — 6370000000 HC RX 637 (ALT 250 FOR IP)

## 2023-07-22 RX ORDER — LEVOFLOXACIN 750 MG/1
750 TABLET ORAL DAILY
Qty: 7 TABLET | Refills: 0 | Status: CANCELLED | OUTPATIENT
Start: 2023-07-22 | End: 2023-07-29

## 2023-07-22 RX ORDER — HYDROCODONE BITARTRATE AND ACETAMINOPHEN 5; 325 MG/1; MG/1
1 TABLET ORAL EVERY 6 HOURS PRN
Qty: 12 TABLET | Refills: 0 | Status: SHIPPED | OUTPATIENT
Start: 2023-07-22 | End: 2023-07-25

## 2023-07-22 RX ORDER — LEVOFLOXACIN 750 MG/1
750 TABLET ORAL DAILY
Status: DISCONTINUED | OUTPATIENT
Start: 2023-07-22 | End: 2023-07-22 | Stop reason: HOSPADM

## 2023-07-22 RX ORDER — LEVOFLOXACIN 750 MG/1
750 TABLET ORAL DAILY
Qty: 6 TABLET | Refills: 0 | Status: SHIPPED | OUTPATIENT
Start: 2023-07-23 | End: 2023-07-29

## 2023-07-22 RX ADMIN — PIPERACILLIN AND TAZOBACTAM 3375 MG: 3; .375 INJECTION, POWDER, LYOPHILIZED, FOR SOLUTION INTRAVENOUS at 08:03

## 2023-07-22 RX ADMIN — HYDROMORPHONE HYDROCHLORIDE 2 MG: 2 TABLET ORAL at 10:03

## 2023-07-22 RX ADMIN — ACETAMINOPHEN 650 MG: 325 TABLET ORAL at 05:45

## 2023-07-22 RX ADMIN — PIPERACILLIN AND TAZOBACTAM 3375 MG: 3; .375 INJECTION, POWDER, LYOPHILIZED, FOR SOLUTION INTRAVENOUS at 00:46

## 2023-07-22 RX ADMIN — DULOXETINE HYDROCHLORIDE 60 MG: 30 CAPSULE, DELAYED RELEASE ORAL at 09:08

## 2023-07-22 RX ADMIN — GABAPENTIN 300 MG: 300 CAPSULE ORAL at 09:08

## 2023-07-22 RX ADMIN — ASPIRIN 325 MG: 325 TABLET, COATED ORAL at 09:08

## 2023-07-22 RX ADMIN — GABAPENTIN 300 MG: 300 CAPSULE ORAL at 13:22

## 2023-07-22 RX ADMIN — DOCUSATE SODIUM 100 MG: 100 CAPSULE, LIQUID FILLED ORAL at 09:08

## 2023-07-22 RX ADMIN — HYDROMORPHONE HYDROCHLORIDE 2 MG: 2 TABLET ORAL at 05:45

## 2023-07-22 RX ADMIN — SODIUM CHLORIDE, PRESERVATIVE FREE 10 ML: 5 INJECTION INTRAVENOUS at 09:09

## 2023-07-22 RX ADMIN — ENOXAPARIN SODIUM 30 MG: 100 INJECTION SUBCUTANEOUS at 09:08

## 2023-07-22 RX ADMIN — SODIUM CHLORIDE, PRESERVATIVE FREE 10 ML: 5 INJECTION INTRAVENOUS at 09:08

## 2023-07-22 RX ADMIN — LEVOFLOXACIN 750 MG: 750 TABLET, FILM COATED ORAL at 10:50

## 2023-07-22 ASSESSMENT — PAIN SCALES - GENERAL
PAINLEVEL_OUTOF10: 6
PAINLEVEL_OUTOF10: 6
PAINLEVEL_OUTOF10: 0

## 2023-07-22 ASSESSMENT — PAIN DESCRIPTION - LOCATION
LOCATION: ABDOMEN
LOCATION: ABDOMEN;BREAST

## 2023-07-22 ASSESSMENT — PAIN - FUNCTIONAL ASSESSMENT: PAIN_FUNCTIONAL_ASSESSMENT: ACTIVITIES ARE NOT PREVENTED

## 2023-07-22 ASSESSMENT — PAIN DESCRIPTION - ORIENTATION
ORIENTATION: LOWER
ORIENTATION: RIGHT;LEFT;ANTERIOR

## 2023-07-22 ASSESSMENT — PAIN DESCRIPTION - DESCRIPTORS
DESCRIPTORS: ACHING;SORE
DESCRIPTORS: ACHING;BURNING

## 2023-07-22 ASSESSMENT — PAIN DESCRIPTION - PAIN TYPE: TYPE: SURGICAL PAIN

## 2023-07-22 ASSESSMENT — PAIN DESCRIPTION - FREQUENCY: FREQUENCY: INTERMITTENT

## 2023-07-22 NOTE — DISCHARGE INSTRUCTIONS
HOME DISCHARGE Too Hebert / [id number] : 1965    Admission date: 2023 Discharge date: 2023 2:14 PM     Please bring this form with you to show your care provider at your follow-up appointment. Primary care provider:  Giselle Oscar      Discharging provider:  Yesenia Jansen MD  - Family Medicine Resident  Marie Kaiser MD  - Family Medicine Attending      You have been admitted to the hospital with the following diagnoses:    ACUTE DIAGNOSES:  Surgical site infection Maceywayne Linder    . . . . . . . . . . . . . . . . . . . . . . . . . . . . . . . . . . . . . . . . . . . . . . . . . . . . . . . . . . . . . . . . . . . . . Lisandro Moscoso FOLLOW-UP CARE RECOMMENDATIONS:    Appointments  Future Appointments   Date Time Provider [address]   2023  9:20 AM Mark Davis MD [facility] BS AMB   8/15/2023  8:45 AM Sandip Oscar MD [facility] BS AMB   10/11/2023  9:00 AM Chadwick Nava MD [facility]       No follow-up provider specified. Follow-up with your PCP regarding:  -the final results of your wound and blood cultures  -monitoring your wounds, infection, and pain     Follow-up tests needed:   -As indicated by your PCP and surgeon    Pending test results: At the time of your discharge the following test results are still pending: final results of wound and blood cultures. Please make sure you review these results with your outpatient follow-up provider(s). DIET/what to eat:  regular diet    ACTIVITY:  activity as tolerated, avoid strenuous activities and heavy lifting while your wounds heal    Wound care: continue current wound care    Equipment needed:  n/a    Specific symptoms to watch for: chest pain, shortness of breath, fever, chills, nausea, vomiting, diarrhea, change in mentation, falling, weakness, bleeding. What to do if new or unexpected symptoms occur?     If you experience any of the above symptoms (or should other concerns or questions arise

## 2023-07-22 NOTE — PROGRESS NOTES
Patient educated and given a chance to ask questions. IV removed. Patient going home , family() at bedside to take her home. Per MD instruction patient going home with 2 ABHI drain in her abdomen.

## 2023-07-22 NOTE — DISCHARGE SUMMARY
Order Status: Completed Specimen: Blood Updated: 07/22/23 0541     Special Requests NO SPECIAL REQUESTS        Culture NO GROWTH 3 DAYS                Imaging:  CT Result (most recent):  CT CHEST ABDOMEN PELVIS W CONTRAST 07/19/2023    Narrative  INDICATION: 8 days s/p BILATERAL BREAST RECONSTRUCTION WITH SHELL MICROSURGICAL  FREE FLAPS, here with fever, cellulitis, c/f abscess    COMPARISON: 6/1/2022    TECHNIQUE:  Following the uneventful intravenous administration of 100 cc  Isovue-370, 5 mm axial images were obtained through the chest, abdomen, and  pelvis. Oral contrast was not administered. Coronal and sagittal  reconstructions were generated. CT dose reduction was achieved through use of a  standardized protocol tailored for this examination and automatic exposure  control for dose modulation. FINDINGS:  Chest wall: Bilateral breast reconstructions. There is soft tissue swelling and  edema with a small amount of gas not unexpected in the postoperative setting. There is no drainable fluid collection. Similar findings noted in the anterior  abdominal wall. THYROID: No nodule. MEDIASTINUM: No mass or lymphadenopathy. BON: No mass or lymphadenopathy. THORACIC AORTA: No dissection or aneurysm. MAIN PULMONARY ARTERY: Normal in caliber. TRACHEA/BRONCHI: Patent. ESOPHAGUS: No wall thickening or dilatation. HEART: Normal in size. Coronary artery calcium:  absent. PLEURA: No effusion or pneumothorax. LUNGS: No nodule, mass, or airspace disease. LIVER: No mass or biliary dilatation. GALLBLADDER: Unremarkable. SPLEEN: No mass. PANCREAS: No mass or ductal dilatation. ADRENALS: Unremarkable. KIDNEYS: No mass, calculus, or hydronephrosis. STOMACH: Unremarkable. SMALL BOWEL: No dilatation or wall thickening. COLON: No dilatation or wall thickening. APPENDIX: Unremarkable. PERITONEUM: No ascites or pneumoperitoneum. RETROPERITONEUM: No lymphadenopathy or aortic aneurysm.  Minimal for any new or worsening symptoms particularly chest pain, shortness of breath, fever, chills, nausea, vomiting, diarrhea, change in mentation, falling, weakness, bleeding.     Follow up plans/appointments  Follow-up Information    None          Brenden Rose MD  Family Medicine Resident       For Billing    Chief Complaint   Patient presents with    Post-op Problem

## 2023-07-23 LAB
BACTERIA SPEC CULT: ABNORMAL
BACTERIA SPEC CULT: NORMAL
BACTERIA SPEC CULT: NORMAL
GRAM STN SPEC: ABNORMAL
GRAM STN SPEC: ABNORMAL
SERVICE CMNT-IMP: ABNORMAL
SERVICE CMNT-IMP: NORMAL
SERVICE CMNT-IMP: NORMAL

## 2023-07-25 ENCOUNTER — TELEPHONE (OUTPATIENT)
Age: 58
End: 2023-07-25

## 2023-07-25 LAB
BACTERIA SPEC CULT: NORMAL
BACTERIA SPEC CULT: NORMAL
SERVICE CMNT-IMP: NORMAL
SERVICE CMNT-IMP: NORMAL

## 2023-07-25 NOTE — TELEPHONE ENCOUNTER
Care Transitions Initial Follow Up Call    Outreach made within 2 business days of discharge: Yes    Patient: Luz Peterson Patient : 1965   MRN: [id number]  Reason for Admission: There are no discharge diagnoses documented for the most recent discharge. Discharge Date: 23       Spoke with: Ms. Bertrand Howell    Discharge department/facility[facility]    TCM Interactive Patient Contact:  Was patient able to fill all prescriptions: Yes  Was patient instructed to bring all medications to the follow-up visit: Yes  Is patient taking all medications as directed in the discharge summary?  Yes  Does patient understand their discharge instructions: Yes  Does patient have questions or concerns that need addressed prior to 7-14 day follow up office visit: no    Scheduled appointment with PCP within 7-14 days    Follow Up  Future Appointments   Date Time Provider [address] [place] [address]   2023  9:20 AM Hubert Wharton MD [facility] BS AMB   8/15/2023  8:45 AM Aurora Avalos MD [facility] BS AMB   10/11/2023  9:00 AM Abdelrahman Love MD [facility] [address], [place]

## 2023-07-25 NOTE — ANESTHESIA POSTPROCEDURE EVALUATION
Department of Anesthesiology  Postprocedure Note    Patient: Carine Sanford  MRN: [id number]  YOB: 1965  Date of evaluation: 7/25/2023      Procedure Summary     Date: 07/11/23 Room / Location: [facility] MAIN OR F2 / [facility] MAIN OR    Anesthesia Start: 6257 Anesthesia Stop: 8494    Procedure: REMOVAL OF LEFT BREAST TISSUE EXPANDER, BILATERAL BREAST RECONSTRUCTION WITH SHELL MICROSURGICAL FREE FLAPS (Bilateral: Breast) Diagnosis:       Malignant neoplasm of female breast, unspecified estrogen receptor status, unspecified laterality, unspecified site of breast ([address])      (Malignant neoplasm of female breast, unspecified estrogen receptor status, unspecified laterality, unspecified site of breast ([address]) Umesh Jimenez)    Surgeons: Sierra Bocanegra MD Responsible Provider: Margaret Cesar MD    Anesthesia Type: General ASA Status: 3          Anesthesia Type: General    Laurie Phase I: Laurie Score: 8    Laurie Phase II:        Anesthesia Post Evaluation    Patient location during evaluation: PACU  Patient participation: complete - patient participated  Level of consciousness: awake and awake and alert  Pain score: 0  Airway patency: patent  Nausea & Vomiting: no nausea and no vomiting  Cardiovascular status: hemodynamically stable  Respiratory status: acceptable  Hydration status: stable

## 2023-07-28 SDOH — ECONOMIC STABILITY: FOOD INSECURITY: WITHIN THE PAST 12 MONTHS, THE FOOD YOU BOUGHT JUST DIDN'T LAST AND YOU DIDN'T HAVE MONEY TO GET MORE.: NEVER TRUE

## 2023-07-28 SDOH — ECONOMIC STABILITY: INCOME INSECURITY: HOW HARD IS IT FOR YOU TO PAY FOR THE VERY BASICS LIKE FOOD, HOUSING, MEDICAL CARE, AND HEATING?: NOT HARD AT ALL

## 2023-07-28 SDOH — ECONOMIC STABILITY: HOUSING INSECURITY
IN THE LAST 12 MONTHS, WAS THERE A TIME WHEN YOU DID NOT HAVE A STEADY PLACE TO SLEEP OR SLEPT IN A SHELTER (INCLUDING NOW)?: NO

## 2023-07-28 SDOH — ECONOMIC STABILITY: TRANSPORTATION INSECURITY
IN THE PAST 12 MONTHS, HAS LACK OF TRANSPORTATION KEPT YOU FROM MEETINGS, WORK, OR FROM GETTING THINGS NEEDED FOR DAILY LIVING?: NO

## 2023-07-28 SDOH — ECONOMIC STABILITY: FOOD INSECURITY: WITHIN THE PAST 12 MONTHS, YOU WORRIED THAT YOUR FOOD WOULD RUN OUT BEFORE YOU GOT MONEY TO BUY MORE.: NEVER TRUE

## 2023-07-31 ENCOUNTER — OFFICE VISIT (OUTPATIENT)
Age: 58
End: 2023-07-31

## 2023-07-31 VITALS
HEART RATE: 79 BPM | HEIGHT: 64 IN | BODY MASS INDEX: 38.07 KG/M2 | SYSTOLIC BLOOD PRESSURE: 114 MMHG | WEIGHT: 223 LBS | TEMPERATURE: 98.4 F | OXYGEN SATURATION: 94 % | DIASTOLIC BLOOD PRESSURE: 76 MMHG

## 2023-07-31 DIAGNOSIS — Z09 HOSPITAL DISCHARGE FOLLOW-UP: Primary | ICD-10-CM

## 2023-07-31 DIAGNOSIS — T81.49XA SURGICAL SITE INFECTION: ICD-10-CM

## 2023-07-31 DIAGNOSIS — N61.0 CELLULITIS OF RIGHT BREAST: ICD-10-CM

## 2023-07-31 DIAGNOSIS — Z98.890 STATUS POST BILATERAL BREAST RECONSTRUCTION: ICD-10-CM

## 2023-07-31 RX ORDER — GABAPENTIN 300 MG/1
300 CAPSULE ORAL
COMMUNITY
Start: 2019-12-09

## 2023-07-31 ASSESSMENT — PATIENT HEALTH QUESTIONNAIRE - PHQ9
SUM OF ALL RESPONSES TO PHQ QUESTIONS 1-9: 0
1. LITTLE INTEREST OR PLEASURE IN DOING THINGS: 0
SUM OF ALL RESPONSES TO PHQ QUESTIONS 1-9: 0
SUM OF ALL RESPONSES TO PHQ9 QUESTIONS 1 & 2: 0
2. FEELING DOWN, DEPRESSED OR HOPELESS: 0

## 2023-07-31 NOTE — PROGRESS NOTES
Post-Discharge Transitional Care  Follow Up      Terence Artis   YOB: 1965    Date of Office Visit:  7/31/2023  Date of Hospital Admission: 7/19/23  Date of Hospital Discharge: 7/22/23  Risk of hospital readmission (high >=14%. Medium >=10%) :Readmission Risk Score: 8.8      Care management risk score Rising risk (score 2-5) and Complex Care (Scores >=6): No Risk Score On File     Non face to face  following discharge, date last encounter closed (first attempt may have been earlier): 07/25/2023    Call initiated 2 business days of discharge: Yes    ASSESSMENT/PLAN:   Hospital discharge follow-up  -     LA DISCHARGE MEDS RECONCILED W/ CURRENT OUTPATIENT MED LIST  -     Has Completed course of PO Levaquin, no a/e experienced  -     Has follow up with plastic surgeon on 08/02/2023  -     Post op pain control is adequate     Surgical site infection       -      Pseudomonas sensitive to levaquin       -      Has been afebrile since hospital discharge and feels generally better       -      Breast drains removed by plastic surgeon and one abdominal drain was taken out leaving one behind       -      No drainage from breast, right breast is mildly inflamed in upper outer quadrant, inflammation has been reducing since discharge        -      55-60 cc serosanguinous fluid draining daily from abdominal drain       -      Will not need any more antibiotics            Status post bilateral breast reconstruction  Surgery on 7/11, bilat breast reconstruct with SHELL, microsurgery, free flaps, F/u Dr Allyson Amaya. -      Has mild retrosternal pain when she coughs and sneezes, pain is reproducible by palpation and relieved by NSAIDs. Likely costochondritis post surgery       -      F/w Dr Allyson Amaya (plastic surgeon), next appt in 2 days      Medical Decision Making: straightforward  Return if symptoms worsen or fail to improve.            Subjective:   HPI:  Follow up of Hospital problems/diagnosis(es) for surgical

## 2023-08-03 NOTE — PROGRESS NOTES
I saw and evaluated the patient, performing the key elements of the service on the date of service. I discussed the findings, assessment and plan with the resident and agree with the resident's findings and plan as documented in the resident's note.      Frankey Helling, MD 8/3/2023

## 2023-08-15 ENCOUNTER — OFFICE VISIT (OUTPATIENT)
Age: 58
End: 2023-08-15
Payer: COMMERCIAL

## 2023-08-15 VITALS
HEIGHT: 64 IN | BODY MASS INDEX: 37.73 KG/M2 | TEMPERATURE: 98.1 F | DIASTOLIC BLOOD PRESSURE: 76 MMHG | OXYGEN SATURATION: 95 % | SYSTOLIC BLOOD PRESSURE: 133 MMHG | HEART RATE: 91 BPM | WEIGHT: 221 LBS | RESPIRATION RATE: 18 BRPM

## 2023-08-15 DIAGNOSIS — C50.111 MALIGNANT NEOPLASM OF CENTRAL PORTION OF RIGHT BREAST IN FEMALE, ESTROGEN RECEPTOR POSITIVE (HCC): Primary | ICD-10-CM

## 2023-08-15 DIAGNOSIS — Z78.0 POSTMENOPAUSAL: ICD-10-CM

## 2023-08-15 DIAGNOSIS — Z17.0 MALIGNANT NEOPLASM OF CENTRAL PORTION OF RIGHT BREAST IN FEMALE, ESTROGEN RECEPTOR POSITIVE (HCC): Primary | ICD-10-CM

## 2023-08-15 PROBLEM — C50.919 BREAST CANCER IN FEMALE (HCC): Status: RESOLVED | Noted: 2023-07-11 | Resolved: 2023-08-15

## 2023-08-15 PROCEDURE — 99214 OFFICE O/P EST MOD 30 MIN: CPT | Performed by: INTERNAL MEDICINE

## 2023-08-15 RX ORDER — PREDNISONE 5 MG/1
5 TABLET ORAL DAILY
Qty: 10 TABLET | Refills: 5 | Status: SHIPPED | OUTPATIENT
Start: 2023-08-15

## 2023-08-15 NOTE — PROGRESS NOTES
[facility] is a 66 y.o. female is here for follow up for breast cancer. 1. Have you been to the ER, urgent care clinic since your last visit? Hospitalized since your last visit? Yes [facility]     2. Have you seen or consulted any other health care providers outside of the [address] [place] [address] since your last visit? Include any pap smears or colon screening.  No
summarized above. Pathology report(s) reviewed above. Radiology report(s) reviewed above. Assessment/plan:     1. Right breast IDC, gr 1, ER +, MO +, HER 2 negative, LN + by core:  cT2 cN1a cM0, stage IIB, prognostic stage IA   Pre/perimenopausal       6/1/22 left breast core bx:  DCIS, gr 2, involving intraductal papilloma, ER + at 95%    6/27/22 left breast mastectomy: DCIS 20 mm, gr 2, pTis pNx cM0; right breast mastectomy:  IDC 17 mm, gr 1, ER + 95%, MO + 95%, HER-2 negative at IHC  1+. 1/5 LN positive with 5.5 mm carcinoma, ER + 95%, MO +95%, HER-2 negative at IHC 1+, ki67 15%; DCIS  present, not extensive, gr 2,  pT1c pN1a cM0, stage IIA, prognostic stage IA         We have explained to the patient that the goal of systemic adjuvant therapy is to improve the chances for cure and decrease the risk of relapse. We explained why a patient can have microscopic cancer spread now even though physical examination, laboratory studies and imaging studies are negative for cancer. We explained that the same treatments used now as adjuvant or preventive treatments rarely if ever are curative in women who develop metastases. Discussed that based on [employer], chemotherapy would be warranted (premenopausal with LN + disease). Discussed that the update to MINDACT validates mammaprint in this setting (> 48years old, LN + disease). Low risk mammaprint. With age > 48, and with the updated MINDACT results, I am ok with no chemotherapy      She completed XRT to the right breast. Started 9/6/22 and completed 10/10/22. S/P SHELL flap reconstruction 7/11/23 with some infection/cellulitis persisting. Will avoid tamoxifen due to history of VTE. We discussed that for high risk women with breast cancer (having either received chemotherapy or < 28years old), ovarian suppression (such as goserelin 10.8 mg q 3 months ) + AI was superior in terms of overall survival than tamoxifen alone.

## 2023-09-12 ENCOUNTER — HOSPITAL ENCOUNTER (OUTPATIENT)
Facility: HOSPITAL | Age: 58
Discharge: HOME | End: 2023-09-15
Attending: INTERNAL MEDICINE
Payer: COMMERCIAL

## 2023-09-12 DIAGNOSIS — Z78.0 POSTMENOPAUSAL: ICD-10-CM

## 2023-09-12 PROCEDURE — 77080 DXA BONE DENSITY AXIAL: CPT

## 2023-10-10 PROBLEM — D05.12 INTRADUCTAL CARCINOMA IN SITU OF LEFT BREAST: Status: ACTIVE | Noted: 2023-10-10

## 2023-10-10 PROBLEM — C50.411 MALIGNANT NEOPLASM OF UPPER-OUTER QUADRANT OF RIGHT FEMALE BREAST (HCC): Status: ACTIVE | Noted: 2023-07-11

## 2023-10-11 ENCOUNTER — HOSPITAL ENCOUNTER (OUTPATIENT)
Facility: HOSPITAL | Age: 58
Discharge: HOME | End: 2023-10-14

## 2023-10-11 VITALS
HEART RATE: 72 BPM | WEIGHT: 220 LBS | OXYGEN SATURATION: 97 % | DIASTOLIC BLOOD PRESSURE: 80 MMHG | BODY MASS INDEX: 37.56 KG/M2 | RESPIRATION RATE: 16 BRPM | HEIGHT: 64 IN | SYSTOLIC BLOOD PRESSURE: 124 MMHG

## 2023-10-11 DIAGNOSIS — D05.12 INTRADUCTAL CARCINOMA IN SITU OF LEFT BREAST: ICD-10-CM

## 2023-10-11 DIAGNOSIS — Z17.0 MALIGNANT NEOPLASM OF UPPER-OUTER QUADRANT OF RIGHT BREAST IN FEMALE, ESTROGEN RECEPTOR POSITIVE (HCC): Primary | ICD-10-CM

## 2023-10-11 DIAGNOSIS — C50.411 MALIGNANT NEOPLASM OF UPPER-OUTER QUADRANT OF RIGHT BREAST IN FEMALE, ESTROGEN RECEPTOR POSITIVE (HCC): Primary | ICD-10-CM

## 2023-10-11 RX ORDER — CYCLOBENZAPRINE HCL 10 MG
10 TABLET ORAL 3 TIMES DAILY PRN
COMMUNITY
Start: 2023-09-05

## 2023-10-11 ASSESSMENT — PAIN SCALES - GENERAL: PAINLEVEL_OUTOF10: 0

## 2023-10-25 ENCOUNTER — OFFICE VISIT (OUTPATIENT)
Age: 58
End: 2023-10-25
Payer: COMMERCIAL

## 2023-10-25 VITALS
DIASTOLIC BLOOD PRESSURE: 81 MMHG | OXYGEN SATURATION: 99 % | TEMPERATURE: 98 F | SYSTOLIC BLOOD PRESSURE: 135 MMHG | BODY MASS INDEX: 38.96 KG/M2 | HEART RATE: 76 BPM | WEIGHT: 227 LBS

## 2023-10-25 DIAGNOSIS — Z86.010 HISTORY OF COLONIC POLYPS: ICD-10-CM

## 2023-10-25 DIAGNOSIS — Z23 ENCOUNTER FOR IMMUNIZATION: ICD-10-CM

## 2023-10-25 DIAGNOSIS — Z00.00 WELL WOMAN EXAM (NO GYNECOLOGICAL EXAM): Primary | ICD-10-CM

## 2023-10-25 DIAGNOSIS — Z12.11 SCREENING FOR COLON CANCER: ICD-10-CM

## 2023-10-25 PROCEDURE — 99213 OFFICE O/P EST LOW 20 MIN: CPT | Performed by: FAMILY MEDICINE

## 2023-10-25 PROCEDURE — 90732 PPSV23 VACC 2 YRS+ SUBQ/IM: CPT | Performed by: FAMILY MEDICINE

## 2023-10-25 PROCEDURE — 90471 IMMUNIZATION ADMIN: CPT | Performed by: FAMILY MEDICINE

## 2023-10-25 PROCEDURE — 99396 PREV VISIT EST AGE 40-64: CPT | Performed by: FAMILY MEDICINE

## 2023-10-25 ASSESSMENT — PATIENT HEALTH QUESTIONNAIRE - PHQ9
2. FEELING DOWN, DEPRESSED OR HOPELESS: 0
SUM OF ALL RESPONSES TO PHQ9 QUESTIONS 1 & 2: 0
6. FEELING BAD ABOUT YOURSELF - OR THAT YOU ARE A FAILURE OR HAVE LET YOURSELF OR YOUR FAMILY DOWN: 0
SUM OF ALL RESPONSES TO PHQ QUESTIONS 1-9: 0
SUM OF ALL RESPONSES TO PHQ QUESTIONS 1-9: 0
3. TROUBLE FALLING OR STAYING ASLEEP: 0
5. POOR APPETITE OR OVEREATING: 0
10. IF YOU CHECKED OFF ANY PROBLEMS, HOW DIFFICULT HAVE THESE PROBLEMS MADE IT FOR YOU TO DO YOUR WORK, TAKE CARE OF THINGS AT HOME, OR GET ALONG WITH OTHER PEOPLE: 0
4. FEELING TIRED OR HAVING LITTLE ENERGY: 0
7. TROUBLE CONCENTRATING ON THINGS, SUCH AS READING THE NEWSPAPER OR WATCHING TELEVISION: 0
SUM OF ALL RESPONSES TO PHQ QUESTIONS 1-9: 0
1. LITTLE INTEREST OR PLEASURE IN DOING THINGS: 0
8. MOVING OR SPEAKING SO SLOWLY THAT OTHER PEOPLE COULD HAVE NOTICED. OR THE OPPOSITE, BEING SO FIGETY OR RESTLESS THAT YOU HAVE BEEN MOVING AROUND A LOT MORE THAN USUAL: 0
SUM OF ALL RESPONSES TO PHQ QUESTIONS 1-9: 0
9. THOUGHTS THAT YOU WOULD BE BETTER OFF DEAD, OR OF HURTING YOURSELF: 0

## 2023-10-26 ENCOUNTER — OFFICE VISIT (OUTPATIENT)
Age: 58
End: 2023-10-26
Payer: COMMERCIAL

## 2023-10-26 VITALS
WEIGHT: 227 LBS | OXYGEN SATURATION: 97 % | BODY MASS INDEX: 38.96 KG/M2 | TEMPERATURE: 98.1 F | HEART RATE: 76 BPM | SYSTOLIC BLOOD PRESSURE: 140 MMHG | DIASTOLIC BLOOD PRESSURE: 86 MMHG | RESPIRATION RATE: 16 BRPM

## 2023-10-26 DIAGNOSIS — C50.111 MALIGNANT NEOPLASM OF CENTRAL PORTION OF RIGHT BREAST IN FEMALE, ESTROGEN RECEPTOR POSITIVE (HCC): Primary | ICD-10-CM

## 2023-10-26 DIAGNOSIS — M25.50 ARTHRALGIA, UNSPECIFIED JOINT: ICD-10-CM

## 2023-10-26 DIAGNOSIS — Z17.0 MALIGNANT NEOPLASM OF CENTRAL PORTION OF RIGHT BREAST IN FEMALE, ESTROGEN RECEPTOR POSITIVE (HCC): Primary | ICD-10-CM

## 2023-10-26 LAB
BUN SERPL-MCNC: 16 MG/DL (ref 6–24)
BUN/CREAT SERPL: 26 (ref 9–23)
CALCIUM SERPL-MCNC: 9.2 MG/DL (ref 8.7–10.2)
CHLORIDE SERPL-SCNC: 101 MMOL/L (ref 96–106)
CHOLEST SERPL-MCNC: 195 MG/DL (ref 100–199)
CO2 SERPL-SCNC: 25 MMOL/L (ref 20–29)
CREAT SERPL-MCNC: 0.62 MG/DL (ref 0.57–1)
EGFRCR SERPLBLD CKD-EPI 2021: 104 ML/MIN/1.73
ERYTHROCYTE [DISTWIDTH] IN BLOOD BY AUTOMATED COUNT: 13.2 % (ref 11.7–15.4)
GLUCOSE SERPL-MCNC: 82 MG/DL (ref 70–99)
HBA1C MFR BLD: 6.1 % (ref 4.8–5.6)
HCT VFR BLD AUTO: 41.2 % (ref 34–46.6)
HDLC SERPL-MCNC: 54 MG/DL
HGB BLD-MCNC: 13.3 G/DL (ref 11.1–15.9)
IMP & REVIEW OF LAB RESULTS: NORMAL
LDLC SERPL CALC-MCNC: 103 MG/DL (ref 0–99)
MCH RBC QN AUTO: 29.3 PG (ref 26.6–33)
MCHC RBC AUTO-ENTMCNC: 32.3 G/DL (ref 31.5–35.7)
MCV RBC AUTO: 91 FL (ref 79–97)
PLATELET # BLD AUTO: 316 X10E3/UL (ref 150–450)
POTASSIUM SERPL-SCNC: 4.1 MMOL/L (ref 3.5–5.2)
RBC # BLD AUTO: 4.54 X10E6/UL (ref 3.77–5.28)
SODIUM SERPL-SCNC: 138 MMOL/L (ref 134–144)
SPECIMEN STATUS REPORT: NORMAL
TRIGL SERPL-MCNC: 223 MG/DL (ref 0–149)
TSH SERPL DL<=0.005 MIU/L-ACNC: 3.04 UIU/ML (ref 0.45–4.5)
VLDLC SERPL CALC-MCNC: 38 MG/DL (ref 5–40)
WBC # BLD AUTO: 4.9 X10E3/UL (ref 3.4–10.8)

## 2023-10-26 PROCEDURE — 99214 OFFICE O/P EST MOD 30 MIN: CPT | Performed by: NURSE PRACTITIONER

## 2023-10-26 RX ORDER — PREDNISONE 10 MG/1
10 TABLET ORAL DAILY
Qty: 30 TABLET | Refills: 4 | Status: SHIPPED | OUTPATIENT
Start: 2023-10-26 | End: 2023-10-26 | Stop reason: CLARIF

## 2023-10-26 RX ORDER — PREDNISONE 5 MG/1
5 TABLET ORAL DAILY
Qty: 30 TABLET | Refills: 5 | Status: SHIPPED | OUTPATIENT
Start: 2023-10-26

## 2023-10-26 NOTE — PROGRESS NOTES
HPI:  Kathy Holland is a 66 y.o. female presenting for well woman exam.     Chief Complaint   Patient presents with    Annual Exam     Due for colonoscopy - requesting referral  Not ready for singles vaccine, will get Pneumonia vaccine        Concerns today:   No acute concern. Would like to start the medication to help with the weight loss. She is unable to do any particular exercise due to chronic pain. Not following any particular diet. She is currently on daily Prednisone 5 mg for chronic pain. She is going to see the rheumatologist in January 2024 for further evaluation. Health Maintenance:  Pap smear: 2022-Negative for IEL or malignancy with negative HPV.    Colonoscopy: Due     Immunizations:   Immunization History   Administered Date(s) Administered    COVID-19, MODERNA BLUE border, Primary or Immunocompromised, (age 12y+), IM, 100 mcg/0.5mL 08/09/2022    COVID-19, PFIZER PURPLE top, DILUTE for use, (age 15 y+), 30mcg/0.3mL 12/28/2020, 01/17/2021, 10/28/2021, 11/02/2022, 10/24/2023    Influenza Virus Vaccine 12/12/2017, 09/26/2023    Influenza, FLUARIX, FLULAVAL, Lieutenant Bonier (age 10 mo+) AND AFLURIA, (age 1 y+), PF, 0.5mL 12/12/2017    Pneumococcal, PPSV23, PNEUMOVAX 23, (age 2y+), SC/IM, 0.5mL 06/01/2018, 10/25/2023    TDaP, ADACEL (age 6y-58y), [facility] (age 10y+), IM, 0.5mL 05/25/2018         Menstrual, Pregnancy, and Sexual Histories:  Postmenopausal.       Allergies   Allergen Reactions    Phenergan [Promethazine Hcl]      Iv phenergan, hallucinations         Current Outpatient Medications   Medication Sig    Semaglutide-Weight Management (WEGOVY) 0.25 MG/0.5ML SOAJ SC injection Inject 0.25 mg into the skin every 7 days    predniSONE (DELTASONE) 5 MG tablet Take 1 tablet by mouth daily    DULoxetine (CYMBALTA) 60 MG extended release capsule Take 1 capsule by mouth daily    cyclobenzaprine (FLEXERIL) 10 MG tablet Take 1 tablet by mouth 3 times daily as needed    gabapentin (NEURONTIN) 300 MG

## 2023-10-26 NOTE — PROGRESS NOTES
Antonio Krabbe is a 66 y.o. female here today to follow up for breast cancer    1. Have you been to the ER, urgent care clinic since your last visit? Hospitalized since your last visit?no    2. Have you seen or consulted any other health care providers outside of the [address] [place] [address] Avenue since your last visit? Include any pap smears or colon screening.  no

## 2023-11-06 ENCOUNTER — NURSE TRIAGE (OUTPATIENT)
Dept: OTHER | Facility: CLINIC | Age: 58
End: 2023-11-06

## 2023-11-06 NOTE — TELEPHONE ENCOUNTER
[facility]    Received call from Bernadine at [facility] with Red Flag Complaint.    Subjective: Caller states cough, aches, chills and fever. \"Not feeling well\"    Current Symptoms: headache, body aches, sinus congestion, fever, cough and short of breath with activity.  Negative covid test yesterday.    Onset: 3 days ago; gradual, worsening    Associated Symptoms: reduced activity. See above symptoms    Pain Severity: 10/10; aching; constant    Temperature: 101 orally    What has been tried: unknown    LMP: NA Pregnant: NA    Recommended disposition: Go to ED Now    Care advice provided, patient verbalizes understanding; denies any other questions or concerns; instructed to call back for any new or worsening symptoms.    Patient/caller agrees to proceed to nearest Emergency Department    Attention Provider:  Thank you for allowing me to participate in the care of your patient.  The patient was connected to triage in response to information provided to the [facility].  Please do not respond through this encounter as the response is not directed to a shared pool.        Reason for Disposition   MODERATE difficulty breathing (e.g., speaks in phrases, SOB even at rest, pulse 100-120) of new-onset or worse than normal    Protocols used: Breathing Difficulty-ADULT-[place]

## 2024-02-14 ENCOUNTER — OFFICE VISIT (OUTPATIENT)
Age: 59
End: 2024-02-14
Payer: COMMERCIAL

## 2024-02-14 VITALS
RESPIRATION RATE: 16 BRPM | BODY MASS INDEX: 39.99 KG/M2 | WEIGHT: 233 LBS | DIASTOLIC BLOOD PRESSURE: 84 MMHG | SYSTOLIC BLOOD PRESSURE: 142 MMHG | OXYGEN SATURATION: 98 % | TEMPERATURE: 98.2 F | HEART RATE: 70 BPM

## 2024-02-14 DIAGNOSIS — Z17.0 MALIGNANT NEOPLASM OF CENTRAL PORTION OF RIGHT BREAST IN FEMALE, ESTROGEN RECEPTOR POSITIVE (HCC): Primary | ICD-10-CM

## 2024-02-14 DIAGNOSIS — M32.9 SLE (SYSTEMIC LUPUS ERYTHEMATOSUS RELATED SYNDROME) (HCC): ICD-10-CM

## 2024-02-14 DIAGNOSIS — C50.111 MALIGNANT NEOPLASM OF CENTRAL PORTION OF RIGHT BREAST IN FEMALE, ESTROGEN RECEPTOR POSITIVE (HCC): Primary | ICD-10-CM

## 2024-02-14 PROCEDURE — 99214 OFFICE O/P EST MOD 30 MIN: CPT | Performed by: INTERNAL MEDICINE

## 2024-02-14 RX ORDER — LETROZOLE 2.5 MG/1
2.5 TABLET, FILM COATED ORAL DAILY
COMMUNITY
Start: 2023-11-26

## 2024-02-14 RX ORDER — ERGOCALCIFEROL 1.25 MG/1
50000 CAPSULE ORAL WEEKLY
Qty: 12 CAPSULE | Refills: 0 | Status: SHIPPED | OUTPATIENT
Start: 2024-02-14

## 2024-02-14 RX ORDER — METHOTREXATE 2.5 MG/1
TABLET ORAL
COMMUNITY
Start: 2024-01-24

## 2024-02-14 RX ORDER — TRAMADOL HYDROCHLORIDE 50 MG/1
TABLET ORAL
COMMUNITY
Start: 2024-01-24

## 2024-02-14 RX ORDER — DOXYCYCLINE HYCLATE 100 MG
100 TABLET ORAL DAILY
COMMUNITY

## 2024-02-14 NOTE — PROGRESS NOTES
Kiki Chandler is a 58 y.o. female here today to follow up for breast cancer    1. Have you been to the ER, urgent care clinic since your last visit?  Hospitalized since your last visit?no    2. Have you seen or consulted any other health care providers outside of the [facility] since your last visit?  Include any pap smears or colon screening. no    
COVID-19 2022    Diverticulitis     Diverticulosis     GERD (gastroesophageal reflux disease) 2023    Pyelonephritis 06/07/2014    Thromboembolus (HCC) 2020    post surgery-1 in R calf and 2 in R upper thigh     Past Surgical History:   Procedure Laterality Date    BREAST RECONSTRUCTION Bilateral 06/27/2022    BREAST RECONSTRUCTION performed by Jeanie Herrera MD at [facility] AMBULATORY OR    BREAST RECONSTRUCTION Bilateral 07/14/2022    RIGHT BREAST IRRIGATION AND DEBRIDEMENT REMOVAL OF RIGHT TISSUE EXPANDER AND FLEX HD, CLOSURE OF LEFT BREAST TRIPLE POINT WOUND performed by Jeanie Herrera MD at [facility] MAIN OR    BREAST RECONSTRUCTION Bilateral 07/11/2023    REMOVAL OF LEFT BREAST TISSUE EXPANDER, BILATERAL BREAST RECONSTRUCTION WITH SHELL MICROSURGICAL FREE FLAPS performed by Filemon Dejesus MD at [facility] MAIN OR    COLONOSCOPY N/A 06/13/2018    COLONOSCOPY performed by Casey Tee MD at [facility] ENDOSCOPY    HX PARTIAL COLECTOMY  2022    partial sigmoid colectomy    MASTECTOMY Bilateral 06/27/2022    BILATERAL BREAST MASTECTOMIES AND RIGHT AXILLARY LYMPH NODE DISSECTION/BILATERAL BREAST RECONSTRUCTION WITH POSSIBLE TISSUE EXPANDER/POSSIBLE DIRECT TO IMPLANT/ ACELLULAR DERMAL MATRIX performed by Ariane Hawley MD at [facility] AMBULATORY OR    MRI BREAST BX USING DEVICE LEFT Left 06/01/2022    MRI BREAST BX USING DEVICE LEFT 6/1/2022 [facility] RAD MRI    ORTHOPEDIC SURGERY Right 2020    tendon and ligament repair to right foot    OTHER SURGICAL HISTORY  1988    vein ligation    ROTATOR CUFF REPAIR Left 09/17/2021    ROTATOR CUFF REPAIR Right 08/30/2023    SALPINGO-OOPHORECTOMY Bilateral 01/26/2023    Laparoscopic bilateral salpingo-oophorectomy, Dr. Sohan King    TONSILLECTOMY  1985    TUBAL LIGATION  1994     Social History     Tobacco Use    Smoking status: Former     Current packs/day: 0.00     Average packs/day: 1 pack/day for 32.0 years (32.0 ttl pk-yrs)     Types: Cigarettes     Start date: 1/1/1959     Quit date: 1/1/1991

## 2024-03-19 ENCOUNTER — HOSPITAL ENCOUNTER (OUTPATIENT)
Facility: HOSPITAL | Age: 59
Discharge: HOME | End: 2024-03-22
Attending: INTERNAL MEDICINE
Payer: COMMERCIAL

## 2024-03-19 DIAGNOSIS — Z17.0 MALIGNANT NEOPLASM OF CENTRAL PORTION OF RIGHT BREAST IN FEMALE, ESTROGEN RECEPTOR POSITIVE (HCC): ICD-10-CM

## 2024-03-19 DIAGNOSIS — C50.111 MALIGNANT NEOPLASM OF CENTRAL PORTION OF RIGHT BREAST IN FEMALE, ESTROGEN RECEPTOR POSITIVE (HCC): ICD-10-CM

## 2024-03-19 PROCEDURE — 71250 CT THORAX DX C-: CPT

## 2024-06-27 ENCOUNTER — OFFICE VISIT (OUTPATIENT)
Age: 59
End: 2024-06-27

## 2024-06-27 VITALS
HEIGHT: 64 IN | WEIGHT: 242 LBS | OXYGEN SATURATION: 95 % | RESPIRATION RATE: 18 BRPM | BODY MASS INDEX: 41.32 KG/M2 | TEMPERATURE: 98.1 F | SYSTOLIC BLOOD PRESSURE: 130 MMHG | HEART RATE: 84 BPM | DIASTOLIC BLOOD PRESSURE: 71 MMHG

## 2024-06-27 DIAGNOSIS — J02.0 ACUTE STREPTOCOCCAL PHARYNGITIS: Primary | ICD-10-CM

## 2024-06-27 DIAGNOSIS — J02.9 SORE THROAT: ICD-10-CM

## 2024-06-27 LAB
GROUP A STREP ANTIGEN, POC: POSITIVE
VALID INTERNAL CONTROL, POC: YES

## 2024-06-27 RX ORDER — PREDNISONE 10 MG/1
20 TABLET ORAL DAILY
COMMUNITY
Start: 2024-06-07

## 2024-06-27 RX ORDER — AMOXICILLIN 500 MG/1
500 CAPSULE ORAL 2 TIMES DAILY
Qty: 20 CAPSULE | Refills: 0 | Status: SHIPPED | OUTPATIENT
Start: 2024-06-27 | End: 2024-07-07

## 2024-06-27 RX ORDER — PREGABALIN 200 MG/1
CAPSULE ORAL
COMMUNITY

## 2024-06-27 NOTE — PROGRESS NOTES
[address] [place] [address]   Office [phone], Fax [phone]    Subjective   CC:  Kiki RAINES [place] is a 59 y.o. female who presents for URI sxs.    URI sxs:  - Started 5-7 days ago  - Worsening  - Sxs: worsening fatigued with exertion, sore throat (started yesterday), ears were hurting prior, headaches, sinus pressure  - No f/c at home  - Pain with swallowing  - Decreased appetite, ok w fluid intake  - No decreased UOP    Complete ROS performed and pertinent positives/negatives are documented in HPI.    Past Medical History  Past Medical History:   Diagnosis Date    Allergy-induced asthma     Anxiety and depression     Arthritis     hands    Bipolar 2 disorder (HCC)     Breast cancer (HCC) 2022    Right and left breast    Chronic pain 2022    with Injections of Lupin/ Zoladex    COVID-19 2022    Diverticulitis     Diverticulosis     GERD (gastroesophageal reflux disease) 2023    Pyelonephritis 06/07/2014    Thromboembolus (HCC) 2020    post surgery-1 in R calf and 2 in R upper thigh       Current Medications  Current Outpatient Medications   Medication Sig Dispense Refill    predniSONE (DELTASONE) 10 MG tablet Take 2 tablets by mouth daily      pregabalin (LYRICA) 200 MG capsule       amoxicillin (AMOXIL) 500 MG capsule Take 1 capsule by mouth 2 times daily for 10 days 20 capsule 0    NONFORMULARY Azelaic acid/ivermectin/metron compound for rosacia      letrozole (FEMARA) 2.5 MG tablet Take 1 tablet by mouth daily      vitamin D (ERGOCALCIFEROL) 1.25 MG (54708 UT) CAPS capsule Take 1 capsule by mouth once a week 12 capsule 0    DULoxetine (CYMBALTA) 60 MG extended release capsule Take 1 capsule by mouth daily       No current facility-administered medications for this visit.       Allergies  Allergies   Allergen Reactions    Phenergan [Promethazine Hcl]      Iv phenergan, hallucinations       Objective   Vital Signs  /71 (Site: Right Upper Arm, Position: Sitting, Cuff Size: Large

## 2024-06-27 NOTE — PROGRESS NOTES
Patient has been identified by name and .    Chief Complaint   Patient presents with    URI     Patient with SOB, cough, sore throat, ear pain, head pain x 5 days - COVID home test negative. No fevers.       Vitals:    24 1816   BP: 130/71   Site: Right Upper Arm   Position: Sitting   Cuff Size: Large Adult   Pulse: 84   Resp: 18   Temp: 98.1 °F (36.7 °C)   TempSrc: Oral   SpO2: 95%   Weight: 109.8 kg (242 lb)   Height: 1.626 m (5' 4\")        \"Have you been to the ER, urgent care clinic since your last visit?  Hospitalized since your last visit?\"    NO    “Have you seen or consulted any other health care providers outside of [facility] since your last visit?”    NO

## 2024-07-17 ENCOUNTER — OFFICE VISIT (OUTPATIENT)
Age: 59
End: 2024-07-17

## 2024-07-17 VITALS
WEIGHT: 249.2 LBS | HEART RATE: 75 BPM | SYSTOLIC BLOOD PRESSURE: 124 MMHG | RESPIRATION RATE: 18 BRPM | BODY MASS INDEX: 42.55 KG/M2 | TEMPERATURE: 97.7 F | OXYGEN SATURATION: 96 % | HEIGHT: 64 IN | DIASTOLIC BLOOD PRESSURE: 83 MMHG

## 2024-07-17 DIAGNOSIS — J02.9 SORE THROAT: Primary | ICD-10-CM

## 2024-07-17 DIAGNOSIS — J02.0 ACUTE STREPTOCOCCAL PHARYNGITIS: ICD-10-CM

## 2024-07-17 LAB
GROUP A STREP ANTIGEN, POC: POSITIVE
VALID INTERNAL CONTROL, POC: YES

## 2024-07-17 RX ORDER — CYCLOBENZAPRINE HCL 10 MG
10 TABLET ORAL 3 TIMES DAILY PRN
COMMUNITY

## 2024-07-17 RX ORDER — AMOXICILLIN AND CLAVULANATE POTASSIUM 875; 125 MG/1; MG/1
1 TABLET, FILM COATED ORAL 2 TIMES DAILY
Qty: 14 TABLET | Refills: 0 | Status: SHIPPED | OUTPATIENT
Start: 2024-07-17 | End: 2024-07-24

## 2024-07-17 RX ORDER — HYDROCODONE BITARTRATE AND ACETAMINOPHEN 5; 325 MG/1; MG/1
1 TABLET ORAL EVERY 6 HOURS PRN
COMMUNITY

## 2024-07-18 ENCOUNTER — PATIENT MESSAGE (OUTPATIENT)
Age: 59
End: 2024-07-18

## 2024-07-18 DIAGNOSIS — C50.111 MALIGNANT NEOPLASM OF CENTRAL PORTION OF RIGHT BREAST IN FEMALE, ESTROGEN RECEPTOR POSITIVE (HCC): Primary | ICD-10-CM

## 2024-07-18 DIAGNOSIS — M25.50 ARTHRALGIA, UNSPECIFIED JOINT: ICD-10-CM

## 2024-07-18 DIAGNOSIS — Z17.0 MALIGNANT NEOPLASM OF CENTRAL PORTION OF RIGHT BREAST IN FEMALE, ESTROGEN RECEPTOR POSITIVE (HCC): Primary | ICD-10-CM

## 2024-07-18 RX ORDER — DULOXETIN HYDROCHLORIDE 60 MG/1
60 CAPSULE, DELAYED RELEASE ORAL DAILY
Qty: 90 CAPSULE | Refills: 2 | Status: SHIPPED | OUTPATIENT
Start: 2024-07-18

## 2024-08-08 ENCOUNTER — OFFICE VISIT (OUTPATIENT)
Age: 59
End: 2024-08-08
Payer: COMMERCIAL

## 2024-08-08 VITALS
SYSTOLIC BLOOD PRESSURE: 138 MMHG | BODY MASS INDEX: 43.29 KG/M2 | OXYGEN SATURATION: 96 % | HEART RATE: 81 BPM | WEIGHT: 253.6 LBS | DIASTOLIC BLOOD PRESSURE: 88 MMHG | TEMPERATURE: 98.1 F | HEIGHT: 64 IN

## 2024-08-08 DIAGNOSIS — S05.02XA ABRASION OF LEFT CORNEA, INITIAL ENCOUNTER: Primary | ICD-10-CM

## 2024-08-08 PROCEDURE — 99213 OFFICE O/P EST LOW 20 MIN: CPT

## 2024-08-08 ASSESSMENT — PATIENT HEALTH QUESTIONNAIRE - PHQ9
1. LITTLE INTEREST OR PLEASURE IN DOING THINGS: NOT AT ALL
2. FEELING DOWN, DEPRESSED OR HOPELESS: NOT AT ALL
SUM OF ALL RESPONSES TO PHQ QUESTIONS 1-9: 0
SUM OF ALL RESPONSES TO PHQ QUESTIONS 1-9: 0
SUM OF ALL RESPONSES TO PHQ9 QUESTIONS 1 & 2: 0
SUM OF ALL RESPONSES TO PHQ QUESTIONS 1-9: 0
SUM OF ALL RESPONSES TO PHQ QUESTIONS 1-9: 0

## 2024-08-08 NOTE — PATIENT INSTRUCTIONS
- avoid contact lens use  - you may take tylenol and or ibuprofen as needed for pain  - if symptoms worsen or do not improve in 48 hours time, call the clinic  - you can trial tears - this is an over the counter eye drop best used at night  - avoid scratching or rubbing the eye

## 2024-08-08 NOTE — PROGRESS NOTES
Kiki RAINES [place] is a 59 y.o. female      Chief Complaint   Patient presents with    Eye Injury     Left eye injury yesterday eye pain and scratched, feels gritty, eye dryness.       \"Have you been to the ER, urgent care clinic since your last visit?  Hospitalized since your last visit?\"    NO    “Have you seen or consulted any other health care providers outside of [facility] since your last visit?”    NO            Click Here for Release of Records Request    Vitals:    08/08/24 1524   Weight: 115 kg (253 lb 9.6 oz)           Medication Reconciliation Completed, changes notes. Please Update medication list.

## 2024-08-08 NOTE — PROGRESS NOTES
[facility]  [address] [place] [address]   Office [phone], Fax [phone]      Chief Complaint:     Chief Complaint   Patient presents with    Eye Injury     Left eye injury yesterday -pain , \"feels gritty\" and dryness.       SUBJECTIVE  Kiki RAINES Lap[place] is an 59 y.o. female who presents left eye pain    #left eye pain  feels like she scratched this eye  Onset this morning. Woke with it  Attempted to flush with saline without relief  Reports blurry vision and sensitive to light, sore  No discharge  No worsening pain with movement    Allergies   Allergies   Allergen Reactions    Phenergan [Promethazine Hcl]      Iv phenergan, hallucinations         Medications   Current Outpatient Medications   Medication Sig    DULoxetine (CYMBALTA) 60 MG extended release capsule Take 1 capsule by mouth daily    HYDROcodone-acetaminophen (NORCO) 5-325 MG per tablet Take 1 tablet by mouth every 6 hours as needed for Pain.    cyclobenzaprine (FLEXERIL) 10 MG tablet Take 1 tablet by mouth 3 times daily as needed for Muscle spasms    pregabalin (LYRICA) 200 MG capsule Take 1 capsule by mouth in the morning, at noon, and at bedtime.    NONFORMULARY Azelaic acid/ivermectin/metronidazole compound for rosacea    letrozole (FEMARA) 2.5 MG tablet Take 1 tablet by mouth daily    vitamin D (ERGOCALCIFEROL) 1.25 MG (03134 UT) CAPS capsule Take 1 capsule by mouth once a week (Patient taking differently: Take 2 capsules by mouth once a week)    predniSONE (DELTASONE) 10 MG tablet Take 1 tablet by mouth daily Tapering down to 5mg daily     No current facility-administered medications for this visit.         Past surgical, medical and social history reviewed and updated as relevant to presenting concerns.     ROS: See HPI      OBJECTIVE  /88 (Site: Right Upper Arm, Position: Sitting, Cuff Size: Large Adult)   Pulse 81   Temp 98.1 °F (36.7 °C) (Oral)   Ht 1.626 m (5' 4.02\")   Wt 115 kg (253

## 2024-08-14 ENCOUNTER — OFFICE VISIT (OUTPATIENT)
Age: 59
End: 2024-08-14
Payer: COMMERCIAL

## 2024-08-14 VITALS
TEMPERATURE: 97.3 F | DIASTOLIC BLOOD PRESSURE: 81 MMHG | BODY MASS INDEX: 42.68 KG/M2 | SYSTOLIC BLOOD PRESSURE: 137 MMHG | OXYGEN SATURATION: 96 % | HEART RATE: 74 BPM | HEIGHT: 64 IN | WEIGHT: 250 LBS

## 2024-08-14 DIAGNOSIS — C50.111 MALIGNANT NEOPLASM OF CENTRAL PORTION OF RIGHT BREAST IN FEMALE, ESTROGEN RECEPTOR POSITIVE (HCC): Primary | ICD-10-CM

## 2024-08-14 DIAGNOSIS — Z17.0 MALIGNANT NEOPLASM OF CENTRAL PORTION OF RIGHT BREAST IN FEMALE, ESTROGEN RECEPTOR POSITIVE (HCC): Primary | ICD-10-CM

## 2024-08-14 PROCEDURE — 99214 OFFICE O/P EST MOD 30 MIN: CPT | Performed by: NURSE PRACTITIONER

## 2024-08-14 RX ORDER — LETROZOLE 2.5 MG/1
2.5 TABLET, FILM COATED ORAL DAILY
Qty: 90 TABLET | Refills: 3 | Status: SHIPPED | OUTPATIENT
Start: 2024-08-14

## 2024-08-14 NOTE — PROGRESS NOTES
I have reviewed all needed documentation in preparation for visit. Verified patient by name and date of birth  Kiki RAINES Thornton is a 59 y.o. female Follow-up (Malignant neoplasm of central portion of right breast in female, estrogen receptor positive (HCC))  .    Vitals:    08/14/24 0804   BP: 137/81   Pulse: 74   Temp: 97.3 °F (36.3 °C)   SpO2: 96%   Weight: 113.4 kg (250 lb)   Height: 1.626 m (5' 4.02\")       No LMP recorded. (Menstrual status: Other - See Notes).         Health Maintenance Review: Patient reminded of \"due or due soon\" health maintenance. I have asked the patient to contact his/her primary care provider (PCP) for follow-up on his/her health maintenance.    \"Have you been to the ER, urgent care clinic since your last visit?  Hospitalized since your last visit?\"    NO    “Have you seen or consulted any other health care providers outside of [facility] since your last visit?”    NO      
axillary breast, posterior depth at 1:30, nonmass   enhancement measures 3.7 cm AP, 1.1 cm TRV and approximately 2.4 cm CC. It shows   dynamic washout enhancement and is suspicious for contralateral malignancy. No   other dominant abnormality of morphology or enhancement appears in the left   breast. Left axillary lymph nodes are not enlarged, but are morphologically   indeterminate.        IMPRESSION:   1. Right BI-RADS 6, known malignancy. Left BI-RADS 4, suspicious.   2. RIGHT BREAST: Known invasive ductal carcinoma at 9:00, with additional   findings between this mass and the nipple for a distance of 8.2 cm, suspicious   for ductal extension to the nipple areolar complex as described above.   Indeterminate lymph nodes, no biopsy clip artifact detected.   3. LEFT BREAST: Suspicious nonmass enhancement in the axillary breast 1:30, for   which ultrasonography could be attempted for biopsy localization to exclude   contralateral malignancy. However, this lesion may be sonographically subtle,   and MRI guided biopsy may be necessary to ensure concordance. Indeterminate left   axillary lymph nodes.   4. A summary portfolio has been created for reference and is available in PACS.      5/24/22 US breast L   IMPRESSION   No discrete mass is identified.   BI-RADS Category 4 - Suspicious abnormality.   .   RECOMMENDATION:   MRI guided biopsy which will be technically challenging due to the far posterior   position of the MRI finding.    Bone Scan 1/31/23: FINDINGS: Physiologic uptake of radiotracer is seen in the skeleton and soft  tissues. Slight activity in the ankles/feet is most likely degenerative. There  is no evidence for fracture or metastatic disease.  Incidental renal imaging  shows no abnormality.      IMPRESSION:  No evidence of bony metastatic disease.    CT Shoulder Right with contrast 6/21/23:   1. Complete tear of supraspinatus tendon with tendon retraction. No atrophy.  2. Intact long head biceps

## 2024-10-24 ENCOUNTER — OFFICE VISIT (OUTPATIENT)
Age: 59
End: 2024-10-24

## 2024-10-24 VITALS
DIASTOLIC BLOOD PRESSURE: 84 MMHG | OXYGEN SATURATION: 98 % | HEART RATE: 88 BPM | BODY MASS INDEX: 43.81 KG/M2 | SYSTOLIC BLOOD PRESSURE: 128 MMHG | WEIGHT: 256.6 LBS | RESPIRATION RATE: 17 BRPM | HEIGHT: 64 IN | TEMPERATURE: 98.2 F

## 2024-10-24 DIAGNOSIS — Z91.89 AT RISK FOR OBSTRUCTIVE SLEEP APNEA: ICD-10-CM

## 2024-10-24 DIAGNOSIS — E66.813 CLASS 3 DRUG-INDUCED OBESITY WITH SERIOUS COMORBIDITY AND BODY MASS INDEX (BMI) OF 40.0 TO 44.9 IN ADULT: ICD-10-CM

## 2024-10-24 DIAGNOSIS — Z00.00 WELL WOMAN EXAM (NO GYNECOLOGICAL EXAM): Primary | ICD-10-CM

## 2024-10-24 DIAGNOSIS — E55.9 VITAMIN D DEFICIENCY: ICD-10-CM

## 2024-10-24 DIAGNOSIS — E78.2 MIXED HYPERLIPIDEMIA: ICD-10-CM

## 2024-10-24 DIAGNOSIS — K57.92 DIVERTICULITIS: ICD-10-CM

## 2024-10-24 DIAGNOSIS — R73.03 PREDIABETES: ICD-10-CM

## 2024-10-24 DIAGNOSIS — E66.1 CLASS 3 DRUG-INDUCED OBESITY WITH SERIOUS COMORBIDITY AND BODY MASS INDEX (BMI) OF 40.0 TO 44.9 IN ADULT: ICD-10-CM

## 2024-10-24 DIAGNOSIS — R53.82 CHRONIC FATIGUE: ICD-10-CM

## 2024-10-24 RX ORDER — MEDROXYPROGESTERONE ACETATE 150 MG/ML
INJECTION, SUSPENSION INTRAMUSCULAR WEEKLY
COMMUNITY
Start: 2024-10-02

## 2024-10-24 RX ORDER — CYCLOSPORINE 0.5 MG/ML
EMULSION OPHTHALMIC
COMMUNITY
Start: 2024-10-19

## 2024-10-24 RX ORDER — ERGOCALCIFEROL 1.25 MG/1
50000 CAPSULE, LIQUID FILLED ORAL WEEKLY
COMMUNITY
Start: 2024-10-23

## 2024-10-24 RX ORDER — SULFASALAZINE 500 MG/1
1000 TABLET ORAL 2 TIMES DAILY
COMMUNITY
Start: 2024-09-02

## 2024-10-24 RX ORDER — PREDNISONE 10 MG/1
10 TABLET ORAL 2 TIMES DAILY PRN
COMMUNITY
Start: 2024-09-24

## 2024-10-24 SDOH — ECONOMIC STABILITY: FOOD INSECURITY: WITHIN THE PAST 12 MONTHS, YOU WORRIED THAT YOUR FOOD WOULD RUN OUT BEFORE YOU GOT MONEY TO BUY MORE.: NEVER TRUE

## 2024-10-24 SDOH — ECONOMIC STABILITY: FOOD INSECURITY: WITHIN THE PAST 12 MONTHS, THE FOOD YOU BOUGHT JUST DIDN'T LAST AND YOU DIDN'T HAVE MONEY TO GET MORE.: NEVER TRUE

## 2024-10-24 SDOH — ECONOMIC STABILITY: INCOME INSECURITY: HOW HARD IS IT FOR YOU TO PAY FOR THE VERY BASICS LIKE FOOD, HOUSING, MEDICAL CARE, AND HEATING?: NOT HARD AT ALL

## 2024-10-24 ASSESSMENT — PATIENT HEALTH QUESTIONNAIRE - PHQ9
SUM OF ALL RESPONSES TO PHQ QUESTIONS 1-9: 0
SUM OF ALL RESPONSES TO PHQ9 QUESTIONS 1 & 2: 0
1. LITTLE INTEREST OR PLEASURE IN DOING THINGS: NOT AT ALL
SUM OF ALL RESPONSES TO PHQ QUESTIONS 1-9: 0
2. FEELING DOWN, DEPRESSED OR HOPELESS: NOT AT ALL

## 2024-10-24 NOTE — PROGRESS NOTES
Room 5    Identified pt with two pt identifiers(name and ). Reviewed record in preparation for visit and have obtained necessary documentation.  Chief Complaint   Patient presents with    Weight Gain     C/o increase weight gain, thinks it may be related to Predinosone     Abdominal Pain     C/o abdominal pain, radiating from the right to left side of abdomen   HX of Diverticulitis     Annual Exam        Health Maintenance Due   Topic    HIV screen     Hepatitis B vaccine (1 of 3 - 19+ 3-dose series)    Shingles vaccine (1 of 2)    A1C test (Diabetic or Prediabetic)        Vitals:    10/24/24 0857   BP: 128/84   Site: Left Upper Arm   Position: Sitting   Cuff Size: Large Adult   Pulse: 88   Resp: 17   Temp: 98.2 °F (36.8 °C)   SpO2: 98%   Weight: 116.4 kg (256 lb 9.6 oz)   Height: 1.626 m (5' 4.02\")         \"Have you been to the ER, urgent care clinic since your last visit?  Hospitalized since your last visit?\"    No    “Have you seen or consulted any other health care providers outside of  since your last visit?”    [facility]oid Ar[facility]             Click Here for Release of Records Request     This patient is accompanied in the office by her self.  I have received verbal consent from Kiki RAINES [place] to discuss any/all medical information while they are present in the room.    
Range Status   10/24/2024 6.2 (H) 4.8 - 5.6 % Final     Comment:                 Prediabetes: 5.7 - 6.4           Diabetes: >6.4           Glycemic control for adults with diabetes: <7.0       Lab Results   Component Value Date    CHOL 212 (H) 10/24/2024    TRIG 209 (H) 10/24/2024    HDL 53 10/24/2024     (H) 10/24/2024    VLDL 37 10/24/2024    CHOLHDLRATIO 5.9 (H) 09/12/2022       Abdominal discomfort. No concerns for acute abdomen. Suspect the early diverticulitis given the history. Will treat with Augmentin. Strict ER precautions were given.     Health Maintenance:  -Up-tp-date          I have discussed the diagnosis with the patient and the intended plan as seen in the above orders.  The patient has received an after-visit summary and questions were answered concerning future plans.  I have discussed medication side effects and warnings with the patient as well. Informed pt to return to the office if new symptoms arise.        Abbi Lopez MD  Family Medicine Attending

## 2024-10-25 LAB
25(OH)D3+25(OH)D2 SERPL-MCNC: 46.1 NG/ML (ref 30–100)
ALBUMIN SERPL-MCNC: 4.3 G/DL (ref 3.8–4.9)
ALP SERPL-CCNC: 87 IU/L (ref 44–121)
ALT SERPL-CCNC: 30 IU/L (ref 0–32)
AST SERPL-CCNC: 26 IU/L (ref 0–40)
BILIRUB SERPL-MCNC: 0.3 MG/DL (ref 0–1.2)
BUN SERPL-MCNC: 15 MG/DL (ref 6–24)
BUN/CREAT SERPL: 20 (ref 9–23)
CALCIUM SERPL-MCNC: 9.6 MG/DL (ref 8.7–10.2)
CHLORIDE SERPL-SCNC: 105 MMOL/L (ref 96–106)
CHOLEST SERPL-MCNC: 212 MG/DL (ref 100–199)
CO2 SERPL-SCNC: 24 MMOL/L (ref 20–29)
CREAT SERPL-MCNC: 0.74 MG/DL (ref 0.57–1)
EGFRCR SERPLBLD CKD-EPI 2021: 94 ML/MIN/1.73
ERYTHROCYTE [DISTWIDTH] IN BLOOD BY AUTOMATED COUNT: 13 % (ref 11.7–15.4)
GLOBULIN SER CALC-MCNC: 2.5 G/DL (ref 1.5–4.5)
GLUCOSE SERPL-MCNC: 111 MG/DL (ref 70–99)
HBA1C MFR BLD: 6.2 % (ref 4.8–5.6)
HCT VFR BLD AUTO: 39.7 % (ref 34–46.6)
HDLC SERPL-MCNC: 53 MG/DL
HGB BLD-MCNC: 12.5 G/DL (ref 11.1–15.9)
IMP & REVIEW OF LAB RESULTS: NORMAL
LDLC SERPL CALC-MCNC: 122 MG/DL (ref 0–99)
MCH RBC QN AUTO: 29.5 PG (ref 26.6–33)
MCHC RBC AUTO-ENTMCNC: 31.5 G/DL (ref 31.5–35.7)
MCV RBC AUTO: 94 FL (ref 79–97)
PLATELET # BLD AUTO: 236 X10E3/UL (ref 150–450)
POTASSIUM SERPL-SCNC: 4.1 MMOL/L (ref 3.5–5.2)
PROT SERPL-MCNC: 6.8 G/DL (ref 6–8.5)
RBC # BLD AUTO: 4.24 X10E6/UL (ref 3.77–5.28)
SODIUM SERPL-SCNC: 143 MMOL/L (ref 134–144)
TRIGL SERPL-MCNC: 209 MG/DL (ref 0–149)
VIT B12 SERPL-MCNC: 611 PG/ML (ref 232–1245)
VLDLC SERPL CALC-MCNC: 37 MG/DL (ref 5–40)
WBC # BLD AUTO: 6.7 X10E3/UL (ref 3.4–10.8)

## 2024-10-26 RX ORDER — TIRZEPATIDE 2.5 MG/.5ML
2.5 INJECTION, SOLUTION SUBCUTANEOUS WEEKLY
Qty: 2 ML | Refills: 1 | Status: SHIPPED | OUTPATIENT
Start: 2024-10-26

## 2024-11-06 DIAGNOSIS — R73.03 PREDIABETES: Primary | ICD-10-CM

## 2025-01-14 ENCOUNTER — OFFICE VISIT (OUTPATIENT)
Age: 60
End: 2025-01-14
Payer: COMMERCIAL

## 2025-01-14 VITALS
WEIGHT: 264.11 LBS | BODY MASS INDEX: 45.09 KG/M2 | SYSTOLIC BLOOD PRESSURE: 131 MMHG | DIASTOLIC BLOOD PRESSURE: 85 MMHG | TEMPERATURE: 98.4 F | RESPIRATION RATE: 18 BRPM | OXYGEN SATURATION: 95 % | HEART RATE: 79 BPM | HEIGHT: 64 IN

## 2025-01-14 DIAGNOSIS — R73.03 PREDIABETES: ICD-10-CM

## 2025-01-14 DIAGNOSIS — E66.01 MORBID OBESITY WITH BMI OF 45.0-49.9, ADULT: ICD-10-CM

## 2025-01-14 DIAGNOSIS — C50.919: ICD-10-CM

## 2025-01-14 DIAGNOSIS — M06.9 RHEUMATOID ARTHRITIS INVOLVING MULTIPLE SITES, UNSPECIFIED WHETHER RHEUMATOID FACTOR PRESENT (HCC): ICD-10-CM

## 2025-01-14 DIAGNOSIS — Z01.818 PREOP GENERAL PHYSICAL EXAM: Primary | ICD-10-CM

## 2025-01-14 PROCEDURE — 99213 OFFICE O/P EST LOW 20 MIN: CPT

## 2025-01-14 RX ORDER — OFLOXACIN 3 MG/ML
SOLUTION/ DROPS OPHTHALMIC
COMMUNITY
Start: 2025-01-09 | End: 2025-01-14

## 2025-01-14 RX ORDER — OMEPRAZOLE 40 MG/1
30 CAPSULE, DELAYED RELEASE ORAL
COMMUNITY
Start: 2024-12-20

## 2025-01-14 RX ORDER — DICYCLOMINE HYDROCHLORIDE 10 MG/1
60 CAPSULE ORAL PRN
COMMUNITY
Start: 2024-12-20

## 2025-01-14 RX ORDER — FLUOROURACIL 50 MG/G
CREAM TOPICAL
COMMUNITY
Start: 2024-10-30

## 2025-01-14 RX ORDER — CELECOXIB 200 MG/1
200 CAPSULE ORAL DAILY
COMMUNITY

## 2025-01-14 RX ORDER — PREDNISOLONE ACETATE 10 MG/ML
SUSPENSION/ DROPS OPHTHALMIC
COMMUNITY
Start: 2025-01-09 | End: 2025-01-14

## 2025-01-14 RX ORDER — KETOROLAC TROMETHAMINE 5 MG/ML
SOLUTION OPHTHALMIC
COMMUNITY
Start: 2025-01-09

## 2025-01-14 SDOH — ECONOMIC STABILITY: FOOD INSECURITY: WITHIN THE PAST 12 MONTHS, YOU WORRIED THAT YOUR FOOD WOULD RUN OUT BEFORE YOU GOT MONEY TO BUY MORE.: NEVER TRUE

## 2025-01-14 SDOH — ECONOMIC STABILITY: FOOD INSECURITY: WITHIN THE PAST 12 MONTHS, THE FOOD YOU BOUGHT JUST DIDN'T LAST AND YOU DIDN'T HAVE MONEY TO GET MORE.: NEVER TRUE

## 2025-01-14 ASSESSMENT — PATIENT HEALTH QUESTIONNAIRE - PHQ9
9. THOUGHTS THAT YOU WOULD BE BETTER OFF DEAD, OR OF HURTING YOURSELF: NOT AT ALL
SUM OF ALL RESPONSES TO PHQ QUESTIONS 1-9: 3
SUM OF ALL RESPONSES TO PHQ QUESTIONS 1-9: 3
5. POOR APPETITE OR OVEREATING: NOT AT ALL
6. FEELING BAD ABOUT YOURSELF - OR THAT YOU ARE A FAILURE OR HAVE LET YOURSELF OR YOUR FAMILY DOWN: NOT AT ALL
8. MOVING OR SPEAKING SO SLOWLY THAT OTHER PEOPLE COULD HAVE NOTICED. OR THE OPPOSITE, BEING SO FIGETY OR RESTLESS THAT YOU HAVE BEEN MOVING AROUND A LOT MORE THAN USUAL: NOT AT ALL
SUM OF ALL RESPONSES TO PHQ9 QUESTIONS 1 & 2: 0
SUM OF ALL RESPONSES TO PHQ QUESTIONS 1-9: 3
4. FEELING TIRED OR HAVING LITTLE ENERGY: NEARLY EVERY DAY
7. TROUBLE CONCENTRATING ON THINGS, SUCH AS READING THE NEWSPAPER OR WATCHING TELEVISION: NOT AT ALL
2. FEELING DOWN, DEPRESSED OR HOPELESS: NOT AT ALL
10. IF YOU CHECKED OFF ANY PROBLEMS, HOW DIFFICULT HAVE THESE PROBLEMS MADE IT FOR YOU TO DO YOUR WORK, TAKE CARE OF THINGS AT HOME, OR GET ALONG WITH OTHER PEOPLE: NOT DIFFICULT AT ALL
3. TROUBLE FALLING OR STAYING ASLEEP: NOT AT ALL
SUM OF ALL RESPONSES TO PHQ QUESTIONS 1-9: 3
1. LITTLE INTEREST OR PLEASURE IN DOING THINGS: NOT AT ALL

## 2025-01-14 NOTE — PROGRESS NOTES
Identified pt with two pt identifiers(name and ). Reviewed record in preparation for visit and have obtained necessary documentation.  Chief Complaint   Patient presents with    Pre-op Exam        There are no preventive care reminders to display for this patient.    Vitals:    25 1454   BP: 131/85   Site: Right Upper Arm   Position: Sitting   Cuff Size: Large Adult   Pulse: 79   Resp: 18   Temp: 98.4 °F (36.9 °C)   TempSrc: Oral   SpO2: 95%   Weight: 119.8 kg (264 lb 1.8 oz)   Height: 1.626 m (5' 4\")         \"Have you been to the ER, urgent care clinic since your last visit?  Hospitalized since your last visit?\"    NO    “Have you seen or consulted any other health care providers outside of [facility] since your last visit?”    NO            Click Here for Release of Records Request     This patient is accompanied in the office by her self.  I have received verbal consent from Kiki RAINES [place] to discuss any/all medical information while they are present in the room.  
EXPANDER AND FLEX HD, CLOSURE OF LEFT BREAST TRIPLE POINT WOUND performed by Jeanie Herrera MD at [facility] MAIN OR    BREAST RECONSTRUCTION Bilateral 2023    REMOVAL OF LEFT BREAST TISSUE EXPANDER, BILATERAL BREAST RECONSTRUCTION WITH SHELL MICROSURGICAL FREE FLAPS performed by Filemon Dejesus MD at [facility] MAIN OR    COLONOSCOPY N/A 2018    COLONOSCOPY performed by Casey Tee MD at [facility] ENDOSCOPY    EYE SURGERY      HX PARTIAL COLECTOMY      partial sigmoid colectomy    MASTECTOMY Bilateral 2022    BILATERAL BREAST MASTECTOMIES AND RIGHT AXILLARY LYMPH NODE DISSECTION/BILATERAL BREAST RECONSTRUCTION WITH POSSIBLE TISSUE EXPANDER/POSSIBLE DIRECT TO IMPLANT/ ACELLULAR DERMAL MATRIX performed by Ariane Hawley MD at [facility] AMBULATORY OR    MASTECTOMY, BILATERAL      MRI BIOPSY BREAST W LOC DEVICE LEFT 1ST LESION Left 2022    MRI BREAST BX USING DEVICE LEFT 2022 [facility] RAD MRI    ORTHOPEDIC SURGERY Right     tendon and ligament repair to right foot    OTHER SURGICAL HISTORY      vein ligation    ROTATOR CUFF REPAIR Left 2021    ROTATOR CUFF REPAIR Right 2023    SALPINGO-OOPHORECTOMY Bilateral 2023    Laparoscopic bilateral salpingo-oophorectomy, Dr. Sohan King    TONSILLECTOMY  1985    TUBAL LIGATION           Social History- reviewed:  Social History     Socioeconomic History    Marital status:      Spouse name: Not on file    Number of children: Not on file    Years of education: Not on file    Highest education level: Not on file   Occupational History    Not on file   Tobacco Use    Smoking status: Former     Current packs/day: 0.00     Average packs/day: 1.5 packs/day for 8.0 years (12.0 ttl pk-yrs)     Types: Cigarettes     Start date:      Quit date: 1991     Years since quittin.0     Passive exposure: Past    Smokeless tobacco: Never   Vaping Use    Vaping status: Never Used   Substance and Sexual Activity    Alcohol use: Yes

## 2025-02-05 ENCOUNTER — TELEPHONE (OUTPATIENT)
Age: 60
End: 2025-02-05

## 2025-02-05 NOTE — TELEPHONE ENCOUNTER
Patient called and stated that she needed to reschedule her appt due to her being out of town. Pt rescheduled to 2/24 at 8:45.

## 2025-02-11 DIAGNOSIS — Z17.0 MALIGNANT NEOPLASM OF CENTRAL PORTION OF RIGHT BREAST IN FEMALE, ESTROGEN RECEPTOR POSITIVE (HCC): ICD-10-CM

## 2025-02-11 DIAGNOSIS — C50.111 MALIGNANT NEOPLASM OF CENTRAL PORTION OF RIGHT BREAST IN FEMALE, ESTROGEN RECEPTOR POSITIVE (HCC): ICD-10-CM

## 2025-02-11 DIAGNOSIS — M25.50 ARTHRALGIA, UNSPECIFIED JOINT: ICD-10-CM

## 2025-02-11 RX ORDER — DULOXETIN HYDROCHLORIDE 60 MG/1
CAPSULE, DELAYED RELEASE ORAL DAILY
Qty: 90 CAPSULE | Refills: 2 | Status: SHIPPED | OUTPATIENT
Start: 2025-02-11

## 2025-02-16 ASSESSMENT — SLEEP AND FATIGUE QUESTIONNAIRES
HOW LIKELY ARE YOU TO NOD OFF OR FALL ASLEEP WHILE SITTING AND TALKING TO SOMEONE: SLIGHT CHANCE OF DOZING
AVERAGE NUMBER OF SLEEP HOURS: 5
SELECT ANY OF THE FOLLOWING BEHAVIORS OBSERVED WHILE YOU ARE ASLEEP: LOUD SNORING
DO YOU HAVE DIFFICULTY OPERATING A MOTOR VEHICLE FOR SHORT DISTANCES (LESS THAN 100 MILES) BECAUSE YOU BECOME SLEEPY: YES, EXTREME
DO YOU HAVE DIFFICULTY VISITING YOUR FAMILY OR FRIENDS IN THEIR HOME BECAUSE YOU BECOME SLEEPY OR TIRED: YES, MODERATE
DO YOU HAVE DIFFICULTY WATCHING A MOVIE OR VIDEO BECAUSE YOU BECOME SLEEPY OR TIRED: YES, EXTREME
AVERAGE NUMBER OF SLEEP HOURS: 5
HOW LIKELY ARE YOU TO NOD OFF OR FALL ASLEEP WHILE SITTING AND READING: HIGH CHANCE OF DOZING
ESS TOTAL SCORE: 20
HAS YOUR RELATIONSHIP WITH FAMILY, FRIENDS OR WORK COLLEAGUES BEEN AFFECTED BECAUSE YOU ARE SLEEPY OR TIRED: YES, EXTREME
FOSQ SCORE: 5.5
ARE YOU BOTHERED BY WAKING UP TOO EARLY AND NOT BEING ABLE TO GET BACK TO SLEEP: YES
HOW LIKELY ARE YOU TO NOD OFF OR FALL ASLEEP WHILE SITTING AND TALKING TO SOMEONE: SLIGHT CHANCE OF DOZING
HOW LIKELY ARE YOU TO NOD OFF OR FALL ASLEEP WHEN YOU ARE A PASSENGER IN A CAR FOR AN HOUR WITHOUT A BREAK: HIGH CHANCE OF DOZING
HOW LIKELY ARE YOU TO NOD OFF OR FALL ASLEEP WHILE WATCHING TV: HIGH CHANCE OF DOZING
DO YOU GET TOO LITTLE SLEEP AT NIGHT: YES
DO YOU HAVE DIFFICULTY BEING AS ACTIVE AS YOU WANT TO BE IN THE MORNING BECAUSE YOU ARE SLEEPY OR TIRED: YES, EXTREME
WHAT TIME DO YOU USUALLY GO TO BED: 19:30
DO YOU WORK SHIFTS: NO
DO YOU GENERALLY HAVE DIFFICULTY REMEMBERING THINGS BECAUSE YOU ARE SLEEPY OR TIRED: YES, EXTREME
ARE YOU BOTHERED BY WAKING UP TOO EARLY AND NOT BEING ABLE TO GET BACK TO SLEEP: YES
DO YOU GET TOO LITTLE SLEEP AT NIGHT: YES
HOW LIKELY ARE YOU TO NOD OFF OR FALL ASLEEP WHILE LYING DOWN TO REST IN THE AFTERNOON WHEN CIRCUMSTANCES PERMIT: HIGH CHANCE OF DOZING
HOW LIKELY ARE YOU TO NOD OFF OR FALL ASLEEP WHILE SITTING QUIETLY AFTER LUNCH WITHOUT ALCOHOL: SLIGHT CHANCE OF DOZING
DO YOU HAVE DIFFICULTY OPERATING A MOTOR VEHICLE FOR LONG DISTANCES (GREATER THAN 100 MILES) BECAUSE YOU BECOME SLEEPY: YES, EXTREME
HOW LIKELY ARE YOU TO NOD OFF OR FALL ASLEEP WHILE SITTING INACTIVE IN A PUBLIC PLACE: HIGH CHANCE OF DOZING
HOW LIKELY ARE YOU TO NOD OFF OR FALL ASLEEP WHILE SITTING INACTIVE IN A PUBLIC PLACE: HIGH CHANCE OF DOZING
HOW LIKELY ARE YOU TO NOD OFF OR FALL ASLEEP WHILE LYING DOWN TO REST IN THE AFTERNOON WHEN CIRCUMSTANCES PERMIT: HIGH CHANCE OF DOZING
DO YOU HAVE DIFFICULTY BEING AS ACTIVE AS YOU WANT TO BE IN THE EVENING BECAUSE YOU ARE SLEEPY OR TIRED: YES, EXTREME
HOW LIKELY ARE YOU TO NOD OFF OR FALL ASLEEP WHILE WATCHING TV: HIGH CHANCE OF DOZING
DO YOU HAVE PROBLEMS WITH FREQUENT AWAKENINGS AT NIGHT: YES
DO YOU HAVE DIFFICULTY CONCENTRATING ON THE THINGS YOU DO BECAUSE YOU ARE SLEEPY OR TIRED: YES, EXTREME
SELECT ANY OF THE FOLLOWING BEHAVIORS OBSERVED WHILE YOU ARE ASLEEP: BITING TONGUE
HOW LIKELY ARE YOU TO NOD OFF OR FALL ASLEEP WHILE SITTING AND READING: HIGH CHANCE OF DOZING
HOW LIKELY ARE YOU TO NOD OFF OR FALL ASLEEP IN A CAR, WHILE STOPPED FOR A FEW MINUTES IN TRAFFIC: HIGH CHANCE OF DOZING
DO YOU TAKE NAPS: NO
NUMBER OF TIMES YOU WAKE PER NIGHT: 3
HOW LIKELY ARE YOU TO NOD OFF OR FALL ASLEEP WHILE SITTING QUIETLY AFTER LUNCH WITHOUT ALCOHOL: SLIGHT CHANCE OF DOZING
HOW LIKELY ARE YOU TO NOD OFF OR FALL ASLEEP WHEN YOU ARE A PASSENGER IN A CAR FOR AN HOUR WITHOUT A BREAK: HIGH CHANCE OF DOZING
HOW LIKELY ARE YOU TO NOD OFF OR FALL ASLEEP IN A CAR, WHILE STOPPED FOR A FEW MINUTES IN TRAFFIC: HIGH CHANCE OF DOZING
HAS YOUR MOOD BEEN AFFECTED BECAUSE YOU ARE SLEEPY OR TIRED: YES, EXTREME

## 2025-02-19 ENCOUNTER — TELEMEDICINE (OUTPATIENT)
Age: 60
End: 2025-02-19
Payer: COMMERCIAL

## 2025-02-19 DIAGNOSIS — G47.33 OSA (OBSTRUCTIVE SLEEP APNEA): Primary | ICD-10-CM

## 2025-02-19 DIAGNOSIS — G47.19 EXCESSIVE DAYTIME SLEEPINESS: ICD-10-CM

## 2025-02-19 DIAGNOSIS — E66.01 MORBID OBESITY WITH BMI OF 45.0-49.9, ADULT: ICD-10-CM

## 2025-02-19 PROCEDURE — 99203 OFFICE O/P NEW LOW 30 MIN: CPT | Performed by: SPECIALIST

## 2025-02-19 NOTE — PROGRESS NOTES
[facility]  [facility] SLEEP DISORDERS [facility]  [address] [address] [address]  Dept: [phone]           [address]  [place] [address]  Tel.  [phone]  Fax. [phone] [address]  [place] [address]  Tel.  [phone]  Fax. [phone] [place] [address]  Tel.  [phone]  Fax. [phone]       Chief Complaint       Chief Complaint   Patient presents with    Sleep Problem     NP refd by Abbi Lopez MD for snoring, witnessed apnea, and at risk for GARFIELD       HPI      iKki RAINES [place] is 59 y.o. female seen for evaluation of a sleep disorder.     The patient reports she has experienced snoring, witnessed apnea, nocturnal awakening and excessive daytime sleepiness.  Snoring described as loud, heard through closed doors and in separate rooms.  Notes frequent dreams.    Normally retires at 7: 30 PM.  May awaken every hour.  Is out of bed at 5 AM.  Describes self as being \"exhausted\" on awakening.      May doze if seated and inactive such as when reading, watching TV, in a public place, as a passenger.      [place] Sleepiness Scale: 20     The patient has not undergone diagnostic testing for the current problems.             2/16/2025    10:44 AM   Sleep Medicine   Sitting and reading 3   Watching TV 3   Sitting, inactive in a public place (e.g. a theatre or a meeting) 3   As a passenger in a car for an hour without a break 3   Lying down to rest in the afternoon when circumstances permit 3   Sitting and talking to someone 1   Sitting quietly after a lunch without alcohol 1   In a car, while stopped for a few minutes in traffic 3   [place] Sleepiness Score 20        Allergies   Allergen Reactions    Phenergan [Promethazine Hcl]      Iv phenergan, hallucinations         Current Outpatient Medications:     DULoxetine (CYMBALTA) 60 MG extended release capsule, TAKE 1 CAPSULE BY

## 2025-02-24 ENCOUNTER — OFFICE VISIT (OUTPATIENT)
Age: 60
End: 2025-02-24
Payer: COMMERCIAL

## 2025-02-24 VITALS
BODY MASS INDEX: 43.36 KG/M2 | OXYGEN SATURATION: 98 % | WEIGHT: 254 LBS | HEIGHT: 64 IN | RESPIRATION RATE: 17 BRPM | TEMPERATURE: 97.3 F | SYSTOLIC BLOOD PRESSURE: 137 MMHG | HEART RATE: 87 BPM | DIASTOLIC BLOOD PRESSURE: 86 MMHG

## 2025-02-24 DIAGNOSIS — C50.111 MALIGNANT NEOPLASM OF CENTRAL PORTION OF RIGHT BREAST IN FEMALE, ESTROGEN RECEPTOR POSITIVE (HCC): Primary | ICD-10-CM

## 2025-02-24 DIAGNOSIS — Z17.0 MALIGNANT NEOPLASM OF CENTRAL PORTION OF RIGHT BREAST IN FEMALE, ESTROGEN RECEPTOR POSITIVE (HCC): Primary | ICD-10-CM

## 2025-02-24 DIAGNOSIS — M25.50 ARTHRALGIA, UNSPECIFIED JOINT: ICD-10-CM

## 2025-02-24 PROCEDURE — 99214 OFFICE O/P EST MOD 30 MIN: CPT | Performed by: NURSE PRACTITIONER

## 2025-02-24 ASSESSMENT — PATIENT HEALTH QUESTIONNAIRE - PHQ9
SUM OF ALL RESPONSES TO PHQ QUESTIONS 1-9: 1
SUM OF ALL RESPONSES TO PHQ9 QUESTIONS 1 & 2: 1
SUM OF ALL RESPONSES TO PHQ QUESTIONS 1-9: 1
2. FEELING DOWN, DEPRESSED OR HOPELESS: SEVERAL DAYS
SUM OF ALL RESPONSES TO PHQ QUESTIONS 1-9: 1
1. LITTLE INTEREST OR PLEASURE IN DOING THINGS: NOT AT ALL
SUM OF ALL RESPONSES TO PHQ QUESTIONS 1-9: 1

## 2025-02-24 NOTE — PROGRESS NOTES
Kiki RAINES [place] is a 59 y.o. female is here today for a breast cancer follow up.    1. Have you been to the ER, urgent care clinic since your last visit?  Hospitalized since your last visit?No    2. Have you seen or consulted any other health care providers outside of the [facility] since your last visit?  Include any pap smears or colon screening. No    
10/24/2024    HCT 39.7 10/24/2024    MCV 94 10/24/2024     10/24/2024     Lab Results   Component Value Date     10/24/2024    K 4.1 10/24/2024     10/24/2024    CO2 24 10/24/2024    BUN 15 10/24/2024    CREATININE 0.74 10/24/2024    GLUCOSE 111 (H) 10/24/2024    CALCIUM 9.6 10/24/2024    BILITOT 0.3 10/24/2024    ALKPHOS 87 10/24/2024    AST 26 10/24/2024    ALT 30 10/24/2024    LABGLOM 94 10/24/2024    GFRAA >60 09/12/2022    AGRATIO 1.0 (L) 01/25/2023    GLOB 4.3 (H) 07/19/2023        MRI breast 5/13/22   Right breast: A dominant dynamically enhancing mass with washout kinetics lies   at approximately 9:00 in the right nipple line, mid depth, measuring 1.1 cm AP,   1.5 cm cc and 1.1 cm TRV. An additional small enhancing mass lies at the nipple,   immediately posterior real or, with nipple inversion and enhancement   inseparable. This nipple areolar complex and enhancement measures 1.4 cm TRV,   1.2 cm AP and 0.9 cm CC. Between the dominant mass at 9:00 and the nipple, there   is a combined distance of 8.2 cm, where there is linear delayed enhancement in   the nipple line suspicious for ductal extension. There are no other dominant   abnormalities of morphology or enhancement within the right breast to suggest   additional disease, although the gland overall in the outer breast is enhancing.   Lymph nodes in the right axilla are normal to indeterminate, but none contains a   biopsy clip artifact.       Left breast: In the axillary breast, posterior depth at 1:30, nonmass   enhancement measures 3.7 cm AP, 1.1 cm TRV and approximately 2.4 cm CC. It shows   dynamic washout enhancement and is suspicious for contralateral malignancy. No   other dominant abnormality of morphology or enhancement appears in the left   breast. Left axillary lymph nodes are not enlarged, but are morphologically   indeterminate.        IMPRESSION:   1. Right BI-RADS 6, known malignancy. Left BI-RADS 4, suspicious.   2.

## 2025-03-17 ENCOUNTER — HOSPITAL ENCOUNTER (OUTPATIENT)
Facility: HOSPITAL | Age: 60
Discharge: HOME | End: 2025-03-20
Payer: COMMERCIAL

## 2025-03-17 ENCOUNTER — PROCEDURE VISIT (OUTPATIENT)
Age: 60
End: 2025-03-17

## 2025-03-17 DIAGNOSIS — G47.33 OSA (OBSTRUCTIVE SLEEP APNEA): Primary | ICD-10-CM

## 2025-03-17 DIAGNOSIS — G47.33 OSA (OBSTRUCTIVE SLEEP APNEA): ICD-10-CM

## 2025-03-17 PROCEDURE — 95800 SLP STDY UNATTENDED: CPT | Performed by: SPECIALIST

## 2025-03-17 NOTE — PROGRESS NOTES
[address]  [place] [address]  Tel.  [phone]  Fax. [phone] [address]  [place] [address]  Tel.  [phone]  Fax. [phone] [address] [place] [address]  Tel.  [phone]  Fax. [phone]       Kiki Chandler is seen today to receive a WatchPAT home sleep apnea testing (HSAT) device.    The WatchPAT HSAT Agreement was reviewed and endorsed by patient.  General information regarding operation of the HSAT device was provided.  Patient viewed the [employer]waabPAT instructional video while in the clinic.  Patient was advised to contact patient support for any problems using the device.  Patient was advised to complete the Morning Questionnaire after awakening/ending the test.    There were no vitals taken for this visit.    Patient will return the HSAT device by noon unless otherwise approved.  Patient will be contacted with results once the study has been reviewed by the physician, typically within 15 business days.    XipLinkT Serial Number [id number]

## 2025-03-18 ENCOUNTER — PROCEDURE VISIT (OUTPATIENT)
Age: 60
End: 2025-03-18

## 2025-03-18 DIAGNOSIS — G47.33 OSA (OBSTRUCTIVE SLEEP APNEA): Primary | ICD-10-CM

## 2025-04-13 ENCOUNTER — CLINICAL DOCUMENTATION (OUTPATIENT)
Age: 60
End: 2025-04-13

## 2025-04-13 DIAGNOSIS — G47.33 OSA (OBSTRUCTIVE SLEEP APNEA): Primary | ICD-10-CM

## 2025-04-28 ENCOUNTER — HOSPITAL ENCOUNTER (OUTPATIENT)
Facility: HOSPITAL | Age: 60
Discharge: HOME | End: 2025-05-01
Payer: COMMERCIAL

## 2025-04-28 DIAGNOSIS — G47.33 OSA (OBSTRUCTIVE SLEEP APNEA): ICD-10-CM

## 2025-04-28 PROCEDURE — 95811 POLYSOM 6/>YRS CPAP 4/> PARM: CPT | Performed by: SPECIALIST

## 2025-04-29 VITALS
HEART RATE: 71 BPM | WEIGHT: 254 LBS | SYSTOLIC BLOOD PRESSURE: 137 MMHG | TEMPERATURE: 97 F | OXYGEN SATURATION: 97 % | DIASTOLIC BLOOD PRESSURE: 86 MMHG | BODY MASS INDEX: 43.36 KG/M2 | HEIGHT: 64 IN

## 2025-05-09 ENCOUNTER — CLINICAL DOCUMENTATION (OUTPATIENT)
Age: 60
End: 2025-05-09

## 2025-05-09 DIAGNOSIS — G47.33 OSA (OBSTRUCTIVE SLEEP APNEA): Primary | ICD-10-CM

## 2025-05-09 NOTE — PROGRESS NOTES
PAP order faxed to [employer] company and patient notified via [employer]. Patient also scheduled for first adherence appointment.

## 2025-05-09 NOTE — PROGRESS NOTES
Titration study performed.  HSAT demonstrated sleep disordered breathing characterized by: AHI 82.3/h (4% desaturation definition). AHI 86.2/h) 3% desaturation definition). Minimal SaO2 71%. 9.4% of sleep in an SaO2 range less than 88%. Prominent snoring noted     492.3 minutes recorded of  which 373 minutes of sleep.  Sleep efficiency 75.8%.  Sleep onset of 23.5 minutes; REM onset at 384.5 minutes with total REM representing 22.5% of sleep time.  All sleep stages observed.    213 respiratory events occurred of which 153 hypopnea and 60 apnea (2 mixed, 58 obstructive).  Overall AHI 34.3/h.  Patient slept supine and lateral.  Minimal SaO2 78%.    CPAP initiated 5 cm and increased to 16 cm.  BiPAP initiated at 12/7 cm; increased to 16/11 cm.  Sustained REM at end of study.  Significant events not observed with BiPAP of 14/9 cm.  AHI 1.5/h.  Minimal SaO2 91%.    F& P Solo nasal mask (M)    Sleep technologist: Please advise patient of study results.  Order has been entered for BiPAP 14/9 cm.  Please schedule compliance appointment.

## 2025-07-07 ENCOUNTER — OFFICE VISIT (OUTPATIENT)
Age: 60
End: 2025-07-07
Payer: COMMERCIAL

## 2025-07-07 VITALS
WEIGHT: 251.32 LBS | TEMPERATURE: 98.4 F | HEART RATE: 77 BPM | DIASTOLIC BLOOD PRESSURE: 95 MMHG | SYSTOLIC BLOOD PRESSURE: 151 MMHG | OXYGEN SATURATION: 97 % | HEIGHT: 64 IN | BODY MASS INDEX: 42.91 KG/M2

## 2025-07-07 DIAGNOSIS — E66.1 CLASS 3 DRUG-INDUCED OBESITY WITH SERIOUS COMORBIDITY AND BODY MASS INDEX (BMI) OF 40.0 TO 44.9 IN ADULT (HCC): ICD-10-CM

## 2025-07-07 DIAGNOSIS — E78.2 MIXED HYPERLIPIDEMIA: ICD-10-CM

## 2025-07-07 DIAGNOSIS — G47.33 OSA TREATED WITH BIPAP: Primary | ICD-10-CM

## 2025-07-07 DIAGNOSIS — E66.813 CLASS 3 DRUG-INDUCED OBESITY WITH SERIOUS COMORBIDITY AND BODY MASS INDEX (BMI) OF 40.0 TO 44.9 IN ADULT (HCC): ICD-10-CM

## 2025-07-07 DIAGNOSIS — E11.9 TYPE 2 DIABETES MELLITUS WITHOUT COMPLICATION, WITHOUT LONG-TERM CURRENT USE OF INSULIN (HCC): ICD-10-CM

## 2025-07-07 DIAGNOSIS — I10 ESSENTIAL (PRIMARY) HYPERTENSION: ICD-10-CM

## 2025-07-07 LAB — HBA1C MFR BLD: 6.6 %

## 2025-07-07 PROCEDURE — 83036 HEMOGLOBIN GLYCOSYLATED A1C: CPT | Performed by: FAMILY MEDICINE

## 2025-07-07 PROCEDURE — 3077F SYST BP >= 140 MM HG: CPT | Performed by: FAMILY MEDICINE

## 2025-07-07 PROCEDURE — 3080F DIAST BP >= 90 MM HG: CPT | Performed by: FAMILY MEDICINE

## 2025-07-07 PROCEDURE — G2211 COMPLEX E/M VISIT ADD ON: HCPCS | Performed by: FAMILY MEDICINE

## 2025-07-07 PROCEDURE — 3044F HG A1C LEVEL LT 7.0%: CPT | Performed by: FAMILY MEDICINE

## 2025-07-07 PROCEDURE — 99214 OFFICE O/P EST MOD 30 MIN: CPT | Performed by: FAMILY MEDICINE

## 2025-07-07 RX ORDER — LOSARTAN POTASSIUM 50 MG/1
50 TABLET ORAL DAILY
Qty: 90 TABLET | Refills: 1 | Status: SHIPPED | OUTPATIENT
Start: 2025-07-07

## 2025-07-07 NOTE — PROGRESS NOTES
Kiki RAINES [place] is a 60 y.o. female      Chief Complaint   Patient presents with    Weight Management     Failed sleep study and Cpap study. Wants to do injectables. Out of Metformin for 1-2 weeks.       \"Have you been to the ER, urgent care clinic since your last visit?  Hospitalized since your last visit?\"    NO    “Have you seen or consulted any other health care providers outside of [facility] since your last visit?”    NO            Click Here for Release of Records Request    Vitals:    07/07/25 1047   Weight: 114 kg (251 lb 5.2 oz)           Medication Reconciliation Completed, changes notes. Please Update medication list.

## 2025-07-07 NOTE — PROGRESS NOTES
Subjective:   Kiki Chandler is an 60 y.o. female who presents for  follow up on chronic medical conditions.    HPI  Chief Complaint   Patient presents with    Weight Management     Failed sleep study and Cpap study. Wants to do injectables. Out of Metformin for 1-2 weeks.       The patient recently underwent the sleep study which showed severe sleep apnea.  She failed the treatment with CPAP and was recommended to use the BiPAP.  Unfortunately her insurance did not cover injectable medications to help with her weight loss so she is interested in other options.  Has not been taking metformin for about 2 weeks as she ran out of the medication.    Allergies - reviewed:   Allergies   Allergen Reactions    Phenergan [Promethazine Hcl]      Iv phenergan, hallucinations         Medications - reviewed:  Current Outpatient Medications   Medication Sig    Adalimumab (HUMIRA PEN SC) Inject into the skin    metFORMIN (GLUCOPHAGE) 500 MG tablet Take 1 tablet by mouth daily (with breakfast)    losartan (COZAAR) 50 MG tablet Take 1 tablet by mouth daily    tirzepatide-weight management (ZEPBOUND) 2.5 MG/0.5ML SOAJ subCUTAneous auto-injector pen Inject 2.5 mg into the skin every 7 days    DULoxetine (CYMBALTA) 60 MG extended release capsule TAKE 1 CAPSULE BY MOUTH EVERY DAY    celecoxib (CELEBREX) 200 MG capsule Take 1 capsule by mouth daily    dicyclomine (BENTYL) 10 MG capsule Take 60 capsules by mouth as needed (abdominal pain)    fluorouracil (EFUDEX) 5 % cream 1 application to affected area Externally Twice a day for 30 days    omeprazole (PRILOSEC) 40 MG delayed release capsule 30 capsules    vitamin D (ERGOCALCIFEROL) 1.25 MG (89890 UT) CAPS capsule Take 1 capsule by mouth once a week    predniSONE (DELTASONE) 10 MG tablet Take 1 tablet by mouth 2 times daily as needed    letrozole (FEMARA) 2.5 MG tablet Take 1 tablet by mouth daily    HYDROcodone-acetaminophen (NORCO) 5-325 MG per tablet Take 1 tablet by mouth every 6

## 2025-07-15 DIAGNOSIS — E11.9 TYPE 2 DIABETES MELLITUS WITHOUT COMPLICATION, WITHOUT LONG-TERM CURRENT USE OF INSULIN (HCC): Primary | ICD-10-CM

## 2025-08-10 ASSESSMENT — SLEEP AND FATIGUE QUESTIONNAIRES
HOW LIKELY ARE YOU TO NOD OFF OR FALL ASLEEP WHILE SITTING QUIETLY AFTER LUNCH WITHOUT ALCOHOL: WOULD NEVER DOZE
HOW LIKELY ARE YOU TO NOD OFF OR FALL ASLEEP WHILE SITTING QUIETLY AFTER LUNCH WITHOUT ALCOHOL: WOULD NEVER DOZE
HOW LIKELY ARE YOU TO NOD OFF OR FALL ASLEEP WHILE SITTING INACTIVE IN A PUBLIC PLACE: SLIGHT CHANCE OF DOZING
DO YOU HAVE DIFFICULTY OPERATING A MOTOR VEHICLE FOR LONG DISTANCES (GREATER THAN 100 MILES) BECAUSE YOU BECOME SLEEPY: YES, MODERATE
DO YOU HAVE DIFFICULTY CONCENTRATING ON THE THINGS YOU DO BECAUSE YOU ARE SLEEPY OR TIRED: YES, A LITTLE
HOW LIKELY ARE YOU TO NOD OFF OR FALL ASLEEP WHILE LYING DOWN TO REST IN THE AFTERNOON WHEN CIRCUMSTANCES PERMIT: MODERATE CHANCE OF DOZING
HOW LIKELY ARE YOU TO NOD OFF OR FALL ASLEEP WHILE SITTING AND TALKING TO SOMEONE: WOULD NEVER DOZE
HOW LIKELY ARE YOU TO NOD OFF OR FALL ASLEEP WHILE WATCHING TV: HIGH CHANCE OF DOZING
DO YOU HAVE DIFFICULTY WATCHING A MOVIE OR VIDEO BECAUSE YOU BECOME SLEEPY OR TIRED: YES, MODERATE
DO YOU HAVE DIFFICULTY BEING AS ACTIVE AS YOU WANT TO BE IN THE MORNING BECAUSE YOU ARE SLEEPY OR TIRED: YES, MODERATE
DO YOU HAVE DIFFICULTY BEING AS ACTIVE AS YOU WANT TO BE IN THE EVENING BECAUSE YOU ARE SLEEPY OR TIRED: YES, MODERATE
DO YOU HAVE DIFFICULTY VISITING YOUR FAMILY OR FRIENDS IN THEIR HOME BECAUSE YOU BECOME SLEEPY OR TIRED: NO
HOW LIKELY ARE YOU TO NOD OFF OR FALL ASLEEP WHILE LYING DOWN TO REST IN THE AFTERNOON WHEN CIRCUMSTANCES PERMIT: MODERATE CHANCE OF DOZING
ESS TOTAL SCORE: 11
HOW LIKELY ARE YOU TO NOD OFF OR FALL ASLEEP IN A CAR, WHILE STOPPED FOR A FEW MINUTES IN TRAFFIC: WOULD NEVER DOZE
HAS YOUR RELATIONSHIP WITH FAMILY, FRIENDS OR WORK COLLEAGUES BEEN AFFECTED BECAUSE YOU ARE SLEEPY OR TIRED: YES, A LITTLE
HOW LIKELY ARE YOU TO NOD OFF OR FALL ASLEEP WHILE SITTING AND READING: MODERATE CHANCE OF DOZING
HOW LIKELY ARE YOU TO NOD OFF OR FALL ASLEEP WHEN YOU ARE A PASSENGER IN A CAR FOR AN HOUR WITHOUT A BREAK: HIGH CHANCE OF DOZING
DO YOU HAVE DIFFICULTY OPERATING A MOTOR VEHICLE FOR SHORT DISTANCES (LESS THAN 100 MILES) BECAUSE YOU BECOME SLEEPY: YES, A LITTLE
HAS YOUR MOOD BEEN AFFECTED BECAUSE YOU ARE SLEEPY OR TIRED: YES, MODERATE
HOW LIKELY ARE YOU TO NOD OFF OR FALL ASLEEP WHILE WATCHING TV: HIGH CHANCE OF DOZING
HOW LIKELY ARE YOU TO NOD OFF OR FALL ASLEEP IN A CAR, WHILE STOPPED FOR A FEW MINUTES IN TRAFFIC: WOULD NEVER DOZE
FOSQ SCORE: 13
HOW LIKELY ARE YOU TO NOD OFF OR FALL ASLEEP WHEN YOU ARE A PASSENGER IN A CAR FOR AN HOUR WITHOUT A BREAK: HIGH CHANCE OF DOZING
DO YOU GENERALLY HAVE DIFFICULTY REMEMBERING THINGS BECAUSE YOU ARE SLEEPY OR TIRED: YES, A LITTLE
HOW LIKELY ARE YOU TO NOD OFF OR FALL ASLEEP WHILE SITTING AND READING: MODERATE CHANCE OF DOZING
HOW LIKELY ARE YOU TO NOD OFF OR FALL ASLEEP WHILE SITTING AND TALKING TO SOMEONE: WOULD NEVER DOZE
HOW LIKELY ARE YOU TO NOD OFF OR FALL ASLEEP WHILE SITTING INACTIVE IN A PUBLIC PLACE: SLIGHT CHANCE OF DOZING

## 2025-08-13 ENCOUNTER — TELEMEDICINE (OUTPATIENT)
Age: 60
End: 2025-08-13
Payer: COMMERCIAL

## 2025-08-13 ENCOUNTER — CLINICAL DOCUMENTATION (OUTPATIENT)
Age: 60
End: 2025-08-13

## 2025-08-13 ENCOUNTER — OFFICE VISIT (OUTPATIENT)
Age: 60
End: 2025-08-13
Payer: COMMERCIAL

## 2025-08-13 VITALS
SYSTOLIC BLOOD PRESSURE: 135 MMHG | OXYGEN SATURATION: 94 % | BODY MASS INDEX: 43.47 KG/M2 | TEMPERATURE: 98.2 F | DIASTOLIC BLOOD PRESSURE: 75 MMHG | WEIGHT: 254.63 LBS | HEIGHT: 64 IN | RESPIRATION RATE: 18 BRPM | HEART RATE: 96 BPM

## 2025-08-13 DIAGNOSIS — E78.2 MIXED HYPERLIPIDEMIA: ICD-10-CM

## 2025-08-13 DIAGNOSIS — G47.33 OSA TREATED WITH BIPAP: ICD-10-CM

## 2025-08-13 DIAGNOSIS — E11.9 TYPE 2 DIABETES MELLITUS WITHOUT COMPLICATION, WITHOUT LONG-TERM CURRENT USE OF INSULIN (HCC): Primary | ICD-10-CM

## 2025-08-13 DIAGNOSIS — E66.01 MORBID OBESITY WITH BMI OF 45.0-49.9, ADULT (HCC): ICD-10-CM

## 2025-08-13 DIAGNOSIS — G47.33 OSA (OBSTRUCTIVE SLEEP APNEA): Primary | ICD-10-CM

## 2025-08-13 DIAGNOSIS — I10 ESSENTIAL (PRIMARY) HYPERTENSION: ICD-10-CM

## 2025-08-13 DIAGNOSIS — I10 PRIMARY HYPERTENSION: ICD-10-CM

## 2025-08-13 PROCEDURE — 99214 OFFICE O/P EST MOD 30 MIN: CPT | Performed by: NURSE PRACTITIONER

## 2025-08-13 PROCEDURE — 99214 OFFICE O/P EST MOD 30 MIN: CPT | Performed by: FAMILY MEDICINE

## 2025-08-13 PROCEDURE — 3044F HG A1C LEVEL LT 7.0%: CPT | Performed by: FAMILY MEDICINE

## 2025-08-13 PROCEDURE — 3075F SYST BP GE 130 - 139MM HG: CPT | Performed by: FAMILY MEDICINE

## 2025-08-13 PROCEDURE — 3078F DIAST BP <80 MM HG: CPT | Performed by: FAMILY MEDICINE

## 2025-08-13 PROCEDURE — G2211 COMPLEX E/M VISIT ADD ON: HCPCS | Performed by: FAMILY MEDICINE

## 2025-08-13 RX ORDER — BUTALBITAL, ACETAMINOPHEN AND CAFFEINE 50; 325; 40 MG/1; MG/1; MG/1
1 TABLET ORAL EVERY 6 HOURS PRN
COMMUNITY
Start: 2025-03-11

## 2025-08-13 RX ORDER — CARBOXYMETHYLCELLULOSE SODIUM 5 MG/ML
SOLUTION/ DROPS OPHTHALMIC
COMMUNITY

## 2025-08-13 RX ORDER — HYDROCODONE BITARTRATE AND ACETAMINOPHEN 10; 325 MG/1; MG/1
1 TABLET ORAL 2 TIMES DAILY PRN
COMMUNITY
Start: 2025-06-30

## 2025-08-13 RX ORDER — ADALIMUMAB-ADAZ 40 MG/.4ML
INJECTION, SOLUTION SUBCUTANEOUS
COMMUNITY
Start: 2025-08-01

## 2025-08-13 RX ORDER — CYCLOSPORINE 0.5 MG/ML
1 EMULSION OPHTHALMIC EVERY 12 HOURS
COMMUNITY
Start: 2024-10-10

## 2025-08-13 ASSESSMENT — PATIENT HEALTH QUESTIONNAIRE - PHQ9
3. TROUBLE FALLING OR STAYING ASLEEP: NOT AT ALL
6. FEELING BAD ABOUT YOURSELF - OR THAT YOU ARE A FAILURE OR HAVE LET YOURSELF OR YOUR FAMILY DOWN: NOT AT ALL
SUM OF ALL RESPONSES TO PHQ QUESTIONS 1-9: 0
1. LITTLE INTEREST OR PLEASURE IN DOING THINGS: NOT AT ALL
8. MOVING OR SPEAKING SO SLOWLY THAT OTHER PEOPLE COULD HAVE NOTICED. OR THE OPPOSITE, BEING SO FIGETY OR RESTLESS THAT YOU HAVE BEEN MOVING AROUND A LOT MORE THAN USUAL: NOT AT ALL
SUM OF ALL RESPONSES TO PHQ QUESTIONS 1-9: 0
2. FEELING DOWN, DEPRESSED OR HOPELESS: NOT AT ALL
7. TROUBLE CONCENTRATING ON THINGS, SUCH AS READING THE NEWSPAPER OR WATCHING TELEVISION: NOT AT ALL
5. POOR APPETITE OR OVEREATING: NOT AT ALL
SUM OF ALL RESPONSES TO PHQ QUESTIONS 1-9: 0
SUM OF ALL RESPONSES TO PHQ QUESTIONS 1-9: 0
9. THOUGHTS THAT YOU WOULD BE BETTER OFF DEAD, OR OF HURTING YOURSELF: NOT AT ALL
10. IF YOU CHECKED OFF ANY PROBLEMS, HOW DIFFICULT HAVE THESE PROBLEMS MADE IT FOR YOU TO DO YOUR WORK, TAKE CARE OF THINGS AT HOME, OR GET ALONG WITH OTHER PEOPLE: NOT DIFFICULT AT ALL
4. FEELING TIRED OR HAVING LITTLE ENERGY: NOT AT ALL

## 2025-08-14 DIAGNOSIS — C50.111 MALIGNANT NEOPLASM OF CENTRAL PORTION OF RIGHT BREAST IN FEMALE, ESTROGEN RECEPTOR POSITIVE (HCC): ICD-10-CM

## 2025-08-14 DIAGNOSIS — Z17.0 MALIGNANT NEOPLASM OF CENTRAL PORTION OF RIGHT BREAST IN FEMALE, ESTROGEN RECEPTOR POSITIVE (HCC): ICD-10-CM

## 2025-08-14 RX ORDER — LETROZOLE 2.5 MG/1
2.5 TABLET, FILM COATED ORAL DAILY
Qty: 90 TABLET | Refills: 3 | Status: SHIPPED | OUTPATIENT
Start: 2025-08-14

## 2025-08-21 ENCOUNTER — OFFICE VISIT (OUTPATIENT)
Age: 60
End: 2025-08-21
Payer: COMMERCIAL

## 2025-08-21 VITALS
WEIGHT: 252 LBS | BODY MASS INDEX: 43.02 KG/M2 | HEART RATE: 80 BPM | DIASTOLIC BLOOD PRESSURE: 77 MMHG | TEMPERATURE: 98.2 F | SYSTOLIC BLOOD PRESSURE: 113 MMHG | RESPIRATION RATE: 18 BRPM | HEIGHT: 64 IN | OXYGEN SATURATION: 97 %

## 2025-08-21 DIAGNOSIS — Z78.0 POST-MENOPAUSAL: ICD-10-CM

## 2025-08-21 DIAGNOSIS — Z17.0 MALIGNANT NEOPLASM OF CENTRAL PORTION OF RIGHT BREAST IN FEMALE, ESTROGEN RECEPTOR POSITIVE (HCC): Primary | ICD-10-CM

## 2025-08-21 DIAGNOSIS — M25.50 ARTHRALGIA, UNSPECIFIED JOINT: ICD-10-CM

## 2025-08-21 DIAGNOSIS — C50.111 MALIGNANT NEOPLASM OF CENTRAL PORTION OF RIGHT BREAST IN FEMALE, ESTROGEN RECEPTOR POSITIVE (HCC): Primary | ICD-10-CM

## 2025-08-21 PROCEDURE — 99214 OFFICE O/P EST MOD 30 MIN: CPT | Performed by: NURSE PRACTITIONER

## 2025-08-21 ASSESSMENT — PATIENT HEALTH QUESTIONNAIRE - PHQ9
1. LITTLE INTEREST OR PLEASURE IN DOING THINGS: NOT AT ALL
SUM OF ALL RESPONSES TO PHQ QUESTIONS 1-9: 0
2. FEELING DOWN, DEPRESSED OR HOPELESS: NOT AT ALL

## (undated) DEVICE — 3M™ TEGADERM™ TRANSPARENT FILM DRESSING FRAME STYLE, 1626, 4 IN X 4-3/4 IN (10 CM X 12 CM), 50/CT 4CT/CASE: Brand: 3M™ TEGADERM™

## (undated) DEVICE — REM POLYHESIVE ADULT PATIENT RETURN ELECTRODE: Brand: VALLEYLAB

## (undated) DEVICE — BANDAGE COBAN 4 IN COMPR W4INXL5YD FOAM COHESIVE QUIK STK SELF ADH SFT

## (undated) DEVICE — SUTURE PDS II SZ 2-0 L27IN ABSRB VLT SH L26MM 1/2 CIR Z317H

## (undated) DEVICE — ROCKER SWITCH PENCIL BLADE ELECTRODE, HOLSTER: Brand: EDGE

## (undated) DEVICE — DRAPE FLD WRM W44XL66IN C6L FOR INTRATEMP SYS THERMABASIN

## (undated) DEVICE — Device

## (undated) DEVICE — GLOVE ORANGE PI 7 1/2   MSG9075

## (undated) DEVICE — SPONGE GZ W4XL4IN COT 12 PLY TYP VII WVN C FLD DSGN

## (undated) DEVICE — FCPS BX HOT LG 2.2MMX240CM

## (undated) DEVICE — SOLIDIFIER MEDC 1200ML -- CONVERT TO 356117

## (undated) DEVICE — RESERVOIR,SUCTION,100CC,SILICONE: Brand: MEDLINE

## (undated) DEVICE — APPLIER CLP L9.375IN APER 2.1MM CLS L3.8MM 20 SM TI CLP

## (undated) DEVICE — AEGIS 1" DISK 4MM HOLE, PEEL OPEN: Brand: MEDLINE

## (undated) DEVICE — SUTURE VCRL + SZ 3-0 L18IN ABSRB UD SH 1/2 CIR TAPERCUT NDL VCP864D

## (undated) DEVICE — ELECTRODE NDL 2.8IN COAT VALLEYLAB

## (undated) DEVICE — SURGICEL ENDOSCP APPL

## (undated) DEVICE — CODMAN® SURGICAL PATTIES 3/4" X 3/4" (1.91CM X 1.91CM): Brand: CODMAN®

## (undated) DEVICE — BRA SURG POST-OP XL 46IN --

## (undated) DEVICE — GYN LAPAROSCOPY - SMH: Brand: MEDLINE INDUSTRIES, INC.

## (undated) DEVICE — SPONGE LAPAROTOMY W18XL18IN WHITE STRUNG RADIOPAQUE STERILE

## (undated) DEVICE — MARKER,SKIN,WI/RULER AND LABELS: Brand: MEDLINE

## (undated) DEVICE — CANISTER, RIGID, 3000CC: Brand: MEDLINE INDUSTRIES, INC.

## (undated) DEVICE — STAPLER SKIN H3.9MM WIRE DIA0.58MM CRWN 6.9MM 35 STPL ROT

## (undated) DEVICE — BASIN EMSIS 16OZ GRAPHITE PLAS KID SHP MOLD GRAD FOR ORAL

## (undated) DEVICE — FRAZIER SUCTION INSTRUMENT 7 FR W/CONTROL VENT & OBTURATOR: Brand: FRAZIER

## (undated) DEVICE — SUTURE MCRYL SZ 4-0 L27IN ABSRB UD L19MM PS-2 1/2 CIR PRIM Y426H

## (undated) DEVICE — TROCAR ENDOSCP L100MM DIA5MM BLDELSS STBL SL THRD OPT VW

## (undated) DEVICE — BAG SPEC BIOHZRD 10 X 10 IN --

## (undated) DEVICE — SUT ETHLN 3-0 18IN PS2 BLK --

## (undated) DEVICE — SYR 5ML 1/5 GRAD LL NSAF LF --

## (undated) DEVICE — SUTURE SZ 0 27IN 5/8 CIR UR-6  TAPER PT VIOLET ABSRB VICRYL J603H

## (undated) DEVICE — SUTURE VCRL SZ 3-0 L27IN ABSRB UD L26MM SH 1/2 CIR J416H

## (undated) DEVICE — GLOVE SURG SZ 65 THK91MIL LTX FREE SYN POLYISOPRENE

## (undated) DEVICE — DECANTER BAG 9": Brand: MEDLINE INDUSTRIES, INC.

## (undated) DEVICE — CHEST PACK-SFMCASU: Brand: MEDLINE INDUSTRIES, INC.

## (undated) DEVICE — SNARE ENDOSCP M L240CM W27MM SHTH DIA2.4MM CHN 2.8MM OVL

## (undated) DEVICE — SUTURE ABSORBABLE BRAIDED 2-0 CT-1 27 IN UD VICRYL J259H

## (undated) DEVICE — TUBING SUCT 10FR MAL ALUM SHFT FN CAP VENT UNIV CONN W/ OBT

## (undated) DEVICE — DRAPE MICSCP W46XL120IN FOR ZEISS MD FEATURING CLEARLENS

## (undated) DEVICE — KIT COLON W/ 1.1OZ LUB AND 2 END

## (undated) DEVICE — STRAP,POSITIONING,KNEE/BODY,FOAM,4X60": Brand: MEDLINE

## (undated) DEVICE — SOL INJ SOD CL 0.9% 1000ML -- NACL LIFECARE

## (undated) DEVICE — SPONGE LAP 18X18IN STRL -- 5/PK

## (undated) DEVICE — SUTURE MCRYL SZ 3-0 L27IN ABSRB UD L19MM PS-2 3/8 CIR PRIM Y427H

## (undated) DEVICE — COVER US PRB W15XL120CM W/ GEL RUBBERBAND TAPE STRP FLD GEN

## (undated) DEVICE — DRAIN SURG W10XL20CM SIL SMOOTH FLAT 3/4 PERF DBL WRP

## (undated) DEVICE — BLADE ASSEMB CLP HAIR FINE --

## (undated) DEVICE — BLADE ELECTRODE: Brand: EDGE

## (undated) DEVICE — EVACUATOR SURG 100CC SIL BLB SUCT RESVR FOR CLS WND DRNGE

## (undated) DEVICE — SUTURE MCRYL SZ 3-0 L27IN ABSRB UD PS-2 3/8 CIR REV CUT NDL MCP427H

## (undated) DEVICE — SYSTEM KIT FILL EXPAND TISSUE

## (undated) DEVICE — CATH IV AUTOGRD BC BLU 22GA 25 -- INSYTE

## (undated) DEVICE — INTENDED FOR TISSUE SEPARATION, AND OTHER PROCEDURES THAT REQUIRE A SHARP SURGICAL BLADE TO PUNCTURE OR CUT.: Brand: BARD-PARKER ® CARBON RIB-BACK BLADES

## (undated) DEVICE — SUTURE VCRL SZ 2-0 L27IN ABSRB UD L26MM SH 1/2 CIR J417H

## (undated) DEVICE — BLANKET WRM W35.4XL86.6IN FULL UNDERBODY + FORC AIR

## (undated) DEVICE — 3M™ TEGADERM™ TRANSPARENT FILM DRESSING FRAME STYLE, 1626W, 4 IN X 4-3/4 IN (10 CM X 12 CM), 50/CT 4CT/CASE: Brand: 3M™ TEGADERM™

## (undated) DEVICE — TOWEL SURG W17XL27IN STD BLU COT NONFENESTRATED PREWASHED

## (undated) DEVICE — BAG SPEC REM 224ML W4XL6IN DIA10MM 1 HND GYN DISP ENDOPCH

## (undated) DEVICE — Device: Brand: JELCO

## (undated) DEVICE — SUT PROL 0 30IN CT1 BLU --

## (undated) DEVICE — YANKAUER,BULB TIP,W/O VENT,RIGID,STERILE: Brand: MEDLINE

## (undated) DEVICE — UNIVERSAL DRAPE: Brand: MEDLINE INDUSTRIES, INC.

## (undated) DEVICE — PLASTICS -SFMC: Brand: MEDLINE INDUSTRIES, INC.

## (undated) DEVICE — 1LYRTR 16FR10ML100%SILTMPS SNP: Brand: MEDLINE INDUSTRIES, INC.

## (undated) DEVICE — HOOK RETRCT 12MM S STL BLNT E STAY LONE STAR

## (undated) DEVICE — SEALER TISS L37CM SHFT DIA5MM 360DEG ROT CVD JAW TAPR TIP

## (undated) DEVICE — 3M™ IOBAN™ 2 ANTIMICROBIAL INCISE DRAPE 6650EZ: Brand: IOBAN™ 2

## (undated) DEVICE — CONTAINER SPEC 20 ML LID NEUT BUFF FORMALIN 10 % POLYPR STS

## (undated) DEVICE — FORCEPS BX L240CM JAW DIA2.8MM L CAP W/ NDL MIC MESH TOOTH

## (undated) DEVICE — SUTURE NONABSORBABLE MONOFILAMENT 2-0 FS 18 IN ETHILON 664H

## (undated) DEVICE — BAG BELONG PT PERS CLEAR HANDL

## (undated) DEVICE — TROCAR ENDO STBL OPTVW 5X100MM --

## (undated) DEVICE — SOLUTION IRRIG 1000ML 0.9% SOD CHL USP POUR PLAS BTL

## (undated) DEVICE — KIT SURG PREP POVIDONE IOD PRESATURATED PAINT WET FOR UNIV

## (undated) DEVICE — PREMIUM WET SKIN PREP TRAY: Brand: MEDLINE INDUSTRIES, INC.

## (undated) DEVICE — 450 ML BOTTLE OF 0.05% CHLORHEXIDINE GLUCONATE IN 99.95% STERILE WATER FOR IRRIGATION, USP AND APPLICATOR.: Brand: IRRISEPT ANTIMICROBIAL WOUND LAVAGE

## (undated) DEVICE — SOLUTION SCRB 2OZ 10% POVIDONE IOD ANTIMIC BTL

## (undated) DEVICE — 1200 GUARD II KIT W/5MM TUBE W/O VAC TUBE: Brand: GUARDIAN

## (undated) DEVICE — SYSTEM EVAC SMOKE LAPARSCOPIC

## (undated) DEVICE — SYRINGE MED 10ML LUERLOCK TIP W/O SFTY DISP

## (undated) DEVICE — TRAP SUC MUCOUS 70ML -- MEDICHOICE MEDLINE

## (undated) DEVICE — KENDALL RADIOLUCENT FOAM MONITORING ELECTRODE -RECTANGULAR SHAPE: Brand: KENDALL

## (undated) DEVICE — GAUZE,SPONGE,FLUFF,6"X6.75",STRL,5/TRAY: Brand: MEDLINE

## (undated) DEVICE — SYR 50ML LR LCK 1ML GRAD NSAF --

## (undated) DEVICE — SYR 3ML LL TIP 1/10ML GRAD --

## (undated) DEVICE — PENCIL ES CRD L10FT HND SWCHING ROCK SWCH W/ EDGE COAT BLDE

## (undated) DEVICE — APPLIER LIG CLP M L11IN TI STR RNG HNDL FOR 20 CLP DISP

## (undated) DEVICE — GLOVE SURG SZ 7 L12IN FNGR THK79MIL GRN LTX FREE

## (undated) DEVICE — PAD PT POS 36 IN SURGYPAD DISP

## (undated) DEVICE — SUTURE VCRL SZ 2-0 L36IN ABSRB UD L36MM CT-1 1/2 CIR J945H

## (undated) DEVICE — SUTURE ETHLN SZ 9-0 L5IN NONABSORBABLE BLK BV130-5 L19MM 2809G

## (undated) DEVICE — HYPODERMIC SAFETY NEEDLE: Brand: MONOJECT

## (undated) DEVICE — MICROVASCULAR CLAMPS ARE USED FOR END-TO-END ANASTOMOTIC PROCEDURES FOR ARTERIES AND VEINS: Brand: GEM BIOVER MICROVASCULAR CLAMP

## (undated) DEVICE — SUT SLK 2-0SH 30IN BLK --

## (undated) DEVICE — 3M™ TEGADERM™ TRANSPARENT FILM DRESSING FRAME STYLE, 1624W, 2-3/8 IN X 2-3/4 IN (6 CM X 7 CM), 100/CT 4CT/CASE: Brand: 3M™ TEGADERM™

## (undated) DEVICE — COVER LT HNDL PLAS RIG 1 PER PK

## (undated) DEVICE — SUTURE MCRYL SZ 2-0 L27IN ABSRB UD SH L26MM TAPERPOINT NDL Y417H

## (undated) DEVICE — DRAIN SURG 19FR 0.25IN SIL RND W/ TRCR INDIC DOT RADPQ FULL

## (undated) DEVICE — DRAIN SURG 15FR SIL RND CHN W/ TRCR FULL FLUT DBL WRP TRAD

## (undated) DEVICE — SENSOR OXMTR PTCH TISS DISP

## (undated) DEVICE — SUTURE ABSRB L30CM 2-0 VLT SPRL PDS + STRATAFIX SXPP1B410

## (undated) DEVICE — TUBING, SUCTION, 1/4" X 10', STRAIGHT: Brand: MEDLINE

## (undated) DEVICE — TROCARS: Brand: KII® BALLOON BLUNT TIP SYSTEM

## (undated) DEVICE — SPONGE GZ W4XL4IN COT RADPQ HIGHLY ABSRB

## (undated) DEVICE — AGENT HEMSTAT 3GM OXIDIZED REGENERATED CELOS ABSRB FOR CONT (ORDER MULTIPLES OF 5EA)

## (undated) DEVICE — TOTAL TRAY, 16FR 10ML SIL FOLEY, URN: Brand: MEDLINE

## (undated) DEVICE — CONTAINER,SPECIMEN,3OZ,OR STRL: Brand: MEDLINE

## (undated) DEVICE — APPLICATOR MEDICATED 26 CC SOLUTION HI LT ORNG CHLORAPREP

## (undated) DEVICE — CORD ELECSURG BPLR 12 FT DISP 810T818750] ADLER INSTRUMENT CO]

## (undated) DEVICE — STRIP,CLOSURE,WOUND,MEDI-STRIP,1/2X4: Brand: MEDLINE

## (undated) DEVICE — SET ADMIN 16ML TBNG L100IN 2 Y INJ SITE IV PIGGY BK DISP

## (undated) DEVICE — NDL PRT INJ NSAF BLNT 18GX1.5 --

## (undated) DEVICE — GLOVE ORANGE PI 7   MSG9070

## (undated) DEVICE — ELECTRODE PT RET AD L9FT HI MOIST COND ADH HYDRGEL CORDED

## (undated) DEVICE — SYRINGE FLSH IV STD 10 CC W/O CANN PREFILLED NACL POSIFLUSH

## (undated) DEVICE — SUTURE STRATAFIX SZ 3-0 30CM NONABSORB UD 26MM FS 3/8 SXMP2B412

## (undated) DEVICE — BLADE ES ELASTOMERIC COAT INSUL DURABLE BEND UPTO 90DEG

## (undated) DEVICE — UNIVERSAL DRAPES: Brand: MEDLINE INDUSTRIES, INC.

## (undated) DEVICE — MATERIAL BKGRND W25XL50MM 1MM GRID LN BLU SIL RADPQ MERCIAN

## (undated) DEVICE — NDL FLTR TIP 5 MIC 18GX1.5IN --

## (undated) DEVICE — TOWEL OR BL STR 4/PK -- MEDICHOICE - MEDLINE

## (undated) DEVICE — CLOSURE SKIN ADH 3X0.5 IN N WOVEN POLYESTER

## (undated) DEVICE — WIPE 400300 MEROCEL 20PK INSTRUMENT: Brand: MEROCEL®